# Patient Record
Sex: MALE | Race: WHITE
[De-identification: names, ages, dates, MRNs, and addresses within clinical notes are randomized per-mention and may not be internally consistent; named-entity substitution may affect disease eponyms.]

---

## 2020-01-27 ENCOUNTER — HOSPITAL ENCOUNTER (OUTPATIENT)
Dept: HOSPITAL 47 - RADUSWWP | Age: 55
Discharge: HOME | End: 2020-01-27
Attending: INTERNAL MEDICINE
Payer: MEDICARE

## 2020-01-27 DIAGNOSIS — K80.20: Primary | ICD-10-CM

## 2020-01-27 PROCEDURE — 76705 ECHO EXAM OF ABDOMEN: CPT

## 2020-01-27 NOTE — US
EXAMINATION TYPE: US abdomen limited

 

DATE OF EXAM: 1/27/2020

 

COMPARISON: US 5/21/18

 

CLINICAL HISTORY: R74.8 Elevated liver enzymes.

 

EXAM MEASUREMENTS:

 

Liver Length:  17.7 cm cm   

Gallbladder Wall:  0.4 cm   

CBD:  0.5 cm

Right Kidney:  12.5 x 5.6 x 5.6 cm

 

 

 

Pancreas:  Within normal limits

Liver:  There is increased echogenicity of the hepatic parenchyma with diminished visualization of th
e portal triads most commonly relating to hepatic steatosis and limiting evaluation for underlying he
patic masses. 

Gallbladder:  Filled with gallstones.

**Evidence for sonographic Villarreal's sign:  No

CBD:  wnl 

Right Kidney: No hydronephrosis or nephrolithiasis

 

IMPRESSION: 

1. Cholelithiasis without sonographic evidence of acute cholecystitis.

2. Sonographic findings most commonly related to hepatic steatosis. Correlate with liver function wesley
ts.

## 2023-01-06 ENCOUNTER — HOSPITAL ENCOUNTER (INPATIENT)
Dept: HOSPITAL 47 - EC | Age: 58
LOS: 17 days | Discharge: HOME HEALTH | DRG: 870 | End: 2023-01-23
Attending: HOSPITALIST | Admitting: HOSPITALIST
Payer: MEDICARE

## 2023-01-06 VITALS — BODY MASS INDEX: 26.4 KG/M2

## 2023-01-06 DIAGNOSIS — Z87.820: ICD-10-CM

## 2023-01-06 DIAGNOSIS — G92.8: ICD-10-CM

## 2023-01-06 DIAGNOSIS — K92.2: ICD-10-CM

## 2023-01-06 DIAGNOSIS — G97.1: ICD-10-CM

## 2023-01-06 DIAGNOSIS — J98.11: ICD-10-CM

## 2023-01-06 DIAGNOSIS — Z79.899: ICD-10-CM

## 2023-01-06 DIAGNOSIS — I10: ICD-10-CM

## 2023-01-06 DIAGNOSIS — E78.5: ICD-10-CM

## 2023-01-06 DIAGNOSIS — N17.0: ICD-10-CM

## 2023-01-06 DIAGNOSIS — Z74.1: ICD-10-CM

## 2023-01-06 DIAGNOSIS — I25.10: ICD-10-CM

## 2023-01-06 DIAGNOSIS — Z91.81: ICD-10-CM

## 2023-01-06 DIAGNOSIS — E43: ICD-10-CM

## 2023-01-06 DIAGNOSIS — A41.01: Primary | ICD-10-CM

## 2023-01-06 DIAGNOSIS — D69.6: ICD-10-CM

## 2023-01-06 DIAGNOSIS — F10.239: ICD-10-CM

## 2023-01-06 DIAGNOSIS — E87.6: ICD-10-CM

## 2023-01-06 DIAGNOSIS — I65.29: ICD-10-CM

## 2023-01-06 DIAGNOSIS — J96.02: ICD-10-CM

## 2023-01-06 DIAGNOSIS — G04.90: ICD-10-CM

## 2023-01-06 DIAGNOSIS — E72.20: ICD-10-CM

## 2023-01-06 DIAGNOSIS — E86.0: ICD-10-CM

## 2023-01-06 DIAGNOSIS — Z88.0: ICD-10-CM

## 2023-01-06 DIAGNOSIS — Z91.14: ICD-10-CM

## 2023-01-06 DIAGNOSIS — J15.9: ICD-10-CM

## 2023-01-06 DIAGNOSIS — R57.1: ICD-10-CM

## 2023-01-06 DIAGNOSIS — E87.3: ICD-10-CM

## 2023-01-06 DIAGNOSIS — G93.41: ICD-10-CM

## 2023-01-06 DIAGNOSIS — E83.42: ICD-10-CM

## 2023-01-06 DIAGNOSIS — J96.01: ICD-10-CM

## 2023-01-06 DIAGNOSIS — R77.8: ICD-10-CM

## 2023-01-06 DIAGNOSIS — E83.39: ICD-10-CM

## 2023-01-06 DIAGNOSIS — Z71.3: ICD-10-CM

## 2023-01-06 DIAGNOSIS — E87.0: ICD-10-CM

## 2023-01-06 DIAGNOSIS — I48.0: ICD-10-CM

## 2023-01-06 DIAGNOSIS — Z28.21: ICD-10-CM

## 2023-01-06 LAB
ALBUMIN SERPL-MCNC: 3.9 G/DL (ref 3.5–5)
ALP SERPL-CCNC: 85 U/L (ref 38–126)
ALT SERPL-CCNC: 84 U/L (ref 4–49)
ANION GAP SERPL CALC-SCNC: 27 MMOL/L
APTT BLD: 23.5 SEC (ref 22–30)
AST SERPL-CCNC: 191 U/L (ref 17–59)
BASOPHILS # BLD AUTO: 0.1 K/UL (ref 0–0.2)
BASOPHILS NFR BLD AUTO: 1 %
BILIRUB UR QL STRIP.AUTO: (no result)
BUN SERPL-SCNC: 52 MG/DL (ref 9–20)
CALCIUM SPEC-MCNC: 7.8 MG/DL (ref 8.4–10.2)
CHLORIDE SERPL-SCNC: 98 MMOL/L (ref 98–107)
CO2 BLDA-SCNC: 18 MMOL/L (ref 19–24)
CO2 SERPL-SCNC: 16 MMOL/L (ref 22–30)
EOSINOPHIL # BLD AUTO: 0.1 K/UL (ref 0–0.7)
EOSINOPHIL NFR BLD AUTO: 1 %
ERYTHROCYTE [DISTWIDTH] IN BLOOD BY AUTOMATED COUNT: 5.95 M/UL (ref 4.3–5.9)
ERYTHROCYTE [DISTWIDTH] IN BLOOD: 13.3 % (ref 11.5–15.5)
GLUCOSE BLD-MCNC: 128 MG/DL (ref 70–110)
GLUCOSE SERPL-MCNC: 155 MG/DL (ref 74–99)
HCO3 BLDA-SCNC: 17 MMOL/L (ref 21–25)
HCO3 BLDV-SCNC: 17 MMOL/L (ref 24–28)
HCT VFR BLD AUTO: 57.3 % (ref 39–53)
HGB BLD-MCNC: 18.7 GM/DL (ref 13–17.5)
HYALINE CASTS UR QL AUTO: 218 /LPF (ref 0–2)
INR PPP: 1.4 (ref ?–1.2)
LACTATE BLDV-SCNC: 12.4 MMOL/L (ref 0.7–2)
LYMPHOCYTES # SPEC AUTO: 2.1 K/UL (ref 1–4.8)
LYMPHOCYTES NFR SPEC AUTO: 12 %
MCH RBC QN AUTO: 31.4 PG (ref 25–35)
MCHC RBC AUTO-ENTMCNC: 32.6 G/DL (ref 31–37)
MCV RBC AUTO: 96.4 FL (ref 80–100)
MIXED CELL CASTS UR QL COMP ASSIST: 4 /LPF
MONOCYTES # BLD AUTO: 0.7 K/UL (ref 0–1)
MONOCYTES NFR BLD AUTO: 4 %
NEUTROPHILS # BLD AUTO: 13.9 K/UL (ref 1.3–7.7)
NEUTROPHILS NFR BLD AUTO: 81 %
PCO2 BLDA: 50 MMHG (ref 35–45)
PCO2 BLDV: 54 MMHG (ref 37–51)
PH BLDA: 7.13 [PH] (ref 7.35–7.45)
PH BLDV: 7.12 [PH] (ref 7.31–7.41)
PH UR: 5.5 [PH] (ref 5–8)
PLATELET # BLD AUTO: 150 K/UL (ref 150–450)
PO2 BLDA: 240 MMHG (ref 83–108)
POTASSIUM SERPL-SCNC: 2.7 MMOL/L (ref 3.5–5.1)
PROT SERPL-MCNC: 6.9 G/DL (ref 6.3–8.2)
PROT UR QL: (no result)
PT BLD: 13.8 SEC (ref 9–12)
RBC UR QL: 8 /HPF (ref 0–5)
SODIUM SERPL-SCNC: 141 MMOL/L (ref 137–145)
SP GR UR: 1.03 (ref 1–1.03)
SQUAMOUS UR QL AUTO: 2 /HPF (ref 0–4)
UROBILINOGEN UR QL STRIP: 3 MG/DL (ref ?–2)
WBC # BLD AUTO: 17.2 K/UL (ref 3.8–10.6)
WBC # UR AUTO: 10 /HPF (ref 0–5)

## 2023-01-06 PROCEDURE — 87529 HSV DNA AMP PROBE: CPT

## 2023-01-06 PROCEDURE — 83735 ASSAY OF MAGNESIUM: CPT

## 2023-01-06 PROCEDURE — 95822 EEG COMA OR SLEEP ONLY: CPT

## 2023-01-06 PROCEDURE — 71260 CT THORAX DX C+: CPT

## 2023-01-06 PROCEDURE — 84132 ASSAY OF SERUM POTASSIUM: CPT

## 2023-01-06 PROCEDURE — 87798 DETECT AGENT NOS DNA AMP: CPT

## 2023-01-06 PROCEDURE — 94002 VENT MGMT INPAT INIT DAY: CPT

## 2023-01-06 PROCEDURE — 71045 X-RAY EXAM CHEST 1 VIEW: CPT

## 2023-01-06 PROCEDURE — 93005 ELECTROCARDIOGRAM TRACING: CPT

## 2023-01-06 PROCEDURE — 87498 ENTEROVIRUS PROBE&REVRS TRNS: CPT

## 2023-01-06 PROCEDURE — 82565 ASSAY OF CREATININE: CPT

## 2023-01-06 PROCEDURE — 96365 THER/PROPH/DIAG IV INF INIT: CPT

## 2023-01-06 PROCEDURE — 84157 ASSAY OF PROTEIN OTHER: CPT

## 2023-01-06 PROCEDURE — 5A1955Z RESPIRATORY VENTILATION, GREATER THAN 96 CONSECUTIVE HOURS: ICD-10-PCS

## 2023-01-06 PROCEDURE — 87496 CYTOMEG DNA AMP PROBE: CPT

## 2023-01-06 PROCEDURE — 93306 TTE W/DOPPLER COMPLETE: CPT

## 2023-01-06 PROCEDURE — 80185 ASSAY OF PHENYTOIN TOTAL: CPT

## 2023-01-06 PROCEDURE — 82607 VITAMIN B-12: CPT

## 2023-01-06 PROCEDURE — 83690 ASSAY OF LIPASE: CPT

## 2023-01-06 PROCEDURE — 80306 DRUG TEST PRSMV INSTRMNT: CPT

## 2023-01-06 PROCEDURE — 87070 CULTURE OTHR SPECIMN AEROBIC: CPT

## 2023-01-06 PROCEDURE — 84484 ASSAY OF TROPONIN QUANT: CPT

## 2023-01-06 PROCEDURE — 85610 PROTHROMBIN TIME: CPT

## 2023-01-06 PROCEDURE — 82746 ASSAY OF FOLIC ACID SERUM: CPT

## 2023-01-06 PROCEDURE — 95713 VEEG 2-12 HR CONT MNTR: CPT

## 2023-01-06 PROCEDURE — 80076 HEPATIC FUNCTION PANEL: CPT

## 2023-01-06 PROCEDURE — 82150 ASSAY OF AMYLASE: CPT

## 2023-01-06 PROCEDURE — 74177 CT ABD & PELVIS W/CONTRAST: CPT

## 2023-01-06 PROCEDURE — 71250 CT THORAX DX C-: CPT

## 2023-01-06 PROCEDURE — 36415 COLL VENOUS BLD VENIPUNCTURE: CPT

## 2023-01-06 PROCEDURE — 89050 BODY FLUID CELL COUNT: CPT

## 2023-01-06 PROCEDURE — 80202 ASSAY OF VANCOMYCIN: CPT

## 2023-01-06 PROCEDURE — 86140 C-REACTIVE PROTEIN: CPT

## 2023-01-06 PROCEDURE — 70498 CT ANGIOGRAPHY NECK: CPT

## 2023-01-06 PROCEDURE — 85730 THROMBOPLASTIN TIME PARTIAL: CPT

## 2023-01-06 PROCEDURE — 80320 DRUG SCREEN QUANTALCOHOLS: CPT

## 2023-01-06 PROCEDURE — 84100 ASSAY OF PHOSPHORUS: CPT

## 2023-01-06 PROCEDURE — 84443 ASSAY THYROID STIM HORMONE: CPT

## 2023-01-06 PROCEDURE — 94003 VENT MGMT INPAT SUBQ DAY: CPT

## 2023-01-06 PROCEDURE — 96368 THER/DIAG CONCURRENT INF: CPT

## 2023-01-06 PROCEDURE — 70450 CT HEAD/BRAIN W/O DYE: CPT

## 2023-01-06 PROCEDURE — 93970 EXTREMITY STUDY: CPT

## 2023-01-06 PROCEDURE — 5A09357 ASSISTANCE WITH RESPIRATORY VENTILATION, LESS THAN 24 CONSECUTIVE HOURS, CONTINUOUS POSITIVE AIRWAY PRESSURE: ICD-10-PCS

## 2023-01-06 PROCEDURE — 82272 OCCULT BLD FECES 1-3 TESTS: CPT

## 2023-01-06 PROCEDURE — 87077 CULTURE AEROBIC IDENTIFY: CPT

## 2023-01-06 PROCEDURE — 87186 SC STD MICRODIL/AGAR DIL: CPT

## 2023-01-06 PROCEDURE — 76937 US GUIDE VASCULAR ACCESS: CPT

## 2023-01-06 PROCEDURE — 82805 BLOOD GASES W/O2 SATURATION: CPT

## 2023-01-06 PROCEDURE — 85027 COMPLETE CBC AUTOMATED: CPT

## 2023-01-06 PROCEDURE — 31500 INSERT EMERGENCY AIRWAY: CPT

## 2023-01-06 PROCEDURE — 81001 URINALYSIS AUTO W/SCOPE: CPT

## 2023-01-06 PROCEDURE — 70553 MRI BRAIN STEM W/O & W/DYE: CPT

## 2023-01-06 PROCEDURE — 72131 CT LUMBAR SPINE W/O DYE: CPT

## 2023-01-06 PROCEDURE — 80053 COMPREHEN METABOLIC PANEL: CPT

## 2023-01-06 PROCEDURE — 94760 N-INVAS EAR/PLS OXIMETRY 1: CPT

## 2023-01-06 PROCEDURE — 5A2204Z RESTORATION OF CARDIAC RHYTHM, SINGLE: ICD-10-PCS

## 2023-01-06 PROCEDURE — 82945 GLUCOSE OTHER FLUID: CPT

## 2023-01-06 PROCEDURE — 80048 BASIC METABOLIC PNL TOTAL CA: CPT

## 2023-01-06 PROCEDURE — 70496 CT ANGIOGRAPHY HEAD: CPT

## 2023-01-06 PROCEDURE — 83605 ASSAY OF LACTIC ACID: CPT

## 2023-01-06 PROCEDURE — 84145 PROCALCITONIN (PCT): CPT

## 2023-01-06 PROCEDURE — 87252 VIRUS INOCULATION TISSUE: CPT

## 2023-01-06 PROCEDURE — 82803 BLOOD GASES ANY COMBINATION: CPT

## 2023-01-06 PROCEDURE — 76770 US EXAM ABDO BACK WALL COMP: CPT

## 2023-01-06 PROCEDURE — 99291 CRITICAL CARE FIRST HOUR: CPT

## 2023-01-06 PROCEDURE — 87324 CLOSTRIDIUM AG IA: CPT

## 2023-01-06 PROCEDURE — 87205 SMEAR GRAM STAIN: CPT

## 2023-01-06 PROCEDURE — 36410 VNPNXR 3YR/> PHY/QHP DX/THER: CPT

## 2023-01-06 PROCEDURE — 84439 ASSAY OF FREE THYROXINE: CPT

## 2023-01-06 PROCEDURE — 0BH17EZ INSERTION OF ENDOTRACHEAL AIRWAY INTO TRACHEA, VIA NATURAL OR ARTIFICIAL OPENING: ICD-10-PCS

## 2023-01-06 PROCEDURE — 85025 COMPLETE CBC W/AUTO DIFF WBC: CPT

## 2023-01-06 PROCEDURE — 96361 HYDRATE IV INFUSION ADD-ON: CPT

## 2023-01-06 PROCEDURE — 92960 CARDIOVERSION ELECTRIC EXT: CPT

## 2023-01-06 PROCEDURE — 82140 ASSAY OF AMMONIA: CPT

## 2023-01-06 PROCEDURE — 87040 BLOOD CULTURE FOR BACTERIA: CPT

## 2023-01-06 PROCEDURE — 95816 EEG AWAKE AND DROWSY: CPT

## 2023-01-06 PROCEDURE — 36600 WITHDRAWAL OF ARTERIAL BLOOD: CPT

## 2023-01-06 RX ADMIN — SODIUM CHLORIDE SCH MLS/HR: 900 INJECTION, SOLUTION INTRAVENOUS at 23:30

## 2023-01-06 RX ADMIN — MIDAZOLAM SCH MLS/HR: 5 INJECTION INTRAMUSCULAR; INTRAVENOUS at 23:29

## 2023-01-06 RX ADMIN — CEFAZOLIN SCH MLS/HR: 330 INJECTION, POWDER, FOR SOLUTION INTRAMUSCULAR; INTRAVENOUS at 23:05

## 2023-01-06 RX ADMIN — POTASSIUM CHLORIDE SCH MLS/HR: 7.46 INJECTION, SOLUTION INTRAVENOUS at 23:42

## 2023-01-06 NOTE — CT
EXAMINATION TYPE: CT angio head neck

 

DATE OF EXAM: 1/6/2023

 

COMPARISON: None

 

HISTORY: AMS

 

CT DLP: 551.5 mGycm

Automated exposure control for dose reduction was used.

 

CONTRAST: 

Performed with IV Contrast, patient injected with 65 mL of Isovue 370.

 

 

Images obtained from the aortic arch to the vertex of the brain with IV contrast.

 

Three-D postprocessed images.

 

There is arterial flow in the thoracic aorta and the aorta shows normal branching pattern. There is a
rterial flow in the subclavian arteries bilaterally. There is arterial flow in the common internal an
d external carotid arteries bilaterally. There is some plaque formation at the carotid bifurcations a
nd approximately 25% stenosis at the origin of the left internal carotid artery. There is arterial fl
ow in both vertebral arteries. There is arterial flow in the vertebrobasilar artery system. No mass e
ffect. No evidence of carotid or vertebral artery aneurysm or dissection.

 

There is arterial flow in the anterior middle and posterior cerebral arteries bilaterally. No evidenc
e of intracranial aneurysm or neovascularity. No evidence of intracranial hemodynamic arterial stenos
is. No pathologic enhancement. There is normal enhancement of the venous sinuses.

 

IMPRESSION:

Negative CT angiogram of the brain.

 

Mild plaque at the carotid artery bifurcations. No evidence of hemodynamic arterial stenosis in the n
shree.

## 2023-01-06 NOTE — ED
Altered Mental Status HPI





- General


Chief Complaint: Altered Mental Status


Stated Complaint: Altered Mental


Time Seen by Provider: 01/06/23 20:15


Source: EMS


Mode of arrival: EMS





- History of Present Illness


Initial Comments: 





57-year-old male with past medical history of alcohol abuse presents to the 

emergency department as a possible code stroke.  EMS states that the patient was

at home, acting his normal self around 7:30.  It is then reported that his 

roommate checked out him at 8 PM and found face first down in bed unresponsive. 

They found the patient to be hypotensive.  They required bagging him into the 

emergency department because of his agonal respirations.  It was reported that 

the patient is an alcoholic and was on a binge recently.  Daughter and friend 

arrived to the emergency department.  They state that the patient has not had 

anything to drink since the first.  He has had significant nausea and vomiting. 

They state that today he was vomiting until he could no longer stand and slumped

down.  This is when they called EMS.  The remainder of the HPI is limited due to

the patient's obtunded status





- Related Data


                                Home Medications











 Medication  Instructions  Recorded  Confirmed


 


Metoclopramide [Reglan] 5 mg PO QID PRN 01/07/23 01/07/23


 


Metoprolol Succinate (ER) [Toprol 50 mg PO DAILY 01/07/23 01/07/23





XL]   


 


Omeprazole [PriLOSEC] 20 mg PO DAILY 01/07/23 01/07/23


 


Potassium Chloride ER [K-Dur 20] 20 meq PO BID 01/07/23 01/07/23


 


Pravastatin Sodium [Pravachol] 40 mg PO DAILY 01/07/23 01/07/23


 


methocarbamoL [Methocarbamol] 750 mg PO TID PRN 01/07/23 01/07/23








                                  Previous Rx's











 Medication  Instructions  Recorded


 


Hydrocodone/Acetaminophen [Norco 2 each PO Q6HR PRN #20 tab 06/17/17





5-325]  











                                    Allergies











Allergy/AdvReac Type Severity Reaction Status Date / Time


 


Penicillins Allergy  Unknown Verified 01/07/23 12:59





   Childhood  














Review of Systems


ROS Statement: 


Those systems with pertinent positive or pertinent negative responses have been 

documented in the HPI.





ROS Other: All systems not noted in ROS Statement are negative.





Past Medical History


Past Medical History: Hypertension


History of Any Multi-Drug Resistant Organisms: None Reported


Past Surgical History: Appendectomy, Orthopedic Surgery, Tonsillectomy


Past Psychological History: No Psychological Hx Reported


Past Alcohol Use History: None Reported


Past Drug Use History: None Reported





- Past Family History


  ** family


Family Medical History: Cancer


Additional Family Medical History / Comment(s): no CAD





General Exam


Limitations: altered mental status


General appearance: obtunded


Head exam: Present: atraumatic, normocephalic, normal inspection


Pupils: Present: mydriatic


ENT exam: Present: mucous membranes dry


Neck exam: Present: normal inspection


Respiratory exam: Present: other (agonal breathing)


Cardiovascular Exam: Present: tachycardia, irregular rhythm


Neurological exam: Present: altered, other (will not follow commands. 

nonsensical speech. no lateralizing deficits appreaciated)


Psychiatric exam: Present: agitated


Skin exam: Present: diaphoretic, mottled





Course


                                   Vital Signs











  01/06/23 01/06/23 01/06/23





  20:16 20:26 20:36


 


Temperature 97.0 F L  


 


Pulse Rate 170 H 168 H 184 H


 


Respiratory 18 16 1 L





Rate   


 


Blood Pressure 97/80 87/69 95/45


 


O2 Sat by Pulse 90 L 89 L 





Oximetry   


 


Fraction of   





Inspired Oxygen   





(FIO2)   














  01/06/23 01/06/23 01/06/23





  20:37 20:40 20:41


 


Temperature   


 


Pulse Rate 203 H  197 H


 


Respiratory 7 L  18





Rate   


 


Blood Pressure 57/42  63/26


 


O2 Sat by Pulse   100





Oximetry   


 


Fraction of  100 





Inspired Oxygen   





(FIO2)   














  01/06/23 01/06/23 01/06/23





  20:45 21:14 21:17


 


Temperature   


 


Pulse Rate 110 H 104 H 


 


Respiratory 18 16 





Rate   


 


Blood Pressure 104/65 131/79 


 


O2 Sat by Pulse 100 99 





Oximetry   


 


Fraction of   100





Inspired Oxygen   





(FIO2)   














  01/06/23 01/06/23 01/06/23





  22:00 23:00 23:11


 


Temperature   


 


Pulse Rate 102 H 101 H 93


 


Respiratory 18 18 18





Rate   


 


Blood Pressure 130/84 80/56 108/93


 


O2 Sat by Pulse 98 98 100





Oximetry   


 


Fraction of 70  





Inspired Oxygen   





(FIO2)   














  01/07/23





  00:00


 


Temperature 


 


Pulse Rate 91


 


Respiratory 31 H





Rate 


 


Blood Pressure 114/87


 


O2 Sat by Pulse 98





Oximetry 


 


Fraction of 





Inspired Oxygen 





(FIO2) 














- Reevaluation(s)


Reevaluation #1: 


Spoke with Dr. Turner - will await stabilization of the patient and CT/Ct 

angiography


01/06/23 2030








Reevaluation #2: 


Patient intubated, cardioverted and now off to CT


01/06/23 20:52








Reevaluation #3: 





01/06/23 21:29


Spoke with Dr. Turner - will not administer TPA. No CVA seen on CT. Patient 

likely has other diagnosis to explain AMS. Risks outweigh benefits of TPA. 








Reevaluation #4: 


Spoke with Dr. Chavez, agrees to admit patient. 


01/06/23 23:16











Procedures





- Procedures


Initial comment: 


Synchronized cardioversion performed at 150J. Patient already intubated and 

sedated prior to procedure. Performed due to hemodynamic instability. No consent

obtained due to emergent nature. 





- ABG Interpretation


Ph: 7.1


PCO2: 50


PO2: 240


Bicarbonate: 16.5


Interpretation: metabolic acidosis





- Intubation


Sedative: Etomidate


Mg Given: 20


Paralytic: Rocuronium


Mg Given: 50


Laryngoscope: fiber optic video scope


Size: 3


ET Tube Size: 7


ET Tube Uncuffed: No


Tube Secured Depth (cm): 24


Tube Secured Location: lips


Tube Placement Confirmation: visualized tube passing through cords, equal breath

sounds bilaterally, no breath sounds over epigastrium, confirmation by 

capnometry


Patient Tolerated Procedure: well, no complications





Medical Decision Making





- Medical Decision Making





Was pt. sent in by a medical professional or institution?


no


Did you speak to anyone other than the patient for history?  


girlfriend, daughter, ems


Did you review nursing and triage notes? 


yes and I agree


Were old charts reviewed? 


yes in an attempt to gain history on the patient


Differential Diagnosis? 


MDM Differential Altered Mental Status:


Hypoglycemia, DKA, hypercapnia, ETOH, overdose, CO poisoning, trauma, myxedema 

coma, HTN encephalopathy, infection, encephalitis, psychosis, intercranial 

hemorrhage, hepatic encephalopathy, meningitis, CVA this is not meant to be an 

all-inclusive list





EKG interpreted by me (3pts min.)?


yes


X-rays interpreted by me (1pt min.)?


yes


CT interpreted by me (1pt min.)?


yes


U/S interpreted by me (1pt. min.)?


no


What testing was considered but not performed? (CT, X-rays, U/S, labs)? Why?


none


What meds were considered but not given? Why?


none


Did you discuss the management of the patient with other professionals?


intensivist and admitting physician


Did you reconcile home meds?


yes


Was smoking cessation discussed for >3mins.?


no


Was critical care preformed (if so, how long)?


48 minutes


Were there social determinants of health that impacted care today? How? 

(Homelessness, low income, unemployed, alcoholism, drug addiction, 

transportation, low edu. Level, literacy, decrease access to med. care, long term, 

rehab)?


alcoholism put the patient into withdrawal subsequently leading to ER visit


Was there de-escalation of care discussed even if they declined? (Discuss DNR or

withdrawal of care, Hospice)?


no


What co-morbidities impacted this encounter? (DM, HTN, Smoking, COPD, CAD, 

Cancer, CVA, Hep., AIDS, mental health diagnosis, sleep apnea, morbid obesity)?


alcohol abuse


Was patient admitted / discharged?


@Upon arrival the patient was promptly placed into trauma 2.  He is placed on 

continuous pulse ox and cardiac monitoring.  Vitals are obtained.  Patient does 

have a heart rate of 220.  Blood pressure is 50/30.  Patient has agonal 

respirations.  He does not follow any commands.  Do attempt to assess an NIH 

which is 21.  Code stroke was activated as it is reported to me that the patient

had sudden onset of altered mental status.  Due to the patient's inability to 

protect his airway, he is intubated.  At 7-Yoruba tube is secured 24 cm at the 

lip.  The patient is then cardioverted due to his hypotension and elevated heart

rate.  He does have conversion to a normal sinus rhythm with normal blood 

pressure.  He is given 3 L bolus of normal saline.  Patient taken for CT of his 

brain, CT angiography is head and neck, CT angios chest abdomen pelvis due to 

his altered mental status, hypotension, mottled extremities.  CTs are read as 

negative.  Did discuss the case with Dr. Turner.  Risk of alteplase 

administration is too high in regards to the benefits of treatment and therefore

this is not administered to the patient.  Laboratory studies reveal a potassium 

of 2.7.  Lactic of 12.4.  Troponin of 0.304.  Patient was originally placed on a

propofol drip however he does have a markedly decrease in his blood pressure 

with this medication.  This is removed and the patient is started on a Versed 

and fentanyl drip.  I did discuss the patient's care with Dr. Villanueva who will 

accept admission of the patient.  I also spoke with Dr. Chavez who accepted 

the patient into ICU.  He will be placed on 150 mL of normal saline per hour.  

He is on a Versed drip for alcohol withdrawal.  Cardiology and nephrology 

consulted.  Patient taken to the floor in stable condition with guarded p

rognosis


Undiagnosed new problem with uncertain prognosis?


yes


Drug Therapy requiring intensive monitoring for toxicity (Heparin, Nitro, 

Insulin, Cardizem)?


yes - propofol gtt which is later switched to fentanyl and versed gtt


Were any procedures done?


Intubation, synchronized cardioversion


Diagnosis/symptom?


encephalopathy with vent dependance


Acute, or Chronic, or Acute on Chronic?


acute


Uncomplicated (without systemic symptoms) or Complicated (systemic symptoms)?


complicated


Side effects of treatment?


possible death with intubation


Exacerbation, Progression, or Severe Exacerbation]


no


Poses a threat to life or bodily function?


yes


Diagnosis/symptom?


new onset afib with rvr


Acute, or Chronic, or Acute on Chronic?


acute


Uncomplicated (without systemic symptoms) or Complicated (systemic symptoms)?


complicated


Side effects of treatment?


possible death with cardioversion


Exacerbation, Progression, or Severe Exacerbation]


no


Poses a threat to life or bodily function?


yes


Diagnosis/symptom?


acute hypotension


Acute, or Chronic, or Acute on Chronic?


acute


Uncomplicated (without systemic symptoms) or Complicated (systemic symptoms)?


complicated


Side effects of treatment?


possible death with cardioversion


Exacerbation, Progression, or Severe Exacerbation]


no


Poses a threat to life or bodily function?


yes





- Lab Data


Result diagrams: 


                                 01/16/23 05:00





                                 01/16/23 15:10


                                   Lab Results











  01/06/23 01/06/23 01/06/23 Range/Units





  20:35 21:19 21:19 


 


WBC     (3.8-10.6)  k/uL


 


RBC     (4.30-5.90)  m/uL


 


Hgb     (13.0-17.5)  gm/dL


 


Hct     (39.0-53.0)  %


 


MCV     (80.0-100.0)  fL


 


MCH     (25.0-35.0)  pg


 


MCHC     (31.0-37.0)  g/dL


 


RDW     (11.5-15.5)  %


 


Plt Count     (150-450)  k/uL


 


MPV     


 


Neutrophils %     %


 


Lymphocytes %     %


 


Monocytes %     %


 


Eosinophils %     %


 


Basophils %     %


 


Neutrophils #     (1.3-7.7)  k/uL


 


Lymphocytes #     (1.0-4.8)  k/uL


 


Monocytes #     (0-1.0)  k/uL


 


Eosinophils #     (0-0.7)  k/uL


 


Basophils #     (0-0.2)  k/uL


 


PT     (9.0-12.0)  sec


 


INR     (<1.2)  


 


APTT     (22.0-30.0)  sec


 


Sample Site     


 


ABG pH     (7.35-7.45)  


 


ABG pCO2     (35-45)  mmHg


 


ABG pO2     ()  mmHg


 


ABG HCO3     (21-25)  mmol/L


 


ABG Total CO2     (19-24)  mmol/L


 


ABG O2 Saturation     (94-97)  %


 


ABG Base Excess     mmol/L


 


Hai Test     


 


VBG pH     (7.31-7.41)  


 


VBG pCO2     (37-51)  mmHg


 


VBG HCO3     (24-28)  mmol/L


 


FiO2     %


 


Sodium     (137-145)  mmol/L


 


Potassium     (3.5-5.1)  mmol/L


 


Chloride     ()  mmol/L


 


Carbon Dioxide     (22-30)  mmol/L


 


Anion Gap     mmol/L


 


BUN     (9-20)  mg/dL


 


Creatinine     (0.66-1.25)  mg/dL


 


Est GFR (CKD-EPI)AfAm     (>60 ml/min/1.73 sqM)  


 


Est GFR (CKD-EPI)NonAf     (>60 ml/min/1.73 sqM)  


 


Glucose     (74-99)  mg/dL


 


POC Glucose (mg/dL)  128 H    ()  mg/dL


 


POC Glu Operater ID  Warren Buchanan    


 


Lactic Ac Sepsis Rflx     


 


Plasma Lactic Acid Jose R     (0.7-2.0)  mmol/L


 


Calcium     (8.4-10.2)  mg/dL


 


Total Bilirubin     (0.2-1.3)  mg/dL


 


AST     (17-59)  U/L


 


ALT     (4-49)  U/L


 


Alkaline Phosphatase     ()  U/L


 


Ammonia     (<30)  umol/L


 


Troponin I     (0.000-0.034)  ng/mL


 


Total Protein     (6.3-8.2)  g/dL


 


Albumin     (3.5-5.0)  g/dL


 


Urine Color    Dark Yellow  


 


Urine Appearance    Cloudy  (Clear)  


 


Urine pH    5.5  (5.0-8.0)  


 


Ur Specific Gravity    1.034  (1.001-1.035)  


 


Urine Protein    2+ H  (Negative)  


 


Urine Glucose (UA)    Trace H  (Negative)  


 


Urine Ketones    Negative  (Negative)  


 


Urine Blood    Moderate H  (Negative)  


 


Urine Nitrite    Negative  (Negative)  


 


Urine Bilirubin    1+ H  (Negative)  


 


Urine Urobilinogen    3.0  (<2.0)  mg/dL


 


Ur Leukocyte Esterase    Negative  (Negative)  


 


Urine RBC    8 H  (0-5)  /hpf


 


Urine WBC    10 H  (0-5)  /hpf


 


Ur Squamous Epith Cells    2  (0-4)  /hpf


 


Cellular Casts    4  (0)  /lpf


 


Hyaline Casts    218 H  (0-2)  /lpf


 


Urine Mucus    Many H  (None)  /hpf


 


Urine Opiates Screen   Not Detected   (NotDetected)  


 


Ur Oxycodone Screen   Not Detected   (NotDetected)  


 


Urine Methadone Screen   Not Detected   (NotDetected)  


 


Ur Propoxyphene Screen   Not Detected   (NotDetected)  


 


Ur Barbiturates Screen   Not Detected   (NotDetected)  


 


U Tricyclic Antidepress   Not Detected   (NotDetected)  


 


Ur Phencyclidine Scrn   Not Detected   (NotDetected)  


 


Ur Amphetamines Screen   Not Detected   (NotDetected)  


 


U Methamphetamines Scrn   Not Detected   (NotDetected)  


 


U Benzodiazepines Scrn   Detected H   (NotDetected)  


 


Urine Cocaine Screen   Not Detected   (NotDetected)  


 


U Marijuana (THC) Screen   Detected H   (NotDetected)  


 


Serum Alcohol     mg/dL














  01/06/23 01/06/23 01/06/23 Range/Units





  21:43 22:00 22:00 


 


WBC    17.2 H  (3.8-10.6)  k/uL


 


RBC    5.95 H  (4.30-5.90)  m/uL


 


Hgb    18.7 H  (13.0-17.5)  gm/dL


 


Hct    57.3 H*  (39.0-53.0)  %


 


MCV    96.4  (80.0-100.0)  fL


 


MCH    31.4  (25.0-35.0)  pg


 


MCHC    32.6  (31.0-37.0)  g/dL


 


RDW    13.3  (11.5-15.5)  %


 


Plt Count    150  (150-450)  k/uL


 


MPV    10.4  


 


Neutrophils %    81  %


 


Lymphocytes %    12  %


 


Monocytes %    4  %


 


Eosinophils %    1  %


 


Basophils %    1  %


 


Neutrophils #    13.9 H  (1.3-7.7)  k/uL


 


Lymphocytes #    2.1  (1.0-4.8)  k/uL


 


Monocytes #    0.7  (0-1.0)  k/uL


 


Eosinophils #    0.1  (0-0.7)  k/uL


 


Basophils #    0.1  (0-0.2)  k/uL


 


PT     (9.0-12.0)  sec


 


INR     (<1.2)  


 


APTT     (22.0-30.0)  sec


 


Sample Site  Right Radial    


 


ABG pH  7.13 L*    (7.35-7.45)  


 


ABG pCO2  50 H    (35-45)  mmHg


 


ABG pO2  240 H    ()  mmHg


 


ABG HCO3  17 L    (21-25)  mmol/L


 


ABG Total CO2  18 L    (19-24)  mmol/L


 


ABG O2 Saturation  98.8 H    (94-97)  %


 


ABG Base Excess  -12.7    mmol/L


 


Hai Test  Yes    


 


VBG pH   7.12 L*   (7.31-7.41)  


 


VBG pCO2   54 H   (37-51)  mmHg


 


VBG HCO3   17 L   (24-28)  mmol/L


 


FiO2  100    %


 


Sodium     (137-145)  mmol/L


 


Potassium     (3.5-5.1)  mmol/L


 


Chloride     ()  mmol/L


 


Carbon Dioxide     (22-30)  mmol/L


 


Anion Gap     mmol/L


 


BUN     (9-20)  mg/dL


 


Creatinine     (0.66-1.25)  mg/dL


 


Est GFR (CKD-EPI)AfAm     (>60 ml/min/1.73 sqM)  


 


Est GFR (CKD-EPI)NonAf     (>60 ml/min/1.73 sqM)  


 


Glucose     (74-99)  mg/dL


 


POC Glucose (mg/dL)     ()  mg/dL


 


POC Glu Operater ID     


 


Lactic Ac Sepsis Rflx     


 


Plasma Lactic Acid Jose R     (0.7-2.0)  mmol/L


 


Calcium     (8.4-10.2)  mg/dL


 


Total Bilirubin     (0.2-1.3)  mg/dL


 


AST     (17-59)  U/L


 


ALT     (4-49)  U/L


 


Alkaline Phosphatase     ()  U/L


 


Ammonia     (<30)  umol/L


 


Troponin I     (0.000-0.034)  ng/mL


 


Total Protein     (6.3-8.2)  g/dL


 


Albumin     (3.5-5.0)  g/dL


 


Urine Color     


 


Urine Appearance     (Clear)  


 


Urine pH     (5.0-8.0)  


 


Ur Specific Gravity     (1.001-1.035)  


 


Urine Protein     (Negative)  


 


Urine Glucose (UA)     (Negative)  


 


Urine Ketones     (Negative)  


 


Urine Blood     (Negative)  


 


Urine Nitrite     (Negative)  


 


Urine Bilirubin     (Negative)  


 


Urine Urobilinogen     (<2.0)  mg/dL


 


Ur Leukocyte Esterase     (Negative)  


 


Urine RBC     (0-5)  /hpf


 


Urine WBC     (0-5)  /hpf


 


Ur Squamous Epith Cells     (0-4)  /hpf


 


Cellular Casts     (0)  /lpf


 


Hyaline Casts     (0-2)  /lpf


 


Urine Mucus     (None)  /hpf


 


Urine Opiates Screen     (NotDetected)  


 


Ur Oxycodone Screen     (NotDetected)  


 


Urine Methadone Screen     (NotDetected)  


 


Ur Propoxyphene Screen     (NotDetected)  


 


Ur Barbiturates Screen     (NotDetected)  


 


U Tricyclic Antidepress     (NotDetected)  


 


Ur Phencyclidine Scrn     (NotDetected)  


 


Ur Amphetamines Screen     (NotDetected)  


 


U Methamphetamines Scrn     (NotDetected)  


 


U Benzodiazepines Scrn     (NotDetected)  


 


Urine Cocaine Screen     (NotDetected)  


 


U Marijuana (THC) Screen     (NotDetected)  


 


Serum Alcohol     mg/dL














  01/06/23 01/06/23 01/06/23 Range/Units





  22:00 22:00 22:00 


 


WBC     (3.8-10.6)  k/uL


 


RBC     (4.30-5.90)  m/uL


 


Hgb     (13.0-17.5)  gm/dL


 


Hct     (39.0-53.0)  %


 


MCV     (80.0-100.0)  fL


 


MCH     (25.0-35.0)  pg


 


MCHC     (31.0-37.0)  g/dL


 


RDW     (11.5-15.5)  %


 


Plt Count     (150-450)  k/uL


 


MPV     


 


Neutrophils %     %


 


Lymphocytes %     %


 


Monocytes %     %


 


Eosinophils %     %


 


Basophils %     %


 


Neutrophils #     (1.3-7.7)  k/uL


 


Lymphocytes #     (1.0-4.8)  k/uL


 


Monocytes #     (0-1.0)  k/uL


 


Eosinophils #     (0-0.7)  k/uL


 


Basophils #     (0-0.2)  k/uL


 


PT  13.8 H    (9.0-12.0)  sec


 


INR  1.4 H    (<1.2)  


 


APTT  23.5    (22.0-30.0)  sec


 


Sample Site     


 


ABG pH     (7.35-7.45)  


 


ABG pCO2     (35-45)  mmHg


 


ABG pO2     ()  mmHg


 


ABG HCO3     (21-25)  mmol/L


 


ABG Total CO2     (19-24)  mmol/L


 


ABG O2 Saturation     (94-97)  %


 


ABG Base Excess     mmol/L


 


Hai Test     


 


VBG pH     (7.31-7.41)  


 


VBG pCO2     (37-51)  mmHg


 


VBG HCO3     (24-28)  mmol/L


 


FiO2     %


 


Sodium    141  (137-145)  mmol/L


 


Potassium    2.7 L*  (3.5-5.1)  mmol/L


 


Chloride    98  ()  mmol/L


 


Carbon Dioxide    16 L  (22-30)  mmol/L


 


Anion Gap    27  mmol/L


 


BUN    52 H  (9-20)  mg/dL


 


Creatinine    3.66 H  (0.66-1.25)  mg/dL


 


Est GFR (CKD-EPI)AfAm    20  (>60 ml/min/1.73 sqM)  


 


Est GFR (CKD-EPI)NonAf    17  (>60 ml/min/1.73 sqM)  


 


Glucose    155 H  (74-99)  mg/dL


 


POC Glucose (mg/dL)     ()  mg/dL


 


POC Glu Operater ID     


 


Lactic Ac Sepsis Rflx     


 


Plasma Lactic Acid Jose R     (0.7-2.0)  mmol/L


 


Calcium    7.8 L  (8.4-10.2)  mg/dL


 


Total Bilirubin    3.7 H  (0.2-1.3)  mg/dL


 


AST    191 H  (17-59)  U/L


 


ALT    84 H  (4-49)  U/L


 


Alkaline Phosphatase    85  ()  U/L


 


Ammonia     (<30)  umol/L


 


Troponin I   0.304 H*   (0.000-0.034)  ng/mL


 


Total Protein    6.9  (6.3-8.2)  g/dL


 


Albumin    3.9  (3.5-5.0)  g/dL


 


Urine Color     


 


Urine Appearance     (Clear)  


 


Urine pH     (5.0-8.0)  


 


Ur Specific Gravity     (1.001-1.035)  


 


Urine Protein     (Negative)  


 


Urine Glucose (UA)     (Negative)  


 


Urine Ketones     (Negative)  


 


Urine Blood     (Negative)  


 


Urine Nitrite     (Negative)  


 


Urine Bilirubin     (Negative)  


 


Urine Urobilinogen     (<2.0)  mg/dL


 


Ur Leukocyte Esterase     (Negative)  


 


Urine RBC     (0-5)  /hpf


 


Urine WBC     (0-5)  /hpf


 


Ur Squamous Epith Cells     (0-4)  /hpf


 


Cellular Casts     (0)  /lpf


 


Hyaline Casts     (0-2)  /lpf


 


Urine Mucus     (None)  /hpf


 


Urine Opiates Screen     (NotDetected)  


 


Ur Oxycodone Screen     (NotDetected)  


 


Urine Methadone Screen     (NotDetected)  


 


Ur Propoxyphene Screen     (NotDetected)  


 


Ur Barbiturates Screen     (NotDetected)  


 


U Tricyclic Antidepress     (NotDetected)  


 


Ur Phencyclidine Scrn     (NotDetected)  


 


Ur Amphetamines Screen     (NotDetected)  


 


U Methamphetamines Scrn     (NotDetected)  


 


U Benzodiazepines Scrn     (NotDetected)  


 


Urine Cocaine Screen     (NotDetected)  


 


U Marijuana (THC) Screen     (NotDetected)  


 


Serum Alcohol    <10  mg/dL














  01/06/23 01/06/23 Range/Units





  22:00 22:49 


 


WBC    (3.8-10.6)  k/uL


 


RBC    (4.30-5.90)  m/uL


 


Hgb    (13.0-17.5)  gm/dL


 


Hct    (39.0-53.0)  %


 


MCV    (80.0-100.0)  fL


 


MCH    (25.0-35.0)  pg


 


MCHC    (31.0-37.0)  g/dL


 


RDW    (11.5-15.5)  %


 


Plt Count    (150-450)  k/uL


 


MPV    


 


Neutrophils %    %


 


Lymphocytes %    %


 


Monocytes %    %


 


Eosinophils %    %


 


Basophils %    %


 


Neutrophils #    (1.3-7.7)  k/uL


 


Lymphocytes #    (1.0-4.8)  k/uL


 


Monocytes #    (0-1.0)  k/uL


 


Eosinophils #    (0-0.7)  k/uL


 


Basophils #    (0-0.2)  k/uL


 


PT    (9.0-12.0)  sec


 


INR    (<1.2)  


 


APTT    (22.0-30.0)  sec


 


Sample Site    


 


ABG pH    (7.35-7.45)  


 


ABG pCO2    (35-45)  mmHg


 


ABG pO2    ()  mmHg


 


ABG HCO3    (21-25)  mmol/L


 


ABG Total CO2    (19-24)  mmol/L


 


ABG O2 Saturation    (94-97)  %


 


ABG Base Excess    mmol/L


 


Hai Test    


 


VBG pH    (7.31-7.41)  


 


VBG pCO2    (37-51)  mmHg


 


VBG HCO3    (24-28)  mmol/L


 


FiO2    %


 


Sodium    (137-145)  mmol/L


 


Potassium    (3.5-5.1)  mmol/L


 


Chloride    ()  mmol/L


 


Carbon Dioxide    (22-30)  mmol/L


 


Anion Gap    mmol/L


 


BUN    (9-20)  mg/dL


 


Creatinine    (0.66-1.25)  mg/dL


 


Est GFR (CKD-EPI)AfAm    (>60 ml/min/1.73 sqM)  


 


Est GFR (CKD-EPI)NonAf    (>60 ml/min/1.73 sqM)  


 


Glucose    (74-99)  mg/dL


 


POC Glucose (mg/dL)    ()  mg/dL


 


POC Glu Operater ID    


 


Lactic Ac Sepsis Rflx   Y  


 


Plasma Lactic Acid Jose R  12.4 H*   (0.7-2.0)  mmol/L


 


Calcium    (8.4-10.2)  mg/dL


 


Total Bilirubin    (0.2-1.3)  mg/dL


 


AST    (17-59)  U/L


 


ALT    (4-49)  U/L


 


Alkaline Phosphatase    ()  U/L


 


Ammonia  81 H   (<30)  umol/L


 


Troponin I    (0.000-0.034)  ng/mL


 


Total Protein    (6.3-8.2)  g/dL


 


Albumin    (3.5-5.0)  g/dL


 


Urine Color    


 


Urine Appearance    (Clear)  


 


Urine pH    (5.0-8.0)  


 


Ur Specific Gravity    (1.001-1.035)  


 


Urine Protein    (Negative)  


 


Urine Glucose (UA)    (Negative)  


 


Urine Ketones    (Negative)  


 


Urine Blood    (Negative)  


 


Urine Nitrite    (Negative)  


 


Urine Bilirubin    (Negative)  


 


Urine Urobilinogen    (<2.0)  mg/dL


 


Ur Leukocyte Esterase    (Negative)  


 


Urine RBC    (0-5)  /hpf


 


Urine WBC    (0-5)  /hpf


 


Ur Squamous Epith Cells    (0-4)  /hpf


 


Cellular Casts    (0)  /lpf


 


Hyaline Casts    (0-2)  /lpf


 


Urine Mucus    (None)  /hpf


 


Urine Opiates Screen    (NotDetected)  


 


Ur Oxycodone Screen    (NotDetected)  


 


Urine Methadone Screen    (NotDetected)  


 


Ur Propoxyphene Screen    (NotDetected)  


 


Ur Barbiturates Screen    (NotDetected)  


 


U Tricyclic Antidepress    (NotDetected)  


 


Ur Phencyclidine Scrn    (NotDetected)  


 


Ur Amphetamines Screen    (NotDetected)  


 


U Methamphetamines Scrn    (NotDetected)  


 


U Benzodiazepines Scrn    (NotDetected)  


 


Urine Cocaine Screen    (NotDetected)  


 


U Marijuana (THC) Screen    (NotDetected)  


 


Serum Alcohol    mg/dL














- EKG Data


EKG Comments: 


EKG done at 2036 demonstrates A. fib with a rapid rate.  Rate of 217.  . 

QTC of 331.  There is ST elevation aVR.  ST depression in 2, 3, aVF, V3 through 

V6





Repeat EKG done at 2043 demonstrates sinus tachycardia with a rate of 109.  HI 

interval 137.  .  .  ST depression 2, 3, aVF and V4 through V6.  M

ild aVR elevation








Critical Care Time


Critical Care Time: Yes


Critical Care Time: 





48 minutes





Disposition


Clinical Impression: 


 Ventilator dependent, Hypokalemia, Atrial fibrillation with RVR, Elevated 

troponin, Lactic acidosis, Kidney failure, Hypotension, Alcohol withdrawal





Disposition: ADMITTED AS IP TO THIS [facility]


Condition: Serious


Is patient prescribed a controlled substance at d/c from ED?: No


Time of Disposition: 23:16


Decision to Admit Reason: Admit from EC


Decision Date: 01/06/23


Decision Time: 23:16

## 2023-01-06 NOTE — CT
EXAMINATION TYPE: CT brain wo con

 

DATE OF EXAM: 1/6/2023

 

COMPARISON: 6/17/2017

 

HISTORY: AMS. CODE STROKE

 

CT DLP: 1126.4 mGycm

Automated exposure control for dose reduction was used.

 

Images obtained of the brain with no contrast.

 

There is cerebral cortical atrophy. There is no mass effect or midline shift. No sign of intracranial
 hemorrhage. There is previous left frontal craniotomy defect. There is left temporal parietal cranio
freya defect. There is normal aeration of the mastoid sinuses.

 

IMPRESSION:

Mild cerebral atrophy. No acute intracranial abnormality. No adverse change compared to old exam.

## 2023-01-06 NOTE — XR
EXAMINATION TYPE: XR chest 1V

 

DATE OF EXAM: 1/6/2023

 

COMPARISON: NONE

 

HISTORY: Altered mental status

 

TECHNIQUE: Single view

 

FINDINGS: Endotracheal tube is 7 cm from the jossy. There is nasogastric tube in the stomach. The nimo
ngs are clear of infiltrate. No heart failure. Heart size is normal. There are chest leads.

 

IMPRESSION: No active cardiopulmonary disease. Normal heart.

## 2023-01-06 NOTE — CT
EXAMINATION TYPE: CT ChestAbdPelvis w con

 

DATE OF EXAM: 1/6/2023

 

COMPARISON: None

 

HISTORY: AMS

 

CT DLP: 1260 mGycm

Automated exposure control for dose reduction was used.

 

CONTRAST: 

Performed with IV Contrast, patient injected with 75 mL of Isovue 370.

 

Images obtained from the thoracic inlet to the floor of the pelvis with the IV contrast.

 

There is endotracheal tube. There is no mediastinal adenopathy. Thoracic aorta is intact. There are n
o hilar masses. Heart size is normal. There is some mild subsegmental atelectasis right posterior rochelle
g base. No pleural effusion. No pericardial effusion. There is some coronary artery calcification.

 

There are clips from cholecystectomy. Liver and spleen are intact. No pancreatic mass. There is diste
nded gas and fluid-filled stomach.

 

There is no adrenal mass. Kidneys show satisfactory contrast opacification. No hydronephrosis. Delaye
d images show normal renal excretion. No retroperitoneal adenopathy. The bladder distends smoothly. N
o inguinal hernia. No free fluid in the pelvis.

 

There is no mesenteric edema. No ascites or free air. No sign of a bowel obstruction. Terminal ileum 
appears normal.  There is small short appendix.

 

The thoracic and lumbar vertebra. Intact. There is mild spondylotic changes in the mid and lower lumb
ar spine. No compression fracture. The sternum is intact. The bony pelvis is intact. The hip joints a
re intact.

 

IMPRESSION:

No significant abnormality of the chest abdomen pelvis.

## 2023-01-07 LAB
AMYLASE SERPL-CCNC: 416 U/L (ref 30–110)
ANION GAP SERPL CALC-SCNC: 18 MMOL/L
BASOPHILS # BLD AUTO: 0.2 K/UL (ref 0–0.2)
BASOPHILS NFR BLD AUTO: 1 %
BUN SERPL-SCNC: 61 MG/DL (ref 9–20)
CALCIUM SPEC-MCNC: 7.3 MG/DL (ref 8.4–10.2)
CHLORIDE SERPL-SCNC: 109 MMOL/L (ref 98–107)
CO2 BLDA-SCNC: 23 MMOL/L (ref 19–24)
CO2 SERPL-SCNC: 16 MMOL/L (ref 22–30)
EOSINOPHIL # BLD AUTO: 0 K/UL (ref 0–0.7)
EOSINOPHIL NFR BLD AUTO: 0 %
ERYTHROCYTE [DISTWIDTH] IN BLOOD BY AUTOMATED COUNT: 6.22 M/UL (ref 4.3–5.9)
ERYTHROCYTE [DISTWIDTH] IN BLOOD: 13.6 % (ref 11.5–15.5)
GLUCOSE BLD-MCNC: 130 MG/DL (ref 70–110)
GLUCOSE BLD-MCNC: 140 MG/DL (ref 70–110)
GLUCOSE BLD-MCNC: 144 MG/DL (ref 70–110)
GLUCOSE BLD-MCNC: 153 MG/DL (ref 70–110)
GLUCOSE SERPL-MCNC: 112 MG/DL (ref 74–99)
HCO3 BLDA-SCNC: 22 MMOL/L (ref 21–25)
HCT VFR BLD AUTO: 59.1 % (ref 39–53)
HGB BLD-MCNC: 19.5 GM/DL (ref 13–17.5)
INR PPP: 1.2 (ref ?–1.2)
LACTATE BLDV-SCNC: 5.3 MMOL/L (ref 0.7–2)
LIPASE SERPL-CCNC: 590 U/L (ref 23–300)
LYMPHOCYTES # SPEC AUTO: 2.9 K/UL (ref 1–4.8)
LYMPHOCYTES NFR SPEC AUTO: 13 %
MAGNESIUM SPEC-SCNC: 2.1 MG/DL (ref 1.6–2.3)
MAGNESIUM SPEC-SCNC: 2.3 MG/DL (ref 1.6–2.3)
MCH RBC QN AUTO: 31.4 PG (ref 25–35)
MCHC RBC AUTO-ENTMCNC: 33 G/DL (ref 31–37)
MCV RBC AUTO: 95 FL (ref 80–100)
MONOCYTES # BLD AUTO: 1.7 K/UL (ref 0–1)
MONOCYTES NFR BLD AUTO: 7 %
NEUTROPHILS # BLD AUTO: 17.3 K/UL (ref 1.3–7.7)
NEUTROPHILS NFR BLD AUTO: 76 %
PCO2 BLDA: 43 MMHG (ref 35–45)
PH BLDA: 7.31 [PH] (ref 7.35–7.45)
PLATELET # BLD AUTO: 134 K/UL (ref 150–450)
PO2 BLDA: 203 MMHG (ref 83–108)
POTASSIUM SERPL-SCNC: 4.2 MMOL/L (ref 3.5–5.1)
PT BLD: 12.8 SEC (ref 9–12)
SODIUM SERPL-SCNC: 143 MMOL/L (ref 137–145)
WBC # BLD AUTO: 22.9 K/UL (ref 3.8–10.6)

## 2023-01-07 PROCEDURE — 0DH67UZ INSERTION OF FEEDING DEVICE INTO STOMACH, VIA NATURAL OR ARTIFICIAL OPENING: ICD-10-PCS

## 2023-01-07 PROCEDURE — 3E0G76Z INTRODUCTION OF NUTRITIONAL SUBSTANCE INTO UPPER GI, VIA NATURAL OR ARTIFICIAL OPENING: ICD-10-PCS

## 2023-01-07 RX ADMIN — NOREPINEPHRINE BITARTRATE SCH: 1 INJECTION, SOLUTION, CONCENTRATE INTRAVENOUS at 02:42

## 2023-01-07 RX ADMIN — PANTOPRAZOLE SODIUM SCH MG: 40 INJECTION, POWDER, FOR SOLUTION INTRAVENOUS at 09:48

## 2023-01-07 RX ADMIN — SODIUM CHLORIDE SCH MLS/HR: 900 INJECTION, SOLUTION INTRAVENOUS at 09:46

## 2023-01-07 RX ADMIN — CEFAZOLIN SCH: 330 INJECTION, POWDER, FOR SOLUTION INTRAMUSCULAR; INTRAVENOUS at 16:11

## 2023-01-07 RX ADMIN — CHLORHEXIDINE GLUCONATE SCH ML: 1.2 RINSE ORAL at 21:47

## 2023-01-07 RX ADMIN — PANTOPRAZOLE SODIUM SCH MG: 40 INJECTION, POWDER, FOR SOLUTION INTRAVENOUS at 21:47

## 2023-01-07 RX ADMIN — CHLORHEXIDINE GLUCONATE SCH ML: 1.2 RINSE ORAL at 09:48

## 2023-01-07 RX ADMIN — POTASSIUM CHLORIDE SCH MLS/HR: 7.46 INJECTION, SOLUTION INTRAVENOUS at 02:40

## 2023-01-07 RX ADMIN — MIDAZOLAM SCH MLS/HR: 5 INJECTION INTRAMUSCULAR; INTRAVENOUS at 18:43

## 2023-01-07 RX ADMIN — POTASSIUM CHLORIDE SCH MLS/HR: 14.9 INJECTION, SOLUTION INTRAVENOUS at 09:08

## 2023-01-07 RX ADMIN — POTASSIUM CHLORIDE SCH MLS/HR: 7.46 INJECTION, SOLUTION INTRAVENOUS at 03:53

## 2023-01-07 RX ADMIN — SODIUM CHLORIDE SCH MLS/HR: 900 INJECTION, SOLUTION INTRAVENOUS at 10:59

## 2023-01-07 RX ADMIN — ENOXAPARIN SODIUM SCH: 40 INJECTION SUBCUTANEOUS at 09:21

## 2023-01-07 RX ADMIN — SODIUM CHLORIDE SCH MLS/HR: 900 INJECTION, SOLUTION INTRAVENOUS at 12:00

## 2023-01-07 RX ADMIN — CEFAZOLIN SCH MLS/HR: 330 INJECTION, POWDER, FOR SOLUTION INTRAMUSCULAR; INTRAVENOUS at 05:50

## 2023-01-07 RX ADMIN — MIDAZOLAM SCH MLS/HR: 5 INJECTION INTRAMUSCULAR; INTRAVENOUS at 14:19

## 2023-01-07 RX ADMIN — MIDAZOLAM SCH MLS/HR: 5 INJECTION INTRAMUSCULAR; INTRAVENOUS at 20:31

## 2023-01-07 RX ADMIN — MIDAZOLAM SCH MLS/HR: 5 INJECTION INTRAMUSCULAR; INTRAVENOUS at 10:12

## 2023-01-07 RX ADMIN — METOPROLOL TARTRATE SCH MG: 50 TABLET, FILM COATED ORAL at 21:47

## 2023-01-07 RX ADMIN — POTASSIUM CHLORIDE SCH MLS/HR: 7.46 INJECTION, SOLUTION INTRAVENOUS at 01:29

## 2023-01-07 RX ADMIN — METOPROLOL TARTRATE SCH MG: 50 TABLET, FILM COATED ORAL at 06:04

## 2023-01-07 RX ADMIN — MIDAZOLAM SCH MLS/HR: 5 INJECTION INTRAMUSCULAR; INTRAVENOUS at 01:55

## 2023-01-07 RX ADMIN — MIDAZOLAM SCH MLS/HR: 5 INJECTION INTRAMUSCULAR; INTRAVENOUS at 05:25

## 2023-01-07 RX ADMIN — POTASSIUM CHLORIDE SCH MLS/HR: 14.9 INJECTION, SOLUTION INTRAVENOUS at 17:24

## 2023-01-07 RX ADMIN — SODIUM CHLORIDE SCH MLS/HR: 900 INJECTION, SOLUTION INTRAVENOUS at 16:28

## 2023-01-07 RX ADMIN — SODIUM CHLORIDE SCH MLS/HR: 900 INJECTION, SOLUTION INTRAVENOUS at 04:56

## 2023-01-07 RX ADMIN — FENTANYL CITRATE SCH MLS/HR: 50 INJECTION INTRAVENOUS at 20:24

## 2023-01-07 NOTE — P.PN
Progress Note - Text


Progress Note Date: 01/07/23





Hospitalist Interval Note





Patient seen and examined at bedside. 








Vital signs reviewed


General: Intubated and sedated


Derm: warm, dry


Head: atraumatic, normocephalic, symmetric


Eyes: Pupils equal and reactive


Mouth: no lip lesion, mucus membranes moist


Cardiovascular: S1S2 reg, no murmur, positive posterior tibial pulse bilateral, 


Lungs: Rhonchorous breath sounds


Abdominal: soft, nondistended, no rigidity, no appreciable organomegaly, NG tube

in place with bleeding noted


Ext: no gross muscle atrophy, no edema, no contractures


Neuro: Sedated


Psych: Unable to assess





Assessment/Plan:


Shock, likely hypovolemic


A. fib with RVR status post cardioversion


Acute metabolic encephalopathy


Acute hypoxic and hypercapnic respiratory failure status post intubation


Acute kidney injury


Uremia


Severe hypokalemia


Severe hyperphosphatemia


Hyperammonemia


Elevated troponin


High anion gap metabolic acidosis secondary to Severe lactic acidosis


History of alcohol abuse


- Continue ICU care


- Wean pressors


-Monitor urine output and renal function


-Replete electrolytes


-IV PPI twice a day


-Gen. surgery consulted for possible upper GI bleed


-Hold anticoagulation





This is an update note for patient . There is no charge associated with this 

note.

## 2023-01-07 NOTE — P.NPCON
History of Present Illness





- Reason for Consult


acute renal failure





- History of Present Illness





Reason for consultation: Acute kidney injury





History of present illness:


Patient is a 57-year-old male seen in renal consultation for acute kidney 

injury.  Patient's creatinine in January 2019 was 1.0 and was elevated at 3.66-7

admission and is 2.75 today.  Patient was found unresponsive at home.  EMS was 

called and he was noted to be hypotensive.  Patient was noted to be actively 

breathing and was subsequently intubated.  Patient has history of fall: Abuse 

and it is noted that he was on a binge recently.  Patient is currently 

intubated.  He is not on vasopressors at this time.  He did receive 3 L of n

ormal saline on admission and is currently maintained on normal saline at 1 50 

mL an hour.  I do see Motrin and his home medication list but unable to 

determine how much she was actually taking.  Patient is noted to be acidotic.  

Blood pressure is in the systolic 80s.  He is on 40% FiO2.





Vital signs are stable.  Blood pressure on the lower side.


General: Resting in bed.


HEENT: Intubated.


LUNGS: Breath sounds decreased.


HEART: Rate and Rhythm are regular.


ABDOMEN: Soft, no distention.


EXTREMITITES: No edema.





Past Medical History


Past Medical History: Atrial Fibrillation, Hyperlipidemia, Hypertension


History of Any Multi-Drug Resistant Organisms: None Reported


Past Surgical History: Appendectomy, Orthopedic Surgery, Tonsillectomy


Past Psychological History: No Psychological Hx Reported


Past Alcohol Use History: None Reported


Past Drug Use History: None Reported





- Past Family History


  ** family


Additional Family Medical History / Comment(s): no CAD





Medications and Allergies


                                Home Medications











 Medication  Instructions  Recorded  Confirmed  Type


 


Metoprolol Succinate (ER) [Toprol 100 mg PO DAILY 05/31/17 05/31/17 History





Xl]    


 


Orphenadrine [Norflex] 100 mg PO Q12H #10 tablet.er 05/31/17  Rx


 


Pravastatin Sodium [Pravachol] 10 mg PO DAILY 05/31/17 05/31/17 History


 


predniSONE 50 mg PO DAILY #5 tab 05/31/17  Rx


 


Cyclobenzaprine [Flexeril] 10 mg PO TID PRN #20 tablet 06/17/17  Rx


 


Hydrocodone/Acetaminophen [Norco 2 each PO Q6HR PRN #20 tab 06/17/17  Rx





5-325]    


 


Ibuprofen [Motrin] 600 mg PO Q6HR PRN #20 tab 06/17/17  Rx








                                    Allergies











Allergy/AdvReac Type Severity Reaction Status Date / Time


 


Penicillins Allergy  Unknown Verified 01/06/23 20:19





   Childhood  














Physical Exam


Vitals: 


                                   Vital Signs











  Temp Pulse Resp BP Pulse Ox FiO2


 


 01/07/23 09:00   98  21  82/65  100 


 


 01/07/23 08:30   93  21  86/60  100 


 


 01/07/23 08:00  99.1 F  90  21  86/60  100  50


 


 01/07/23 07:30   90   90/64  


 


 01/07/23 07:01       40


 


 01/07/23 07:00   92  21  90/64  94 L  40


 


 01/07/23 06:30   107 H  21  97/67  96  40


 


 01/07/23 06:00   108 H  21  100/59  97  50


 


 01/07/23 05:30   107 H  21  93/80  96  50


 


 01/07/23 05:00   110 H  23  91/72  96  50


 


 01/07/23 04:30   111 H  24  105/73  94 L  50


 


 01/07/23 04:00  98.3 F  109 H  24  95/70  96  50


 


 01/07/23 03:45       50


 


 01/07/23 03:43       50


 


 01/07/23 03:40       60


 


 01/07/23 03:30   107 H  20  92/81  96  60


 


 01/07/23 03:00   124 H  22  103/57  96  60


 


 01/07/23 02:30   102 H  22  93/76  96  60


 


 01/07/23 02:00   101 H  22  93/76  96  60


 


 01/07/23 01:37       60


 


 01/07/23 01:30   98  28 H  88/64  96  60


 


 01/07/23 01:00   105 H  28 H  123/66  96  70


 


 01/07/23 00:51       70


 


 01/07/23 00:00   91  31 H  114/87  98 


 


 01/06/23 23:11   93  18  108/93  100 


 


 01/06/23 23:00   101 H  18  80/56  98 


 


 01/06/23 22:00   102 H  18  130/84  98  70


 


 01/06/23 21:17       100


 


 01/06/23 21:14   104 H  16  131/79  99 


 


 01/06/23 20:45   110 H  18  104/65  100 


 


 01/06/23 20:41   197 H  18  63/26  100 


 


 01/06/23 20:40       100


 


 01/06/23 20:37   203 H  7 L  57/42  


 


 01/06/23 20:36   184 H  1 L  95/45  


 


 01/06/23 20:26   168 H  16  87/69  89 L 


 


 01/06/23 20:16  97.0 F L  170 H  18  97/80  90 L 








                                Intake and Output











 01/06/23 01/07/23 01/07/23





 22:59 06:59 14:59


 


Intake Total  1442.680 450


 


Output Total  215 80


 


Balance  1227.680 370


 


Intake:   


 


  IV  1300 450


 


    Potassium Chloride 10 meq  400 





    In Water For Injection 1   





    100ml.bag @ 100 mls/hr   





    IVPB Q1HR [facility] Rx#:   





    [id number]   


 


    Sodium Chloride 0.9% 1,  900 450





    000 ml @ 150 mls/hr IV .   





    Q6H40M [facility] Rx#:[id number]   


 


  Intake, IV Titration  142.680 





  Amount   


 


    Midazolam HCl 50 mg In  51.999 





    Sodium Chloride 0.9% 40   





    ml @ 1 MG/HR 1 mls/hr IV   





    .Q24H [facility] Rx#:[id number]   


 


    Sodium Chloride 0.9% 80  88.721 





    ml @ 2 MCG/KG/HR 16.329   





    mls/hr IV .Q6H8M [facility] with   





    fentaNYL (PF) 1,000 mcg   





    Rx#:[id number]   


 


    propofoL 1,000 mg In  1.96 





    Empty Bag 1 bag @ 20 MCG/   





    KG/MIN 9.798 mls/hr IV .   





    S52Z34P [facility] Rx#:[id number]   


 


Output:   


 


  Urine  215 80


 


Other:   


 


  Voiding Method  Indwelling Catheter 


 


  Weight 81.647 kg  














Results





- Lab Results


                             Most recent lab results











ABG pH  7.31  (7.35-7.45)  L  01/07/23  05:49    


 


ABG pCO2  43 mmHg (35-45)   01/07/23  05:49    


 


ABG pO2  203 mmHg ()  H  01/07/23  05:49    


 


ABG HCO3  22 mmol/L (21-25)   01/07/23  05:49    


 


ABG O2 Saturation  99.5 % (94-97)  H  01/07/23  05:49    


 


Calcium  7.3 mg/dL (8.4-10.2)  L  01/07/23  07:26    


 


Phosphorus  8.4 mg/dL (2.5-4.5)  H  01/07/23  07:26    


 


Magnesium  2.1 mg/dL (1.6-2.3)   01/07/23  07:26    














                                 01/07/23 07:26





                                 01/07/23 07:26





Assessment and Plan


Plan: 





Assessment:


1.  Acute kidney injury secondary to hemodynamic ATN.  Creatinine 2.66 on 

admission and is 2.75 today.  Patient received IV contrast for CT angiogram and 

CT of the chest abdomen and pelvis on 01/06/2023 which were negative.  No 

hydronephrosis was noted.  Creatinine in January 2019 was 1.


2.  Alcohol withdrawal.


3.  Metabolic acidosis secondary to acute kidney injury and lactic acidosis.


4.  Hypokalemia from poor intake and intracellular shifting.  Replaced.  Better.

  Magnesium not low.  


5.  Altered mental status. ?Etiology.





Plan:


Change normal saline to bicarb drip.


Check blood cultures.


1 L bolus of normal saline now.


May need to initiate Levophed to maintain adequate map.


Avoid nephrotoxins.


Continue to monitor renal function and urine output.





Thank you for the consultation.  I will continue to follow this patient with you

 during his hospital stay.

## 2023-01-07 NOTE — P.GSCN
History of Present Illness


Consult date: 01/07/23


Reason for Consult: 





GI bleeding


History of present illness: 





57-year-old male in the ICU on the ventilator.  Admitted to the hospital with 

mental status changes.  Thought to possibly be related to alcohol withdrawal.  

Patient was having emesis.  Patient with significant lactic acidosis which has 

improved.  Underwent CT brain chest abdomen and pelvis.  No abdominal source of 

sepsis noted at this time.  Patient with an elevated hemoglobin suggesting 

dehydration.  We were consulted this morning after the nursing staff noted the 

gastric aspirate changing from a dark brown to possibly a maroon color.  No 

stools.  Currently nasogastric aspirate is brownish.  Patient taking Motrin and 

prednisone at home reportedly.  Patient with history of atrial fibrillation but 

I do not see any anticoagulation at home.





Review of Systems


ROS unobtainable: due to endotracheal tube





Past Medical History


Past Medical History: Atrial Fibrillation, Hyperlipidemia, Hypertension


History of Any Multi-Drug Resistant Organisms: None Reported


Past Surgical History: Appendectomy, Orthopedic Surgery, Tonsillectomy


Past Psychological History: No Psychological Hx Reported


Past Alcohol Use History: None Reported


Past Drug Use History: None Reported





- Past Family History


  ** family


Additional Family Medical History / Comment(s): no CAD





Medications and Allergies


                                Home Medications











 Medication  Instructions  Recorded  Confirmed  Type


 


Metoprolol Succinate (ER) [Toprol 100 mg PO DAILY 05/31/17 05/31/17 History





Xl]    


 


Orphenadrine [Norflex] 100 mg PO Q12H #10 tablet.er 05/31/17  Rx


 


Pravastatin Sodium [Pravachol] 10 mg PO DAILY 05/31/17 05/31/17 History


 


predniSONE 50 mg PO DAILY #5 tab 05/31/17  Rx


 


Cyclobenzaprine [Flexeril] 10 mg PO TID PRN #20 tablet 06/17/17  Rx


 


Hydrocodone/Acetaminophen [Norco 2 each PO Q6HR PRN #20 tab 06/17/17  Rx





5-325]    


 


Ibuprofen [Motrin] 600 mg PO Q6HR PRN #20 tab 06/17/17  Rx








                                    Allergies











Allergy/AdvReac Type Severity Reaction Status Date / Time


 


Penicillins Allergy  Unknown Verified 01/06/23 20:19





   Childhood  














Surgical - Exam


                                   Vital Signs











Temp Pulse Resp BP Pulse Ox


 


 97.0 F L  170 H  18   97/80   90 L


 


 01/06/23 20:16  01/06/23 20:16  01/06/23 20:16  01/06/23 20:16  01/06/23 20:16

















Physical exam:


General: Well-developed, well-nourished


HEENT: Normocephalic, sclerae nonicteric


Abdomen: Nontender, mildly distended


Extremities: No edema


Neuro: Intubated





Results





- Labs





                                 01/07/23 07:26





                                 01/07/23 07:26


                  Abnormal Lab Results - Last 24 Hours (Table)











  01/06/23 01/06/23 01/06/23 Range/Units





  20:35 21:19 21:19 


 


WBC     (3.8-10.6)  k/uL


 


RBC     (4.30-5.90)  m/uL


 


Hgb     (13.0-17.5)  gm/dL


 


Hct     (39.0-53.0)  %


 


Plt Count     (150-450)  k/uL


 


Neutrophils #     (1.3-7.7)  k/uL


 


Monocytes #     (0-1.0)  k/uL


 


PT     (9.0-12.0)  sec


 


INR     (<1.2)  


 


ABG pH     (7.35-7.45)  


 


ABG pCO2     (35-45)  mmHg


 


ABG pO2     ()  mmHg


 


ABG HCO3     (21-25)  mmol/L


 


ABG Total CO2     (19-24)  mmol/L


 


ABG O2 Saturation     (94-97)  %


 


VBG pH     (7.31-7.41)  


 


VBG pCO2     (37-51)  mmHg


 


VBG HCO3     (24-28)  mmol/L


 


Potassium     (3.5-5.1)  mmol/L


 


Chloride     ()  mmol/L


 


Carbon Dioxide     (22-30)  mmol/L


 


BUN     (9-20)  mg/dL


 


Creatinine     (0.66-1.25)  mg/dL


 


Glucose     (74-99)  mg/dL


 


POC Glucose (mg/dL)  128 H    ()  mg/dL


 


Plasma Lactic Acid Jose R     (0.7-2.0)  mmol/L


 


Calcium     (8.4-10.2)  mg/dL


 


Phosphorus     (2.5-4.5)  mg/dL


 


Total Bilirubin     (0.2-1.3)  mg/dL


 


AST     (17-59)  U/L


 


ALT     (4-49)  U/L


 


Ammonia     (<30)  umol/L


 


Troponin I     (0.000-0.034)  ng/mL


 


Amylase     ()  U/L


 


Lipase     ()  U/L


 


Urine Protein    2+ H  (Negative)  


 


Urine Glucose (UA)    Trace H  (Negative)  


 


Urine Blood    Moderate H  (Negative)  


 


Urine Bilirubin    1+ H  (Negative)  


 


Urine RBC    8 H  (0-5)  /hpf


 


Urine WBC    10 H  (0-5)  /hpf


 


Hyaline Casts    218 H  (0-2)  /lpf


 


Urine Mucus    Many H  (None)  /hpf


 


U Benzodiazepines Scrn   Detected H   (NotDetected)  


 


U Marijuana (THC) Screen   Detected H   (NotDetected)  














  01/06/23 01/06/23 01/06/23 Range/Units





  21:43 22:00 22:00 


 


WBC    17.2 H  (3.8-10.6)  k/uL


 


RBC    5.95 H  (4.30-5.90)  m/uL


 


Hgb    18.7 H  (13.0-17.5)  gm/dL


 


Hct    57.3 H*  (39.0-53.0)  %


 


Plt Count     (150-450)  k/uL


 


Neutrophils #    13.9 H  (1.3-7.7)  k/uL


 


Monocytes #     (0-1.0)  k/uL


 


PT     (9.0-12.0)  sec


 


INR     (<1.2)  


 


ABG pH  7.13 L*    (7.35-7.45)  


 


ABG pCO2  50 H    (35-45)  mmHg


 


ABG pO2  240 H    ()  mmHg


 


ABG HCO3  17 L    (21-25)  mmol/L


 


ABG Total CO2  18 L    (19-24)  mmol/L


 


ABG O2 Saturation  98.8 H    (94-97)  %


 


VBG pH   7.12 L*   (7.31-7.41)  


 


VBG pCO2   54 H   (37-51)  mmHg


 


VBG HCO3   17 L   (24-28)  mmol/L


 


Potassium     (3.5-5.1)  mmol/L


 


Chloride     ()  mmol/L


 


Carbon Dioxide     (22-30)  mmol/L


 


BUN     (9-20)  mg/dL


 


Creatinine     (0.66-1.25)  mg/dL


 


Glucose     (74-99)  mg/dL


 


POC Glucose (mg/dL)     ()  mg/dL


 


Plasma Lactic Acid Jose R     (0.7-2.0)  mmol/L


 


Calcium     (8.4-10.2)  mg/dL


 


Phosphorus     (2.5-4.5)  mg/dL


 


Total Bilirubin     (0.2-1.3)  mg/dL


 


AST     (17-59)  U/L


 


ALT     (4-49)  U/L


 


Ammonia     (<30)  umol/L


 


Troponin I     (0.000-0.034)  ng/mL


 


Amylase     ()  U/L


 


Lipase     ()  U/L


 


Urine Protein     (Negative)  


 


Urine Glucose (UA)     (Negative)  


 


Urine Blood     (Negative)  


 


Urine Bilirubin     (Negative)  


 


Urine RBC     (0-5)  /hpf


 


Urine WBC     (0-5)  /hpf


 


Hyaline Casts     (0-2)  /lpf


 


Urine Mucus     (None)  /hpf


 


U Benzodiazepines Scrn     (NotDetected)  


 


U Marijuana (THC) Screen     (NotDetected)  














  01/06/23 01/06/23 01/06/23 Range/Units





  22:00 22:00 22:00 


 


WBC     (3.8-10.6)  k/uL


 


RBC     (4.30-5.90)  m/uL


 


Hgb     (13.0-17.5)  gm/dL


 


Hct     (39.0-53.0)  %


 


Plt Count     (150-450)  k/uL


 


Neutrophils #     (1.3-7.7)  k/uL


 


Monocytes #     (0-1.0)  k/uL


 


PT  13.8 H    (9.0-12.0)  sec


 


INR  1.4 H    (<1.2)  


 


ABG pH     (7.35-7.45)  


 


ABG pCO2     (35-45)  mmHg


 


ABG pO2     ()  mmHg


 


ABG HCO3     (21-25)  mmol/L


 


ABG Total CO2     (19-24)  mmol/L


 


ABG O2 Saturation     (94-97)  %


 


VBG pH     (7.31-7.41)  


 


VBG pCO2     (37-51)  mmHg


 


VBG HCO3     (24-28)  mmol/L


 


Potassium    2.7 L*  (3.5-5.1)  mmol/L


 


Chloride     ()  mmol/L


 


Carbon Dioxide    16 L  (22-30)  mmol/L


 


BUN    52 H  (9-20)  mg/dL


 


Creatinine    3.66 H  (0.66-1.25)  mg/dL


 


Glucose    155 H  (74-99)  mg/dL


 


POC Glucose (mg/dL)     ()  mg/dL


 


Plasma Lactic Acid Jose R     (0.7-2.0)  mmol/L


 


Calcium    7.8 L  (8.4-10.2)  mg/dL


 


Phosphorus     (2.5-4.5)  mg/dL


 


Total Bilirubin    3.7 H  (0.2-1.3)  mg/dL


 


AST    191 H  (17-59)  U/L


 


ALT    84 H  (4-49)  U/L


 


Ammonia     (<30)  umol/L


 


Troponin I   0.304 H*   (0.000-0.034)  ng/mL


 


Amylase     ()  U/L


 


Lipase     ()  U/L


 


Urine Protein     (Negative)  


 


Urine Glucose (UA)     (Negative)  


 


Urine Blood     (Negative)  


 


Urine Bilirubin     (Negative)  


 


Urine RBC     (0-5)  /hpf


 


Urine WBC     (0-5)  /hpf


 


Hyaline Casts     (0-2)  /lpf


 


Urine Mucus     (None)  /hpf


 


U Benzodiazepines Scrn     (NotDetected)  


 


U Marijuana (THC) Screen     (NotDetected)  














  01/06/23 01/07/23 01/07/23 Range/Units





  22:00 00:52 01:05 


 


WBC     (3.8-10.6)  k/uL


 


RBC     (4.30-5.90)  m/uL


 


Hgb     (13.0-17.5)  gm/dL


 


Hct     (39.0-53.0)  %


 


Plt Count     (150-450)  k/uL


 


Neutrophils #     (1.3-7.7)  k/uL


 


Monocytes #     (0-1.0)  k/uL


 


PT     (9.0-12.0)  sec


 


INR     (<1.2)  


 


ABG pH     (7.35-7.45)  


 


ABG pCO2     (35-45)  mmHg


 


ABG pO2     ()  mmHg


 


ABG HCO3     (21-25)  mmol/L


 


ABG Total CO2     (19-24)  mmol/L


 


ABG O2 Saturation     (94-97)  %


 


VBG pH     (7.31-7.41)  


 


VBG pCO2     (37-51)  mmHg


 


VBG HCO3     (24-28)  mmol/L


 


Potassium     (3.5-5.1)  mmol/L


 


Chloride     ()  mmol/L


 


Carbon Dioxide     (22-30)  mmol/L


 


BUN     (9-20)  mg/dL


 


Creatinine     (0.66-1.25)  mg/dL


 


Glucose     (74-99)  mg/dL


 


POC Glucose (mg/dL)   140 H   ()  mg/dL


 


Plasma Lactic Acid Jose R  12.4 H*   5.3 H*  (0.7-2.0)  mmol/L


 


Calcium     (8.4-10.2)  mg/dL


 


Phosphorus     (2.5-4.5)  mg/dL


 


Total Bilirubin     (0.2-1.3)  mg/dL


 


AST     (17-59)  U/L


 


ALT     (4-49)  U/L


 


Ammonia  81 H   31 H  (<30)  umol/L


 


Troponin I     (0.000-0.034)  ng/mL


 


Amylase     ()  U/L


 


Lipase     ()  U/L


 


Urine Protein     (Negative)  


 


Urine Glucose (UA)     (Negative)  


 


Urine Blood     (Negative)  


 


Urine Bilirubin     (Negative)  


 


Urine RBC     (0-5)  /hpf


 


Urine WBC     (0-5)  /hpf


 


Hyaline Casts     (0-2)  /lpf


 


Urine Mucus     (None)  /hpf


 


U Benzodiazepines Scrn     (NotDetected)  


 


U Marijuana (THC) Screen     (NotDetected)  














  01/07/23 01/07/23 01/07/23 Range/Units





  01:05 01:15 03:58 


 


WBC     (3.8-10.6)  k/uL


 


RBC     (4.30-5.90)  m/uL


 


Hgb     (13.0-17.5)  gm/dL


 


Hct     (39.0-53.0)  %


 


Plt Count     (150-450)  k/uL


 


Neutrophils #     (1.3-7.7)  k/uL


 


Monocytes #     (0-1.0)  k/uL


 


PT    12.8 H  (9.0-12.0)  sec


 


INR    1.2 H  (<1.2)  


 


ABG pH     (7.35-7.45)  


 


ABG pCO2     (35-45)  mmHg


 


ABG pO2     ()  mmHg


 


ABG HCO3     (21-25)  mmol/L


 


ABG Total CO2     (19-24)  mmol/L


 


ABG O2 Saturation     (94-97)  %


 


VBG pH     (7.31-7.41)  


 


VBG pCO2     (37-51)  mmHg


 


VBG HCO3     (24-28)  mmol/L


 


Potassium     (3.5-5.1)  mmol/L


 


Chloride     ()  mmol/L


 


Carbon Dioxide     (22-30)  mmol/L


 


BUN     (9-20)  mg/dL


 


Creatinine     (0.66-1.25)  mg/dL


 


Glucose     (74-99)  mg/dL


 


POC Glucose (mg/dL)     ()  mg/dL


 


Plasma Lactic Acid Jose R     (0.7-2.0)  mmol/L


 


Calcium     (8.4-10.2)  mg/dL


 


Phosphorus  9.3 H*    (2.5-4.5)  mg/dL


 


Total Bilirubin     (0.2-1.3)  mg/dL


 


AST     (17-59)  U/L


 


ALT     (4-49)  U/L


 


Ammonia     (<30)  umol/L


 


Troponin I   0.248 H*   (0.000-0.034)  ng/mL


 


Amylase     ()  U/L


 


Lipase     ()  U/L


 


Urine Protein     (Negative)  


 


Urine Glucose (UA)     (Negative)  


 


Urine Blood     (Negative)  


 


Urine Bilirubin     (Negative)  


 


Urine RBC     (0-5)  /hpf


 


Urine WBC     (0-5)  /hpf


 


Hyaline Casts     (0-2)  /lpf


 


Urine Mucus     (None)  /hpf


 


U Benzodiazepines Scrn     (NotDetected)  


 


U Marijuana (THC) Screen     (NotDetected)  














  01/07/23 01/07/23 01/07/23 Range/Units





  05:49 06:07 07:26 


 


WBC    22.9 H  (3.8-10.6)  k/uL


 


RBC    6.22 H  (4.30-5.90)  m/uL


 


Hgb    19.5 H*  (13.0-17.5)  gm/dL


 


Hct    59.1 H*  (39.0-53.0)  %


 


Plt Count    134 L  (150-450)  k/uL


 


Neutrophils #    17.3 H  (1.3-7.7)  k/uL


 


Monocytes #    1.7 H  (0-1.0)  k/uL


 


PT     (9.0-12.0)  sec


 


INR     (<1.2)  


 


ABG pH  7.31 L    (7.35-7.45)  


 


ABG pCO2     (35-45)  mmHg


 


ABG pO2  203 H    ()  mmHg


 


ABG HCO3     (21-25)  mmol/L


 


ABG Total CO2     (19-24)  mmol/L


 


ABG O2 Saturation  99.5 H    (94-97)  %


 


VBG pH     (7.31-7.41)  


 


VBG pCO2     (37-51)  mmHg


 


VBG HCO3     (24-28)  mmol/L


 


Potassium     (3.5-5.1)  mmol/L


 


Chloride     ()  mmol/L


 


Carbon Dioxide     (22-30)  mmol/L


 


BUN     (9-20)  mg/dL


 


Creatinine     (0.66-1.25)  mg/dL


 


Glucose     (74-99)  mg/dL


 


POC Glucose (mg/dL)   130 H   ()  mg/dL


 


Plasma Lactic Acid Jose R     (0.7-2.0)  mmol/L


 


Calcium     (8.4-10.2)  mg/dL


 


Phosphorus     (2.5-4.5)  mg/dL


 


Total Bilirubin     (0.2-1.3)  mg/dL


 


AST     (17-59)  U/L


 


ALT     (4-49)  U/L


 


Ammonia     (<30)  umol/L


 


Troponin I     (0.000-0.034)  ng/mL


 


Amylase     ()  U/L


 


Lipase     ()  U/L


 


Urine Protein     (Negative)  


 


Urine Glucose (UA)     (Negative)  


 


Urine Blood     (Negative)  


 


Urine Bilirubin     (Negative)  


 


Urine RBC     (0-5)  /hpf


 


Urine WBC     (0-5)  /hpf


 


Hyaline Casts     (0-2)  /lpf


 


Urine Mucus     (None)  /hpf


 


U Benzodiazepines Scrn     (NotDetected)  


 


U Marijuana (THC) Screen     (NotDetected)  














  01/07/23 01/07/23 01/07/23 Range/Units





  07:26 07:26 11:58 


 


WBC     (3.8-10.6)  k/uL


 


RBC     (4.30-5.90)  m/uL


 


Hgb     (13.0-17.5)  gm/dL


 


Hct     (39.0-53.0)  %


 


Plt Count     (150-450)  k/uL


 


Neutrophils #     (1.3-7.7)  k/uL


 


Monocytes #     (0-1.0)  k/uL


 


PT     (9.0-12.0)  sec


 


INR     (<1.2)  


 


ABG pH     (7.35-7.45)  


 


ABG pCO2     (35-45)  mmHg


 


ABG pO2     ()  mmHg


 


ABG HCO3     (21-25)  mmol/L


 


ABG Total CO2     (19-24)  mmol/L


 


ABG O2 Saturation     (94-97)  %


 


VBG pH     (7.31-7.41)  


 


VBG pCO2     (37-51)  mmHg


 


VBG HCO3     (24-28)  mmol/L


 


Potassium     (3.5-5.1)  mmol/L


 


Chloride  109 H    ()  mmol/L


 


Carbon Dioxide  16 L    (22-30)  mmol/L


 


BUN  61 H    (9-20)  mg/dL


 


Creatinine  2.75 H    (0.66-1.25)  mg/dL


 


Glucose  112 H    (74-99)  mg/dL


 


POC Glucose (mg/dL)    144 H  ()  mg/dL


 


Plasma Lactic Acid Jose R   3.1 H*   (0.7-2.0)  mmol/L


 


Calcium  7.3 L    (8.4-10.2)  mg/dL


 


Phosphorus  8.4 H    (2.5-4.5)  mg/dL


 


Total Bilirubin     (0.2-1.3)  mg/dL


 


AST     (17-59)  U/L


 


ALT     (4-49)  U/L


 


Ammonia     (<30)  umol/L


 


Troponin I     (0.000-0.034)  ng/mL


 


Amylase  416 H*    ()  U/L


 


Lipase  590 H    ()  U/L


 


Urine Protein     (Negative)  


 


Urine Glucose (UA)     (Negative)  


 


Urine Blood     (Negative)  


 


Urine Bilirubin     (Negative)  


 


Urine RBC     (0-5)  /hpf


 


Urine WBC     (0-5)  /hpf


 


Hyaline Casts     (0-2)  /lpf


 


Urine Mucus     (None)  /hpf


 


U Benzodiazepines Scrn     (NotDetected)  


 


U Marijuana (THC) Screen     (NotDetected)  








                      Microbiology - Last 24 Hours (Table)











 01/06/23 20:37 Blood Culture - Final





 Blood 








                                 Diabetes panel











  01/06/23 01/07/23 Range/Units





  22:00 07:26 


 


Sodium  141  143  (137-145)  mmol/L


 


Potassium  2.7 L*  4.2  (3.5-5.1)  mmol/L


 


Chloride  98  109 H  ()  mmol/L


 


Carbon Dioxide  16 L  16 L  (22-30)  mmol/L


 


BUN  52 H  61 H  (9-20)  mg/dL


 


Creatinine  3.66 H  2.75 H  (0.66-1.25)  mg/dL


 


Glucose  155 H  112 H  (74-99)  mg/dL


 


Calcium  7.8 L  7.3 L  (8.4-10.2)  mg/dL


 


AST  191 H   (17-59)  U/L


 


ALT  84 H   (4-49)  U/L


 


Alkaline Phosphatase  85   ()  U/L


 


Total Protein  6.9   (6.3-8.2)  g/dL


 


Albumin  3.9   (3.5-5.0)  g/dL








                                  Calcium panel











  01/06/23 01/07/23 01/07/23 Range/Units





  22:00 01:05 07:26 


 


Calcium  7.8 L   7.3 L  (8.4-10.2)  mg/dL


 


Phosphorus   9.3 H*  8.4 H  (2.5-4.5)  mg/dL


 


Albumin  3.9    (3.5-5.0)  g/dL








                                 Pituitary panel











  01/06/23 01/07/23 Range/Units





  22:00 07:26 


 


Sodium  141  143  (137-145)  mmol/L


 


Potassium  2.7 L*  4.2  (3.5-5.1)  mmol/L


 


Chloride  98  109 H  ()  mmol/L


 


Carbon Dioxide  16 L  16 L  (22-30)  mmol/L


 


BUN  52 H  61 H  (9-20)  mg/dL


 


Creatinine  3.66 H  2.75 H  (0.66-1.25)  mg/dL


 


Glucose  155 H  112 H  (74-99)  mg/dL


 


Calcium  7.8 L  7.3 L  (8.4-10.2)  mg/dL








                                  Adrenal panel











  01/06/23 01/07/23 Range/Units





  22:00 07:26 


 


Sodium  141  143  (137-145)  mmol/L


 


Potassium  2.7 L*  4.2  (3.5-5.1)  mmol/L


 


Chloride  98  109 H  ()  mmol/L


 


Carbon Dioxide  16 L  16 L  (22-30)  mmol/L


 


BUN  52 H  61 H  (9-20)  mg/dL


 


Creatinine  3.66 H  2.75 H  (0.66-1.25)  mg/dL


 


Glucose  155 H  112 H  (74-99)  mg/dL


 


Calcium  7.8 L  7.3 L  (8.4-10.2)  mg/dL


 


Total Bilirubin  3.7 H   (0.2-1.3)  mg/dL


 


AST  191 H   (17-59)  U/L


 


ALT  84 H   (4-49)  U/L


 


Alkaline Phosphatase  85   ()  U/L


 


Total Protein  6.9   (6.3-8.2)  g/dL


 


Albumin  3.9   (3.5-5.0)  g/dL














Assessment and Plan


(1) GI bleed


Narrative/Plan: 


57-year-old male with suspected upper GI bleed.  Currently the gastric aspirate 

is nonbloody.  This may be related to gastric suction trauma.  No plans for 

endoscopy at this time.  We'll follow closely with you.


Current Visit: Yes   Status: Acute   Code(s): K92.2 - GASTROINTESTINAL 

HEMORRHAGE, UNSPECIFIED   SNOMED Code(s): 15107339

## 2023-01-07 NOTE — P.CRDCN
History of Present Illness


Consult date: 01/07/23


Reason for Consult (text): 





Tachycardia


History of present illness: 


This is Isaac Orona NP, I'm dictating on behalf of Dr. Stacy's H&P and A&P 


The patient was interviewed and examined.





HPI: Patient is a 57-year-old male who presented to the hospital with apparent 

respiratory failure and unresponsiveness.  The history and physicals obtained 

from records, as the patient is currently intubated and unresponsive.  According

to the records the patient was found by family after a few episodes of vomiting 

to be unresponsive facedown on his bed.  EMS was called and the patient was 

transported to the hospital.  During transportation, EMS had to bag the patient 

due to agonal respirations.  In the emergency department he was continued found 

to be unresponsive as well as unable to support his respiratory status, so he 

was intubated.  Multiple imaging studies were completed with no obvious acute 

cause.  It is reported the patient is a significant alcoholic and had not had 

anything to drink for approximately 6 days.  Patient was found at the time to be

severely tachycardic in the emergency department.  This appeared to be A. fib 

with RVR, the patient was cardioverted in the emergency department successfully.

 He is seen today in the ICU still ventilated and sedated.





ROS: 


Unable to be completed due to intubation and sedation.





EXAMINATION:


GENERAL: Well-nourished.


NECK: Supple without JVD or thyromegaly.


LUNGS: Breath sounds clear to auscultation bilaterally.  Respiration equal and 

unlabored.  No wheezes, rales or rhonchi.


HEART: Regular rate and rhythm without murmurs, rubs or gallops.  S1 and S2 

heard.


EXTREMITIES: No edema.  No clubbing or cyanosis.  Peripheral pulses intact and 

strong.








REVIEW OF LABS, ECG & MEDICAL DATA: 


LABS: White count 22.9, hemoglobin 19.5, platelets 134, arterial blood gas-pH 

7.31, CO2 43, O2 203, HCO3 22; sodium 143, potassium 4.2, B1 61, creatinine 

2.75, lactic acid 3.1, calcium 7.3, phosphorus 8.4, magnesium 2.1, amylase 416, 

lipase 590, total bilirubin 3.7, , ALTs 84, ammonia 81 troponin 2-0.304,

0.248


EKG: Initial EKG shows sinus tachycardia heart rate 109


IMAGING: CT of the brain without contrast dated 01/06/2023 demonstrates mild 

cerebral atrophy, no acute intracranial abnormality, no adverse change compared 

to old exam.  CT angiogram of the head and neck dated 01/06/2023 demonstrates a 

negative CT and agreement of the brain, mild plaque at the carotid artery bifurc

ations, no evidence of hemodynamic arterial stenosis in the neck.  CT of the 

chest, abdomen, and pelvis with contrast dated 01/06/2023 demonstrates no 

significant abnormality of the chest, abdomen, or pelvis.  Chest x-ray dated 

01/06/2023 demonstrates no active cardiopulmonary disease, normal heart.  

Ultrasound of the abdomen and bladder dated 01/07/2023 demonstrates no 

hydronephrosis bilaterally.


VITALS: Temp 98.5, pulse 93, respirations 16, blood pressure 92/71, O2 

saturation 97% on mechanical ventilation





IMPRESSION:


1.  Acute alcohol withdrawal


2.  Sinus tachycardia secondary to acute hypoxic respiratory failure, currently 

improved


3.  Elevated troponins, likely secondary to hypoxic respiratory failure





PLAN:


Patient's heart rate is currently under control.


Continue current medications as prescribed.


Further recommendations based on the patient's clinical course.





Thank you for the consult and allowing us to participate in the care of this 

patient.



































Past Medical History


Past Medical History: Atrial Fibrillation, Hyperlipidemia, Hypertension


History of Any Multi-Drug Resistant Organisms: None Reported


Past Surgical History: Appendectomy, Orthopedic Surgery, Tonsillectomy


Past Psychological History: No Psychological Hx Reported


Past Alcohol Use History: None Reported


Past Drug Use History: None Reported





- Past Family History


  ** family


Additional Family Medical History / Comment(s): no CAD





Medications and Allergies


                                Home Medications











 Medication  Instructions  Recorded  Confirmed  Type


 


Metoprolol Succinate (ER) [Toprol 100 mg PO DAILY 05/31/17 05/31/17 History





Xl]    


 


Orphenadrine [Norflex] 100 mg PO Q12H #10 tablet.er 05/31/17  Rx


 


Pravastatin Sodium [Pravachol] 10 mg PO DAILY 05/31/17 05/31/17 History


 


predniSONE 50 mg PO DAILY #5 tab 05/31/17  Rx


 


Cyclobenzaprine [Flexeril] 10 mg PO TID PRN #20 tablet 06/17/17  Rx


 


Hydrocodone/Acetaminophen [Norco 2 each PO Q6HR PRN #20 tab 06/17/17  Rx





5-325]    


 


Ibuprofen [Motrin] 600 mg PO Q6HR PRN #20 tab 06/17/17  Rx








                                    Allergies











Allergy/AdvReac Type Severity Reaction Status Date / Time


 


Penicillins Allergy  Unknown Verified 01/06/23 20:19





   Childhood  














Physical Exam


Vitals: 


                                   Vital Signs











  Temp Pulse Resp BP Pulse Ox FiO2


 


 01/07/23 12:00  98.5 F  93  16  92/71  97 


 


 01/07/23 11:30   91  23  90/65  98 


 


 01/07/23 11:00   90  20  81/65  99 


 


 01/07/23 10:37       40


 


 01/07/23 10:30   90  20  82/58  100 


 


 01/07/23 10:00   89  21  86/66  100  40


 


 01/07/23 09:30   92  20  83/65  100 


 


 01/07/23 09:00   98  21  82/65  100 


 


 01/07/23 08:30   93  21  86/60  100 


 


 01/07/23 08:00  99.1 F  90  21  86/60  100  50


 


 01/07/23 07:30   90   90/64  


 


 01/07/23 07:01       40


 


 01/07/23 07:00   92  21  90/64  94 L  40


 


 01/07/23 06:30   107 H  21  97/67  96  40


 


 01/07/23 06:00   108 H  21  100/59  97  50


 


 01/07/23 05:30   107 H  21  93/80  96  50


 


 01/07/23 05:00   110 H  23  91/72  96  50


 


 01/07/23 04:30   111 H  24  105/73  94 L  50


 


 01/07/23 04:00  98.3 F  109 H  24  95/70  96  50


 


 01/07/23 03:45       50


 


 01/07/23 03:43       50


 


 01/07/23 03:40       60


 


 01/07/23 03:30   107 H  20  92/81  96  60


 


 01/07/23 03:00   124 H  22  103/57  96  60


 


 01/07/23 02:30   102 H  22  93/76  96  60


 


 01/07/23 02:00   101 H  22  93/76  96  60


 


 01/07/23 01:37       60


 


 01/07/23 01:30   98  28 H  88/64  96  60


 


 01/07/23 01:00   105 H  28 H  123/66  96  70


 


 01/07/23 00:51       70


 


 01/07/23 00:00   91  31 H  114/87  98 


 


 01/06/23 23:11   93  18  108/93  100 


 


 01/06/23 23:00   101 H  18  80/56  98 


 


 01/06/23 22:00   102 H  18  130/84  98  70


 


 01/06/23 21:17       100


 


 01/06/23 21:14   104 H  16  131/79  99 


 


 01/06/23 20:45   110 H  18  104/65  100 


 


 01/06/23 20:41   197 H  18  63/26  100 


 


 01/06/23 20:40       100


 


 01/06/23 20:37   203 H  7 L  57/42  


 


 01/06/23 20:36   184 H  1 L  95/45  


 


 01/06/23 20:26   168 H  16  87/69  89 L 


 


 01/06/23 20:16  97.0 F L  170 H  18  97/80  90 L 








                                Intake and Output











 01/06/23 01/07/23 01/07/23





 22:59 06:59 14:59


 


Intake Total  9394.602 5701.79


 


Output Total  215 320


 


Balance  6718.757 9585.79


 


Intake:   


 


  IV  1300 1950


 


    Dextrose 5% in Water 1,   450





    000 ml @ 150 mls/hr IV .   





    Q7H40M [facility] with Sodium   





    Bicarb (1 Meq/ml) 150 ml   





    Rx#:[id number]   


 


    Potassium Chloride 10 meq  400 





    In Water For Injection 1   





    100ml.bag @ 100 mls/hr   





    IVPB Q1HR [facility] Rx#:   





    [id number]   


 


    Sodium Chloride 0.9% 1,  900 450





    000 ml @ 150 mls/hr IV .   





    Q6H40M [facility] Rx#:[id number]   


 


    Sodium Chloride 0.9% 1,   1000





    000 ml @ 999 mls/hr IV .   





    Q1H1M ONE Rx#:[id number]   


 


    Thiamine 100 mg In Sodium   50





    Chloride 0.9% 50 ml @   





    100 mls/hr IVPB Q24HR [facility]   





    Rx#:[id number]   


 


  Intake, IV Titration  142.680 148.79





  Amount   


 


    Midazolam HCl 50 mg In  51.999 50





    Sodium Chloride 0.9% 40   





    ml @ 1 MG/HR 1 mls/hr IV   





    .Q24H [facility] Rx#:[id number]   


 


    Sodium Chloride 0.9% 80  88.721 98.79





    ml @ 2 MCG/KG/HR 16.329   





    mls/hr IV .Q6H8M [facility] with   





    fentaNYL (PF) 1,000 mcg   





    Rx#:[id number]   


 


    propofoL 1,000 mg In  1.96 





    Empty Bag 1 bag @ 20 MCG/   





    KG/MIN 9.798 mls/hr IV .   





    [id number] [facility] Rx#:[id number]   


 


Output:   


 


  Urine  215 320


 


Other:   


 


  Voiding Method  Indwelling Catheter 


 


  Weight 81.647 kg  














Results





                                 01/07/23 07:26





                                 01/07/23 07:26


                                 Cardiac Enzymes











  01/06/23 01/06/23 01/07/23 Range/Units





  22:00 22:00 01:15 


 


AST   191 H   (17-59)  U/L


 


Troponin I  0.304 H*   0.248 H*  (0.000-0.034)  ng/mL








                                   Coagulation











  01/06/23 01/07/23 Range/Units





  22:00 03:58 


 


PT  13.8 H  12.8 H  (9.0-12.0)  sec


 


APTT  23.5   (22.0-30.0)  sec








                                       CBC











  01/06/23 01/07/23 Range/Units





  22:00 07:26 


 


WBC  17.2 H  22.9 H  (3.8-10.6)  k/uL


 


RBC  5.95 H  6.22 H  (4.30-5.90)  m/uL


 


Hgb  18.7 H  19.5 H*  (13.0-17.5)  gm/dL


 


Hct  57.3 H*  59.1 H*  (39.0-53.0)  %


 


Plt Count  150  134 L  (150-450)  k/uL








                          Comprehensive Metabolic Panel











  01/06/23 01/07/23 Range/Units





  22:00 07:26 


 


Sodium  141  143  (137-145)  mmol/L


 


Potassium  2.7 L*  4.2  (3.5-5.1)  mmol/L


 


Chloride  98  109 H  ()  mmol/L


 


Carbon Dioxide  16 L  16 L  (22-30)  mmol/L


 


BUN  52 H  61 H  (9-20)  mg/dL


 


Creatinine  3.66 H  2.75 H  (0.66-1.25)  mg/dL


 


Glucose  155 H  112 H  (74-99)  mg/dL


 


Calcium  7.8 L  7.3 L  (8.4-10.2)  mg/dL


 


AST  191 H   (17-59)  U/L


 


ALT  84 H   (4-49)  U/L


 


Alkaline Phosphatase  85   ()  U/L


 


Total Protein  6.9   (6.3-8.2)  g/dL


 


Albumin  3.9   (3.5-5.0)  g/dL








                               Current Medications











Generic Name Dose Route Start Last Admin





  Trade Name Freq  PRN Reason Stop Dose Admin


 


Chlorhexidine Gluconate  15 ml  01/07/23 09:00  01/07/23 09:48





  Chlorhexidine Gluconate 15 Ml Cup  MUCOUS MEM   15 ml





  BID [facility]   Administration


 


Enoxaparin Sodium  40 mg  01/07/23 09:00  01/07/23 09:21





  Enoxaparin 40 Mg/0.4 Ml Syringe  SQ   Not Given





  DAILY [facility]  


 


Sodium Chloride  1,000 mls @ 150 mls/hr  01/06/23 23:00  01/07/23 05:50





  Saline 0.9%  IV   150 mls/hr





  .Q6H40M [facility]   Administration


 


Fentanyl Citrate 1,000 mcg/  100 mls @ 16.329 mls/hr  01/06/23 23:30  01/07/23 

10:59





  Sodium Chloride  IV   2 mcg/kg/hr





  .Q6H8M [facility]   16.329 mls/hr





    Administration





  2 MCG/KG/HR  


 


Midazolam HCl 50 mg/ Sodium  50 mls @ 1 mls/hr  01/06/23 23:30  01/07/23 10:12





  Chloride  IV   11 mg/hr





  .Q24H [facility]   11 mls/hr





    Administration





  Protocol  





  1 MG/HR  


 


Norepinephrine Bitartrate 4 mg  254 mls @ 9.332 mls/hr  01/07/23 02:00  01/07/23

 02:42





  / Sodium Chloride  IV   Not Given





  .Q24H [facility]  





  Protocol  





  0.03 MCG/KG/MIN  


 


Thiamine HCl 100 mg/ Sodium  51 mls @ 100 mls/hr  01/07/23 09:00  01/07/23 12:00





  Chloride  IVPB   100 mls/hr





  Q24HR [facility]   Administration


 


Sodium Bicarbonate 150 ml/  1,150 mls @ 150 mls/hr  01/07/23 08:30  01/07/23 

09:08





  Dextrose/Water  IV   150 mls/hr





  .Q7H40M [facility]   Administration


 


Metoprolol Tartrate  50 mg  01/07/23 09:00  01/07/23 06:04





  Metoprolol Tartrate 50 Mg Tab  PO   50 mg





  BID [facility]   Administration


 


Naloxone HCl  0.2 mg  01/06/23 23:41 





  Naloxone 0.4 Mg/Ml 1 Ml Vial  IV  





  Q2M PRN  





  Opioid Reversal  


 


Pantoprazole Sodium  40 mg  01/07/23 09:30  01/07/23 09:48





  Pantoprazole 40 Mg/10 Ml Vial  IVP   40 mg





  BID [facility]   Administration








                                Intake and Output











 01/06/23 01/07/23 01/07/23





 22:59 06:59 14:59


 


Intake Total  9897.898 3757.79


 


Output Total  215 320


 


Balance  5980.534 6256.79


 


Intake:   


 


  IV  1300 1950


 


    Dextrose 5% in Water 1,   450





    000 ml @ 150 mls/hr IV .   





    Q7H40M [facility] with Sodium   





    Bicarb (1 Meq/ml) 150 ml   





    Rx#:[id number]   


 


    Potassium Chloride 10 meq  400 





    In Water For Injection 1   





    100ml.bag @ 100 mls/hr   





    IVPB Q1HR [facility] Rx#:   





    [id number]   


 


    Sodium Chloride 0.9% 1,  900 450





    000 ml @ 150 mls/hr IV .   





    Q6H40M [facility] Rx#:[id number]   


 


    Sodium Chloride 0.9% 1,   1000





    000 ml @ 999 mls/hr IV .   





    Q1H1M ONE Rx#:[id number]   


 


    Thiamine 100 mg In Sodium   50





    Chloride 0.9% 50 ml @   





    100 mls/hr IVPB Q24HR [facility]   





    Rx#:[id number]   


 


  Intake, IV Titration  142.680 148.79





  Amount   


 


    Midazolam HCl 50 mg In  51.999 50





    Sodium Chloride 0.9% 40   





    ml @ 1 MG/HR 1 mls/hr IV   





    .Q24H [facility] Rx#:[id number]   


 


    Sodium Chloride 0.9% 80  88.721 98.79





    ml @ 2 MCG/KG/HR 16.329   





    mls/hr IV .Q6H8M [facility] with   





    fentaNYL (PF) 1,000 mcg   





    Rx#:[id number]   


 


    propofoL 1,000 mg In  1.96 





    Empty Bag 1 bag @ 20 MCG/   





    KG/MIN 9.798 mls/hr IV .   





    [id number] [facility] Rx#:[id number]   


 


Output:   


 


  Urine  215 320


 


Other:   


 


  Voiding Method  Indwelling Catheter 


 


  Weight 81.647 kg  








                                        





                                 01/07/23 07:26 





                                 01/07/23 07:26

## 2023-01-07 NOTE — P.HPIM
History of Present Illness


H&P Date: 01/07/23


Chief Complaint: altered mental status 





57 year old male with alcohol dependance / abuse, afib on metoprolol 





patient arrived with EMS bagging the patient as a code stroke. 





patient is known for alcohol   abuse with binge drinking, however his last drink

was Jan 1st (6 days ago.) today he was seen tired , he vomited and slumped down 

on his face and became unresponsive , and family notified EMS. no report of 

bleeding, chest pain , or trouble breathing. 





upon arrival, code stroke was activated, however patient was unstable  

hypotensive with afib and RVR , patient was intubated and cardioverted. and 

converted to NSR.   CT imaging of the brain showed no acute pathology, 

interventional neurology on call did not recommend to give alteplase.  





no report of any recent acute illness, unknown if he has been taking his meds, 

he has history of afib , and supposed to be on metoprolol. 





blood work in the ED showed acute severe electrolytes imbalance. 








Review of Systems


ROS unobtainable: due to endotracheal tube





Past Medical History


Past Medical History: Atrial Fibrillation, Hypertension


History of Any Multi-Drug Resistant Organisms: None Reported


Past Surgical History: Appendectomy, Orthopedic Surgery, Tonsillectomy


Past Psychological History: No Psychological Hx Reported


Past Alcohol Use History: None Reported


Past Drug Use History: None Reported





- Past Family History


  ** family


Additional Family Medical History / Comment(s): no CAD





Medications and Allergies


                                Home Medications











 Medication  Instructions  Recorded  Confirmed  Type


 


Metoprolol Succinate (ER) [Toprol 100 mg PO DAILY 05/31/17 05/31/17 History





Xl]    


 


Orphenadrine [Norflex] 100 mg PO Q12H #10 tablet.er 05/31/17  Rx


 


Pravastatin Sodium [Pravachol] 10 mg PO DAILY 05/31/17 05/31/17 History


 


predniSONE 50 mg PO DAILY #5 tab 05/31/17  Rx


 


Cyclobenzaprine [Flexeril] 10 mg PO TID PRN #20 tablet 06/17/17  Rx


 


Hydrocodone/Acetaminophen [Norco 2 each PO Q6HR PRN #20 tab 06/17/17  Rx





5-325]    


 


Ibuprofen [Motrin] 600 mg PO Q6HR PRN #20 tab 06/17/17  Rx








                                    Allergies











Allergy/AdvReac Type Severity Reaction Status Date / Time


 


Penicillins Allergy  Unknown Verified 01/06/23 20:19





   Childhood  














Physical Exam


Vitals: 


                                   Vital Signs











  Temp Pulse Resp BP Pulse Ox FiO2


 


 01/07/23 02:00   101 H  22  93/76  96  60


 


 01/07/23 01:37       60


 


 01/07/23 01:30   98  28 H  88/64  96  60


 


 01/07/23 01:00   105 H  28 H  123/66  96  70


 


 01/07/23 00:51       70


 


 01/07/23 00:00   91  31 H  114/87  98 


 


 01/06/23 23:11   93  18  108/93  100 


 


 01/06/23 23:00   101 H  18  80/56  98 


 


 01/06/23 22:00   102 H  18  130/84  98  70


 


 01/06/23 21:17       100


 


 01/06/23 21:14   104 H  16  131/79  99 


 


 01/06/23 20:45   110 H  18  104/65  100 


 


 01/06/23 20:41   197 H  18  63/26  100 


 


 01/06/23 20:40       100


 


 01/06/23 20:37   203 H  7 L  57/42  


 


 01/06/23 20:36   184 H  1 L  95/45  


 


 01/06/23 20:26   168 H  16  87/69  89 L 


 


 01/06/23 20:16  97.0 F L  170 H  18  97/80  90 L 








                                Intake and Output











 01/06/23 01/06/23 01/07/23





 14:59 22:59 06:59


 


Intake Total   516.876


 


Output Total   50


 


Balance   466.876


 


Intake:   


 


  IV   500


 


    Potassium Chloride 10 meq   200





    In Water For Injection 1   





    100ml.bag @ 100 mls/hr   





    IVPB Q1HR [facility] Rx#:   





    [id number]   


 


    Sodium Chloride 0.9% 1,   300





    000 ml @ 150 mls/hr IV .   





    Q6H40M [facility] Rx#:[id number]   


 


  Intake, IV Titration   16.876





  Amount   


 


    Midazolam HCl 50 mg In   14.916





    Sodium Chloride 0.9% 40   





    ml @ 1 MG/HR 1 mls/hr IV   





    .Q24H [facility] Rx#:[id number]   


 


    propofoL 1,000 mg In   1.96





    Empty Bag 1 bag @ 20 MCG/   





    KG/MIN 9.798 mls/hr IV .   





    [id number] [facility] Rx#:[id number]   


 


Output:   


 


  Urine   50


 


Other:   


 


  Voiding Method   Indwelling Catheter


 


  Weight  81.647 kg 

















Constitutional:       intubated , makes good eye contact, follows simple 

commands


Eyes:      Anicteric sclerae, moist conjunctiva, 


         Pupils equal round reactive to light





ENMT:      NC/AT


          





Neck:      Supple,  no masses, or JVD


         No carotid bruits


         No thyromegaly





Lungs:      Clear to auscultation


         Clear to percussion


         Normal respiratory effort, no accessory muscle use 





Cardiovascular:   Heart regular in rate and rhythm, 


         No murmurs, gallops, or rubs


         No peripheral edema





Abdominal:       Soft


         Nontender, no guarding, rebound or rigidity


         Abdomen moving with respiration


         Normoactive bowel sounds


         No hepatomegaly, No splenomegaly


         No palpable mass 


          





Skin:      Normal temperature, tone, texture, turgor


          





Extremities:      No digital cyanosis 


         No clubbing


         Pedal pulses intact and symmetrical


         Radial pulses intact and symmetrical 


         No calf tenderness 





Psychiatric:      intubated and sedated , however, awake and follows simple 

commands


      


Neuro      unable to assess properly patient sedated , intubated, and in 4 point

 restraints


Lymphatics:       no palpable cervical or supraclavicular  lymph nodes  





Results


CBC & Chem 7: 


                                 01/06/23 22:00





                                 01/06/23 22:00


Labs: 


                  Abnormal Lab Results - Last 24 Hours (Table)











  01/06/23 01/06/23 01/06/23 Range/Units





  20:35 21:19 21:19 


 


WBC     (3.8-10.6)  k/uL


 


RBC     (4.30-5.90)  m/uL


 


Hgb     (13.0-17.5)  gm/dL


 


Hct     (39.0-53.0)  %


 


Neutrophils #     (1.3-7.7)  k/uL


 


PT     (9.0-12.0)  sec


 


INR     (<1.2)  


 


ABG pH     (7.35-7.45)  


 


ABG pCO2     (35-45)  mmHg


 


ABG pO2     ()  mmHg


 


ABG HCO3     (21-25)  mmol/L


 


ABG Total CO2     (19-24)  mmol/L


 


ABG O2 Saturation     (94-97)  %


 


VBG pH     (7.31-7.41)  


 


VBG pCO2     (37-51)  mmHg


 


VBG HCO3     (24-28)  mmol/L


 


Potassium     (3.5-5.1)  mmol/L


 


Carbon Dioxide     (22-30)  mmol/L


 


BUN     (9-20)  mg/dL


 


Creatinine     (0.66-1.25)  mg/dL


 


Glucose     (74-99)  mg/dL


 


POC Glucose (mg/dL)  128 H    ()  mg/dL


 


Plasma Lactic Acid Jose R     (0.7-2.0)  mmol/L


 


Calcium     (8.4-10.2)  mg/dL


 


Phosphorus     (2.5-4.5)  mg/dL


 


Total Bilirubin     (0.2-1.3)  mg/dL


 


AST     (17-59)  U/L


 


ALT     (4-49)  U/L


 


Ammonia     (<30)  umol/L


 


Troponin I     (0.000-0.034)  ng/mL


 


Urine Protein    2+ H  (Negative)  


 


Urine Glucose (UA)    Trace H  (Negative)  


 


Urine Blood    Moderate H  (Negative)  


 


Urine Bilirubin    1+ H  (Negative)  


 


Urine RBC    8 H  (0-5)  /hpf


 


Urine WBC    10 H  (0-5)  /hpf


 


Hyaline Casts    218 H  (0-2)  /lpf


 


Urine Mucus    Many H  (None)  /hpf


 


U Benzodiazepines Scrn   Detected H   (NotDetected)  


 


U Marijuana (THC) Screen   Detected H   (NotDetected)  














  01/06/23 01/06/23 01/06/23 Range/Units





  21:43 22:00 22:00 


 


WBC    17.2 H  (3.8-10.6)  k/uL


 


RBC    5.95 H  (4.30-5.90)  m/uL


 


Hgb    18.7 H  (13.0-17.5)  gm/dL


 


Hct    57.3 H*  (39.0-53.0)  %


 


Neutrophils #    13.9 H  (1.3-7.7)  k/uL


 


PT     (9.0-12.0)  sec


 


INR     (<1.2)  


 


ABG pH  7.13 L*    (7.35-7.45)  


 


ABG pCO2  50 H    (35-45)  mmHg


 


ABG pO2  240 H    ()  mmHg


 


ABG HCO3  17 L    (21-25)  mmol/L


 


ABG Total CO2  18 L    (19-24)  mmol/L


 


ABG O2 Saturation  98.8 H    (94-97)  %


 


VBG pH   7.12 L*   (7.31-7.41)  


 


VBG pCO2   54 H   (37-51)  mmHg


 


VBG HCO3   17 L   (24-28)  mmol/L


 


Potassium     (3.5-5.1)  mmol/L


 


Carbon Dioxide     (22-30)  mmol/L


 


BUN     (9-20)  mg/dL


 


Creatinine     (0.66-1.25)  mg/dL


 


Glucose     (74-99)  mg/dL


 


POC Glucose (mg/dL)     ()  mg/dL


 


Plasma Lactic Acid Jose R     (0.7-2.0)  mmol/L


 


Calcium     (8.4-10.2)  mg/dL


 


Phosphorus     (2.5-4.5)  mg/dL


 


Total Bilirubin     (0.2-1.3)  mg/dL


 


AST     (17-59)  U/L


 


ALT     (4-49)  U/L


 


Ammonia     (<30)  umol/L


 


Troponin I     (0.000-0.034)  ng/mL


 


Urine Protein     (Negative)  


 


Urine Glucose (UA)     (Negative)  


 


Urine Blood     (Negative)  


 


Urine Bilirubin     (Negative)  


 


Urine RBC     (0-5)  /hpf


 


Urine WBC     (0-5)  /hpf


 


Hyaline Casts     (0-2)  /lpf


 


Urine Mucus     (None)  /hpf


 


U Benzodiazepines Scrn     (NotDetected)  


 


U Marijuana (THC) Screen     (NotDetected)  














  01/06/23 01/06/23 01/06/23 Range/Units





  22:00 22:00 22:00 


 


WBC     (3.8-10.6)  k/uL


 


RBC     (4.30-5.90)  m/uL


 


Hgb     (13.0-17.5)  gm/dL


 


Hct     (39.0-53.0)  %


 


Neutrophils #     (1.3-7.7)  k/uL


 


PT  13.8 H    (9.0-12.0)  sec


 


INR  1.4 H    (<1.2)  


 


ABG pH     (7.35-7.45)  


 


ABG pCO2     (35-45)  mmHg


 


ABG pO2     ()  mmHg


 


ABG HCO3     (21-25)  mmol/L


 


ABG Total CO2     (19-24)  mmol/L


 


ABG O2 Saturation     (94-97)  %


 


VBG pH     (7.31-7.41)  


 


VBG pCO2     (37-51)  mmHg


 


VBG HCO3     (24-28)  mmol/L


 


Potassium    2.7 L*  (3.5-5.1)  mmol/L


 


Carbon Dioxide    16 L  (22-30)  mmol/L


 


BUN    52 H  (9-20)  mg/dL


 


Creatinine    3.66 H  (0.66-1.25)  mg/dL


 


Glucose    155 H  (74-99)  mg/dL


 


POC Glucose (mg/dL)     ()  mg/dL


 


Plasma Lactic Acid Jose R     (0.7-2.0)  mmol/L


 


Calcium    7.8 L  (8.4-10.2)  mg/dL


 


Phosphorus     (2.5-4.5)  mg/dL


 


Total Bilirubin    3.7 H  (0.2-1.3)  mg/dL


 


AST    191 H  (17-59)  U/L


 


ALT    84 H  (4-49)  U/L


 


Ammonia     (<30)  umol/L


 


Troponin I   0.304 H*   (0.000-0.034)  ng/mL


 


Urine Protein     (Negative)  


 


Urine Glucose (UA)     (Negative)  


 


Urine Blood     (Negative)  


 


Urine Bilirubin     (Negative)  


 


Urine RBC     (0-5)  /hpf


 


Urine WBC     (0-5)  /hpf


 


Hyaline Casts     (0-2)  /lpf


 


Urine Mucus     (None)  /hpf


 


U Benzodiazepines Scrn     (NotDetected)  


 


U Marijuana (THC) Screen     (NotDetected)  














  01/06/23 01/07/23 01/07/23 Range/Units





  22:00 00:52 01:05 


 


WBC     (3.8-10.6)  k/uL


 


RBC     (4.30-5.90)  m/uL


 


Hgb     (13.0-17.5)  gm/dL


 


Hct     (39.0-53.0)  %


 


Neutrophils #     (1.3-7.7)  k/uL


 


PT     (9.0-12.0)  sec


 


INR     (<1.2)  


 


ABG pH     (7.35-7.45)  


 


ABG pCO2     (35-45)  mmHg


 


ABG pO2     ()  mmHg


 


ABG HCO3     (21-25)  mmol/L


 


ABG Total CO2     (19-24)  mmol/L


 


ABG O2 Saturation     (94-97)  %


 


VBG pH     (7.31-7.41)  


 


VBG pCO2     (37-51)  mmHg


 


VBG HCO3     (24-28)  mmol/L


 


Potassium     (3.5-5.1)  mmol/L


 


Carbon Dioxide     (22-30)  mmol/L


 


BUN     (9-20)  mg/dL


 


Creatinine     (0.66-1.25)  mg/dL


 


Glucose     (74-99)  mg/dL


 


POC Glucose (mg/dL)   140 H   ()  mg/dL


 


Plasma Lactic Acid Jose R  12.4 H*   5.3 H*  (0.7-2.0)  mmol/L


 


Calcium     (8.4-10.2)  mg/dL


 


Phosphorus     (2.5-4.5)  mg/dL


 


Total Bilirubin     (0.2-1.3)  mg/dL


 


AST     (17-59)  U/L


 


ALT     (4-49)  U/L


 


Ammonia  81 H   31 H  (<30)  umol/L


 


Troponin I     (0.000-0.034)  ng/mL


 


Urine Protein     (Negative)  


 


Urine Glucose (UA)     (Negative)  


 


Urine Blood     (Negative)  


 


Urine Bilirubin     (Negative)  


 


Urine RBC     (0-5)  /hpf


 


Urine WBC     (0-5)  /hpf


 


Hyaline Casts     (0-2)  /lpf


 


Urine Mucus     (None)  /hpf


 


U Benzodiazepines Scrn     (NotDetected)  


 


U Marijuana (THC) Screen     (NotDetected)  














  01/07/23 01/07/23 Range/Units





  01:05 01:15 


 


WBC    (3.8-10.6)  k/uL


 


RBC    (4.30-5.90)  m/uL


 


Hgb    (13.0-17.5)  gm/dL


 


Hct    (39.0-53.0)  %


 


Neutrophils #    (1.3-7.7)  k/uL


 


PT    (9.0-12.0)  sec


 


INR    (<1.2)  


 


ABG pH    (7.35-7.45)  


 


ABG pCO2    (35-45)  mmHg


 


ABG pO2    ()  mmHg


 


ABG HCO3    (21-25)  mmol/L


 


ABG Total CO2    (19-24)  mmol/L


 


ABG O2 Saturation    (94-97)  %


 


VBG pH    (7.31-7.41)  


 


VBG pCO2    (37-51)  mmHg


 


VBG HCO3    (24-28)  mmol/L


 


Potassium    (3.5-5.1)  mmol/L


 


Carbon Dioxide    (22-30)  mmol/L


 


BUN    (9-20)  mg/dL


 


Creatinine    (0.66-1.25)  mg/dL


 


Glucose    (74-99)  mg/dL


 


POC Glucose (mg/dL)    ()  mg/dL


 


Plasma Lactic Acid Jose R    (0.7-2.0)  mmol/L


 


Calcium    (8.4-10.2)  mg/dL


 


Phosphorus  9.3 H*   (2.5-4.5)  mg/dL


 


Total Bilirubin    (0.2-1.3)  mg/dL


 


AST    (17-59)  U/L


 


ALT    (4-49)  U/L


 


Ammonia    (<30)  umol/L


 


Troponin I   0.248 H*  (0.000-0.034)  ng/mL


 


Urine Protein    (Negative)  


 


Urine Glucose (UA)    (Negative)  


 


Urine Blood    (Negative)  


 


Urine Bilirubin    (Negative)  


 


Urine RBC    (0-5)  /hpf


 


Urine WBC    (0-5)  /hpf


 


Hyaline Casts    (0-2)  /lpf


 


Urine Mucus    (None)  /hpf


 


U Benzodiazepines Scrn    (NotDetected)  


 


U Marijuana (THC) Screen    (NotDetected)  














Assessment and Plan


Assessment: 








57 year old male with afib , non compliant with meds, alcohol abuse, last drink 

6 days ago. presented due to altered mental status , code stroke activated. 

found to have afib with RVR and hypotension , required cardioversion, intubated 

due to agonal breathing, acute hypoxic and hypercapnic respiratory failure and 

severe acidosis resulting in acute metabolic encephalopathy .severe electrolytes

 derangement. patient is critical with guarded prognosis admitted to the ICU as 

inpatient with anticipated length of stay >2 midnight s








afib with RVR, unstable with hypotension


s/p cardioversion 


NSR 


start heparin drip 


cardiology consult 


check Echocardiogram 


initial troponins elevated , trending down 


resume metoprolol 





acute metabolic encephalopathy 


acute hypoxic   hypercapnic respiratory failure 


s/p ET intubation 


vent support 


admission to the ICU 


pulmonary/ICU consult 





acute kidney injury 


electrolytes imbalance severe hypokalemia, severe hyperphosphatemia 


severe lactic acidosis 


hyperammonemia 


monitor electrolytes 


replace K 


follow up electrolytes 


monitor urine output 


IVF hydration with normal saline 





alcohol abuse , last drink 6 days ago 


monitor for signs of alcohol withdrawal 


patient on versed and fentanyl 


patient did not tolerate propofol due to hypotension 





full code


DVT PPX on heparin drip for afib and elevated trops

## 2023-01-07 NOTE — P.CNPUL
History of Present Illness


Consult date: 01/07/23


Chief complaint: Altered mental status


History of present illness: 





57-year-old male patient with known history of alcoholism and known history of 

chronic into fibrillation.  The patient arrived to the emergency department 

yesterday for altered mentation suspecting a possible stroke.  EMS stated that 

the patient was at home, and he was in his normal self at around 7:30 PM.  

Subsequently at around 8 PM, he was found face down in bed unresponsive.  In the

emergency, the patient was found to be hypotensive.  EMS was supporting his 

breathing by bagging and the patient was having agonal breathing.  It was 

reported to us that the patient is an alcoholic and he was binge drinking.  

There is also history of emesis and apparently the patient was having 

significant amount of emesis and ultimately collapsed and he was unable to stand

up anymore.  In the emergency, the patient was found to have a heart rate of 

220.  Initial blood pressure was 50/30.  He was having agonal breathing.  He was

not following any commands.  Code stroke was activated.  Patient was unable to 

protect his airway.  At that point, he was intubated and he was given a 7 ET 

tube.  He was given a total of 3 L of fluid normal saline bolus.  CAT scan of 

the brain was done in addition to a CT angiogram of the head and the neck.  The 

CAT scans were negative.  Case was discussed with intervention neurology and no 

further treatment was done.  Initial blood work showed a lactic acid level of 

12.4.  The patient also will was found to have a potassium level of 2.7.  He was

in acute kidney injury with a BUN of 52 and a creatinine of 3.6.  Sodium level 

was at 141.  Troponins were positive at levels of 0.3 and 0.2 respectively.  AST

was 191.  He was 84 bilirubin was 3.7 calcium was 7.8.  His white cell count was

17.2 with a hemoglobin 18.7.  INR was 1.4 with a PT of 17.8 and a PTT of 23.  

Urine drug screen was positive for benzodiazepines and marijuana.  Alcohol level

was negative.  UA was positive for protein, 10 WBCs, 8 RBCs.  The chest x-ray 

was essentially clear.  Posterior intubation chest x-ray showed adequate 

positioning of 82.  Vision also has a G-tube in place.  No evidence of any 

pneumothorax.  Subsequent lactic acid level dropped down to 5.3.  At this point 

in time, the patient is intubated on a mechanical ventilator.  He is on a Versed

drip running at 11 mg/m and fentanyl drip running at 2 Karan respiratory 

kilogram per hour.  His assist-control mode of mechanical ventilation at the 

rate of 18 with a tidal volume of 400 FiO2 of 50% with a PEEP of 5.  Most recent

blood gas showed a pH of 7.31 with a pCO2 of 43 and pO2 of 203.  IV fluids are c

urrently in the form of normal saline at rate of 1 50 mL an hour.  Urine output 

is adequate and the patient is producing white celia urine.  Repeat electrodes 

from today are still pending.  He was placed on IV heparin.  He is on no 

pressors for now.  Note that the patient also had a CAT scan of the chest 

abdomen and pelvis in the emergency department that showed no significant 

abnormalities.





Review of Systems


ROS unobtainable: due to endotracheal tube





Past Medical History


Past Medical History: Atrial Fibrillation, Hyperlipidemia, Hypertension


History of Any Multi-Drug Resistant Organisms: None Reported


Past Surgical History: Appendectomy, Orthopedic Surgery, Tonsillectomy


Past Psychological History: No Psychological Hx Reported


Past Alcohol Use History: None Reported


Past Drug Use History: None Reported





- Past Family History


  ** family


Additional Family Medical History / Comment(s): no CAD





Medications and Allergies


                                Home Medications











 Medication  Instructions  Recorded  Confirmed  Type


 


Metoprolol Succinate (ER) [Toprol 100 mg PO DAILY 05/31/17 05/31/17 History





Xl]    


 


Orphenadrine [Norflex] 100 mg PO Q12H #10 tablet.er 05/31/17  Rx


 


Pravastatin Sodium [Pravachol] 10 mg PO DAILY 05/31/17 05/31/17 History


 


predniSONE 50 mg PO DAILY #5 tab 05/31/17  Rx


 


Cyclobenzaprine [Flexeril] 10 mg PO TID PRN #20 tablet 06/17/17  Rx


 


Hydrocodone/Acetaminophen [Norco 2 each PO Q6HR PRN #20 tab 06/17/17  Rx





5-325]    


 


Ibuprofen [Motrin] 600 mg PO Q6HR PRN #20 tab 06/17/17  Rx








                                    Allergies











Allergy/AdvReac Type Severity Reaction Status Date / Time


 


Penicillins Allergy  Unknown Verified 01/06/23 20:19





   Childhood  














Physical Exam


Vitals: 


                                   Vital Signs











  Temp Pulse Resp BP Pulse Ox FiO2


 


 01/07/23 06:00   108 H  21  100/59  97  50


 


 01/07/23 05:30   107 H  21  93/80  96  50


 


 01/07/23 05:00   110 H  23  91/72  96  50


 


 01/07/23 04:30   111 H  24  105/73  94 L  50


 


 01/07/23 04:00  98.3 F  109 H  24  95/70  96  50


 


 01/07/23 03:45       50


 


 01/07/23 03:43       50


 


 01/07/23 03:40       60


 


 01/07/23 03:30   107 H  20  92/81  96  60


 


 01/07/23 03:00   124 H  22  103/57  96  60


 


 01/07/23 02:30   102 H  22  93/76  96  60


 


 01/07/23 02:00   101 H  22  93/76  96  60


 


 01/07/23 01:37       60


 


 01/07/23 01:30   98  28 H  88/64  96  60


 


 01/07/23 01:00   105 H  28 H  123/66  96  70


 


 01/07/23 00:51       70


 


 01/07/23 00:00   91  31 H  114/87  98 


 


 01/06/23 23:11   93  18  108/93  100 


 


 01/06/23 23:00   101 H  18  80/56  98 


 


 01/06/23 22:00   102 H  18  130/84  98  70


 


 01/06/23 21:17       100


 


 01/06/23 21:14   104 H  16  131/79  99 


 


 01/06/23 20:45   110 H  18  104/65  100 


 


 01/06/23 20:41   197 H  18  63/26  100 


 


 01/06/23 20:40       100


 


 01/06/23 20:37   203 H  7 L  57/42  


 


 01/06/23 20:36   184 H  1 L  95/45  


 


 01/06/23 20:26   168 H  16  87/69  89 L 


 


 01/06/23 20:16  97.0 F L  170 H  18  97/80  90 L 








                                Intake and Output











 01/06/23 01/06/23 01/07/23





 14:59 22:59 06:59


 


Intake Total   1442.680


 


Output Total   215


 


Balance   1227.680


 


Intake:   


 


  IV   1300


 


    Potassium Chloride 10 meq   400





    In Water For Injection 1   





    100ml.bag @ 100 mls/hr   





    IVPB Q1HR [facility] Rx#:   





    [id number]   


 


    Sodium Chloride 0.9% 1,   900





    000 ml @ 150 mls/hr IV .   





    Q6H40M [facility] Rx#:[id number]   


 


  Intake, IV Titration   142.680





  Amount   


 


    Midazolam HCl 50 mg In   51.999





    Sodium Chloride 0.9% 40   





    ml @ 1 MG/HR 1 mls/hr IV   





    .Q24H [facility] Rx#:[id number]   


 


    Sodium Chloride 0.9% 80   88.721





    ml @ 2 MCG/KG/HR 16.329   





    mls/hr IV .Q6H8M [facility] with   





    fentaNYL (PF) 1,000 mcg   





    Rx#:[id number]   


 


    propofoL 1,000 mg In   1.96





    Empty Bag 1 bag @ 20 MCG/   





    KG/MIN 9.798 mls/hr IV .   





    L01F80M [facility] Rx#:[id number]   


 


Output:   


 


  Urine   215


 


Other:   


 


  Voiding Method   Indwelling Catheter


 


  Weight  81.647 kg 














  the patient is currently intubated on a mechanical ventilator, he is sedated. 

 He has an orogastric and orotracheal tube in place.  His calm and comfortable. 

 He grimaces to deep painful stimulation.  Once off sedation, he gets quite 

agitated.


Head exam was generally normal. There was no scleral icterus or corneal arcus. 

Mucous membranes were moist.


Neck was supple and without jugular venous distension, thyromegaly, or carotid 

bruits. Carotids were easily palpable bilaterally. There was no adenopathy.


Lungs were clear to auscultation and percussion, and with normal diaphragmatic 

excursion. No wheezes or rales were noted. 


Cardiac exam revealed the PMI to be normally situated and sized. The rhythm was 

regular and no extrasystoles were noted during several minutes of auscultation. 

The first and second heart sounds were normal and physiologic splitting of the 

second heart sound was noted. There were no murmurs, rubs, clicks, or gallops.


Abdominal exam revealed normal bowel sounds. The abdomen was soft, non-tender, 

and without masses, organomegaly, or appreciable enlargement of the abdominal 

aorta.


Examination of the extremities revealed easily palpable radial, femoral and 

pedal pulses. There was no cyanosis, clubbing or edema.


Examination of the skin revealed no evidence of significant rashes, suspicious 

appearing nevi or other concerning lesions.


Neurologically, the patient is awake and alert and the patient does not have any

 focal neurological deficit.  Cranial nerves are essentially intact.





Results





- Laboratory Findings


CBC and BMP: 


                                 01/06/23 22:00





                                 01/06/23 22:00


ABG











ABG pH  7.31  (7.35-7.45)  L  01/07/23  05:49    


 


ABG pCO2  43 mmHg (35-45)   01/07/23  05:49    


 


ABG pO2  203 mmHg ()  H  01/07/23  05:49    


 


ABG O2 Saturation  99.5 % (94-97)  H  01/07/23  05:49    





PT/INR, D-dimer











PT  12.8 sec (9.0-12.0)  H  01/07/23  03:58    


 


INR  1.2  (<1.2)  H  01/07/23  03:58    








Abnormal lab findings: 


                                  Abnormal Labs











  01/06/23 01/06/23 01/06/23





  20:35 21:19 21:19


 


WBC   


 


RBC   


 


Hgb   


 


Hct   


 


Neutrophils #   


 


PT   


 


INR   


 


ABG pH   


 


ABG pCO2   


 


ABG pO2   


 


ABG HCO3   


 


ABG Total CO2   


 


ABG O2 Saturation   


 


VBG pH   


 


VBG pCO2   


 


VBG HCO3   


 


Potassium   


 


Carbon Dioxide   


 


BUN   


 


Creatinine   


 


Glucose   


 


POC Glucose (mg/dL)  128 H  


 


Plasma Lactic Acid Jose R   


 


Calcium   


 


Phosphorus   


 


Total Bilirubin   


 


AST   


 


ALT   


 


Ammonia   


 


Troponin I   


 


Urine Protein    2+ H


 


Urine Glucose (UA)    Trace H


 


Urine Blood    Moderate H


 


Urine Bilirubin    1+ H


 


Urine RBC    8 H


 


Urine WBC    10 H


 


Hyaline Casts    218 H


 


Urine Mucus    Many H


 


U Benzodiazepines Scrn   Detected H 


 


U Marijuana (THC) Screen   Detected H 














  01/06/23 01/06/23 01/06/23





  21:43 22:00 22:00


 


WBC    17.2 H


 


RBC    5.95 H


 


Hgb    18.7 H


 


Hct    57.3 H*


 


Neutrophils #    13.9 H


 


PT   


 


INR   


 


ABG pH  7.13 L*  


 


ABG pCO2  50 H  


 


ABG pO2  240 H  


 


ABG HCO3  17 L  


 


ABG Total CO2  18 L  


 


ABG O2 Saturation  98.8 H  


 


VBG pH   7.12 L* 


 


VBG pCO2   54 H 


 


VBG HCO3   17 L 


 


Potassium   


 


Carbon Dioxide   


 


BUN   


 


Creatinine   


 


Glucose   


 


POC Glucose (mg/dL)   


 


Plasma Lactic Acid Jose R   


 


Calcium   


 


Phosphorus   


 


Total Bilirubin   


 


AST   


 


ALT   


 


Ammonia   


 


Troponin I   


 


Urine Protein   


 


Urine Glucose (UA)   


 


Urine Blood   


 


Urine Bilirubin   


 


Urine RBC   


 


Urine WBC   


 


Hyaline Casts   


 


Urine Mucus   


 


U Benzodiazepines Scrn   


 


U Marijuana (THC) Screen   














  01/06/23 01/06/23 01/06/23





  22:00 22:00 22:00


 


WBC   


 


RBC   


 


Hgb   


 


Hct   


 


Neutrophils #   


 


PT  13.8 H  


 


INR  1.4 H  


 


ABG pH   


 


ABG pCO2   


 


ABG pO2   


 


ABG HCO3   


 


ABG Total CO2   


 


ABG O2 Saturation   


 


VBG pH   


 


VBG pCO2   


 


VBG HCO3   


 


Potassium    2.7 L*


 


Carbon Dioxide    16 L


 


BUN    52 H


 


Creatinine    3.66 H


 


Glucose    155 H


 


POC Glucose (mg/dL)   


 


Plasma Lactic Acid Jose R   


 


Calcium    7.8 L


 


Phosphorus   


 


Total Bilirubin    3.7 H


 


AST    191 H


 


ALT    84 H


 


Ammonia   


 


Troponin I   0.304 H* 


 


Urine Protein   


 


Urine Glucose (UA)   


 


Urine Blood   


 


Urine Bilirubin   


 


Urine RBC   


 


Urine WBC   


 


Hyaline Casts   


 


Urine Mucus   


 


U Benzodiazepines Scrn   


 


U Marijuana (THC) Screen   














  01/06/23 01/07/23 01/07/23





  22:00 00:52 01:05


 


WBC   


 


RBC   


 


Hgb   


 


Hct   


 


Neutrophils #   


 


PT   


 


INR   


 


ABG pH   


 


ABG pCO2   


 


ABG pO2   


 


ABG HCO3   


 


ABG Total CO2   


 


ABG O2 Saturation   


 


VBG pH   


 


VBG pCO2   


 


VBG HCO3   


 


Potassium   


 


Carbon Dioxide   


 


BUN   


 


Creatinine   


 


Glucose   


 


POC Glucose (mg/dL)   140 H 


 


Plasma Lactic Acid Jose R  12.4 H*   5.3 H*


 


Calcium   


 


Phosphorus   


 


Total Bilirubin   


 


AST   


 


ALT   


 


Ammonia  81 H   31 H


 


Troponin I   


 


Urine Protein   


 


Urine Glucose (UA)   


 


Urine Blood   


 


Urine Bilirubin   


 


Urine RBC   


 


Urine WBC   


 


Hyaline Casts   


 


Urine Mucus   


 


U Benzodiazepines Scrn   


 


U Marijuana (THC) Screen   














  01/07/23 01/07/23 01/07/23





  01:05 01:15 03:58


 


WBC   


 


RBC   


 


Hgb   


 


Hct   


 


Neutrophils #   


 


PT    12.8 H


 


INR    1.2 H


 


ABG pH   


 


ABG pCO2   


 


ABG pO2   


 


ABG HCO3   


 


ABG Total CO2   


 


ABG O2 Saturation   


 


VBG pH   


 


VBG pCO2   


 


VBG HCO3   


 


Potassium   


 


Carbon Dioxide   


 


BUN   


 


Creatinine   


 


Glucose   


 


POC Glucose (mg/dL)   


 


Plasma Lactic Acid Jose R   


 


Calcium   


 


Phosphorus  9.3 H*  


 


Total Bilirubin   


 


AST   


 


ALT   


 


Ammonia   


 


Troponin I   0.248 H* 


 


Urine Protein   


 


Urine Glucose (UA)   


 


Urine Blood   


 


Urine Bilirubin   


 


Urine RBC   


 


Urine WBC   


 


Hyaline Casts   


 


Urine Mucus   


 


U Benzodiazepines Scrn   


 


U Marijuana (THC) Screen   














  01/07/23 01/07/23





  05:49 06:07


 


WBC  


 


RBC  


 


Hgb  


 


Hct  


 


Neutrophils #  


 


PT  


 


INR  


 


ABG pH  7.31 L 


 


ABG pCO2  


 


ABG pO2  203 H 


 


ABG HCO3  


 


ABG Total CO2  


 


ABG O2 Saturation  99.5 H 


 


VBG pH  


 


VBG pCO2  


 


VBG HCO3  


 


Potassium  


 


Carbon Dioxide  


 


BUN  


 


Creatinine  


 


Glucose  


 


POC Glucose (mg/dL)   130 H


 


Plasma Lactic Acid Jose R  


 


Calcium  


 


Phosphorus  


 


Total Bilirubin  


 


AST  


 


ALT  


 


Ammonia  


 


Troponin I  


 


Urine Protein  


 


Urine Glucose (UA)  


 


Urine Blood  


 


Urine Bilirubin  


 


Urine RBC  


 


Urine WBC  


 


Hyaline Casts  


 


Urine Mucus  


 


U Benzodiazepines Scrn  


 


U Marijuana (THC) Screen  














- Diagnostic Findings


Chest x-ray: image reviewed


CT scan - chest: image reviewed





Assessment and Plan


Plan: 














Altered mental status, currently under investigation.  No clinical evidence of 

stroke.  The neuro workup has been negative thus far.  The patient may have an 

underlying metabolic encephalopathy.  He was hypotensive and severely acidotic 

at a time of presentation to the emergency department.  Currently intubated on a

 mechanical ventilator





Acute respiratory failure, this is a non-hypoxic respiratory failure, probably 

hypercapnic.  The patient was unable to protect his airways in the emergency 

department as such the patient was intubated on a mechanical ventilator for now.

  He was quite acidotic at the time of admission and mildly hypercapnic.  Chest 

x-ray is clear.  Oxygenation is adequate for now





Severe lactic acidosis, improving and the lactic acid level is down to 5.3





Hypotension, recovered and the patient was given a total of 3 L bolus and 

currently on normal saline at rate of 150 mL an hour





Anion gap metabolic acidosis, being monitored





Troponin leak, probably in association with major hemodynamic alteration in 

acidosis, type II ischemia.








Alcoholism





Acute kidney injury and the creatinine is up to 3.6, could be related to 

underlying intravascular volume depletion, currently being resuscitated with IV 

fluids





Acute hypokalemia, awaiting follow-up labs





History of itchy fibrillation, current rhythm is sinus





Hypertension





Hyperlipidemia





Plan





Continue ventilator support and dropped FiO2 down to 40%


Awaiting follow-up electrodes from this morning of interest will be his level of

 acidosis and his potassium level and renal function.


Altered obtain an ultrasound the kidneys to rule out hydronephrosis


Keep the sedation for now with a combination of Versed and fentanyl


Continue IV fluids in the form of normal saline at rate of 150 mL an hour


Heparin subcu portably prophylaxis


IV Protonix


IV thiamine 100 mg every 24 hours


Discontinue IV heparin and put the patient on Lovenox 40 mg for DVT prophylaxis 

subcu


Check amylase and lipase


Condition is critical.  We'll continue to follow and make further 

recommendations based on his progress.


Time with Patient: Greater than 30

## 2023-01-07 NOTE — XR
EXAMINATION TYPE: XR chest 1V portable

 

DATE OF EXAM: 1/7/2023

 

CLINICAL HISTORY: Difficulty breathing progress study.  

 

TECHNIQUE: Single AP portable semiupright view of the chest is obtained.

 

COMPARISON: Chest x-ray and CT from one day earlier.

 

FINDINGS:  Stable endotracheal and orogastric tubes.

 

Lungs remain clear. Cardiac silhouette size stable and within normal limits. Partial visualization of
 surgical change in the cervical spine on current study.

 

IMPRESSION: No acute pulmonary process. No significant change from one day earlier.

## 2023-01-07 NOTE — US
EXAMINATION TYPE: US kidneys/renal and bladder

 

DATE OF EXAM: 1/7/2023

 

COMPARISON: CT from yesterday

 

CLINICAL HISTORY: [facility]. ICU patient. Bladder alston catheter.  

 

EXAM MEASUREMENTS:

 

Right Kidney:  11.2 x 4.5 x 5.8 cm

Left Kidney: 10.3 x 5.1 x 5.8 cm

 

 

 

Right Kidney: No hydronephrosis or masses seen  

Left Kidney: No hydronephrosis or masses seen  

Bladder: not well visualized due to alston

**Bilateral Jets not seen due to alston

 

There is no evidence for hydronephrosis at this point in time.  No nephrolithiasis is seen.  No yumiko
s are identified.  The urinary bladder is decompressed by Alston catheter.

 

 

 

IMPRESSION: No hydronephrosis seen bilaterally.

## 2023-01-08 LAB
ALBUMIN SERPL-MCNC: 3 G/DL (ref 3.5–5)
ALP SERPL-CCNC: 61 U/L (ref 38–126)
ALT SERPL-CCNC: 80 U/L (ref 4–49)
AMYLASE SERPL-CCNC: 48 U/L (ref 30–110)
ANION GAP SERPL CALC-SCNC: 8 MMOL/L
AST SERPL-CCNC: 83 U/L (ref 17–59)
BASOPHILS # BLD AUTO: 0.1 K/UL (ref 0–0.2)
BASOPHILS NFR BLD AUTO: 1 %
BUN SERPL-SCNC: 44 MG/DL (ref 9–20)
CALCIUM SPEC-MCNC: 7.1 MG/DL (ref 8.4–10.2)
CHLORIDE SERPL-SCNC: 103 MMOL/L (ref 98–107)
CO2 BLDA-SCNC: 41 MMOL/L (ref 19–24)
CO2 SERPL-SCNC: 32 MMOL/L (ref 22–30)
EOSINOPHIL # BLD AUTO: 0.1 K/UL (ref 0–0.7)
EOSINOPHIL NFR BLD AUTO: 0 %
ERYTHROCYTE [DISTWIDTH] IN BLOOD BY AUTOMATED COUNT: 5.31 M/UL (ref 4.3–5.9)
ERYTHROCYTE [DISTWIDTH] IN BLOOD: 13.8 % (ref 11.5–15.5)
GLUCOSE BLD-MCNC: 131 MG/DL (ref 70–110)
GLUCOSE BLD-MCNC: 144 MG/DL (ref 70–110)
GLUCOSE BLD-MCNC: 91 MG/DL (ref 70–110)
GLUCOSE BLD-MCNC: 97 MG/DL (ref 70–110)
GLUCOSE SERPL-MCNC: 148 MG/DL (ref 74–99)
HCO3 BLDA-SCNC: 39 MMOL/L (ref 21–25)
HCT VFR BLD AUTO: 49.3 % (ref 39–53)
HGB BLD-MCNC: 16.4 GM/DL (ref 13–17.5)
LIPASE SERPL-CCNC: 72 U/L (ref 23–300)
LYMPHOCYTES # SPEC AUTO: 1.7 K/UL (ref 1–4.8)
LYMPHOCYTES NFR SPEC AUTO: 12 %
MAGNESIUM SPEC-SCNC: 1.8 MG/DL (ref 1.6–2.3)
MCH RBC QN AUTO: 30.9 PG (ref 25–35)
MCHC RBC AUTO-ENTMCNC: 33.3 G/DL (ref 31–37)
MCV RBC AUTO: 92.8 FL (ref 80–100)
MONOCYTES # BLD AUTO: 1.2 K/UL (ref 0–1)
MONOCYTES NFR BLD AUTO: 8 %
NEUTROPHILS # BLD AUTO: 10.8 K/UL (ref 1.3–7.7)
NEUTROPHILS NFR BLD AUTO: 76 %
PCO2 BLDA: 54 MMHG (ref 35–45)
PH BLDA: 7.47 [PH] (ref 7.35–7.45)
PLATELET # BLD AUTO: 54 K/UL (ref 150–450)
PO2 BLDA: 90 MMHG (ref 83–108)
POTASSIUM SERPL-SCNC: 3.3 MMOL/L (ref 3.5–5.1)
PROT SERPL-MCNC: 5.7 G/DL (ref 6.3–8.2)
SODIUM SERPL-SCNC: 143 MMOL/L (ref 137–145)
WBC # BLD AUTO: 14.3 K/UL (ref 3.8–10.6)

## 2023-01-08 RX ADMIN — CEFAZOLIN SCH MLS/HR: 330 INJECTION, POWDER, FOR SOLUTION INTRAMUSCULAR; INTRAVENOUS at 18:11

## 2023-01-08 RX ADMIN — CHLORHEXIDINE GLUCONATE SCH ML: 1.2 RINSE ORAL at 08:41

## 2023-01-08 RX ADMIN — POTASSIUM BICARBONATE SCH MEQ: 782 TABLET, EFFERVESCENT ORAL at 11:31

## 2023-01-08 RX ADMIN — POTASSIUM CHLORIDE SCH MLS/HR: 14.9 INJECTION, SOLUTION INTRAVENOUS at 00:51

## 2023-01-08 RX ADMIN — SODIUM CHLORIDE SCH MLS/HR: 9 INJECTION, SOLUTION INTRAVENOUS at 08:37

## 2023-01-08 RX ADMIN — FENTANYL CITRATE SCH MLS/HR: 50 INJECTION INTRAVENOUS at 08:21

## 2023-01-08 RX ADMIN — METOPROLOL TARTRATE SCH MG: 25 TABLET, FILM COATED ORAL at 20:08

## 2023-01-08 RX ADMIN — CHLORHEXIDINE GLUCONATE SCH ML: 1.2 RINSE ORAL at 20:08

## 2023-01-08 RX ADMIN — PANTOPRAZOLE SODIUM SCH MG: 40 INJECTION, POWDER, FOR SOLUTION INTRAVENOUS at 20:08

## 2023-01-08 RX ADMIN — CEFAZOLIN SCH MLS/HR: 330 INJECTION, POWDER, FOR SOLUTION INTRAMUSCULAR; INTRAVENOUS at 07:30

## 2023-01-08 RX ADMIN — FENTANYL CITRATE SCH MLS/HR: 50 INJECTION INTRAVENOUS at 15:30

## 2023-01-08 RX ADMIN — NOREPINEPHRINE BITARTRATE SCH MLS/HR: 1 INJECTION, SOLUTION, CONCENTRATE INTRAVENOUS at 18:12

## 2023-01-08 RX ADMIN — MIDAZOLAM SCH MLS/HR: 5 INJECTION INTRAMUSCULAR; INTRAVENOUS at 09:00

## 2023-01-08 RX ADMIN — POTASSIUM BICARBONATE SCH MEQ: 782 TABLET, EFFERVESCENT ORAL at 08:41

## 2023-01-08 RX ADMIN — SODIUM CHLORIDE SCH: 900 INJECTION, SOLUTION INTRAVENOUS at 07:19

## 2023-01-08 RX ADMIN — SODIUM CHLORIDE SCH MLS/HR: 900 INJECTION, SOLUTION INTRAVENOUS at 11:30

## 2023-01-08 RX ADMIN — PANTOPRAZOLE SODIUM SCH MG: 40 INJECTION, POWDER, FOR SOLUTION INTRAVENOUS at 08:43

## 2023-01-08 RX ADMIN — METOPROLOL TARTRATE SCH MG: 25 TABLET, FILM COATED ORAL at 11:15

## 2023-01-08 RX ADMIN — FENTANYL CITRATE SCH MLS/HR: 50 INJECTION INTRAVENOUS at 02:40

## 2023-01-08 RX ADMIN — ENOXAPARIN SODIUM SCH MG: 40 INJECTION SUBCUTANEOUS at 08:42

## 2023-01-08 RX ADMIN — SODIUM CHLORIDE SCH MLS/HR: 9 INJECTION, SOLUTION INTRAVENOUS at 20:08

## 2023-01-08 NOTE — P.PN
Subjective


Progress Note Date: 01/08/23





Hospital Course: 


57-year-old male with history of alcohol dependence/abuse, atrial fibrillation 

presented with altered mentation and possible code stroke.  On arrival, patient 

was hemodynamically unstable, in A. fib with RVR, patient was intubated and 

cardioverted to normal sinus rhythm.  CT had showed no acute process, 

interventional neurology on call did not recommend to give alteplase.  

Reportedly, patient has been binge drinking, his last drink was on January 1.  

Per family patient was vomiting prior to becoming unresponsive.  His initial 

labs were consistent with severe dehydration, pH of 7.13, potassium 2.7, 

creatinine of 3.66, lactic acid of 12.4, mild transaminitis, mild elevated 

troponin, urine tox positive for benzos and marijuana.  Currently patient 

remains intubated, not requiring any pressors, currently on sedation, renal 

function improving with IV fluids.  Is also concern for possible GI bleed, 

patient on IV Protonix, surgery following.  Nephrology and ICU also following.








Subjective: 


Patient seen and examined at bedside.  No acute events overnight.  Currently he 

remains intubated and sedated.  Per nursing, his urine output is getting better.

 He has dark-colored stools, and minimal NG output with serosanguineous fluid.





Pertinent positives and negatives as discussed above, a complete review of 

systems was performed and all other systems are negative.








Vitals Signs Reviewed. 





Vital signs reviewed


General: Intubated and sedated


Derm: warm, dry


Head: atraumatic, normocephalic, symmetric


Eyes: Pupils pinpoint equal and reactive


Mouth: no lip lesion, mucus membranes moist


Cardiovascular: S1S2 reg, no murmur, positive posterior tibial pulse bilateral, 


Lungs: Clear to auscultation bilaterally, mechanical ventilation


Abdominal: soft, nondistended, no rigidity, no appreciable organomegaly, NG tube

in place 


Ext: no gross muscle atrophy, no edema, no contractures


Neuro: Sedated


Psych: Unable to assess





Assessment/Plan:


Shock, likely hypovolemic - resolved


A. fib with RVR status post cardioversion


Acute metabolic encephalopathy


Acute hypoxic and hypercapnic respiratory failure status post intubation


Acute kidney injury - resolved


Uremia


Severe hypokalemia - resolving


Severe hyperphosphatemia - resolved


Hyperammonemia


Elevated troponin


High anion gap metabolic acidosis secondary to Severe lactic acidosis - resolved


History of alcohol abuse


Gram-positive bacteremia


-Continue ICU care


-Continue IV fluids


-Monitor urine output and renal function, renal ultrasound showed no 

hydronephrosis


-Electrolytes improving, continue to monitor


-IV PPI twice a day


-Gen. surgery , and nephrology, and cardiology following


-First set of blood cultures positive for staph, likely contaminant


-Patient currently on vancomycin, repeat blood cultures pending





DVT ppx: Lovenox





Code status: Full code





Anticipated discharge place: Pending clinical course


Anticipated discharge time: Pending clinical course








Objective





- Vital Signs


Vital signs: 


                                   Vital Signs











Temp  100 F H  01/08/23 08:00


 


Pulse  99   01/08/23 10:00


 


Resp  18   01/08/23 10:00


 


BP  99/68   01/08/23 10:00


 


Pulse Ox  94 L  01/08/23 10:00


 


FiO2  40   01/08/23 10:00








                                 Intake & Output











 01/07/23 01/08/23 01/08/23





 18:59 06:59 18:59


 


Intake Total 3713.955 2101.070 513.662


 


Output Total 1200 970 170


 


Balance 2513.955 1131.070 343.662


 


Weight  79.8 kg 


 


Intake:   


 


  IV 3350 1800 150


 


    Dextrose 5% in Water 1, 1350 1800 150





    000 ml @ 150 mls/hr IV .   





    Q7H40M [facility] with Sodium   





    Bicarb (1 Meq/ml) 150 ml   





    Rx#:[id number]   


 


    Sodium Chloride 0.9% 1, 450  





    000 ml @ 150 mls/hr IV .   





    Q6H40M [facility] Rx#:[id number]   


 


    Sodium Chloride 0.9% 1, 1000  





    000 ml @ 999 mls/hr IV .   





    Q1H1M [facility] Rx#:[id number]   


 


    Thiamine 100 mg In Sodium 50  





    Chloride 0.9% 50 ml @   





    100 mls/hr IVPB Q24HR [facility]   





    Rx#:[id number]   


 


    Vancomycin 1,500 mg In 500  





    Sodium Chloride 0.9% 500   





    ml 500 ml @ 167 mls/hr   





    IVPB Q24H [facility] Rx#:   





    [id number]   


 


  Intake, IV Titration 363.955 271.070 363.662





  Amount   


 


    Midazolam HCl 200 mg In   78.05





    Sodium Chloride 0.9% 60   





    ml @ 14 MG/HR 7 mls/hr IV   





    .[id number] [facility] Rx#:   





    [id number]   


 


    Midazolam HCl 50 mg In 145.283  





    Sodium Chloride 0.9% 40   





    ml @ 1 MG/HR 1 mls/hr IV   





    .Q24H [facility] Rx#:[id number]   


 


    Sodium Chloride 0.9% 1,   100





    000 ml @ 100 mls/hr IV .   





    Q10H [facility] Rx#:[id number]   


 


    Sodium Chloride 0.9% 80 218.672 66.407 





    ml @ 2 MCG/KG/HR 16.329   





    mls/hr IV .Q6H8M [facility] with   





    fentaNYL (PF) 1,000 mcg   





    Rx#:[id number]   


 


    fentaNYL (PF) 2,500 mcg  204.663 185.612





    In Sodium Chloride 0.9%   





    200 ml @ 4 MCG/KG/HR 32.   





    659 mls/hr IV .Q7H40M [facility]   





    Rx#:[id number]   


 


  Other  30 


 


Output:   


 


  Gastric Drainage 300 290 


 


  Urine 700 680 70


 


  Stool 200  100


 


Other:   


 


  Voiding Method Indwelling Catheter Indwelling Catheter Indwelling Catheter














- Labs


CBC & Chem 7: 


                                 01/08/23 05:33





                                 01/08/23 05:33


Labs: 


                  Abnormal Lab Results - Last 24 Hours (Table)











  01/07/23 01/07/23 01/08/23 Range/Units





  11:58 18:00 00:39 


 


WBC     (3.8-10.6)  k/uL


 


Plt Count     (150-450)  k/uL


 


Neutrophils #     (1.3-7.7)  k/uL


 


Monocytes #     (0-1.0)  k/uL


 


ABG pH     (7.35-7.45)  


 


ABG pCO2     (35-45)  mmHg


 


ABG HCO3     (21-25)  mmol/L


 


ABG Total CO2     (19-24)  mmol/L


 


ABG O2 Saturation     (94-97)  %


 


Potassium     (3.5-5.1)  mmol/L


 


Carbon Dioxide     (22-30)  mmol/L


 


BUN     (9-20)  mg/dL


 


Glucose     (74-99)  mg/dL


 


POC Glucose (mg/dL)  144 H  153 H  131 H  ()  mg/dL


 


Calcium     (8.4-10.2)  mg/dL


 


Total Bilirubin     (0.2-1.3)  mg/dL


 


AST     (17-59)  U/L


 


ALT     (4-49)  U/L


 


Total Protein     (6.3-8.2)  g/dL


 


Albumin     (3.5-5.0)  g/dL














  01/08/23 01/08/23 01/08/23 Range/Units





  05:33 05:33 05:59 


 


WBC  14.3 H    (3.8-10.6)  k/uL


 


Plt Count  54 L D    (150-450)  k/uL


 


Neutrophils #  10.8 H    (1.3-7.7)  k/uL


 


Monocytes #  1.2 H    (0-1.0)  k/uL


 


ABG pH    7.47 H  (7.35-7.45)  


 


ABG pCO2    54 H  (35-45)  mmHg


 


ABG HCO3    39 H  (21-25)  mmol/L


 


ABG Total CO2    41 H  (19-24)  mmol/L


 


ABG O2 Saturation    98.2 H  (94-97)  %


 


Potassium   3.3 L   (3.5-5.1)  mmol/L


 


Carbon Dioxide   32 H   (22-30)  mmol/L


 


BUN   44 H   (9-20)  mg/dL


 


Glucose   148 H   (74-99)  mg/dL


 


POC Glucose (mg/dL)     ()  mg/dL


 


Calcium   7.1 L   (8.4-10.2)  mg/dL


 


Total Bilirubin   2.3 H   (0.2-1.3)  mg/dL


 


AST   83 H   (17-59)  U/L


 


ALT   80 H   (4-49)  U/L


 


Total Protein   5.7 L   (6.3-8.2)  g/dL


 


Albumin   3.0 L   (3.5-5.0)  g/dL














  01/08/23 Range/Units





  06:37 


 


WBC   (3.8-10.6)  k/uL


 


Plt Count   (150-450)  k/uL


 


Neutrophils #   (1.3-7.7)  k/uL


 


Monocytes #   (0-1.0)  k/uL


 


ABG pH   (7.35-7.45)  


 


ABG pCO2   (35-45)  mmHg


 


ABG HCO3   (21-25)  mmol/L


 


ABG Total CO2   (19-24)  mmol/L


 


ABG O2 Saturation   (94-97)  %


 


Potassium   (3.5-5.1)  mmol/L


 


Carbon Dioxide   (22-30)  mmol/L


 


BUN   (9-20)  mg/dL


 


Glucose   (74-99)  mg/dL


 


POC Glucose (mg/dL)  144 H  ()  mg/dL


 


Calcium   (8.4-10.2)  mg/dL


 


Total Bilirubin   (0.2-1.3)  mg/dL


 


AST   (17-59)  U/L


 


ALT   (4-49)  U/L


 


Total Protein   (6.3-8.2)  g/dL


 


Albumin   (3.5-5.0)  g/dL








                      Microbiology - Last 24 Hours (Table)











 01/07/23 00:38 Gram Stain - Preliminary





 Sputum Sputum Culture - Preliminary


 


 01/06/23 20:37 Blood Culture Gram Stain - Preliminary





 Blood Blood Culture - Preliminary





    Staphylococcus aureus





    Staphylococcus epidermidis


 


 01/06/23 20:37 Blood Culture - Final





 Blood

## 2023-01-08 NOTE — P.PN
Subjective





Patient is seen in follow-up for acute kidney injury.  Renal function improving.

 Nonoliguric.  Receiving IV fluids.  Now on normal saline.  Intubated.





Vital signs are stable.


General: Resting in bed.


HEENT: Intubated.  NG tube noted.


LUNGS: Breath sounds decreased.


HEART: Rate and Rhythm are regular. 


ABDOMEN: Soft, no distention.


EXTREMITITES: No edema.





Objective





- Vital Signs


Vital signs: 


                                   Vital Signs











Temp  100 F H  01/08/23 08:00


 


Pulse  101 H  01/08/23 08:30


 


Resp  17   01/08/23 08:30


 


BP  99/67   01/08/23 08:30


 


Pulse Ox  94 L  01/08/23 08:30


 


FiO2  40   01/08/23 08:00








                                 Intake & Output











 01/07/23 01/08/23 01/08/23





 18:59 06:59 18:59


 


Intake Total 3713.955 2101.070 513.662


 


Output Total 1200 970 170


 


Balance 2513.955 1131.070 343.662


 


Weight  79.8 kg 


 


Intake:   


 


  IV 3350 1800 150


 


    Dextrose 5% in Water 1, 1350 1800 150





    000 ml @ 150 mls/hr IV .   





    Q7H40M [facility] with Sodium   





    Bicarb (1 Meq/ml) 150 ml   





    Rx#:[id number]   


 


    Sodium Chloride 0.9% 1, 450  





    000 ml @ 150 mls/hr IV .   





    Q6H40M [facility] Rx#:[id number]   


 


    Sodium Chloride 0.9% 1, 1000  





    000 ml @ 999 mls/hr IV .   





    Q1H1M [facility] Rx#:[id number]   


 


    Thiamine 100 mg In Sodium 50  





    Chloride 0.9% 50 ml @   





    100 mls/hr IVPB Q24HR [facility]   





    Rx#:[id number]   


 


    Vancomycin 1,500 mg In 500  





    Sodium Chloride 0.9% 500   





    ml 500 ml @ 167 mls/hr   





    IVPB Q24H [facility] Rx#:   





    [id number]   


 


  Intake, IV Titration 363.955 271.070 363.662





  Amount   


 


    Midazolam HCl 200 mg In   78.05





    Sodium Chloride 0.9% 60   





    ml @ 14 MG/HR 7 mls/hr IV   





    .[id number] [facility] Rx#:   





    [id number]   


 


    Midazolam HCl 50 mg In 145.283  





    Sodium Chloride 0.9% 40   





    ml @ 1 MG/HR 1 mls/hr IV   





    .Q24H [facility] Rx#:[id number]   


 


    Sodium Chloride 0.9% 1,   100





    000 ml @ 100 mls/hr IV .   





    Q10H [facility] Rx#:[id number]   


 


    Sodium Chloride 0.9% 80 218.672 66.407 





    ml @ 2 MCG/KG/HR 16.329   





    mls/hr IV .Q6H8M [facility] with   





    fentaNYL (PF) 1,000 mcg   





    Rx#:[id number]   


 


    fentaNYL (PF) 2,500 mcg  204.663 185.612





    In Sodium Chloride 0.9%   





    200 ml @ 4 MCG/KG/HR 32.   





    659 mls/hr IV .Q7H40M [facility]   





    Rx#:[id number]   


 


  Other  30 


 


Output:   


 


  Gastric Drainage 300 290 


 


  Urine 700 680 70


 


  Stool 200  100


 


Other:   


 


  Voiding Method Indwelling Catheter Indwelling Catheter Indwelling Catheter














- Labs


CBC & Chem 7: 


                                 01/08/23 05:33





                                 01/08/23 05:33


Labs: 


                  Abnormal Lab Results - Last 24 Hours (Table)











  01/07/23 01/07/23 01/08/23 Range/Units





  11:58 18:00 00:39 


 


WBC     (3.8-10.6)  k/uL


 


Plt Count     (150-450)  k/uL


 


Neutrophils #     (1.3-7.7)  k/uL


 


Monocytes #     (0-1.0)  k/uL


 


ABG pH     (7.35-7.45)  


 


ABG pCO2     (35-45)  mmHg


 


ABG HCO3     (21-25)  mmol/L


 


ABG Total CO2     (19-24)  mmol/L


 


ABG O2 Saturation     (94-97)  %


 


Potassium     (3.5-5.1)  mmol/L


 


Carbon Dioxide     (22-30)  mmol/L


 


BUN     (9-20)  mg/dL


 


Glucose     (74-99)  mg/dL


 


POC Glucose (mg/dL)  144 H  153 H  131 H  ()  mg/dL


 


Calcium     (8.4-10.2)  mg/dL


 


Total Bilirubin     (0.2-1.3)  mg/dL


 


AST     (17-59)  U/L


 


ALT     (4-49)  U/L


 


Total Protein     (6.3-8.2)  g/dL


 


Albumin     (3.5-5.0)  g/dL














  01/08/23 01/08/23 01/08/23 Range/Units





  05:33 05:33 05:59 


 


WBC  14.3 H    (3.8-10.6)  k/uL


 


Plt Count  54 L D    (150-450)  k/uL


 


Neutrophils #  10.8 H    (1.3-7.7)  k/uL


 


Monocytes #  1.2 H    (0-1.0)  k/uL


 


ABG pH    7.47 H  (7.35-7.45)  


 


ABG pCO2    54 H  (35-45)  mmHg


 


ABG HCO3    39 H  (21-25)  mmol/L


 


ABG Total CO2    41 H  (19-24)  mmol/L


 


ABG O2 Saturation    98.2 H  (94-97)  %


 


Potassium   3.3 L   (3.5-5.1)  mmol/L


 


Carbon Dioxide   32 H   (22-30)  mmol/L


 


BUN   44 H   (9-20)  mg/dL


 


Glucose   148 H   (74-99)  mg/dL


 


POC Glucose (mg/dL)     ()  mg/dL


 


Calcium   7.1 L   (8.4-10.2)  mg/dL


 


Total Bilirubin   2.3 H   (0.2-1.3)  mg/dL


 


AST   83 H   (17-59)  U/L


 


ALT   80 H   (4-49)  U/L


 


Total Protein   5.7 L   (6.3-8.2)  g/dL


 


Albumin   3.0 L   (3.5-5.0)  g/dL














  01/08/23 Range/Units





  06:37 


 


WBC   (3.8-10.6)  k/uL


 


Plt Count   (150-450)  k/uL


 


Neutrophils #   (1.3-7.7)  k/uL


 


Monocytes #   (0-1.0)  k/uL


 


ABG pH   (7.35-7.45)  


 


ABG pCO2   (35-45)  mmHg


 


ABG HCO3   (21-25)  mmol/L


 


ABG Total CO2   (19-24)  mmol/L


 


ABG O2 Saturation   (94-97)  %


 


Potassium   (3.5-5.1)  mmol/L


 


Carbon Dioxide   (22-30)  mmol/L


 


BUN   (9-20)  mg/dL


 


Glucose   (74-99)  mg/dL


 


POC Glucose (mg/dL)  144 H  ()  mg/dL


 


Calcium   (8.4-10.2)  mg/dL


 


Total Bilirubin   (0.2-1.3)  mg/dL


 


AST   (17-59)  U/L


 


ALT   (4-49)  U/L


 


Total Protein   (6.3-8.2)  g/dL


 


Albumin   (3.5-5.0)  g/dL








                      Microbiology - Last 24 Hours (Table)











 01/07/23 00:38 Gram Stain - Preliminary





 Sputum Sputum Culture - Preliminary


 


 01/06/23 20:37 Blood Culture Gram Stain - Preliminary





 Blood Blood Culture - Preliminary





    Staphylococcus aureus





    Staphylococcus epidermidis


 


 01/06/23 20:37 Blood Culture - Final





 Blood 














Assessment and Plan


Plan: 





Assessment:


1.  Acute kidney injury secondary to hemodynamic ATN.  Creatinine 2.66 on 

admission and is 1.23 today.  Patient received IV contrast for CT angiogram and 

CT of the chest abdomen and pelvis on 01/06/2023 which were negative.  No 

hydronephrosis was noted.  Creatinine in January 2019 was 1.


2.  Alcohol withdrawal.


3.  Metabolic acidosis secondary to acute kidney injury and lactic acidosis.  

Status post bicarb drip.  Resolved.


4.  Hypokalemia from poor intake and intracellular shifting.  


5.  Altered mental status. ?Etiology.  Gram-positive bacteremia.  On 

antibiotics.





Plan:


Maintain normal saline.


Potassium replaced.


Avoid nephrotoxins.


Continue to monitor renal function and urine output.


Wean FiO2.

## 2023-01-08 NOTE — XR
EXAMINATION TYPE: XR chest 1V portable

 

DATE OF EXAM: 1/8/2023 5:23 AM

 

COMPARISON: Chest radiograph from one day prior. 

 

TECHNIQUE: XR chest 1V portable Frontal view of the chest.

 

CLINICAL INDICATION:Male, 57 years old with history of Tube placement; 

 

FINDINGS: 

Lungs/Pleura: There is no evidence of pleural effusion, focal consolidation, or pneumothorax.  

Pulmonary vascularity: Unremarkable.

Heart/mediastinum: Cardiomediastinal silhouette is unremarkable.

Musculoskeletal: No acute osseous pathology.

 

Other findings: None

 

Lines/Tubes:

Endotracheal tube with distal tip 6.9 cm above the jossy.

Nasogastric tube with its distal tip and side-port projecting under the diaphragm.

 

 

IMPRESSION: 

1.  No acute cardiopulmonary disease/process.

2.  Stable support tubes

## 2023-01-08 NOTE — P.PN
Subjective


Progress Note Date: 01/08/23








57-year-old male patient with known history of alcoholism and known history of 

chronic into fibrillation.  The patient arrived to the emergency department 

yesterday for altered mentation suspecting a possible stroke.  EMS stated that 

the patient was at home, and he was in his normal self at around 7:30 PM.  

Subsequently at around 8 PM, he was found face down in bed unresponsive.  In the

emergency, the patient was found to be hypotensive.  EMS was supporting his 

breathing by bagging and the patient was having agonal breathing.  It was 

reported to us that the patient is an alcoholic and he was binge drinking.  

There is also history of emesis and apparently the patient was having 

significant amount of emesis and ultimately collapsed and he was unable to stand

up anymore.  In the emergency, the patient was found to have a heart rate of 

220.  Initial blood pressure was 50/30.  He was having agonal breathing.  He was

not following any commands.  Code stroke was activated.  Patient was unable to p

rotect his airway.  At that point, he was intubated and he was given a 7 ET 

tube.  He was given a total of 3 L of fluid normal saline bolus.  CAT scan of 

the brain was done in addition to a CT angiogram of the head and the neck.  The 

CAT scans were negative.  Case was discussed with intervention neurology and no 

further treatment was done.  Initial blood work showed a lactic acid level of 

12.4.  The patient also will was found to have a potassium level of 2.7.  He was

in acute kidney injury with a BUN of 52 and a creatinine of 3.6.  Sodium level 

was at 141.  Troponins were positive at levels of 0.3 and 0.2 respectively.  AST

was 191.  He was 84 bilirubin was 3.7 calcium was 7.8.  His white cell count was

17.2 with a hemoglobin 18.7.  INR was 1.4 with a PT of 17.8 and a PTT of 23.  

Urine drug screen was positive for benzodiazepines and marijuana.  Alcohol level

was negative.  UA was positive for protein, 10 WBCs, 8 RBCs.  The chest x-ray 

was essentially clear.  Posterior intubation chest x-ray showed adequate 

positioning of 82.  Vision also has a G-tube in place.  No evidence of any p

neumothorax.  Subsequent lactic acid level dropped down to 5.3.  At this point 

in time, the patient is intubated on a mechanical ventilator.  He is on a Versed

drip running at 11 mg/m and fentanyl drip running at 2 Karan respiratory 

kilogram per hour.  His assist-control mode of mechanical ventilation at the 

rate of 18 with a tidal volume of 400 FiO2 of 50% with a PEEP of 5.  Most recent

blood gas showed a pH of 7.31 with a pCO2 of 43 and pO2 of 203.  IV fluids are 

currently in the form of normal saline at rate of 1 50 mL an hour.  Urine output

is adequate and the patient is producing white celia urine.  Repeat electrodes 

from today are still pending.  He was placed on IV heparin.  He is on no 

pressors for now.  Note that the patient also had a CAT scan of the chest 

abdomen and pelvis in the emergency department that showed no significant 

abnormalities.





On today's evaluation of 01/08/2023, the patient is still intubated on a 

mechanical ventilator.  He is very much dependent on sedation as the patient is 

quite agitated once off sedation.  This morning, he is on a combination of Aure

sed at 40 mg an hour and is also on fentanyl and at 4 mcg/kg/h.  I was told by 

the nursing staff that he gets quite agitated when the sedation runs of and this

has been noted by the nursing staff.  Note that no purposeful activity or 

following commands and his mentation is not appropriate at this point in time.  

There may be a component of delirium tremens of this patient.  He is a long-time

alcoholic.  In [facility], another sedation holiday will be given today and his 

mental status will be reassessed during the day.  Meanwhile, this morning, he is

on assist-control mode at a rate of 18 with a tidal volume of 400 and FiO2 was 

at 45% with a PEEP of 5.  His chest x-ray showed no acute abnormalities and EKG 

was in a good location.  No significant orotracheal secretions.  The blood gas 

is showing a component of alkalosis as the patient was being given bicarb 

infusion.  His patient's pH is at 7.47 with a pCO2 of 54 and pO2 of 90.  On his 

blood work, the renal function has improved.  The patient's creatinine is down 

to 1.23 with a BUN of 44.  Sodium is at 143 and potassium is at 3.70 severity 

replaced.  Serum bicarb is up to 32.  WBC was at 14.3 and hematocrit is 49 with 

a platelet count that is pending for now.  Note that her amylase and lipase was 

slightly elevated and there may be a component of pancreatitis along with 

alcoholic hepatitis.  Blood culture was positive for staph aureus and staph 

epidermidis.  I'm not sure if this is a true infection versus a contaminant.  

This was only in 1 out of 2 bottles.  Another set of blood cultures will be 

obtained.  Noted the patient was given a dose of vancomycin pending further 

cultures.  He is on Lovenox portably prophylaxis.  He remains nothing by mouth. 

No pressors for now.  Renal ultrasound was completed yesterday and it showed no 

evidence of any hydronephrosis.





Objective





- Vital Signs


Vital signs: 


                                   Vital Signs











Temp  100.7 F H  01/08/23 00:00


 


Pulse  101 H  01/08/23 06:00


 


Resp  18   01/08/23 06:00


 


BP  99/63   01/08/23 06:00


 


Pulse Ox  95   01/08/23 06:00


 


FiO2  45   01/08/23 05:15








                                 Intake & Output











 01/07/23 01/07/23 01/08/23





 06:59 18:59 06:59


 


Intake Total 1024.901 9937.955 2101.070


 


Output Total 215 1200 970


 


Balance 8981.272 8537.955 1131.070


 


Weight 81.647 kg  79.8 kg


 


Intake:   


 


  IV 1300 3350 1800


 


    Dextrose 5% in Water 1,  1350 1800





    000 ml @ 150 mls/hr IV .   





    Q7H40M [facility] with Sodium   





    Bicarb (1 Meq/ml) 150 ml   





    Rx#:[id number]   


 


    Potassium Chloride 10 meq 400  





    In Water For Injection 1   





    100ml.bag @ 100 mls/hr   





    IVPB Q1HR [facility] Rx#:   





    [id number]   


 


    Sodium Chloride 0.9% 1, 900 450 





    000 ml @ 150 mls/hr IV .   





    Q6H40M [facility] Rx#:[id number]   


 


    Sodium Chloride 0.9% 1,  1000 





    000 ml @ 999 mls/hr IV .   





    Q1H1M ONE Rx#:[id number]   


 


    Thiamine 100 mg In Sodium  50 





    Chloride 0.9% 50 ml @   





    100 mls/hr IVPB Q24HR [facility]   





    Rx#:[id number]   


 


    Vancomycin 1,500 mg In  500 





    Sodium Chloride 0.9% 500   





    ml 500 ml @ 167 mls/hr   





    IVPB Q24H [facility] Rx#:   





    [id number]   


 


  Intake, IV Titration 142.680 363.955 271.070





  Amount   


 


    Midazolam HCl 50 mg In 51.999 145.283 





    Sodium Chloride 0.9% 40   





    ml @ 1 MG/HR 1 mls/hr IV   





    .Q24H [facility] Rx#:[id number]   


 


    Sodium Chloride 0.9% 80 88.721 218.672 66.407





    ml @ 2 MCG/KG/HR 16.329   





    mls/hr IV .Q6H8M [facility] with   





    fentaNYL (PF) 1,000 mcg   





    Rx#:[id number]   


 


    fentaNYL (PF) 2,500 mcg   204.663





    In Sodium Chloride 0.9%   





    200 ml @ 4 MCG/KG/HR 32.   





    659 mls/hr IV .Q7H40M [facility]   





    Rx#:[id number]   


 


    propofoL 1,000 mg In 1.96  





    Empty Bag 1 bag @ 20 MCG/   





    KG/MIN 9.798 mls/hr IV .   





    H28F51T [facility] Rx#:[id number]   


 


  Other   30


 


Output:   


 


  Gastric Drainage  300 290


 


  Urine 215 700 680


 


  Stool  200 


 


Other:   


 


  Voiding Method Indwelling Catheter Indwelling Catheter Indwelling Catheter














- Exam














  the patient is currently intubated on a mechanical ventilator, he is sedated. 

He has an orogastric and orotracheal tube in place.  His calm and comfortable.  

He grimaces to deep painful stimulation.  Once off sedation, he gets quite 

agitated.


Head exam was generally normal. There was no scleral icterus or corneal arcus. 

Mucous membranes were moist.


Neck was supple and without jugular venous distension, thyromegaly, or carotid 

bruits. Carotids were easily palpable bilaterally. There was no adenopathy.


Lungs were clear to auscultation and percussion, and with normal diaphragmatic 

excursion. No wheezes or rales were noted. 


Cardiac exam revealed the PMI to be normally situated and sized. The rhythm was 

regular and no extrasystoles were noted during several minutes of auscultation. 

The first and second heart sounds were normal and physiologic splitting of the 

second heart sound was noted. There were no murmurs, rubs, clicks, or gallops.


Abdominal exam revealed normal bowel sounds. The abdomen was soft, non-tender, 

and without masses, organomegaly, or appreciable enlargement of the abdominal 

aorta.


Examination of the extremities revealed easily palpable radial, femoral and 

pedal pulses. There was no cyanosis, clubbing or edema.


Examination of the skin revealed no evidence of significant rashes, suspicious 

appearing nevi or other concerning lesions.


Neurologically, the patient is awake and alert and the patient does not have any

focal neurological deficit.  Cranial nerves are essentially intact.








- Labs


CBC & Chem 7: 


                                 01/08/23 05:33





                                 01/08/23 05:33


Labs: 


                  Abnormal Lab Results - Last 24 Hours (Table)











  01/07/23 01/07/23 01/07/23 Range/Units





  07:26 07:26 07:26 


 


WBC  22.9 H    (3.8-10.6)  k/uL


 


RBC  6.22 H    (4.30-5.90)  m/uL


 


Hgb  19.5 H*    (13.0-17.5)  gm/dL


 


Hct  59.1 H*    (39.0-53.0)  %


 


Plt Count  134 L    (150-450)  k/uL


 


Neutrophils #  17.3 H    (1.3-7.7)  k/uL


 


Monocytes #  1.7 H    (0-1.0)  k/uL


 


ABG pH     (7.35-7.45)  


 


ABG pCO2     (35-45)  mmHg


 


ABG HCO3     (21-25)  mmol/L


 


ABG Total CO2     (19-24)  mmol/L


 


ABG O2 Saturation     (94-97)  %


 


Potassium     (3.5-5.1)  mmol/L


 


Chloride   109 H   ()  mmol/L


 


Carbon Dioxide   16 L   (22-30)  mmol/L


 


BUN   61 H   (9-20)  mg/dL


 


Creatinine   2.75 H   (0.66-1.25)  mg/dL


 


Glucose   112 H   (74-99)  mg/dL


 


POC Glucose (mg/dL)     ()  mg/dL


 


Plasma Lactic Acid Jose R    3.1 H*  (0.7-2.0)  mmol/L


 


Calcium   7.3 L   (8.4-10.2)  mg/dL


 


Phosphorus   8.4 H   (2.5-4.5)  mg/dL


 


Total Bilirubin     (0.2-1.3)  mg/dL


 


AST     (17-59)  U/L


 


ALT     (4-49)  U/L


 


Total Protein     (6.3-8.2)  g/dL


 


Albumin     (3.5-5.0)  g/dL


 


Amylase   416 H*   ()  U/L


 


Lipase   590 H   ()  U/L














  01/07/23 01/07/23 01/08/23 Range/Units





  11:58 18:00 00:39 


 


WBC     (3.8-10.6)  k/uL


 


RBC     (4.30-5.90)  m/uL


 


Hgb     (13.0-17.5)  gm/dL


 


Hct     (39.0-53.0)  %


 


Plt Count     (150-450)  k/uL


 


Neutrophils #     (1.3-7.7)  k/uL


 


Monocytes #     (0-1.0)  k/uL


 


ABG pH     (7.35-7.45)  


 


ABG pCO2     (35-45)  mmHg


 


ABG HCO3     (21-25)  mmol/L


 


ABG Total CO2     (19-24)  mmol/L


 


ABG O2 Saturation     (94-97)  %


 


Potassium     (3.5-5.1)  mmol/L


 


Chloride     ()  mmol/L


 


Carbon Dioxide     (22-30)  mmol/L


 


BUN     (9-20)  mg/dL


 


Creatinine     (0.66-1.25)  mg/dL


 


Glucose     (74-99)  mg/dL


 


POC Glucose (mg/dL)  144 H  153 H  131 H  ()  mg/dL


 


Plasma Lactic Acid Jose R     (0.7-2.0)  mmol/L


 


Calcium     (8.4-10.2)  mg/dL


 


Phosphorus     (2.5-4.5)  mg/dL


 


Total Bilirubin     (0.2-1.3)  mg/dL


 


AST     (17-59)  U/L


 


ALT     (4-49)  U/L


 


Total Protein     (6.3-8.2)  g/dL


 


Albumin     (3.5-5.0)  g/dL


 


Amylase     ()  U/L


 


Lipase     ()  U/L














  01/08/23 01/08/23 01/08/23 Range/Units





  05:33 05:33 05:59 


 


WBC  14.3 H    (3.8-10.6)  k/uL


 


RBC     (4.30-5.90)  m/uL


 


Hgb     (13.0-17.5)  gm/dL


 


Hct     (39.0-53.0)  %


 


Plt Count     (150-450)  k/uL


 


Neutrophils #     (1.3-7.7)  k/uL


 


Monocytes #     (0-1.0)  k/uL


 


ABG pH    7.47 H  (7.35-7.45)  


 


ABG pCO2    54 H  (35-45)  mmHg


 


ABG HCO3    39 H  (21-25)  mmol/L


 


ABG Total CO2    41 H  (19-24)  mmol/L


 


ABG O2 Saturation    98.2 H  (94-97)  %


 


Potassium   3.3 L   (3.5-5.1)  mmol/L


 


Chloride     ()  mmol/L


 


Carbon Dioxide   32 H   (22-30)  mmol/L


 


BUN   44 H   (9-20)  mg/dL


 


Creatinine     (0.66-1.25)  mg/dL


 


Glucose   148 H   (74-99)  mg/dL


 


POC Glucose (mg/dL)     ()  mg/dL


 


Plasma Lactic Acid Jose R     (0.7-2.0)  mmol/L


 


Calcium   7.1 L   (8.4-10.2)  mg/dL


 


Phosphorus     (2.5-4.5)  mg/dL


 


Total Bilirubin   2.3 H   (0.2-1.3)  mg/dL


 


AST   83 H   (17-59)  U/L


 


ALT   80 H   (4-49)  U/L


 


Total Protein   5.7 L   (6.3-8.2)  g/dL


 


Albumin   3.0 L   (3.5-5.0)  g/dL


 


Amylase     ()  U/L


 


Lipase     ()  U/L














  01/08/23 Range/Units





  06:37 


 


WBC   (3.8-10.6)  k/uL


 


RBC   (4.30-5.90)  m/uL


 


Hgb   (13.0-17.5)  gm/dL


 


Hct   (39.0-53.0)  %


 


Plt Count   (150-450)  k/uL


 


Neutrophils #   (1.3-7.7)  k/uL


 


Monocytes #   (0-1.0)  k/uL


 


ABG pH   (7.35-7.45)  


 


ABG pCO2   (35-45)  mmHg


 


ABG HCO3   (21-25)  mmol/L


 


ABG Total CO2   (19-24)  mmol/L


 


ABG O2 Saturation   (94-97)  %


 


Potassium   (3.5-5.1)  mmol/L


 


Chloride   ()  mmol/L


 


Carbon Dioxide   (22-30)  mmol/L


 


BUN   (9-20)  mg/dL


 


Creatinine   (0.66-1.25)  mg/dL


 


Glucose   (74-99)  mg/dL


 


POC Glucose (mg/dL)  144 H  ()  mg/dL


 


Plasma Lactic Acid Jose R   (0.7-2.0)  mmol/L


 


Calcium   (8.4-10.2)  mg/dL


 


Phosphorus   (2.5-4.5)  mg/dL


 


Total Bilirubin   (0.2-1.3)  mg/dL


 


AST   (17-59)  U/L


 


ALT   (4-49)  U/L


 


Total Protein   (6.3-8.2)  g/dL


 


Albumin   (3.5-5.0)  g/dL


 


Amylase   ()  U/L


 


Lipase   ()  U/L








                      Microbiology - Last 24 Hours (Table)











 01/06/23 20:37 Blood Culture Gram Stain - Preliminary





 Blood Blood Culture - Preliminary





    Staphylococcus aureus





    Staphylococcus epidermidis


 


 01/07/23 00:38 Sputum Culture - Preliminary





 Sputum 


 


 01/06/23 20:37 Blood Culture - Final





 Blood 














Assessment and Plan


Plan: 














Altered mental status, currently under investigation.  No clinical evidence of 

stroke.  The neuro workup has been negative thus far.  The patient may have an 

underlying metabolic encephalopathy.  Cannot rule out the possibility of 

delirium tremens.  The patient is currently on a combination of fentanyl and 

Versed to control his agitation.





Acute respiratory failure, this is a non-hypoxic respiratory failure, probably 

hypercapnic.  The patient was unable to protect his airways in the emergency 

department as such the patient was intubated on a mechanical ventilator for now.

 He was quite acidotic at the time of admission and mildly hypercapnic.  Chest 

x-ray is clear.  Oxygenation is adequate for now.  Chest x-ray from today is not

showing any acute abnormalities., > 30 min





Positive culture with staph aureus and staph epidermidis, could be contaminant 

versus true infection.  The patient is currently on vancomycin





Severe lactic acidosis, improving and the lactic acid level is down to 3.0.





Hypotension, recovered and the patient is normotensive for now





Anion gap metabolic acidosis, being monitored, controlled with bicarb infusion 

the patient is currently alkalotic





Troponin leak, probably in association with major hemodynamic alteration in 

acidosis, type II ischemia.








Alcoholism





Acute kidney injury  improving and the creatinine is currently down to 1.23.





Acute hypokalemia, awaiting follow-up labs, being replaced





History of atrial fibrillation, current rhythm is sinus





Hypertension





Hyperlipidemia





Plan





Continue ventilator support and dropped FiO2 down to 40%


Give the patient has sedation holiday.  I would suggest dropping and put on 

first and keeping the patient Versed and assess his mentation and assess his 

ability to follow commands and will decide if further weaning it is possible 

today.


Discontinue bicarb infusion and switch this patient to normal saline at rate of 

75 mL an hour and replace the potassium.


Renal function is improving and the creatinine is normalizing.  No evidence of 

any hydronephrosis.


Repeat another 2 sets of blood cultures and continue the vancomycin for now


Continue IV Protonix and IV thiamine


Start the patient enteral feeding for nutritional support


Awaiting follow-up electrodes from this morning of interest will be his level of

acidosis and his potassium level and renal function.


Altered obtain an ultrasound the kidneys to rule out hydronephrosis


Keep the sedation for now with a combination of Versed and fentanyl


Continue IV fluids in the form of normal saline at rate of 75 mL an hour


Heparin subcu portably prophylaxis


IV Protonix


IV thiamine 100 mg every 24 hours


Lovenox 40 mg for DVT prophylaxis subcu


The patient enteral feeding for nutritional support.  Dietary consultation will 

be obtained


Check amylase and lipase levels were elevated yesterday in follow-up levels will

be obtained today


Condition is critical.  We'll continue to follow and make further 

recommendations based on his progress.


Critical care evaluation


Time with Patient: Greater than 30

## 2023-01-08 NOTE — P.PN
Subjective


Progress Note Date: 01/08/23


Principal diagnosis: 





Upper GI bleed





Patient remains on the ventilator sedated.  The gastric aspirate appears more 

serous in appearance.  There is a pink tinged to some of the fluid in the 

container.  The stool however is loose and brown.  A fecal management system was

placed and C. diff is pending.  Hemoglobin relatively stable.





Objective





- Vital Signs


Vital signs: 


                                   Vital Signs











Temp  100 F H  01/08/23 08:00


 


Pulse  101 H  01/08/23 08:30


 


Resp  17   01/08/23 08:30


 


BP  99/67   01/08/23 08:30


 


Pulse Ox  94 L  01/08/23 08:30


 


FiO2  40   01/08/23 08:00








                                 Intake & Output











 01/07/23 01/08/23 01/08/23





 18:59 06:59 18:59


 


Intake Total 3713.955 2101.070 513.662


 


Output Total 1200 970 170


 


Balance 2513.955 1131.070 343.662


 


Weight  79.8 kg 


 


Intake:   


 


  IV 3350 1800 150


 


    Dextrose 5% in Water 1, 1350 1800 150





    000 ml @ 150 mls/hr IV .   





    Q7H40M [facility] with Sodium   





    Bicarb (1 Meq/ml) 150 ml   





    Rx#:[id number]   


 


    Sodium Chloride 0.9% 1, 450  





    000 ml @ 150 mls/hr IV .   





    Q6H40M [facility] Rx#:[id number]   


 


    Sodium Chloride 0.9% 1, 1000  





    000 ml @ 999 mls/hr IV .   





    Q1H1M ONE Rx#:[id number]   


 


    Thiamine 100 mg In Sodium 50  





    Chloride 0.9% 50 ml @   





    100 mls/hr IVPB Q24HR [facility]   





    Rx#:[id number]   


 


    Vancomycin 1,500 mg In 500  





    Sodium Chloride 0.9% 500   





    ml 500 ml @ 167 mls/hr   





    IVPB Q24H [facility] Rx#:   





    [id number]   


 


  Intake, IV Titration 363.955 271.070 363.662





  Amount   


 


    Midazolam HCl 200 mg In   78.05





    Sodium Chloride 0.9% 60   





    ml @ 14 MG/HR 7 mls/hr IV   





    .K39F54S [facility] Rx#:   





    [id number]   


 


    Midazolam HCl 50 mg In 145.283  





    Sodium Chloride 0.9% 40   





    ml @ 1 MG/HR 1 mls/hr IV   





    .Q24H [facility] Rx#:[id number]   


 


    Sodium Chloride 0.9% 1,   100





    000 ml @ 100 mls/hr IV .   





    Q10H [facility] Rx#:[id number]   


 


    Sodium Chloride 0.9% 80 218.672 66.407 





    ml @ 2 MCG/KG/HR 16.329   





    mls/hr IV .Q6H8M [facility] with   





    fentaNYL (PF) 1,000 mcg   





    Rx#:[id number]   


 


    fentaNYL (PF) 2,500 mcg  204.663 185.612





    In Sodium Chloride 0.9%   





    200 ml @ 4 MCG/KG/HR 32.   





    659 mls/hr IV .Q7H40M [facility]   





    Rx#:[id number]   


 


  Other  30 


 


Output:   


 


  Gastric Drainage 300 290 


 


  Urine 700 680 70


 


  Stool 200  100


 


Other:   


 


  Voiding Method Indwelling Catheter Indwelling Catheter Indwelling Catheter














- Exam





Abdomen: Soft, nontender, nondistended





- Labs


CBC & Chem 7: 


                                 01/08/23 05:33





                                 01/08/23 05:33


Labs: 


                  Abnormal Lab Results - Last 24 Hours (Table)











  01/07/23 01/07/23 01/08/23 Range/Units





  11:58 18:00 00:39 


 


WBC     (3.8-10.6)  k/uL


 


Plt Count     (150-450)  k/uL


 


Neutrophils #     (1.3-7.7)  k/uL


 


Monocytes #     (0-1.0)  k/uL


 


ABG pH     (7.35-7.45)  


 


ABG pCO2     (35-45)  mmHg


 


ABG HCO3     (21-25)  mmol/L


 


ABG Total CO2     (19-24)  mmol/L


 


ABG O2 Saturation     (94-97)  %


 


Potassium     (3.5-5.1)  mmol/L


 


Carbon Dioxide     (22-30)  mmol/L


 


BUN     (9-20)  mg/dL


 


Glucose     (74-99)  mg/dL


 


POC Glucose (mg/dL)  144 H  153 H  131 H  ()  mg/dL


 


Calcium     (8.4-10.2)  mg/dL


 


Total Bilirubin     (0.2-1.3)  mg/dL


 


AST     (17-59)  U/L


 


ALT     (4-49)  U/L


 


Total Protein     (6.3-8.2)  g/dL


 


Albumin     (3.5-5.0)  g/dL














  01/08/23 01/08/23 01/08/23 Range/Units





  05:33 05:33 05:59 


 


WBC  14.3 H    (3.8-10.6)  k/uL


 


Plt Count  54 L D    (150-450)  k/uL


 


Neutrophils #  10.8 H    (1.3-7.7)  k/uL


 


Monocytes #  1.2 H    (0-1.0)  k/uL


 


ABG pH    7.47 H  (7.35-7.45)  


 


ABG pCO2    54 H  (35-45)  mmHg


 


ABG HCO3    39 H  (21-25)  mmol/L


 


ABG Total CO2    41 H  (19-24)  mmol/L


 


ABG O2 Saturation    98.2 H  (94-97)  %


 


Potassium   3.3 L   (3.5-5.1)  mmol/L


 


Carbon Dioxide   32 H   (22-30)  mmol/L


 


BUN   44 H   (9-20)  mg/dL


 


Glucose   148 H   (74-99)  mg/dL


 


POC Glucose (mg/dL)     ()  mg/dL


 


Calcium   7.1 L   (8.4-10.2)  mg/dL


 


Total Bilirubin   2.3 H   (0.2-1.3)  mg/dL


 


AST   83 H   (17-59)  U/L


 


ALT   80 H   (4-49)  U/L


 


Total Protein   5.7 L   (6.3-8.2)  g/dL


 


Albumin   3.0 L   (3.5-5.0)  g/dL














  01/08/23 Range/Units





  06:37 


 


WBC   (3.8-10.6)  k/uL


 


Plt Count   (150-450)  k/uL


 


Neutrophils #   (1.3-7.7)  k/uL


 


Monocytes #   (0-1.0)  k/uL


 


ABG pH   (7.35-7.45)  


 


ABG pCO2   (35-45)  mmHg


 


ABG HCO3   (21-25)  mmol/L


 


ABG Total CO2   (19-24)  mmol/L


 


ABG O2 Saturation   (94-97)  %


 


Potassium   (3.5-5.1)  mmol/L


 


Carbon Dioxide   (22-30)  mmol/L


 


BUN   (9-20)  mg/dL


 


Glucose   (74-99)  mg/dL


 


POC Glucose (mg/dL)  144 H  ()  mg/dL


 


Calcium   (8.4-10.2)  mg/dL


 


Total Bilirubin   (0.2-1.3)  mg/dL


 


AST   (17-59)  U/L


 


ALT   (4-49)  U/L


 


Total Protein   (6.3-8.2)  g/dL


 


Albumin   (3.5-5.0)  g/dL








                      Microbiology - Last 24 Hours (Table)











 01/07/23 00:38 Gram Stain - Preliminary





 Sputum Sputum Culture - Preliminary


 


 01/06/23 20:37 Blood Culture Gram Stain - Preliminary





 Blood Blood Culture - Preliminary





    Staphylococcus aureus





    Staphylococcus epidermidis


 


 01/06/23 20:37 Blood Culture - Final





 Blood 














Assessment and Plan


(1) GI bleed


Narrative/Plan: 


Patient without significant bleeding at this time.  Check C. diff as ordered.  

Follow hemoglobin.  Continue antiacid therapy.  Consider initiation of low 

volume tube feeds.  We'll follow.


Current Visit: Yes   Status: Acute   Code(s): K92.2 - GASTROINTESTINAL 

HEMORRHAGE, UNSPECIFIED   SNOMED Code(s): 73087789

## 2023-01-08 NOTE — P.PN
Subjective


Progress Note Date: 01/08/23


This is Isaac Orona NP, I'm dictating on behalf of Dr. Stacy's H&P and A&P.


Patient was interviewed and examined.





Patient is a 57-year-old male who presented to the hospital unresponsive with 

agonal breathing.  He was in afib with RVR, this has resolved and the patient is

now in sinus rhythm. He remains sedated and intubated. He is unresponsive this 

morning.  Nursing reported holding his metoprolol this am due to decreased blood

pressures, which appear to be trending down.








GENERAL: Intubated and sedated


NECK: Supple without JVD or thyromegaly.


LUNGS: Breath sounds clear to auscultation bilaterally.  Respiration equal and 

unlabored.  No wheezes, rales or rhonchi.


HEART: Regular rate and rhythm without murmurs, rubs or gallops.  S1 and S2 

heard.


EXTREMITIES: No edema.  No clubbing or cyanosis.  Peripheral pulses intact and 

strong.





VITALS:


Temp 100, pulse 99, respirations 18, blood pressure 99/68, O2 saturation 94% on 

mechanical ventilation





TELEMETRY:


Normal sinus rhythm





LABS:


White count 14.3, hemoglobin 16.4, platelets 54, ABG-pH 7.47, CO2 54, O2 90, 

HCO3 39; sodium 143, potassium 3.3, B1 44, creatinine 1.23, calcium 7.1, 

magnesium 1.8





IMPRESSION:


1.  Acute alcohol withdrawal


2.  Sinus tachycardia secondary to acute hypoxic failure, currently improved


3.  Elevated troponins, likely secondary to respiratory failure





PLAN:


Decrease metoprolol to 25mg BID


Continue other cardiac medications


Further recommendations based on the patients clinical course.








Objective





- Vital Signs


Vital signs: 


                                   Vital Signs











Temp  100 F H  01/08/23 08:00


 


Pulse  99   01/08/23 10:00


 


Resp  18   01/08/23 10:00


 


BP  99/68   01/08/23 10:00


 


Pulse Ox  94 L  01/08/23 10:00


 


FiO2  40   01/08/23 10:00








                                 Intake & Output











 01/07/23 01/08/23 01/08/23





 18:59 06:59 18:59


 


Intake Total 3713.955 2101.070 723.929


 


Output Total 1200 970 260


 


Balance 2513.955 1131.070 463.929


 


Weight  79.8 kg 


 


Intake:   


 


  IV 3350 1800 350


 


    Dextrose 5% in Water 1, 1350 1800 150





    000 ml @ 150 mls/hr IV .   





    Q7H40M [facility] with Sodium   





    Bicarb (1 Meq/ml) 150 ml   





    Rx#:[id number]   


 


    Sodium Chloride 0.9% 1,   200





    000 ml @ 100 mls/hr IV .   





    Q10H [facility] Rx#:[id number]   


 


    Sodium Chloride 0.9% 1, 450  





    000 ml @ 150 mls/hr IV .   





    Q6H40M [facility] Rx#:[id number]   


 


    Sodium Chloride 0.9% 1, 1000  





    000 ml @ 999 mls/hr IV .   





    Q1H1M [facility] Rx#:[id number]   


 


    Thiamine 100 mg In Sodium 50  





    Chloride 0.9% 50 ml @   





    100 mls/hr IVPB Q24HR [facility]   





    Rx#:[id number]   


 


    Vancomycin 1,500 mg In 500  





    Sodium Chloride 0.9% 500   





    ml 500 ml @ 167 mls/hr   





    IVPB Q24H [facility] Rx#:   





    [id number]   


 


  Intake, IV Titration 363.955 271.070 373.929





  Amount   


 


    Midazolam HCl 200 mg In   88.317





    Sodium Chloride 0.9% 60   





    ml @ 14 MG/HR 7 mls/hr IV   





    .[id number] [facility] Rx#:   





    [id number]   


 


    Midazolam HCl 50 mg In 145.283  





    Sodium Chloride 0.9% 40   





    ml @ 1 MG/HR 1 mls/hr IV   





    .Q24H [facility] Rx#:[id number]   


 


    Sodium Chloride 0.9% 1,   100





    000 ml @ 100 mls/hr IV .   





    Q10H [facility] Rx#:[id number]   


 


    Sodium Chloride 0.9% 80 218.672 66.407 





    ml @ 2 MCG/KG/HR 16.329   





    mls/hr IV .Q6H8M [facility] with   





    fentaNYL (PF) 1,000 mcg   





    Rx#:[id number]   


 


    fentaNYL (PF) 2,500 mcg  204.663 185.612





    In Sodium Chloride 0.9%   





    200 ml @ 4 MCG/KG/HR 32.   





    659 mls/hr IV .Q7H40M [facility]   





    Rx#:[id number]   


 


  Other  30 


 


Output:   


 


  Gastric Drainage 300 290 


 


  Urine 700 680 160


 


  Stool 200  100


 


Other:   


 


  Voiding Method Indwelling Catheter Indwelling Catheter Indwelling Catheter














- Labs


CBC & Chem 7: 


                                 01/08/23 05:33





                                 01/08/23 05:33


Labs: 


                  Abnormal Lab Results - Last 24 Hours (Table)











  01/07/23 01/07/23 01/08/23 Range/Units





  11:58 18:00 00:39 


 


WBC     (3.8-10.6)  k/uL


 


Plt Count     (150-450)  k/uL


 


Neutrophils #     (1.3-7.7)  k/uL


 


Monocytes #     (0-1.0)  k/uL


 


ABG pH     (7.35-7.45)  


 


ABG pCO2     (35-45)  mmHg


 


ABG HCO3     (21-25)  mmol/L


 


ABG Total CO2     (19-24)  mmol/L


 


ABG O2 Saturation     (94-97)  %


 


Potassium     (3.5-5.1)  mmol/L


 


Carbon Dioxide     (22-30)  mmol/L


 


BUN     (9-20)  mg/dL


 


Glucose     (74-99)  mg/dL


 


POC Glucose (mg/dL)  144 H  153 H  131 H  ()  mg/dL


 


Calcium     (8.4-10.2)  mg/dL


 


Total Bilirubin     (0.2-1.3)  mg/dL


 


AST     (17-59)  U/L


 


ALT     (4-49)  U/L


 


Total Protein     (6.3-8.2)  g/dL


 


Albumin     (3.5-5.0)  g/dL














  01/08/23 01/08/23 01/08/23 Range/Units





  05:33 05:33 05:59 


 


WBC  14.3 H    (3.8-10.6)  k/uL


 


Plt Count  54 L D    (150-450)  k/uL


 


Neutrophils #  10.8 H    (1.3-7.7)  k/uL


 


Monocytes #  1.2 H    (0-1.0)  k/uL


 


ABG pH    7.47 H  (7.35-7.45)  


 


ABG pCO2    54 H  (35-45)  mmHg


 


ABG HCO3    39 H  (21-25)  mmol/L


 


ABG Total CO2    41 H  (19-24)  mmol/L


 


ABG O2 Saturation    98.2 H  (94-97)  %


 


Potassium   3.3 L   (3.5-5.1)  mmol/L


 


Carbon Dioxide   32 H   (22-30)  mmol/L


 


BUN   44 H   (9-20)  mg/dL


 


Glucose   148 H   (74-99)  mg/dL


 


POC Glucose (mg/dL)     ()  mg/dL


 


Calcium   7.1 L   (8.4-10.2)  mg/dL


 


Total Bilirubin   2.3 H   (0.2-1.3)  mg/dL


 


AST   83 H   (17-59)  U/L


 


ALT   80 H   (4-49)  U/L


 


Total Protein   5.7 L   (6.3-8.2)  g/dL


 


Albumin   3.0 L   (3.5-5.0)  g/dL














  01/08/23 Range/Units





  06:37 


 


WBC   (3.8-10.6)  k/uL


 


Plt Count   (150-450)  k/uL


 


Neutrophils #   (1.3-7.7)  k/uL


 


Monocytes #   (0-1.0)  k/uL


 


ABG pH   (7.35-7.45)  


 


ABG pCO2   (35-45)  mmHg


 


ABG HCO3   (21-25)  mmol/L


 


ABG Total CO2   (19-24)  mmol/L


 


ABG O2 Saturation   (94-97)  %


 


Potassium   (3.5-5.1)  mmol/L


 


Carbon Dioxide   (22-30)  mmol/L


 


BUN   (9-20)  mg/dL


 


Glucose   (74-99)  mg/dL


 


POC Glucose (mg/dL)  144 H  ()  mg/dL


 


Calcium   (8.4-10.2)  mg/dL


 


Total Bilirubin   (0.2-1.3)  mg/dL


 


AST   (17-59)  U/L


 


ALT   (4-49)  U/L


 


Total Protein   (6.3-8.2)  g/dL


 


Albumin   (3.5-5.0)  g/dL








                      Microbiology - Last 24 Hours (Table)











 01/07/23 00:38 Gram Stain - Preliminary





 Sputum Sputum Culture - Preliminary


 


 01/06/23 20:37 Blood Culture Gram Stain - Preliminary





 Blood Blood Culture - Preliminary





    Staphylococcus aureus





    Staphylococcus epidermidis


 


 01/06/23 20:37 Blood Culture - Final





 Blood

## 2023-01-09 LAB
ANION GAP SERPL CALC-SCNC: 3 MMOL/L
BUN SERPL-SCNC: 26 MG/DL (ref 9–20)
CALCIUM SPEC-MCNC: 7.5 MG/DL (ref 8.4–10.2)
CHLORIDE SERPL-SCNC: 109 MMOL/L (ref 98–107)
CO2 BLDA-SCNC: 36 MMOL/L (ref 19–24)
CO2 SERPL-SCNC: 31 MMOL/L (ref 22–30)
GLUCOSE BLD-MCNC: 83 MG/DL (ref 70–110)
GLUCOSE BLD-MCNC: 87 MG/DL (ref 70–110)
GLUCOSE SERPL-MCNC: 83 MG/DL (ref 74–99)
HCO3 BLDA-SCNC: 34 MMOL/L (ref 21–25)
MAGNESIUM SPEC-SCNC: 1.8 MG/DL (ref 1.6–2.3)
PCO2 BLDA: 48 MMHG (ref 35–45)
PH BLDA: 7.47 [PH] (ref 7.35–7.45)
PO2 BLDA: 84 MMHG (ref 83–108)
POTASSIUM SERPL-SCNC: 4 MMOL/L (ref 3.5–5.1)
SODIUM SERPL-SCNC: 143 MMOL/L (ref 137–145)

## 2023-01-09 RX ADMIN — MIDAZOLAM SCH MLS/HR: 5 INJECTION INTRAMUSCULAR; INTRAVENOUS at 14:34

## 2023-01-09 RX ADMIN — FENTANYL CITRATE SCH MLS/HR: 50 INJECTION INTRAVENOUS at 14:34

## 2023-01-09 RX ADMIN — FENTANYL CITRATE SCH MLS/HR: 50 INJECTION INTRAVENOUS at 07:04

## 2023-01-09 RX ADMIN — SODIUM CHLORIDE SCH MLS/HR: 9 INJECTION, SOLUTION INTRAVENOUS at 09:08

## 2023-01-09 RX ADMIN — CHLORHEXIDINE GLUCONATE SCH ML: 1.2 RINSE ORAL at 20:41

## 2023-01-09 RX ADMIN — CEFAZOLIN SCH: 330 INJECTION, POWDER, FOR SOLUTION INTRAMUSCULAR; INTRAVENOUS at 03:25

## 2023-01-09 RX ADMIN — CEFAZOLIN SCH MLS/HR: 330 INJECTION, POWDER, FOR SOLUTION INTRAMUSCULAR; INTRAVENOUS at 07:10

## 2023-01-09 RX ADMIN — NOREPINEPHRINE BITARTRATE SCH MLS/HR: 1 INJECTION, SOLUTION, CONCENTRATE INTRAVENOUS at 03:19

## 2023-01-09 RX ADMIN — PANTOPRAZOLE SODIUM SCH MG: 40 INJECTION, POWDER, FOR SOLUTION INTRAVENOUS at 09:08

## 2023-01-09 RX ADMIN — SODIUM CHLORIDE SCH MLS/HR: 9 INJECTION, SOLUTION INTRAVENOUS at 20:41

## 2023-01-09 RX ADMIN — ENOXAPARIN SODIUM SCH MG: 40 INJECTION SUBCUTANEOUS at 09:08

## 2023-01-09 RX ADMIN — FENTANYL CITRATE SCH MLS/HR: 50 INJECTION INTRAVENOUS at 22:07

## 2023-01-09 RX ADMIN — MIDAZOLAM SCH MLS/HR: 5 INJECTION INTRAMUSCULAR; INTRAVENOUS at 03:24

## 2023-01-09 RX ADMIN — NOREPINEPHRINE BITARTRATE SCH MLS/HR: 1 INJECTION, SOLUTION, CONCENTRATE INTRAVENOUS at 15:30

## 2023-01-09 RX ADMIN — SODIUM CHLORIDE SCH MLS/HR: 900 INJECTION, SOLUTION INTRAVENOUS at 09:08

## 2023-01-09 RX ADMIN — PANTOPRAZOLE SODIUM SCH MG: 40 INJECTION, POWDER, FOR SOLUTION INTRAVENOUS at 20:40

## 2023-01-09 RX ADMIN — CHLORHEXIDINE GLUCONATE SCH ML: 1.2 RINSE ORAL at 09:08

## 2023-01-09 RX ADMIN — FENTANYL CITRATE SCH MLS/HR: 50 INJECTION INTRAVENOUS at 00:17

## 2023-01-09 RX ADMIN — METOPROLOL TARTRATE SCH: 25 TABLET, FILM COATED ORAL at 22:28

## 2023-01-09 RX ADMIN — CEFAZOLIN SCH MLS/HR: 330 INJECTION, POWDER, FOR SOLUTION INTRAMUSCULAR; INTRAVENOUS at 20:40

## 2023-01-09 RX ADMIN — METOPROLOL TARTRATE SCH MG: 25 TABLET, FILM COATED ORAL at 09:08

## 2023-01-09 NOTE — XR
EXAMINATION TYPE: XR chest 1V portable

 

DATE OF EXAM: 1/9/2023 3:49 PM

 

COMPARISON: Chest radiograph from one day prior.

 

TECHNIQUE: XR chest 1V portable Portable AP radiograph of the chest.

 

CLINICAL INDICATION:Male, 57 years old with history of Tube placement; 

 

FINDINGS: 

Lungs/Pleura: Minimal opacities project over the heart on the left without significant change from pr
ior. There is no evidence of pleural effusion, focal consolidation, or pneumothorax.  

Pulmonary vascularity: Unremarkable.

Heart/mediastinum: Cardiomediastinal silhouette is unremarkable.

Musculoskeletal: No acute osseous pathology.

 

Lines/Tubes:

Endotracheal tube with distal tip 5.8 cm above the jossy.

Nasogastric tube with its distal tip and side-port projecting under the diaphragm.

 

 

IMPRESSION: 

1.  Endotracheal tube in appropriate position, no significant change from one day prior.

2.  Minimal left lower lung airspace opacities.

3.

## 2023-01-09 NOTE — PN
PROGRESS NOTE



SUBJECTIVE:

A 57-year-old gentleman, who is admitted to hospital with unresponsiveness, was found

to be in atrial fibrillation with rapid ventricular rate.  The patient is sedated,

intubated, and has had issues with alcohol withdrawal.  His elevated troponin was

thought to be secondary to respiratory failure.



OBJECTIVE:

GENERAL:  On exam today, the patient is intubated, vented, and sedated.

VITAL SIGNS:  Heart rate is 66 beats per minute, blood pressure _____, respiratory rate

18, O2 saturation is 100.

NECK:  There is no jugular venous distention.

CHEST:  Reveals good air entry bilaterally.

HEART:  Reveals first and second heart sounds.  No gallop.  No murmur.

ABDOMEN:  Soft.

EXTREMITIES:  Did not reveal any edema.  Peripheral pulses are felt.



LABORATORY DATA:

Labs show that the potassium is 4, creatinine is 0.8; hemoglobin is 16.4, and his

platelet count is low at 54.



MEDICATIONS:

The patient is currently on Lopressor 25 b.i.d.



ASSESSMENT:

Paroxysmal atrial fibrillation.



PLAN:

The patient is currently in sinus rhythm.  He has thrombocytopenia of unclear etiology.

Please have Hematology review the patient.  Hold the Lovenox.





MMODL / IJN: [id number] / Job#: [id number]

## 2023-01-09 NOTE — P.PN
Subjective


Progress Note Date: 01/09/23





Hospital Course: 


57-year-old male with history of alcohol dependence/abuse, atrial fibrillation 

presented with altered mentation and possible code stroke.  On arrival, patient 

was hemodynamically unstable, in A. fib with RVR, patient was intubated and 

cardioverted to normal sinus rhythm.  CT had showed no acute process, 

interventional neurology on call did not recommend to give alteplase.  Re

portedly, patient has been binge drinking, his last drink was on January 1.  Per

family patient was vomiting prior to becoming unresponsive.  His initial labs 

were consistent with severe dehydration, pH of 7.13, potassium 2.7, creatinine 

of 3.66, lactic acid of 12.4, mild transaminitis, mild elevated troponin, urine 

tox positive for benzos and marijuana.  Currently patient remains intubated, not

requiring any pressors, currently on sedation, renal function improving with IV 

fluids.  There is also concern for possible GI bleed, patient on IV Protonix, 

surgery following.  Nephrology and ICU also following.








Subjective: 


Patient seen and examined at bedside.  No acute events overnight.  Currently he 

remains intubated and sedated.  Per nursing, his urine output is getting better.

 He has been having significant amount of diarrhea.





Pertinent positives and negatives as discussed above, a complete review of 

systems was performed and all other systems are negative.








Vitals Signs Reviewed. 





Vital signs reviewed


General: Intubated and sedated


Derm: warm, dry


Head: atraumatic, normocephalic, symmetric


Eyes: Pupils pinpoint equal and reactive


Mouth: no lip lesion, mucus membranes moist


Cardiovascular: S1S2 reg, no murmur, positive posterior tibial pulse bilateral, 


Lungs: Clear to auscultation bilaterally, mechanical ventilation


Abdominal: soft, nondistended, no rigidity, no appreciable organomegaly, NG tube

in place , rectal tube


Ext: no gross muscle atrophy, no edema, no contractures


Neuro: Sedated


Psych: Unable to assess





Assessment/Plan:


Shock, likely hypovolemic - resolved


A. fib with RVR status post cardioversion


Acute metabolic encephalopathy


Acute hypoxic and hypercapnic respiratory failure status post intubation


Acute kidney injury - resolved


Uremia - resolving


Severe hypokalemia -resolved


Severe hyperphosphatemia - resolved


Hyperammonemia


Elevated troponin


High anion gap metabolic acidosis secondary to Severe lactic acidosis - resolved


History of alcohol abuse


Gram-positive bacteremia


-Continue ICU care


-Continue IV fluids


-Monitor urine output and renal function, renal ultrasound showed no 

hydronephrosis


-Electrolytes improving, continue to monitor


-IV PPI twice a day


-Gen. surgery , and nephrology, and cardiology following


-First set of blood cultures positive for staph aureus and epidermidis, likely 

contaminant. however, sputum culture also showing staph


-Patient currently on vancomycin, repeat blood cultures pending


-Continue to wean sedation, possible extubation in 1-2 days





DVT ppx: Lovenox





Code status: Full code





Anticipated discharge place: Pending clinical course


Anticipated discharge time: Pending clinical course





Objective





- Vital Signs


Vital signs: 


                                   Vital Signs











Temp  99.7 F H  01/09/23 08:00


 


Pulse  75   01/09/23 10:00


 


Resp  20   01/09/23 10:00


 


BP  124/70   01/09/23 10:00


 


Pulse Ox  100   01/09/23 09:45


 


FiO2  40   01/09/23 08:00








                                 Intake & Output











 01/08/23 01/09/23 01/09/23





 18:59 06:59 18:59


 


Intake Total 2459.454 2247.096 371.354


 


Output Total 515 587 150


 


Balance 8587.494 0657.096 221.354


 


Weight  80 kg 


 


Intake:   


 


  IV 1750 1498 100


 


    Dextrose 5% in Water 1, 150  





    000 ml @ 150 mls/hr IV .   





    Q7H40M [facility] with Sodium   





    Bicarb (1 Meq/ml) 150 ml   





    Rx#:[id number]   


 


    Sodium Chloride 0.9% 1, 1000 1000 100





    000 ml @ 100 mls/hr IV .   





    Q10H [facility] Rx#:[id number]   


 


    Thiamine 100 mg In Sodium 100  





    Chloride 0.9% 50 ml @   





    100 mls/hr IVPB Q24HR [facility]   





    Rx#:[id number]   


 


    Vancomycin 1,500 mg In 500 498 





    Sodium Chloride 0.9% 500   





    ml 500 ml @ 167 mls/hr   





    IVPB Q24H [facility] Rx#:   





    [id number]   


 


  Intake, IV Titration 649.454 639.096 261.354





  Amount   


 


    Midazolam HCl 200 mg In 123.383 58.008 





    Sodium Chloride 0.9% 60   





    ml @ 14 MG/HR 7 mls/hr IV   





    .[id number] [facility] Rx#:   





    [id number]   


 


    Norepinephrine 4 mg In 6.947 331.088 39.817





    Sodium Chloride 0.9% 250   





    ml @ 0.03 MCG/KG/MIN 9.   





    332 mls/hr IV .Q24H [facility]   





    Rx#:[id number]   


 


    Sodium Chloride 0.9% 1, 100  





    000 ml @ 100 mls/hr IV .   





    [facility]H [facility] Rx#:[id number]   


 


    fentaNYL (PF) 2,500 mcg 419.124 250 221.537





    In Sodium Chloride 0.9%   





    200 ml @ 4 MCG/KG/HR 32.   





    659 mls/hr IV .Q7H40M [facility]   





    Rx#:[id number]   


 


  Tube Feeding  110 10


 


  Other 60  


 


Output:   


 


  Urine 415 587 50


 


  Stool 100  100


 


Other:   


 


  Voiding Method Indwelling Catheter Indwelling Catheter Indwelling Catheter














- Labs


CBC & Chem 7: 


                                 01/08/23 05:33





                                 01/09/23 07:39


Labs: 


                  Abnormal Lab Results - Last 24 Hours (Table)











  01/09/23 01/09/23 Range/Units





  05:01 07:39 


 


ABG pH  7.47 H   (7.35-7.45)  


 


ABG pCO2  48 H   (35-45)  mmHg


 


ABG HCO3  34 H   (21-25)  mmol/L


 


ABG Total CO2  36 H   (19-24)  mmol/L


 


ABG O2 Saturation  97.3 H   (94-97)  %


 


Chloride   109 H  ()  mmol/L


 


Carbon Dioxide   31 H  (22-30)  mmol/L


 


BUN   26 H  (9-20)  mg/dL


 


Calcium   7.5 L  (8.4-10.2)  mg/dL








                      Microbiology - Last 24 Hours (Table)











 01/08/23 08:29 Blood Culture - Preliminary





 Blood    No Growth after 24 hours


 


 01/07/23 00:38 Gram Stain - Preliminary





 Sputum Sputum Culture - Preliminary





    Presumptive Staph aureus

## 2023-01-09 NOTE — P.PN
Subjective


Progress Note Date: 01/09/23





CHIEF COMPLAINT: Upper GI bleed





HISTORY OF PRESENT ILLNESS: Patient remains in the ICU on mechanical vent

ilation.  Patient has been having diarrhea had FMS placed.  Stool is brown.  C. 

diff is negative.  NG tube currently has had no further blood noted.  Tube 

feedings had been started.  Afebrile.  WBC is 14 hemoglobin is 16 platelets are 

down to 54 sodium 143 potassium 4.0 creatinine 0.88 magnesium 1.8





PHYSICAL EXAM: 


VITAL SIGNS: Reviewed.


GENERAL: Well-developed in no acute distress. 


HEENT:  No sclera icterus. Extraocular movements grossly intact.  Moist buccal 

mucosa. Head is atraumatic, normocephalic. 


ABDOMEN:  Soft.  Nondistended. 


NEUROLOGIC: Intubated and sedated





ASSESSMENT: 


1.  Acute GI bleed.  No longer having bleeding through NG tube.  Stools are 

brown.








PLAN: 


-Continue ICU management


-Continue PPI


-Continue monitor hemoglobin








Physician Assistant note has been reviewed by physician. Signing provider agrees

with the documented findings, assessment, and plan of care. 





Objective





- Vital Signs


Vital signs: 


                                   Vital Signs











Temp  99.4 F   01/09/23 12:00


 


Pulse  61   01/09/23 12:00


 


Resp  22   01/09/23 12:00


 


BP  91/52   01/09/23 12:00


 


Pulse Ox  100   01/09/23 12:00


 


FiO2  40   01/09/23 12:00








                                 Intake & Output











 01/08/23 01/09/23 01/09/23





 18:59 06:59 18:59


 


Intake Total 2459.454 2247.096 1361.354


 


Output Total 515 587 305


 


Balance 8038.369 6030.096 1056.354


 


Weight  80 kg 80 kg


 


Intake:   


 


  IV 1750 1498 1050


 


    Dextrose 5% in Water 1, 150  





    000 ml @ 150 mls/hr IV .   





    Q7H40M [facility] with Sodium   





    Bicarb (1 Meq/ml) 150 ml   





    Rx#:[id number]   


 


    Sodium Chloride 0.9% 1, 1000 1000 500





    000 ml @ 100 mls/hr IV .   





    Q10H [facility] Rx#:[id number]   


 


    Thiamine 100 mg In Sodium 100  50





    Chloride 0.9% 50 ml @   





    100 mls/hr IVPB Q24HR [facility]   





    Rx#:[id number]   


 


    Vancomycin 1,500 mg In   500





    Sodium Chloride 0.9% 500   





    ml 500 ml @ 167 mls/hr   





    IVPB Q12H [facility] Rx#:   





    [id number]   


 


    Vancomycin 1,500 mg In 500 498 





    Sodium Chloride 0.9% 500   





    ml 500 ml @ 167 mls/hr   





    IVPB Q24H [facility] Rx#:   





    [id number]   


 


  Intake, IV Titration 649.454 639.096 261.354





  Amount   


 


    Midazolam HCl 200 mg In 123.383 58.008 





    Sodium Chloride 0.9% 60   





    ml @ 14 MG/HR 7 mls/hr IV   





    .[id number] [facility] Rx#:   





    [id number]   


 


    Norepinephrine 4 mg In 6.947 331.088 39.817





    Sodium Chloride 0.9% 250   





    ml @ 0.03 MCG/KG/MIN 9.   





    332 mls/hr IV .Q24H [facility]   





    Rx#:[id number]   


 


    Sodium Chloride 0.9% 1, 100  





    000 ml @ 100 mls/hr IV .   





    Q10H [facility] Rx#:[id number]   


 


    fentaNYL (PF) 2,500 mcg 419.124 250 221.537





    In Sodium Chloride 0.9%   





    200 ml @ 4 MCG/KG/HR 32.   





    659 mls/hr IV .Q7H40M [facility]   





    Rx#:[id number]   


 


  Tube Feeding  110 20


 


  Other 60  30


 


Output:   


 


  Urine 415 587 205


 


  Stool 100  100


 


Other:   


 


  Voiding Method Indwelling Catheter Indwelling Catheter Indwelling Catheter














- Labs


CBC & Chem 7: 


                                 01/08/23 05:33





                                 01/09/23 07:39


Labs: 


                  Abnormal Lab Results - Last 24 Hours (Table)











  01/09/23 01/09/23 Range/Units





  05:01 07:39 


 


ABG pH  7.47 H   (7.35-7.45)  


 


ABG pCO2  48 H   (35-45)  mmHg


 


ABG HCO3  34 H   (21-25)  mmol/L


 


ABG Total CO2  36 H   (19-24)  mmol/L


 


ABG O2 Saturation  97.3 H   (94-97)  %


 


Chloride   109 H  ()  mmol/L


 


Carbon Dioxide   31 H  (22-30)  mmol/L


 


BUN   26 H  (9-20)  mg/dL


 


Calcium   7.5 L  (8.4-10.2)  mg/dL








                      Microbiology - Last 24 Hours (Table)











 01/08/23 08:29 Blood Culture - Preliminary





 Blood    No Growth after 24 hours


 


 01/07/23 00:38 Gram Stain - Preliminary





 Sputum Sputum Culture - Preliminary





    Presumptive Staph aureus

## 2023-01-09 NOTE — P.PN
Subjective





Patient is seen in follow-up for acute kidney injury.  GFR back to baseline.  

Creatinine 0.88 today.  Nonoliguric.  Receiving IV fluids.  Also on tube feeds. 

Intubated.





Vital signs are stable.


General: Resting in bed.


HEENT: Intubated. 


LUNGS: Breath sounds decreased.


HEART: Rate and Rhythm are regular. 


ABDOMEN: Soft, no distention.


EXTREMITITES: No edema.





Objective





- Vital Signs


Vital signs: 


                                   Vital Signs











Temp  99.7 F H  01/09/23 08:00


 


Pulse  75   01/09/23 10:00


 


Resp  20   01/09/23 10:00


 


BP  124/70   01/09/23 10:00


 


Pulse Ox  100   01/09/23 09:45


 


FiO2  40   01/09/23 08:00








                                 Intake & Output











 01/08/23 01/09/23 01/09/23





 18:59 06:59 18:59


 


Intake Total 2459.454 2247.096 371.354


 


Output Total 515 587 150


 


Balance 0134.460 6758.096 221.354


 


Weight  80 kg 


 


Intake:   


 


  IV 1750 1498 100


 


    Dextrose 5% in Water 1, 150  





    000 ml @ 150 mls/hr IV .   





    Q7H40M [facility] with Sodium   





    Bicarb (1 Meq/ml) 150 ml   





    Rx#:[id number]   


 


    Sodium Chloride 0.9% 1, 1000 1000 100





    000 ml @ 100 mls/hr IV .   





    Q10H [facility] Rx#:[id number]   


 


    Thiamine 100 mg In Sodium 100  





    Chloride 0.9% 50 ml @   





    100 mls/hr IVPB Q24HR [facility]   





    Rx#:[id number]   


 


    Vancomycin 1,500 mg In 500 498 





    Sodium Chloride 0.9% 500   





    ml 500 ml @ 167 mls/hr   





    IVPB Q24H [facility] Rx#:   





    [id number]   


 


  Intake, IV Titration 649.454 639.096 261.354





  Amount   


 


    Midazolam HCl 200 mg In 123.383 58.008 





    Sodium Chloride 0.9% 60   





    ml @ 14 MG/HR 7 mls/hr IV   





    .[id number] [facility] Rx#:   





    [id number]   


 


    Norepinephrine 4 mg In 6.947 331.088 39.817





    Sodium Chloride 0.9% 250   





    ml @ 0.03 MCG/KG/MIN 9.   





    332 mls/hr IV .Q24H [facility]   





    Rx#:[id number]   


 


    Sodium Chloride 0.9% 1, 100  





    000 ml @ 100 mls/hr IV .   





    Q10H [facility] Rx#:[id number]   


 


    fentaNYL (PF) 2,500 mcg 419.124 250 221.537





    In Sodium Chloride 0.9%   





    200 ml @ 4 MCG/KG/HR 32.   





    659 mls/hr IV .Q7H40M [facility]   





    Rx#:[id number]   


 


  Tube Feeding  110 10


 


  Other 60  


 


Output:   


 


  Urine 415 587 50


 


  Stool 100  100


 


Other:   


 


  Voiding Method Indwelling Catheter Indwelling Catheter Indwelling Catheter














- Labs


CBC & Chem 7: 


                                 01/08/23 05:33





                                 01/09/23 07:39


Labs: 


                  Abnormal Lab Results - Last 24 Hours (Table)











  01/09/23 01/09/23 Range/Units





  05:01 07:39 


 


ABG pH  7.47 H   (7.35-7.45)  


 


ABG pCO2  48 H   (35-45)  mmHg


 


ABG HCO3  34 H   (21-25)  mmol/L


 


ABG Total CO2  36 H   (19-24)  mmol/L


 


ABG O2 Saturation  97.3 H   (94-97)  %


 


Chloride   109 H  ()  mmol/L


 


Carbon Dioxide   31 H  (22-30)  mmol/L


 


BUN   26 H  (9-20)  mg/dL


 


Calcium   7.5 L  (8.4-10.2)  mg/dL








                      Microbiology - Last 24 Hours (Table)











 01/07/23 00:38 Gram Stain - Preliminary





 Sputum Sputum Culture - Preliminary





    Presumptive Staph aureus














Assessment and Plan


Plan: 





Assessment:


1.  Acute kidney injury secondary to hemodynamic ATN.  Creatinine 2.66 on a

dmission and is 0.88 today.  Patient received IV contrast for CT angiogram and 

CT of the chest abdomen and pelvis on 01/06/2023 which were negative.  No 

hydronephrosis was noted.  Creatinine in January 2019 was 1.


2.  Alcohol withdrawal.


3.  Metabolic acidosis secondary to acute kidney injury and lactic acidosis.  

Status post bicarb drip.  Resolved.


4.  Hypokalemia from poor intake and intracellular shifting.  Replaced.  Better.


5.  Altered mental status. ?Etiology.  Staph aureus bacteremia.  On antibiotics.





Plan:


Maintain IV fluids.


Also receiving tube feeds.


Avoid nephrotoxins.


Continue to monitor renal function and urine output.


Wean FiO2.


I will sign off.  Please call with any questions or concerns.

## 2023-01-09 NOTE — P.PN
Subjective


Progress Note Date: 01/09/23


Principal diagnosis: 





Acute hypoxic and hypercapnic respiratory failure





57-year-old male patient with known history of alcoholism and known history of 

chronic into fibrillation.  The patient arrived to the emergency department 

yesterday for altered mentation suspecting a possible stroke.  EMS stated that 

the patient was at home, and he was in his normal self at around 7:30 PM.  

Subsequently at around 8 PM, he was found face down in bed unresponsive.  In the

emergency, the patient was found to be hypotensive.  EMS was supporting his 

breathing by bagging and the patient was having agonal breathing.  It was 

reported to us that the patient is an alcoholic and he was binge drinking.  

There is also history of emesis and apparently the patient was having 

significant amount of emesis and ultimately collapsed and he was unable to stand

up anymore.  In the emergency, the patient was found to have a heart rate of 

220.  Initial blood pressure was 50/30.  He was having agonal breathing.  He was

not following any commands.  Code stroke was activated.  Patient was unable to 

protect his airway.  At that point, he was intubated and he was given a 7 ET 

tube.  He was given a total of 3 L of fluid normal saline bolus.  CAT scan of 

the brain was done in addition to a CT angiogram of the head and the neck.  The 

CAT scans were negative.  Case was discussed with intervention neurology and no 

further treatment was done.  Initial blood work showed a lactic acid level of 

12.4.  The patient also will was found to have a potassium level of 2.7.  He was

in acute kidney injury with a BUN of 52 and a creatinine of 3.6.  Sodium level 

was at 141.  Troponins were positive at levels of 0.3 and 0.2 respectively.  AST

was 191.  He was 84 bilirubin was 3.7 calcium was 7.8.  His white cell count was

17.2 with a hemoglobin 18.7.  INR was 1.4 with a PT of 17.8 and a PTT of 23.  

Urine drug screen was positive for benzodiazepines and marijuana.  Alcohol level

was negative.  UA was positive for protein, 10 WBCs, 8 RBCs.  The chest x-ray 

was essentially clear.  Posterior intubation chest x-ray showed adequate 

positioning of 82.  Vision also has a G-tube in place.  No evidence of any pneu

mothorax.  Subsequent lactic acid level dropped down to 5.3.  At this point in 

time, the patient is intubated on a mechanical ventilator.  He is on a Versed 

drip running at 11 mg/m and fentanyl drip running at 2 Karan respiratory 

kilogram per hour.  His assist-control mode of mechanical ventilation at the 

rate of 18 with a tidal volume of 400 FiO2 of 50% with a PEEP of 5.  Most recent

blood gas showed a pH of 7.31 with a pCO2 of 43 and pO2 of 203.  IV fluids are 

currently in the form of normal saline at rate of 1 50 mL an hour.  Urine output

is adequate and the patient is producing white celia urine.  Repeat electrodes 

from today are still pending.  He was placed on IV heparin.  He is on no 

pressors for now.  Note that the patient also had a CAT scan of the chest 

abdomen and pelvis in the emergency department that showed no significant 

abnormalities.





On today's evaluation of 01/08/2023, the patient is still intubated on a 

mechanical ventilator.  He is very much dependent on sedation as the patient is 

quite agitated once off sedation.  This morning, he is on a combination of 

Versed at 40 mg an hour and is also on fentanyl and at 4 mcg/kg/h.  I was told 

by the nursing staff that he gets quite agitated when the sedation runs of and 

this has been noted by the nursing staff.  Note that no purposeful activity or 

following commands and his mentation is not appropriate at this point in time.  

There may be a component of delirium tremens of this patient.  He is a long-time

alcoholic.  In [facility], another sedation holiday will be given today and his 

mental status will be reassessed during the day.  Meanwhile, this morning, he is

on assist-control mode at a rate of 18 with a tidal volume of 400 and FiO2 was 

at 45% with a PEEP of 5.  His chest x-ray showed no acute abnormalities and EKG 

was in a good location.  No significant orotracheal secretions.  The blood gas 

is showing a component of alkalosis as the patient was being given bicarb 

infusion.  His patient's pH is at 7.47 with a pCO2 of 54 and pO2 of 90.  On his 

blood work, the renal function has improved.  The patient's creatinine is down 

to 1.23 with a BUN of 44.  Sodium is at 143 and potassium is at 3.70 severity 

replaced.  Serum bicarb is up to 32.  WBC was at 14.3 and hematocrit is 49 with 

a platelet count that is pending for now.  Note that her amylase and lipase was 

slightly elevated and there may be a component of pancreatitis along with 

alcoholic hepatitis.  Blood culture was positive for staph aureus and staph 

epidermidis.  I'm not sure if this is a true infection versus a contaminant.  

This was only in 1 out of 2 bottles.  Another set of blood cultures will be 

obtained.  Noted the patient was given a dose of vancomycin pending further 

cultures.  He is on Lovenox portably prophylaxis.  He remains nothing by mouth. 

No pressors for now.  Renal ultrasound was completed yesterday and it showed no 

evidence of any hydronephrosis.





Reevaluated today on 1/9/2023, patient remains in the ICU, intubated and 

mechanically ventilated.  Patient is on assist control rate of 18 tidal volume 

400 FiO2 40% and PEEP of 5 ABG showed a pO2 of 84 pCO2 48 pH of 7.47.  Patient 

remains on fentanyl at 4 mcg/kg/h, norepinephrine at 0.01 mcg/kg/m, Versed at 15

mg per hour and he is on IV fluid at the cc per hour in the form of 0.9 normal 

saline.  Patient is receiving vital HPI at 10 mL per hour.  Continues to have 

extreme agitations in spite of sedation, patient is arousable, and gets agitated

very easily.  Chest x-ray not done today, but chest x-ray from yesterday showed 

no evidence of infiltrate.  Seen by neurology today, felt to have toxic 

metabolic encephalopathy and alcohol withdrawal.  Considering the patient's 

agitation, he is obviously not a candidate for weaning and extubation at this 

point.  Basic metabolic profile is normal, renal profile is normal.  Brain CT on

admission showed no intracranial abnormality











Objective





- Vital Signs


Vital signs: 


                                   Vital Signs











Temp  99.7 F H  01/09/23 08:00


 


Pulse  66   01/09/23 11:00


 


Resp  20   01/09/23 11:00


 


BP  92/50   01/09/23 11:00


 


Pulse Ox  100   01/09/23 11:00


 


FiO2  40   01/09/23 11:26








                                 Intake & Output











 01/08/23 01/09/23 01/09/23





 18:59 06:59 18:59


 


Intake Total 2459.454 2247.096 1221.354


 


Output Total 515 587 275


 


Balance 2380.353 2305.096 946.354


 


Weight  80 kg 80 kg


 


Intake:   


 


  IV 1750 1498 950


 


    Dextrose 5% in Water 1, 150  





    000 ml @ 150 mls/hr IV .   





    Q7H40M PORTIA with Sodium   





    Bicarb (1 Meq/ml) 150 ml   





    Rx#:[id number]   


 


    Sodium Chloride 0.9% 1, 1000 1000 400





    000 ml @ 100 mls/hr IV .   





    Q10H [facility] Rx#:[id number]   


 


    Thiamine 100 mg In Sodium 100  50





    Chloride 0.9% 50 ml @   





    100 mls/hr IVPB Q24HR PORTIA   





    Rx#:[id number]   


 


    Vancomycin 1,500 mg In   500





    Sodium Chloride 0.9% 500   





    ml 500 ml @ 167 mls/hr   





    IVPB Q12H [facility] Rx#:   





    [id number]   


 


    Vancomycin 1,500 mg In 500 498 





    Sodium Chloride 0.9% 500   





    ml 500 ml @ 167 mls/hr   





    IVPB Q24H [facility] Rx#:   





    [id number]   


 


  Intake, IV Titration 649.454 639.096 261.354





  Amount   


 


    Midazolam HCl 200 mg In 123.383 58.008 





    Sodium Chloride 0.9% 60   





    ml @ 14 MG/HR 7 mls/hr IV   





    .[id number] [facility] Rx#:   





    [id number]   


 


    Norepinephrine 4 mg In 6.947 331.088 39.817





    Sodium Chloride 0.9% 250   





    ml @ 0.03 MCG/KG/MIN 9.   





    332 mls/hr IV .Q24H [facility]   





    Rx#:[id number]   


 


    Sodium Chloride 0.9% 1, 100  





    000 ml @ 100 mls/hr IV .   





    Q10H [facility] Rx#:[id number]   


 


    fentaNYL (PF) 2,500 mcg 419.124 250 221.537





    In Sodium Chloride 0.9%   





    200 ml @ 4 MCG/KG/HR 32.   





    659 mls/hr IV .Q7H40M [facility]   





    Rx#:[id number]   


 


  Tube Feeding  110 10


 


  Other 60  


 


Output:   


 


  Urine 415 587 175


 


  Stool 100  100


 


Other:   


 


  Voiding Method Indwelling Catheter Indwelling Catheter Indwelling Catheter














- Exam





Physical Exam: Revealed 57-year-old white male intubated, mechanically 

ventilated, on Versed and on fentanyl.


Head: Atraumatic, normocephalic.  Endotracheal tube and orogastric tube are 

intact.


HEENT:[Neck is supple.] [No neck masses.] [No thyromegaly.] [No JVD.]


Chest: [Clear throughout, no crackles, no rhonchi, no wheezes.]


Cardiac Exam: [Normal S1 and S2, no S3 gallop, no murmur.]


Abdomen: [Soft, nontender,  no megaly, no rebound, no guarding, normal bowel 

sounds.]


Extremities: [No clubbing, no edema, no cyanosis.]


Neurological Exam: Sedated, follows simple instructions however gets agitated 

very easily in spite of sedation on board.


Psychiatric : Could not assess.  Gets extremely agitated easily.


Musculoskeletal: No deformities.


Skin: No rashes





- Labs


CBC & Chem 7: 


                                 01/08/23 05:33





                                 01/09/23 07:39


Labs: 


                  Abnormal Lab Results - Last 24 Hours (Table)











  01/09/23 01/09/23 Range/Units





  05:01 07:39 


 


ABG pH  7.47 H   (7.35-7.45)  


 


ABG pCO2  48 H   (35-45)  mmHg


 


ABG HCO3  34 H   (21-25)  mmol/L


 


ABG Total CO2  36 H   (19-24)  mmol/L


 


ABG O2 Saturation  97.3 H   (94-97)  %


 


Chloride   109 H  ()  mmol/L


 


Carbon Dioxide   31 H  (22-30)  mmol/L


 


BUN   26 H  (9-20)  mg/dL


 


Calcium   7.5 L  (8.4-10.2)  mg/dL








                      Microbiology - Last 24 Hours (Table)











 01/08/23 08:29 Blood Culture - Preliminary





 Blood    No Growth after 24 hours


 


 01/07/23 00:38 Gram Stain - Preliminary





 Sputum Sputum Culture - Preliminary





    Presumptive Staph aureus














Assessment and Plan


Assessment: 





Impression:


Acute hypoxic and hypercapnic respiratory failure, mostly because the patient 

was unable to protect his airways upon his initial presentation, patient 

required intubation for airways protection.  And he was encephalopathic with 

mental status change upon his initial presentation.


Altered mental status, possibly metabolic encephalopathy, suspect alcohol 

withdrawal.


Severe lactic acidosis on admission, with hypovolemia, resolved.


Hypovolemic hypotension


Anion gap metabolic acidosis, resolved.


Positive blood cultures for staph epidermidis, felt to be a contamination.


History of alcoholism


Acute kidney injury with hypovolemia, resolved.


History of paroxysmal atrial fibrillation


Benign essential hypertension


Dyslipidemia





Recommendation:


Continue ventilatory support, considering his overall neurological status and 

agitation, not quite ready for weaning trials


Continue GI and DVT prophylaxis patient is on Lovenox and on Protonix.


Continue hemodynamic support if necessary, patient is not requiring much 

norepinephrine and that will be discontinued today.


Continue sedation


Continue nutritional support.


Continue IV thiamine.


Continue to monitor daily labs and address accordingly


Urology to see her on consultation.


Patient remains critical, prognosis is relatively guarded.


Critical care time is over 30 minutes.


We'll continue to follow





Time with Patient: Greater than 30

## 2023-01-09 NOTE — P.CNNES
History of Present Illness


Consult date: 01/09/23


Requesting physician: Carol Aldana


Reason for Consult: mental status changes


History of Present Illness: 





Patient is a 57-year-old male came to the [facility] ambulance on 01/06/2023 for

alcohol withdrawal with altered level of consciousness.  When EMS arrived, found

patient laying prone face down in the bed.  Patient was unresponsive with 

tachypnea at 24.  Patient's friend has mentioned the patient has been 

withdrawing from alcohol for the past 4 days.  Patient's friend mentioned that 

the patient was last seen normal alert and oriented 4 just prior to EMS 

arrival.  Estimated onset time was 7:30 PM.  Pupils were 6 mm and nonreactive.  

Pulse was irregular tachycardia with rate of 150.  Patient was hypotensive with 

initial BP 96/76 but then became hypotensive 73/49.  Patient's respiration 

declined to 8-10 and started becoming shallow and ineffective and is oxygen 

saturation dropped to 85%.  Patient's respiration when assisted with BVM at 15 

later per minute.  Patient's mouth appeared to have small amount of vomiting 8. 

Blood glucose was 144.  Patient became more alert and saturation improved to 95%

in the breathing improved.





Patient was intubated in the ER.  CT head reported mild cerebral atrophy, no ac

sally intracranial abnormality.  CTA reported as negative of the brain.  Mild 

plaque at the carotid artery bifurcation.  No evidence of hemodynamic arterial 

stenosis in the neck.  CT of the chest abdomen and pelvis were negative.  Chest 

x-ray was normal and EKG showed sinus tachycardia.  Patient's initial blood 

tests showed WBC 17.2 with hemoglobin 18.7 and platelets are normal.  INR 1.4.  

ABG was pH 7.13, pCO2 50 and saturation 98.8.  Sodium was normal potassium 2.7, 

BUN 52, creatinine 3.66.  , ALT 84 troponin was mildly elevated 0.304, UA

negative.  Urine drug screen positive for benzodiazepine and marijuana.  Blood 

alcohol level was <10.  Patient's most recent blood test shows normal 

electrolytes, BUN 26, creatinine 0.88.  Amylase and lipase are normal.  Ammonia 

borderline 31.  C. difficile negative.





Patient currently on fentanyl 4 mcg/kg/h, thiamine 100 mg, Versed drip 7.5 mL p

er hour.  Patient otherwise is very agitated, restless.





Patient is following some, arms, as per examination below.  He is sedated.





Review of Systems





Patient sedated.  Family members not available, and patient's nurse does not 

know any more details about review of systems.


ROS unobtainable: due to endotracheal tube, due to mental status





Past Medical History


Past Medical History: Atrial Fibrillation, Hyperlipidemia, Hypertension


History of Any Multi-Drug Resistant Organisms: None Reported


Past Surgical History: Appendectomy, Orthopedic Surgery, Tonsillectomy


Additional Past Surgical History / Comment(s): surgery for brain bleed 2019 or 

2020 (family unsure)


Past Psychological History: No Psychological Hx Reported


Past Alcohol Use History: None Reported


Past Drug Use History: None Reported





- Past Family History


  ** family


Family Medical History: Cancer


Additional Family Medical History / Comment(s): no CAD





Medications and Allergies


                                Home Medications











 Medication  Instructions  Recorded  Confirmed  Type


 


Hydrocodone/Acetaminophen [Norco 2 each PO Q6HR PRN #20 tab 06/17/17 01/07/23 Rx





5-325]    


 


Metoclopramide [Reglan] 5 mg PO QID PRN 01/07/23 01/07/23 History


 


Metoprolol Succinate (ER) [Toprol 50 mg PO DAILY 01/07/23 01/07/23 History





XL]    


 


Omeprazole [PriLOSEC] 20 mg PO DAILY 01/07/23 01/07/23 History


 


Potassium Chloride ER [K-Dur 20] 20 meq PO BID 01/07/23 01/07/23 History


 


Pravastatin Sodium [Pravachol] 40 mg PO DAILY 01/07/23 01/07/23 History


 


methocarbamoL [Methocarbamol] 750 mg PO TID PRN 01/07/23 01/07/23 History








                                    Allergies











Allergy/AdvReac Type Severity Reaction Status Date / Time


 


Penicillins Allergy  Unknown Verified 01/07/23 12:59





   Childhood  














Physical Examination





- Vital Signs


Vital Signs: 


                                   Vital Signs











  Temp Pulse Resp BP Pulse Ox FiO2


 


 01/09/23 08:00  99.7 F H  81  26 H  135/72  99  40


 


 01/09/23 07:45   84  22  131/87  


 


 01/09/23 07:30   80  21  118/62  100 


 


 01/09/23 07:23       40


 


 01/09/23 07:15   75  24  116/63  


 


 01/09/23 07:00   75  18  117/63  98 


 


 01/09/23 06:45   77  18  121/66  98 


 


 01/09/23 06:30   77  18  117/65  97 


 


 01/09/23 06:15   77  18  117/66  97 


 


 01/09/23 06:00   77  19  125/72  97  40


 


 01/09/23 05:45   83  18  128/66  99 


 


 01/09/23 05:30   80  18  111/66  99 


 


 01/09/23 05:15   70  19  109/67  99 


 


 01/09/23 05:00   71  18  110/63  98  40


 


 01/09/23 04:45   68  19  110/66  99 


 


 01/09/23 04:30   68  0 L  108/66  100 


 


 01/09/23 04:15   68  0 L  107/63  


 


 01/09/23 04:00  98.6 F  68  20  114/67  99  40


 


 01/09/23 03:45   72  20  107/60  


 


 01/09/23 03:30   68  19  106/62  


 


 01/09/23 03:15   68  19  107/64  98 


 


 01/09/23 03:00   70  18  107/62  98  40


 


 01/09/23 02:56       40


 


 01/09/23 02:45   68  18  101/53  78 L 


 


 01/09/23 02:30   68  18  102/58  97 


 


 01/09/23 02:15   69  18  103/60  96 


 


 01/09/23 02:00   69  17  102/60  96  40


 


 01/09/23 01:45   68  18  103/59  82 L 


 


 01/09/23 01:30   69  18  103/60  97 


 


 01/09/23 01:15   68  18  103/58  


 


 01/09/23 01:00   68  18  98/57  97  40


 


 01/09/23 00:45   69  18  99/58  97 


 


 01/09/23 00:30   71  18  94/58  


 


 01/09/23 00:15   73  23  100/61  96 


 


 01/09/23 00:00  98.7 F  73  25 H  95/58  98 


 


 01/08/23 23:45   66  24  97/58  99  40


 


 01/08/23 23:30   65  21  98/61  99  40


 


 01/08/23 23:16   66  18  98/61  99 


 


 01/08/23 23:15   65  18  93/58  99 


 


 01/08/23 23:09       40


 


 01/08/23 23:00   65  18  88/55  99  40


 


 01/08/23 22:45   66  18  90/56  99 


 


 01/08/23 22:30   66  18  92/56  98 


 


 01/08/23 22:15   65  18  89/53  99 


 


 01/08/23 22:00   66  18  96/56  100  40


 


 01/08/23 21:45   65  18  87/50  99 


 


 01/08/23 21:30   66  38 H  90/54  100 


 


 01/08/23 21:15   67  18  98/61  100 


 


 01/08/23 21:00   66  19  93/57  99  45


 


 01/08/23 20:45   70  23  99/65  99 


 


 01/08/23 20:30   71  22  101/62  99 


 


 01/08/23 20:15   72  21  98/65  99 


 


 01/08/23 20:00  98.5 F  71  18  99/62  98  45


 


 01/08/23 19:45   71  18  96/62  98 


 


 01/08/23 19:30   71  20  95/57  99 


 


 01/08/23 19:15   72  18  93/58  99 


 


 01/08/23 19:13       40


 


 01/08/23 19:00   71  21  94/56  99  40


 


 01/08/23 18:45   71  20  86/53  99 


 


 01/08/23 18:30   73  36 H  83/51  98 


 


 01/08/23 18:00   76  18  99/59  96  40


 


 01/08/23 17:30   77  27 H  84/52  98 


 


 01/08/23 17:00   73  18  80/53  99  40


 


 01/08/23 16:30   73  18  82/55  99 


 


 01/08/23 16:15       40


 


 01/08/23 16:00  98.5 F  74  19  82/49  97  40


 


 01/08/23 15:30   76  17  87/55  95 


 


 01/08/23 15:05       40


 


 01/08/23 15:00   80  16  86/59  97  40


 


 01/08/23 14:30   77  20  86/57  97 


 


 01/08/23 14:00   79  18  88/56  95  40


 


 01/08/23 13:30   80  21  87/56  95 


 


 01/08/23 13:00   81  18  84/55  96  40


 


 01/08/23 12:30   85  18  86/58  95 


 


 01/08/23 12:00  99.2 F  91  18  89/59  95  40


 


 01/08/23 11:30   93  18  90/63  95 


 


 01/08/23 11:10       40


 


 01/08/23 11:00   96  18  97/65  95  40


 


 01/08/23 10:30   97  18  99/65  95 


 


 01/08/23 10:00   99  18  99/68  94 L  40


 


 01/08/23 09:30   100  19  104/67  94 L 


 


 01/08/23 09:00   100  18  98/64  95  40








                                Intake and Output











 01/08/23 01/09/23 01/09/23





 22:59 06:59 14:59


 


Intake Total 9981.752 7395.727 368.243


 


Output Total 250 452 150


 


Balance 5328.134 0046.727 218.243


 


Intake:   


 


   900 100


 


    Sodium Chloride 0.9% 1, 500 900 100





    000 ml @ 100 mls/hr IV .   





    Q10H PORTIA Rx#:[id number]   


 


    Vancomycin 1,500 mg In 498  





    Sodium Chloride 0.9% 500   





    ml 500 ml @ 167 mls/hr   





    IVPB Q24H PORTIA Rx#:   





    [id number]   


 


  Intake, IV Titration 268.828 628.727 258.243





  Amount   


 


    Midazolam HCl 200 mg In 18 58.008 





    Sodium Chloride 0.9% 60   





    ml @ 14 MG/HR 7 mls/hr IV   





    .[id number] PORTIA Rx#:   





    [id number]   


 


    Norepinephrine 4 mg In 17.316 320.719 36.706





    Sodium Chloride 0.9% 250   





    ml @ 0.03 MCG/KG/MIN 9.   





    332 mls/hr IV .Q24H PORTIA   





    Rx#:[id number]   


 


    fentaNYL (PF) 2,500 mcg 233.512 250 221.537





    In Sodium Chloride 0.9%   





    200 ml @ 4 MCG/KG/HR 32.   





    659 mls/hr IV .Q7H40M PORTIA   





    Rx#:[id number]   


 


  Tube Feeding 30 80 10


 


Output:   


 


  Urine 250 452 50


 


  Stool   100


 


Other:   


 


  Voiding Method Indwelling Catheter Indwelling Catheter Indwelling Catheter


 


  Weight  80 kg 














Patient is a middle aged  male, who is intubated, sedated.


Patient is sedated, also appears encephalopathic.    Speech and language 

functions  cannot be assessed  Attention, concentration decreased due to 

sedation, and fund of knowledge  cannot be assessed due to intubation. 





On cranial nerve examination, pupils are small 2.5 mm, reacting to 2 mm 

bilaterally, they are equal, round and reacting to light.  Visual fields could 

not be tested.  While checking for oculocephalics, patient started moving her 

head side-to-side, as it was uncomfortable and wanted to stop.    Patient does 

have a gag and cough.   Other cranial nerves cannot be tested. 





On muscle strength testing, patient has normal  bilaterally.  Patient does 

move all 4 extremities to painful stimuli.





Deep tendon reflexes are symmetric 1 at the biceps, 1 brachioradialis, 2 at the 

knees, trace ankles and plantars are possibly upgoing bilaterally.





Sensory to touch  cannot be assessed, but patient does withdraw to painful 

stimuli in all 4 limbs





Cerebellar function cannot be tested.  Tone and bulk of muscles normal.





Gait deferred..





On general examination, there is no carotid bruit or murmur, S1-S2 audible.  

Chest is clear on consultation.  Abdomen is soft nontender.  No organomegaly, 

bowel sounds present.   Peripheral pulses are present.  No edema.





Results





- Laboratory Findings


CBC and BMP: 


                                 01/10/23 07:10





                                 01/10/23 07:10


Abnormal Lab Findings: 


                                  Abnormal Labs











  01/06/23 01/06/23 01/06/23





  20:35 21:19 21:19


 


WBC   


 


RBC   


 


Hgb   


 


Hct   


 


Plt Count   


 


Neutrophils #   


 


Monocytes #   


 


PT   


 


INR   


 


ABG pH   


 


ABG pCO2   


 


ABG pO2   


 


ABG HCO3   


 


ABG Total CO2   


 


ABG O2 Saturation   


 


VBG pH   


 


VBG pCO2   


 


VBG HCO3   


 


Potassium   


 


Chloride   


 


Carbon Dioxide   


 


BUN   


 


Creatinine   


 


Glucose   


 


POC Glucose (mg/dL)  128 H  


 


Plasma Lactic Acid Jose R   


 


Calcium   


 


Phosphorus   


 


Total Bilirubin   


 


AST   


 


ALT   


 


Ammonia   


 


Troponin I   


 


Total Protein   


 


Albumin   


 


Amylase   


 


Lipase   


 


Urine Protein    2+ H


 


Urine Glucose (UA)    Trace H


 


Urine Blood    Moderate H


 


Urine Bilirubin    1+ H


 


Urine RBC    8 H


 


Urine WBC    10 H


 


Hyaline Casts    218 H


 


Urine Mucus    Many H


 


U Benzodiazepines Scrn   Detected H 


 


U Marijuana (THC) Screen   Detected H 














  01/06/23 01/06/23 01/06/23





  21:43 22:00 22:00


 


WBC    17.2 H


 


RBC    5.95 H


 


Hgb    18.7 H


 


Hct    57.3 H*


 


Plt Count   


 


Neutrophils #    13.9 H


 


Monocytes #   


 


PT   


 


INR   


 


ABG pH  7.13 L*  


 


ABG pCO2  50 H  


 


ABG pO2  240 H  


 


ABG HCO3  17 L  


 


ABG Total CO2  18 L  


 


ABG O2 Saturation  98.8 H  


 


VBG pH   7.12 L* 


 


VBG pCO2   54 H 


 


VBG HCO3   17 L 


 


Potassium   


 


Chloride   


 


Carbon Dioxide   


 


BUN   


 


Creatinine   


 


Glucose   


 


POC Glucose (mg/dL)   


 


Plasma Lactic Acid Jose R   


 


Calcium   


 


Phosphorus   


 


Total Bilirubin   


 


AST   


 


ALT   


 


Ammonia   


 


Troponin I   


 


Total Protein   


 


Albumin   


 


Amylase   


 


Lipase   


 


Urine Protein   


 


Urine Glucose (UA)   


 


Urine Blood   


 


Urine Bilirubin   


 


Urine RBC   


 


Urine WBC   


 


Hyaline Casts   


 


Urine Mucus   


 


U Benzodiazepines Scrn   


 


U Marijuana (THC) Screen   














  01/06/23 01/06/23 01/06/23





  22:00 22:00 22:00


 


WBC   


 


RBC   


 


Hgb   


 


Hct   


 


Plt Count   


 


Neutrophils #   


 


Monocytes #   


 


PT  13.8 H  


 


INR  1.4 H  


 


ABG pH   


 


ABG pCO2   


 


ABG pO2   


 


ABG HCO3   


 


ABG Total CO2   


 


ABG O2 Saturation   


 


VBG pH   


 


VBG pCO2   


 


VBG HCO3   


 


Potassium    2.7 L*


 


Chloride   


 


Carbon Dioxide    16 L


 


BUN    52 H


 


Creatinine    3.66 H


 


Glucose    155 H


 


POC Glucose (mg/dL)   


 


Plasma Lactic Acid Jose R   


 


Calcium    7.8 L


 


Phosphorus   


 


Total Bilirubin    3.7 H


 


AST    191 H


 


ALT    84 H


 


Ammonia   


 


Troponin I   0.304 H* 


 


Total Protein   


 


Albumin   


 


Amylase   


 


Lipase   


 


Urine Protein   


 


Urine Glucose (UA)   


 


Urine Blood   


 


Urine Bilirubin   


 


Urine RBC   


 


Urine WBC   


 


Hyaline Casts   


 


Urine Mucus   


 


U Benzodiazepines Scrn   


 


U Marijuana (THC) Screen   














  01/06/23 01/07/23 01/07/23





  22:00 00:52 01:05


 


WBC   


 


RBC   


 


Hgb   


 


Hct   


 


Plt Count   


 


Neutrophils #   


 


Monocytes #   


 


PT   


 


INR   


 


ABG pH   


 


ABG pCO2   


 


ABG pO2   


 


ABG HCO3   


 


ABG Total CO2   


 


ABG O2 Saturation   


 


VBG pH   


 


VBG pCO2   


 


VBG HCO3   


 


Potassium   


 


Chloride   


 


Carbon Dioxide   


 


BUN   


 


Creatinine   


 


Glucose   


 


POC Glucose (mg/dL)   140 H 


 


Plasma Lactic Acid Jose R  12.4 H*   5.3 H*


 


Calcium   


 


Phosphorus   


 


Total Bilirubin   


 


AST   


 


ALT   


 


Ammonia  81 H   31 H


 


Troponin I   


 


Total Protein   


 


Albumin   


 


Amylase   


 


Lipase   


 


Urine Protein   


 


Urine Glucose (UA)   


 


Urine Blood   


 


Urine Bilirubin   


 


Urine RBC   


 


Urine WBC   


 


Hyaline Casts   


 


Urine Mucus   


 


U Benzodiazepines Scrn   


 


U Marijuana (THC) Screen   














  01/07/23 01/07/23 01/07/23





  01:05 01:15 03:58


 


WBC   


 


RBC   


 


Hgb   


 


Hct   


 


Plt Count   


 


Neutrophils #   


 


Monocytes #   


 


PT    12.8 H


 


INR    1.2 H


 


ABG pH   


 


ABG pCO2   


 


ABG pO2   


 


ABG HCO3   


 


ABG Total CO2   


 


ABG O2 Saturation   


 


VBG pH   


 


VBG pCO2   


 


VBG HCO3   


 


Potassium   


 


Chloride   


 


Carbon Dioxide   


 


BUN   


 


Creatinine   


 


Glucose   


 


POC Glucose (mg/dL)   


 


Plasma Lactic Acid Jose R   


 


Calcium   


 


Phosphorus  9.3 H*  


 


Total Bilirubin   


 


AST   


 


ALT   


 


Ammonia   


 


Troponin I   0.248 H* 


 


Total Protein   


 


Albumin   


 


Amylase   


 


Lipase   


 


Urine Protein   


 


Urine Glucose (UA)   


 


Urine Blood   


 


Urine Bilirubin   


 


Urine RBC   


 


Urine WBC   


 


Hyaline Casts   


 


Urine Mucus   


 


U Benzodiazepines Scrn   


 


U Marijuana (THC) Screen   














  01/07/23 01/07/23 01/07/23





  05:49 06:07 07:26


 


WBC    22.9 H


 


RBC    6.22 H


 


Hgb    19.5 H*


 


Hct    59.1 H*


 


Plt Count    134 L


 


Neutrophils #    17.3 H


 


Monocytes #    1.7 H


 


PT   


 


INR   


 


ABG pH  7.31 L  


 


ABG pCO2   


 


ABG pO2  203 H  


 


ABG HCO3   


 


ABG Total CO2   


 


ABG O2 Saturation  99.5 H  


 


VBG pH   


 


VBG pCO2   


 


VBG HCO3   


 


Potassium   


 


Chloride   


 


Carbon Dioxide   


 


BUN   


 


Creatinine   


 


Glucose   


 


POC Glucose (mg/dL)   130 H 


 


Plasma Lactic Acid Jose R   


 


Calcium   


 


Phosphorus   


 


Total Bilirubin   


 


AST   


 


ALT   


 


Ammonia   


 


Troponin I   


 


Total Protein   


 


Albumin   


 


Amylase   


 


Lipase   


 


Urine Protein   


 


Urine Glucose (UA)   


 


Urine Blood   


 


Urine Bilirubin   


 


Urine RBC   


 


Urine WBC   


 


Hyaline Casts   


 


Urine Mucus   


 


U Benzodiazepines Scrn   


 


U Marijuana (THC) Screen   














  01/07/23 01/07/23 01/07/23





  07:26 07:26 11:58


 


WBC   


 


RBC   


 


Hgb   


 


Hct   


 


Plt Count   


 


Neutrophils #   


 


Monocytes #   


 


PT   


 


INR   


 


ABG pH   


 


ABG pCO2   


 


ABG pO2   


 


ABG HCO3   


 


ABG Total CO2   


 


ABG O2 Saturation   


 


VBG pH   


 


VBG pCO2   


 


VBG HCO3   


 


Potassium   


 


Chloride  109 H  


 


Carbon Dioxide  16 L  


 


BUN  61 H  


 


Creatinine  2.75 H  


 


Glucose  112 H  


 


POC Glucose (mg/dL)    144 H


 


Plasma Lactic Acid Jose R   3.1 H* 


 


Calcium  7.3 L  


 


Phosphorus  8.4 H  


 


Total Bilirubin   


 


AST   


 


ALT   


 


Ammonia   


 


Troponin I   


 


Total Protein   


 


Albumin   


 


Amylase  416 H*  


 


Lipase  590 H  


 


Urine Protein   


 


Urine Glucose (UA)   


 


Urine Blood   


 


Urine Bilirubin   


 


Urine RBC   


 


Urine WBC   


 


Hyaline Casts   


 


Urine Mucus   


 


U Benzodiazepines Scrn   


 


U Marijuana (THC) Screen   














  01/07/23 01/08/23 01/08/23





  18:00 00:39 05:33


 


WBC    14.3 H


 


RBC   


 


Hgb   


 


Hct   


 


Plt Count    54 L D


 


Neutrophils #    10.8 H


 


Monocytes #    1.2 H


 


PT   


 


INR   


 


ABG pH   


 


ABG pCO2   


 


ABG pO2   


 


ABG HCO3   


 


ABG Total CO2   


 


ABG O2 Saturation   


 


VBG pH   


 


VBG pCO2   


 


VBG HCO3   


 


Potassium   


 


Chloride   


 


Carbon Dioxide   


 


BUN   


 


Creatinine   


 


Glucose   


 


POC Glucose (mg/dL)  153 H  131 H 


 


Plasma Lactic Acid Jose R   


 


Calcium   


 


Phosphorus   


 


Total Bilirubin   


 


AST   


 


ALT   


 


Ammonia   


 


Troponin I   


 


Total Protein   


 


Albumin   


 


Amylase   


 


Lipase   


 


Urine Protein   


 


Urine Glucose (UA)   


 


Urine Blood   


 


Urine Bilirubin   


 


Urine RBC   


 


Urine WBC   


 


Hyaline Casts   


 


Urine Mucus   


 


U Benzodiazepines Scrn   


 


U Marijuana (THC) Screen   














  01/08/23 01/08/23 01/08/23





  05:33 05:59 06:37


 


WBC   


 


RBC   


 


Hgb   


 


Hct   


 


Plt Count   


 


Neutrophils #   


 


Monocytes #   


 


PT   


 


INR   


 


ABG pH   7.47 H 


 


ABG pCO2   54 H 


 


ABG pO2   


 


ABG HCO3   39 H 


 


ABG Total CO2   41 H 


 


ABG O2 Saturation   98.2 H 


 


VBG pH   


 


VBG pCO2   


 


VBG HCO3   


 


Potassium  3.3 L  


 


Chloride   


 


Carbon Dioxide  32 H  


 


BUN  44 H  


 


Creatinine   


 


Glucose  148 H  


 


POC Glucose (mg/dL)    144 H


 


Plasma Lactic Acid Jose R   


 


Calcium  7.1 L  


 


Phosphorus   


 


Total Bilirubin  2.3 H  


 


AST  83 H  


 


ALT  80 H  


 


Ammonia   


 


Troponin I   


 


Total Protein  5.7 L  


 


Albumin  3.0 L  


 


Amylase   


 


Lipase   


 


Urine Protein   


 


Urine Glucose (UA)   


 


Urine Blood   


 


Urine Bilirubin   


 


Urine RBC   


 


Urine WBC   


 


Hyaline Casts   


 


Urine Mucus   


 


U Benzodiazepines Scrn   


 


U Marijuana (THC) Screen   














  01/09/23 01/09/23





  05:01 07:39


 


WBC  


 


RBC  


 


Hgb  


 


Hct  


 


Plt Count  


 


Neutrophils #  


 


Monocytes #  


 


PT  


 


INR  


 


ABG pH  7.47 H 


 


ABG pCO2  48 H 


 


ABG pO2  


 


ABG HCO3  34 H 


 


ABG Total CO2  36 H 


 


ABG O2 Saturation  97.3 H 


 


VBG pH  


 


VBG pCO2  


 


VBG HCO3  


 


Potassium  


 


Chloride   109 H


 


Carbon Dioxide   31 H


 


BUN   26 H


 


Creatinine  


 


Glucose  


 


POC Glucose (mg/dL)  


 


Plasma Lactic Acid Jose R  


 


Calcium   7.5 L


 


Phosphorus  


 


Total Bilirubin  


 


AST  


 


ALT  


 


Ammonia  


 


Troponin I  


 


Total Protein  


 


Albumin  


 


Amylase  


 


Lipase  


 


Urine Protein  


 


Urine Glucose (UA)  


 


Urine Blood  


 


Urine Bilirubin  


 


Urine RBC  


 


Urine WBC  


 


Hyaline Casts  


 


Urine Mucus  


 


U Benzodiazepines Scrn  


 


U Marijuana (THC) Screen  














Assessment and Plan


Assessment: 





* Altered mental status, likely due to toxic metabolic encephalopathy


* Patient presented with unresponsiveness, likely due to above.


* Alcohol withdrawal


* Acute kidney injury, now resolved


* Status post shock, likely hypovolemic


* Atrial fibrillation with RVR, status post cardioversion


* Acute respiratory failure, on mechanical ventilation


* Elevated troponin


* Gram-positive bacteremia, possibly contaminant.





Plan: 





* Patient is showing meaningful response at this time.  Patient is still on 

  sedation with Versed and fentanyl.


* CTA reported as negative of the brain.  Mild plaque at the carotid artery 

  bifurcation.  No evidence of hemodynamic arterial stenosis in the neck.


* Check EEG to evaluate for any epileptiform activity


* Check B12, folate, TSH.


* Neurology will follow.  Thank you for the consult.

## 2023-01-10 LAB
ANION GAP SERPL CALC-SCNC: 2 MMOL/L
BASOPHILS # BLD AUTO: 0 K/UL (ref 0–0.2)
BASOPHILS NFR BLD AUTO: 0 %
BUN SERPL-SCNC: 15 MG/DL (ref 9–20)
CALCIUM SPEC-MCNC: 7.5 MG/DL (ref 8.4–10.2)
CHLORIDE SERPL-SCNC: 111 MMOL/L (ref 98–107)
CO2 BLDA-SCNC: 34 MMOL/L (ref 19–24)
CO2 SERPL-SCNC: 32 MMOL/L (ref 22–30)
EOSINOPHIL # BLD AUTO: 0.4 K/UL (ref 0–0.7)
EOSINOPHIL NFR BLD AUTO: 7 %
ERYTHROCYTE [DISTWIDTH] IN BLOOD BY AUTOMATED COUNT: 3.72 M/UL (ref 4.3–5.9)
ERYTHROCYTE [DISTWIDTH] IN BLOOD: 13.3 % (ref 11.5–15.5)
GLUCOSE BLD-MCNC: 102 MG/DL (ref 70–110)
GLUCOSE BLD-MCNC: 78 MG/DL (ref 70–110)
GLUCOSE BLD-MCNC: 93 MG/DL (ref 70–110)
GLUCOSE SERPL-MCNC: 88 MG/DL (ref 74–99)
HCO3 BLDA-SCNC: 32 MMOL/L (ref 21–25)
HCT VFR BLD AUTO: 36 % (ref 39–53)
HGB BLD-MCNC: 11.8 GM/DL (ref 13–17.5)
LYMPHOCYTES # SPEC AUTO: 1.1 K/UL (ref 1–4.8)
LYMPHOCYTES NFR SPEC AUTO: 18 %
MAGNESIUM SPEC-SCNC: 1.8 MG/DL (ref 1.6–2.3)
MCH RBC QN AUTO: 31.8 PG (ref 25–35)
MCHC RBC AUTO-ENTMCNC: 32.9 G/DL (ref 31–37)
MCV RBC AUTO: 96.7 FL (ref 80–100)
MONOCYTES # BLD AUTO: 0.6 K/UL (ref 0–1)
MONOCYTES NFR BLD AUTO: 10 %
NEUTROPHILS # BLD AUTO: 3.6 K/UL (ref 1.3–7.7)
NEUTROPHILS NFR BLD AUTO: 61 %
PCO2 BLDA: 51 MMHG (ref 35–45)
PH BLDA: 7.41 [PH] (ref 7.35–7.45)
PLATELET # BLD AUTO: 61 K/UL (ref 150–450)
PO2 BLDA: 100 MMHG (ref 83–108)
POTASSIUM SERPL-SCNC: 2.9 MMOL/L (ref 3.5–5.1)
SODIUM SERPL-SCNC: 145 MMOL/L (ref 137–145)
T4 FREE SERPL-MCNC: 1.37 NG/DL (ref 0.78–2.19)
WBC # BLD AUTO: 5.8 K/UL (ref 3.8–10.6)

## 2023-01-10 RX ADMIN — SODIUM CHLORIDE SCH MLS/HR: 9 INJECTION, SOLUTION INTRAVENOUS at 07:57

## 2023-01-10 RX ADMIN — PANTOPRAZOLE SODIUM SCH MG: 40 INJECTION, POWDER, FOR SOLUTION INTRAVENOUS at 20:13

## 2023-01-10 RX ADMIN — POTASSIUM CHLORIDE SCH MLS/HR: 14.9 INJECTION, SOLUTION INTRAVENOUS at 20:14

## 2023-01-10 RX ADMIN — PANTOPRAZOLE SODIUM SCH MG: 40 INJECTION, POWDER, FOR SOLUTION INTRAVENOUS at 08:00

## 2023-01-10 RX ADMIN — CHLORHEXIDINE GLUCONATE SCH ML: 1.2 RINSE ORAL at 08:00

## 2023-01-10 RX ADMIN — POTASSIUM CHLORIDE SCH MLS/HR: 14.9 INJECTION, SOLUTION INTRAVENOUS at 09:31

## 2023-01-10 RX ADMIN — FENTANYL CITRATE SCH MLS/HR: 50 INJECTION INTRAVENOUS at 17:11

## 2023-01-10 RX ADMIN — FENTANYL CITRATE SCH: 50 INJECTION INTRAVENOUS at 05:31

## 2023-01-10 RX ADMIN — POTASSIUM BICARBONATE SCH MEQ: 782 TABLET, EFFERVESCENT ORAL at 10:37

## 2023-01-10 RX ADMIN — FENTANYL CITRATE SCH MLS/HR: 50 INJECTION INTRAVENOUS at 06:21

## 2023-01-10 RX ADMIN — SODIUM CHLORIDE SCH MLS/HR: 900 INJECTION, SOLUTION INTRAVENOUS at 08:00

## 2023-01-10 RX ADMIN — POTASSIUM BICARBONATE SCH MEQ: 782 TABLET, EFFERVESCENT ORAL at 09:30

## 2023-01-10 RX ADMIN — MAGNESIUM SULFATE IN DEXTROSE SCH MLS/HR: 10 INJECTION, SOLUTION INTRAVENOUS at 09:30

## 2023-01-10 RX ADMIN — DEXTRAN 70 AND HYPROMELLOSE 2910 SCH: 1; 3 SOLUTION/ DROPS OPHTHALMIC at 20:41

## 2023-01-10 RX ADMIN — MIDAZOLAM SCH MLS/HR: 5 INJECTION INTRAMUSCULAR; INTRAVENOUS at 21:50

## 2023-01-10 RX ADMIN — ENOXAPARIN SODIUM SCH MG: 40 INJECTION SUBCUTANEOUS at 08:00

## 2023-01-10 RX ADMIN — CISATRACURIUM BESYLATE SCH: 10 INJECTION INTRAVENOUS at 19:58

## 2023-01-10 RX ADMIN — MAGNESIUM SULFATE IN DEXTROSE SCH MLS/HR: 10 INJECTION, SOLUTION INTRAVENOUS at 11:02

## 2023-01-10 RX ADMIN — CEFAZOLIN SCH MLS/HR: 330 INJECTION, POWDER, FOR SOLUTION INTRAMUSCULAR; INTRAVENOUS at 05:30

## 2023-01-10 RX ADMIN — POTASSIUM BICARBONATE SCH MEQ: 782 TABLET, EFFERVESCENT ORAL at 11:03

## 2023-01-10 RX ADMIN — CHLORHEXIDINE GLUCONATE SCH ML: 1.2 RINSE ORAL at 20:13

## 2023-01-10 RX ADMIN — FENTANYL CITRATE SCH MLS/HR: 50 INJECTION INTRAVENOUS at 23:17

## 2023-01-10 RX ADMIN — MIDAZOLAM SCH MLS/HR: 5 INJECTION INTRAMUSCULAR; INTRAVENOUS at 02:26

## 2023-01-10 RX ADMIN — METOPROLOL TARTRATE SCH MG: 25 TABLET, FILM COATED ORAL at 20:13

## 2023-01-10 RX ADMIN — MIDAZOLAM SCH MLS/HR: 5 INJECTION INTRAMUSCULAR; INTRAVENOUS at 11:44

## 2023-01-10 RX ADMIN — METOPROLOL TARTRATE SCH: 25 TABLET, FILM COATED ORAL at 08:00

## 2023-01-10 RX ADMIN — SODIUM CHLORIDE SCH MLS/HR: 9 INJECTION, SOLUTION INTRAVENOUS at 20:12

## 2023-01-10 NOTE — P.PN
Subjective


Progress Note Date: 01/10/23


Principal diagnosis: 





Acute hypoxic and hypercapnic respiratory failure





57-year-old male patient with known history of alcoholism and known history of 

chronic into fibrillation.  The patient arrived to the emergency department 

yesterday for altered mentation suspecting a possible stroke.  EMS stated that 

the patient was at home, and he was in his normal self at around 7:30 PM.  

Subsequently at around 8 PM, he was found face down in bed unresponsive.  In the

emergency, the patient was found to be hypotensive.  EMS was supporting his 

breathing by bagging and the patient was having agonal breathing.  It was 

reported to us that the patient is an alcoholic and he was binge drinking.  

There is also history of emesis and apparently the patient was having 

significant amount of emesis and ultimately collapsed and he was unable to stand

up anymore.  In the emergency, the patient was found to have a heart rate of 

220.  Initial blood pressure was 50/30.  He was having agonal breathing.  He was

not following any commands.  Code stroke was activated.  Patient was unable to 

protect his airway.  At that point, he was intubated and he was given a 7 ET 

tube.  He was given a total of 3 L of fluid normal saline bolus.  CAT scan of 

the brain was done in addition to a CT angiogram of the head and the neck.  The 

CAT scans were negative.  Case was discussed with intervention neurology and no 

further treatment was done.  Initial blood work showed a lactic acid level of 

12.4.  The patient also will was found to have a potassium level of 2.7.  He was

in acute kidney injury with a BUN of 52 and a creatinine of 3.6.  Sodium level 

was at 141.  Troponins were positive at levels of 0.3 and 0.2 respectively.  AST

was 191.  He was 84 bilirubin was 3.7 calcium was 7.8.  His white cell count was

17.2 with a hemoglobin 18.7.  INR was 1.4 with a PT of 17.8 and a PTT of 23.  

Urine drug screen was positive for benzodiazepines and marijuana.  Alcohol level

was negative.  UA was positive for protein, 10 WBCs, 8 RBCs.  The chest x-ray 

was essentially clear.  Posterior intubation chest x-ray showed adequate 

positioning of 82.  Vision also has a G-tube in place.  No evidence of any pneu

mothorax.  Subsequent lactic acid level dropped down to 5.3.  At this point in 

time, the patient is intubated on a mechanical ventilator.  He is on a Versed 

drip running at 11 mg/m and fentanyl drip running at 2 Karan respiratory 

kilogram per hour.  His assist-control mode of mechanical ventilation at the 

rate of 18 with a tidal volume of 400 FiO2 of 50% with a PEEP of 5.  Most recent

blood gas showed a pH of 7.31 with a pCO2 of 43 and pO2 of 203.  IV fluids are 

currently in the form of normal saline at rate of 1 50 mL an hour.  Urine output

is adequate and the patient is producing white celia urine.  Repeat electrodes 

from today are still pending.  He was placed on IV heparin.  He is on no 

pressors for now.  Note that the patient also had a CAT scan of the chest 

abdomen and pelvis in the emergency department that showed no significant 

abnormalities.





On today's evaluation of 01/08/2023, the patient is still intubated on a 

mechanical ventilator.  He is very much dependent on sedation as the patient is 

quite agitated once off sedation.  This morning, he is on a combination of 

Versed at 40 mg an hour and is also on fentanyl and at 4 mcg/kg/h.  I was told 

by the nursing staff that he gets quite agitated when the sedation runs of and 

this has been noted by the nursing staff.  Note that no purposeful activity or 

following commands and his mentation is not appropriate at this point in time.  

There may be a component of delirium tremens of this patient.  He is a long-time

alcoholic.  In [facility], another sedation holiday will be given today and his 

mental status will be reassessed during the day.  Meanwhile, this morning, he is

on assist-control mode at a rate of 18 with a tidal volume of 400 and FiO2 was 

at 45% with a PEEP of 5.  His chest x-ray showed no acute abnormalities and EKG 

was in a good location.  No significant orotracheal secretions.  The blood gas 

is showing a component of alkalosis as the patient was being given bicarb 

infusion.  His patient's pH is at 7.47 with a pCO2 of 54 and pO2 of 90.  On his 

blood work, the renal function has improved.  The patient's creatinine is down 

to 1.23 with a BUN of 44.  Sodium is at 143 and potassium is at 3.70 severity 

replaced.  Serum bicarb is up to 32.  WBC was at 14.3 and hematocrit is 49 with 

a platelet count that is pending for now.  Note that her amylase and lipase was 

slightly elevated and there may be a component of pancreatitis along with 

alcoholic hepatitis.  Blood culture was positive for staph aureus and staph 

epidermidis.  I'm not sure if this is a true infection versus a contaminant.  

This was only in 1 out of 2 bottles.  Another set of blood cultures will be 

obtained.  Noted the patient was given a dose of vancomycin pending further 

cultures.  He is on Lovenox portably prophylaxis.  He remains nothing by mouth. 

No pressors for now.  Renal ultrasound was completed yesterday and it showed no 

evidence of any hydronephrosis.





Reevaluated today on 1/9/2023, patient remains in the ICU, intubated and 

mechanically ventilated.  Patient is on assist control rate of 18 tidal volume 

400 FiO2 40% and PEEP of 5 ABG showed a pO2 of 84 pCO2 48 pH of 7.47.  Patient 

remains on fentanyl at 4 mcg/kg/h, norepinephrine at 0.01 mcg/kg/m, Versed at 15

mg per hour and he is on IV fluid at the cc per hour in the form of 0.9 normal 

saline.  Patient is receiving vital HPI at 10 mL per hour.  Continues to have 

extreme agitations in spite of sedation, patient is arousable, and gets agitated

very easily.  Chest x-ray not done today, but chest x-ray from yesterday showed 

no evidence of infiltrate.  Seen by neurology today, felt to have toxic 

metabolic encephalopathy and alcohol withdrawal.  Considering the patient's 

agitation, he is obviously not a candidate for weaning and extubation at this 

point.  Basic metabolic profile is normal, renal profile is normal.  Brain CT on

admission showed no intracranial abnormality





Reevaluated today on 1/10/23, patient remains in the ICU, intubated and 

mechanically ventilated.  Patient is on assist control rate of 18, tidal volume 

400 FiO2 40% and PEEP of 5.  ABG showed a pO2 of 100 pCO2 51 pH of 7.41.  EEG 

showed mostly metabolic encephalopathy, no seizure activity.  Patient remains on

significant amount of sedation including fentanyl and 4 mcg/kg/h, and I cut it 

down to 2 mcg/kg per hour.  Remains on Versed at 20 mg per hour.  Normal saline 

which I have changed to D5 45 at 100 mL per hour.  He is on tube feeding at 40 

mL per hour.  Patient remains on GI and DVT prophylaxis, he is not requiring any

pressors.  Today I plan to hold or at least cut down on the sedation, assess 

mental status, and possibly give the patient a trial of weaning if possible.  

However considering the patient gets extremely agitated, that may not happen 

today.  His potassium is low, and is being corrected as per protocol.  WBC count

is 5.8 hemoglobin is 11.8.  Renal profile is normal potassium is being corrected

it is 2.90.  Patient's Gram stain of the sputum and culture came back positive 

for staph aureus.  The staph aureus is MSSA.  Chest x-ray remains relatively cl

ear, minimal perihilar atelectasis, no clear-cut evidence of pneumonia.











Objective





- Vital Signs


Vital signs: 


                                   Vital Signs











Temp  97.8 F   01/10/23 08:00


 


Pulse  57 L  01/10/23 09:00


 


Resp  18   01/10/23 09:00


 


BP  94/53   01/10/23 09:00


 


Pulse Ox  100   01/10/23 09:00


 


FiO2  40   01/10/23 08:10








                                 Intake & Output











 01/09/23 01/10/23 01/10/23





 18:59 06:59 18:59


 


Intake Total 2445.613 2842.050 862.876


 


Output Total 1340 510 53


 


Balance 4631.200 4249.050 809.876


 


Weight 80 kg 86.1 kg 


 


Intake:   


 


  IV 1650 1700 650


 


    Sodium Chloride 0.9% 1, 1100 1200 100





    000 ml @ 100 mls/hr IV .   





    Q10H [facility] Rx#:[id number]   


 


    Thiamine 100 mg In Sodium 50  50





    Chloride 0.9% 50 ml @   





    100 mls/hr IVPB Q24HR [facility]   





    Rx#:[id number]   


 


    Vancomycin 1,500 mg In 500 500 500





    Sodium Chloride 0.9% 500   





    ml 500 ml @ 167 mls/hr   





    IVPB Q12H [facility] Rx#:   





    [id number]   


 


  Intake, IV Titration 625.613 772.050 102.876





  Amount   


 


    Midazolam HCl 200 mg In 67.625 96.292 





    Sodium Chloride 0.9% 60   





    ml @ 14 MG/HR 7 mls/hr IV   





    .[id number] [facility] Rx#:   





    [id number]   


 


    Norepinephrine 4 mg In 91.508 179.183 





    Sodium Chloride 0.9% 250   





    ml @ 0.03 MCG/KG/MIN 9.   





    332 mls/hr IV .Q24H [facility]   





    Rx#:[id number]   


 


    fentaNYL (PF) 2,500 mcg 466.480 496.575 102.876





    In Sodium Chloride 0.9%   





    200 ml @ 4 MCG/KG/HR 32.   





    659 mls/hr IV .Q7H40M [facility]   





    Rx#:[id number]   


 


  Tube Feeding 110 280 80


 


  Other 60 90 30


 


Output:   


 


  Urine 440 510 53


 


  Stool 900  


 


Other:   


 


  Voiding Method Indwelling Catheter Indwelling Catheter Indwelling Catheter














- Exam





Physical Exam: Revealed 57-year-old white male intubated, mechanically 

ventilated, remains on relatively high dose of fentanyl and Versed


Head: Atraumatic, normocephalic.  Endotracheal tube and orogastric tube are 

intact.


HEENT:[Neck is supple.] [No neck masses.] [No thyromegaly.] [No JVD.]


Chest: [Clear throughout, no crackles, no rhonchi, no wheezes.]


Cardiac Exam: [Normal S1 and S2, no S3 gallop, no murmur.]


Abdomen: [Soft, nontender,  no megaly, no rebound, no guarding, normal bowel 

sounds.]


Extremities: [No clubbing, no edema, no cyanosis.]


Neurological Exam: Arousable in spite of being on fentanyl and Versed, patient f

ollows very simple instructions


Psychiatric : Could not assess.  .


Musculoskeletal: No deformities.


Skin: No rashes





- Labs


CBC & Chem 7: 


                                 01/10/23 07:10





                                 01/10/23 07:10


Labs: 


                  Abnormal Lab Results - Last 24 Hours (Table)











  01/10/23 01/10/23 01/10/23 Range/Units





  05:08 07:10 07:10 


 


RBC   3.72 L   (4.30-5.90)  m/uL


 


Hgb   11.8 L D   (13.0-17.5)  gm/dL


 


Hct   36.0 L   (39.0-53.0)  %


 


Plt Count   61 L   (150-450)  k/uL


 


ABG pCO2  51 H    (35-45)  mmHg


 


ABG HCO3  32 H    (21-25)  mmol/L


 


ABG Total CO2  34 H    (19-24)  mmol/L


 


ABG O2 Saturation  98.4 H    (94-97)  %


 


Potassium    2.9 L  (3.5-5.1)  mmol/L


 


Chloride    111 H  ()  mmol/L


 


Carbon Dioxide    32 H  (22-30)  mmol/L


 


Calcium    7.5 L  (8.4-10.2)  mg/dL








                      Microbiology - Last 24 Hours (Table)











 01/08/23 08:29 Blood Culture - Preliminary





 Blood    No Growth after 48 hours


 


 01/06/23 20:37 Blood Culture Gram Stain - Preliminary





 Blood Blood Culture - Preliminary





    Staphylococcus aureus





    Staphylococcus epidermidis





    Coagulase Negative Staph


 


 01/07/23 00:38 Gram Stain - Final





 Sputum Sputum Culture - Final





    Staphylococcus aureus














Assessment and Plan


Assessment: 





Impression:


Acute hypoxic and hypercapnic respiratory failure, mostly because the patient 

was unable to protect his airways upon his initial presentation, patient 

required intubation for airways protection.  And he was encephalopathic with 

mental status change upon his initial presentation.


MSSA tracheobronchitis, no clear-cut evidence of pneumonia noted on the chest x-

ray.


Altered mental status, possibly metabolic encephalopathy, suspect alcohol 

withdrawal.


Severe lactic acidosis on admission, with hypovolemia, resolved.


Hypovolemic with hypotension


Anion gap metabolic acidosis, resolved.


Positive blood cultures for staph epidermidis, felt to be a contamination.


History of alcoholism


Acute kidney injury with hypovolemia, resolved.


History of paroxysmal atrial fibrillation


Benign essential hypertension


Acute toxic metabolic encephalopathy


Dyslipidemia





Recommendation:


Continue ventilatory support, may consider a trial of weaning give the patient 

couldn't tolerate coming off Versed and fentanyl today.


Continue GI and DVT prophylaxis patient is on Lovenox and on Protonix.


Continue sedation


Continue nutritional support.


Continue IV thiamine.


Continue to monitor daily labs and address accordingly


Neurology is following on consultation, and I have reviewed the results of the 

EEG.  Basically nondiagnostic.  Showed metabolic encephalopathy


Patient remains critical, prognosis is relatively guarded.


Critical care time is over 30 minutes.


We'll continue to follow





Time with Patient: Greater than 30

## 2023-01-10 NOTE — P.PN
Subjective





Patient is seen in follow-up for acute kidney injury.  GFR back to baseline.  

Potassium is low and is being replaced.  Nonoliguric.  Receiving IV fluids.  

Also on tube feeds.  Intubated.





Vital signs are stable.


General: Resting in bed.


HEENT: Intubated. 


LUNGS: Breath sounds decreased.


HEART: Rate and Rhythm are regular. 


ABDOMEN: Soft, no distention.


EXTREMITITES: No edema.





Objective





- Vital Signs


Vital signs: 


                                   Vital Signs











Temp  97.8 F   01/10/23 08:00


 


Pulse  57 L  01/10/23 09:00


 


Resp  18   01/10/23 09:00


 


BP  94/53   01/10/23 09:00


 


Pulse Ox  100   01/10/23 09:00


 


FiO2  40   01/10/23 08:10








                                 Intake & Output











 01/09/23 01/10/23 01/10/23





 18:59 06:59 18:59


 


Intake Total 2445.613 2842.050 862.876


 


Output Total 1340 510 53


 


Balance 9267.865 5773.050 809.876


 


Weight 80 kg 86.1 kg 


 


Intake:   


 


  IV 1650 1700 650


 


    Sodium Chloride 0.9% 1, 1100 1200 100





    000 ml @ 100 mls/hr IV .   





    Q10H [facility] Rx#:[id number]   


 


    Thiamine 100 mg In Sodium 50  50





    Chloride 0.9% 50 ml @   





    100 mls/hr IVPB Q24HR [facility]   





    Rx#:[id number]   


 


    Vancomycin 1,500 mg In 500 500 500





    Sodium Chloride 0.9% 500   





    ml 500 ml @ 167 mls/hr   





    IVPB Q12H [facility] Rx#:   





    [id number]   


 


  Intake, IV Titration 625.613 772.050 102.876





  Amount   


 


    Midazolam HCl 200 mg In 67.625 96.292 





    Sodium Chloride 0.9% 60   





    ml @ 14 MG/HR 7 mls/hr IV   





    .[id number] [facility] Rx#:   





    [id number]   


 


    Norepinephrine 4 mg In 91.508 179.183 





    Sodium Chloride 0.9% 250   





    ml @ 0.03 MCG/KG/MIN 9.   





    332 mls/hr IV .Q24H [facility]   





    Rx#:[id number]   


 


    fentaNYL (PF) 2,500 mcg 466.480 496.575 102.876





    In Sodium Chloride 0.9%   





    200 ml @ 4 MCG/KG/HR 32.   





    659 mls/hr IV .Q7H40M [facility]   





    Rx#:[id number]   


 


  Tube Feeding 110 280 80


 


  Other 60 90 30


 


Output:   


 


  Urine 440 510 53


 


  Stool 900  


 


Other:   


 


  Voiding Method Indwelling Catheter Indwelling Catheter Indwelling Catheter














- Labs


CBC & Chem 7: 


                                 01/10/23 07:10





                                 01/10/23 07:10


Labs: 


                  Abnormal Lab Results - Last 24 Hours (Table)











  01/10/23 01/10/23 01/10/23 Range/Units





  05:08 07:10 07:10 


 


RBC   3.72 L   (4.30-5.90)  m/uL


 


Hgb   11.8 L D   (13.0-17.5)  gm/dL


 


Hct   36.0 L   (39.0-53.0)  %


 


Plt Count   61 L   (150-450)  k/uL


 


ABG pCO2  51 H    (35-45)  mmHg


 


ABG HCO3  32 H    (21-25)  mmol/L


 


ABG Total CO2  34 H    (19-24)  mmol/L


 


ABG O2 Saturation  98.4 H    (94-97)  %


 


Potassium    2.9 L  (3.5-5.1)  mmol/L


 


Chloride    111 H  ()  mmol/L


 


Carbon Dioxide    32 H  (22-30)  mmol/L


 


Calcium    7.5 L  (8.4-10.2)  mg/dL








                      Microbiology - Last 24 Hours (Table)











 01/06/23 20:37 Blood Culture Gram Stain - Preliminary





 Blood Blood Culture - Preliminary





    Staphylococcus aureus





    Staphylococcus epidermidis





    Coagulase Negative Staph


 


 01/07/23 00:38 Gram Stain - Final





 Sputum Sputum Culture - Final





    Staphylococcus aureus


 


 01/08/23 08:29 Blood Culture - Preliminary





 Blood    No Growth after 24 hours














Assessment and Plan


Plan: 





Assessment:


1.  Acute kidney injury secondary to hemodynamic ATN.  Creatinine 2.66 on 

admission and is 0.68 today.  Patient received IV contrast for CT angiogram and 

CT of the chest abdomen and pelvis on 01/06/2023 which were negative.  No 

hydronephrosis was noted.  Creatinine in January 2019 was 1.


2.  Alcohol withdrawal.


3.  Metabolic acidosis secondary to acute kidney injury and lactic acidosis.  

Status post bicarb drip.  Resolved.


4.  Hypokalemia from poor intake and saline diuresis.  Being replaced.


5.  Altered mental status. ?Etiology.  Staph aureus bacteremia.  On antibiotics.


6.  Mild hypernatremia from lack of water intake.





Plan:


Agree with half-normal saline.  


Also receiving tube feeds.


Continue with potassium replacement.  


Avoid nephrotoxins.


Continue to monitor renal function and urine output.


Wean FiO2.

## 2023-01-10 NOTE — EEG
ELECTROENCEPHALOGRAM REPORT



PREAMBLE:

This is a 57-year-old male with episode of unresponsiveness and altered mental status.

This study is performed to evaluate for any epileptiform activity.



EEG FINDINGS:

This is a 21-channel digital EEG recorded with video component, utilizing 10/20

international system with referential and bipolar montages.  Background consists of

slightly asymmetric background, with relatively higher amplitude activity in the left

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

defect.  Otherwise, the background consists of diffusely moderate voltage activity in

mixed theta and delta slowing in bihemispheric region.  Different stages of sleep were

not clearly seen.  No definitive focal or generalized epileptiform activity was seen.



IMPRESSION:

This is an abnormal EEG due to:

1. Amplitude asymmetry with relatively higher amplitude activity in the left

    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

2. Background slowing of moderate-to-severe degree, consistent with generalized

    cerebral dysfunction as can be seen with toxic metabolic encephalopathy or due to

    diffuse structural brain abnormality.  Clinical correlation is recommended.  No

    definitive epileptiform activity was seen.





MMODL / IJN: [id number] / Job#: [id number]

## 2023-01-10 NOTE — CDI
Documentation Clarification Form



Date: 1/10/2023 10:53:28 AM

From: Emperatriz Laurent RN CCDS

Phone: [phone]

MRN: [id number]

Admit Date: 1/6/2023 11:52:00 PM

Patient Name: Denton Delacruz

Visit Number: [id number]

Discharge Date:  





ATTENTION: The Clinical Documentation Specialists (CDI) and [facility] [employer] [facility] [employer] Coding Staff 
appreciate your assistance in clarifying documentation. Please respond to the 
clarification below the line at the bottom and electronically sign. The CDI & 
[facility] [employer] Coding staff will review the response and follow-up if needed. Please note: 
Queries are made part of the Legal Health Record. If you have any questions, 
please contact the author of this message via ITS.



Dr. Glen Bishop MD



The documentation of   His elevated troponin was thought to be secondary to 
respiratory failure Cardiology Note, 1/9.  Please clarify if there is an 
additional diagnosis and/or clinical significance.



Patient history/risk factors: 51-year-old male presented to the ED via EMS after
being found face down in bed unresponsive. The patient was hypotensive and 
required bagging him into the ED with agonal respirations.  Medical History: 
Alcoholic; Atrial Fibrillation and HTN. 1/7, H&P.



Clinical indicators:

1/6, VS: B/P 97/80; ; Temp 97.0F Oral; RR 18; SpO2 90% Non-rebreather 15L 

1/6, VS: B/P 104/65; ; SpO2 100% Mechanical Ventilator FiO2 100.

1/6, Blood Gas: Right Radial  PH 7.13; PCO2 50; pO2 240; HCO3 17; Total CO2 18; 
O2 Saturation 98.8 BASE EXCESS -12.7; Hai test Yes; PH 7.12 PCO2 54  HCO3 17 
FiO2 100% 

1/6, Troponin: 0.304; 1/7:  0.248

1/6, EKG: Sinus Tachycardia, Possible right atrial enlargement, Moderate ST 
Depression. 



Treatment: 1/6 Non-Rebreather mask;  1/6 BiPAP; 1/6  Intubation on Mechanical 
Ventilator. 



Is there an additional diagnosis and/or clinical significance related to the 
above lab result/information?



[  ]  Type 2 MI due to Respiratory Failure 

[  ]  Other, please specify ________

[  ]  Unable to determine

 pt did not have MI



(Template Last Reviewed: October 2021)

MTDD

## 2023-01-10 NOTE — PN
PROGRESS NOTE



SUBJECTIVE:

Denton is a 57-year-old gentleman with respiratory failure, who is currently intubated

and on vent, has been having problems with hypotension.  He remains in sinus rhythm.



OBJECTIVE:

VITAL SIGNS:  Heart rate is 57 beats per minute, blood pressure is 94/50, and

respiratory rate is 18.

CHEST:  Reveals diminished air entry with occasional rhonchi.

HEART:  Reveals first and second heart sounds.  No gallop.

ABDOMEN:  Soft.

EXTREMITIES:  Did not reveal any edema.



LABORATORY DATA:

Show a hemoglobin of 11.8, platelet count is 61, potassium is 2.9, creatinine is 0.68.



ASSESSMENT:

1. Respiratory failure.

2. Paroxysmal atrial fibrillation.

3. Hypotension.



PLAN:

Continue current supportive measures.





MMODL / IJN: [id number] / Job#: [id number]

## 2023-01-10 NOTE — P.PN
Subjective


Progress Note Date: 01/10/23





CHIEF COMPLAINT: Upper GI bleed





HISTORY OF PRESENT ILLNESS: Patient remains in the ICU on mechanical vent

ilation.  Patient has been having diarrhea had FMS placed.  Stool is brown.  C. 

diff is negative.  NG tube currently has had no further blood noted.  Patient is

currently on tube feeds.  Afebrile.  WBC 5.8 Hgb 11.8 platelets 61 sodium 145 

potassium 2.9 creatinine 0.68





PHYSICAL EXAM: 


VITAL SIGNS: Reviewed.


GENERAL: Well-developed in no acute distress. 


HEENT:  No sclera icterus. Extraocular movements grossly intact.  Moist buccal 

mucosa. Head is atraumatic, normocephalic. 


ABDOMEN:  Soft.  Nondistended. 


NEUROLOGIC: Intubated and sedated





ASSESSMENT: 


1.  Acute GI bleed.  No longer having bleeding through NG tube.  Stools are 

brown.








PLAN: 


-Continue ICU management


-Continue PPI


-Continue monitor hemoglobin


-Patient has had no further bleeding.  Surgical service will sign off.  Please 

call with any questions or concerns.





Physician Assistant note has been reviewed by physician. Signing provider agrees

with the documented findings, assessment, and plan of care. 





I have personally seen and examined the patient, reviewed the NP /PAs history, 

exam and MDM and agree with the assessment and plan as written. Based on total 

visit time, I have performed more than 50% of the visit.





As above:  Patient stable on the ventilator.  No further bleeding.  We'll sign 

off.  Please call if needed.





Objective





- Vital Signs


Vital signs: 


                                   Vital Signs











Temp  97.5 F L  01/10/23 12:00


 


Pulse  85   01/10/23 15:15


 


Resp  20   01/10/23 15:15


 


BP  135/81   01/10/23 15:15


 


Pulse Ox  92 L  01/10/23 15:15


 


FiO2  40   01/10/23 15:19








                                 Intake & Output











 01/09/23 01/10/23 01/10/23





 18:59 06:59 18:59


 


Intake Total 2445.613 2842.050 2071.376


 


Output Total 1340 510 303


 


Balance 7040.157 6169.050 1768.376


 


Weight 80 kg 86.1 kg 


 


Intake:   


 


  IV 1650 1700 1450


 


    Dextrose 5%-0.45% NaCl 1,   600





    000 ml @ 100 mls/hr IV .   





    Q10H [facility] Rx#:[id number]   


 


    Magnesium Sulfate-D5w Pmx   200





    1 gm In Dextrose/Water 1   





    100ml.bag @ 100 mls/hr   





    IVPB Q1H [facility] Rx#:   





    [id number]   


 


    Sodium Chloride 0.9% 1, 1100 1200 100





    000 ml @ 100 mls/hr IV .   





    Q10H [facility] Rx#:[id number]   


 


    Thiamine 100 mg In Sodium 50  50





    Chloride 0.9% 50 ml @   





    100 mls/hr IVPB Q24HR [facility]   





    Rx#:[id number]   


 


    Vancomycin 1,500 mg In 500 500 500





    Sodium Chloride 0.9% 500   





    ml 500 ml @ 167 mls/hr   





    IVPB Q12H [facility] Rx#:   





    [id number]   


 


  Intake, IV Titration 625.613 772.050 201.376





  Amount   


 


    Midazolam HCl 200 mg In 67.625 96.292 98.50





    Sodium Chloride 0.9% 60   





    ml @ 14 MG/HR 7 mls/hr IV   





    .[id number] [facility] Rx#:   





    [id number]   


 


    Norepinephrine 4 mg In 91.508 179.183 





    Sodium Chloride 0.9% 250   





    ml @ 0.03 MCG/KG/MIN 9.   





    332 mls/hr IV .Q24H [facility]   





    Rx#:[id number]   


 


    fentaNYL (PF) 2,500 mcg 466.480 496.575 102.876





    In Sodium Chloride 0.9%   





    200 ml @ 4 MCG/KG/HR 32.   





    659 mls/hr IV .Q7H40M [facility]   





    Rx#:[id number]   


 


  Tube Feeding 110 280 330


 


  Other 60 90 90


 


Output:   


 


  Urine 440 510 303


 


  Stool 900  


 


Other:   


 


  Voiding Method Indwelling Catheter Indwelling Catheter Indwelling Catheter








                       ABP, PAP, CO, CI - Last Documented











Arterial Blood Pressure        74/34

















- Labs


CBC & Chem 7: 


                                 01/10/23 07:10





                                 01/10/23 16:11


Labs: 


                  Abnormal Lab Results - Last 24 Hours (Table)











  01/10/23 01/10/23 01/10/23 Range/Units





  05:08 07:10 07:10 


 


RBC   3.72 L   (4.30-5.90)  m/uL


 


Hgb   11.8 L D   (13.0-17.5)  gm/dL


 


Hct   36.0 L   (39.0-53.0)  %


 


Plt Count   61 L   (150-450)  k/uL


 


ABG pCO2  51 H    (35-45)  mmHg


 


ABG HCO3  32 H    (21-25)  mmol/L


 


ABG Total CO2  34 H    (19-24)  mmol/L


 


ABG O2 Saturation  98.4 H    (94-97)  %


 


Potassium    2.9 L  (3.5-5.1)  mmol/L


 


Chloride    111 H  ()  mmol/L


 


Carbon Dioxide    32 H  (22-30)  mmol/L


 


Calcium    7.5 L  (8.4-10.2)  mg/dL


 


TSH     (0.465-4.680)  mIU/L














  01/10/23 Range/Units





  07:10 


 


RBC   (4.30-5.90)  m/uL


 


Hgb   (13.0-17.5)  gm/dL


 


Hct   (39.0-53.0)  %


 


Plt Count   (150-450)  k/uL


 


ABG pCO2   (35-45)  mmHg


 


ABG HCO3   (21-25)  mmol/L


 


ABG Total CO2   (19-24)  mmol/L


 


ABG O2 Saturation   (94-97)  %


 


Potassium   (3.5-5.1)  mmol/L


 


Chloride   ()  mmol/L


 


Carbon Dioxide   (22-30)  mmol/L


 


Calcium   (8.4-10.2)  mg/dL


 


TSH  0.220 L  (0.465-4.680)  mIU/L








                      Microbiology - Last 24 Hours (Table)











 01/06/23 20:37 Blood Culture Gram Stain - Preliminary





 Blood Blood Culture - Preliminary





    Staphylococcus aureus





    Staphylococcus epidermidis





    Coagulase Negative Staph


 


 01/08/23 08:29 Blood Culture - Preliminary





 Blood    No Growth after 48 hours


 


 01/07/23 00:38 Gram Stain - Final





 Sputum Sputum Culture - Final





    Staphylococcus aureus

## 2023-01-10 NOTE — PN
PROGRESS NOTE



SUBJECTIVE:

Denton is a 57-year-old gentleman with respiratory failure, paroxysmal atrial

fibrillation that we are following for paroxysmal atrial fibrillation.  Remains in

sinus rhythm.  Has had issues with hypotension and is currently requiring Levophed from

time to time.



OBJECTIVE:

GENERAL:  Intubated, vented, sedated, stable hemodynamically.

CHEST:  Reveals good air entry bilaterally.

HEART:  Reveals first and second heart sounds.  No gallop.

EXTREMITIES:  Did not reveal any edema.  Peripheral pulses are felt.



ASSESSMENT:

1. Vent requiring respiratory failure.

2. Paroxysmal atrial fibrillation.



PLAN:

The patient will continue current supportive measures.  Prognosis guarded.





MMODL / IJN: [id number] / Job#: [id number]

## 2023-01-10 NOTE — P.PN
Subjective


Progress Note Date: 01/10/23





Hospital Course: 


57-year-old male with history of alcohol dependence/abuse, atrial fibrillation 

presented with altered mentation and possible code stroke.  On arrival, patient 

was hemodynamically unstable, in A. fib with RVR, patient was intubated and 

cardioverted to normal sinus rhythm.  CT had showed no acute process, 

interventional neurology on call did not recommend to give alteplase.  Re

portedly, patient has been binge drinking, his last drink was on January 1.  Per

family patient was vomiting prior to becoming unresponsive.  His initial labs 

were consistent with severe dehydration, pH of 7.13, potassium 2.7, creatinine 

of 3.66, lactic acid of 12.4, mild transaminitis, mild elevated troponin, urine 

tox positive for benzos and marijuana.  Currently patient remains intubated, not

requiring any pressors, currently on sedation, renal function improving with IV 

fluids.  There is also concern for possible GI bleed, patient on IV Protonix, 

surgery following a hemoglobin stable.  Nephrology and ICU also following.








Subjective: 


Patient seen and examined at bedside.  No acute events overnight.  Currently he 

remains intubated and sedated.  Per nursing, his urine output slowly down 

trending.  The amount of diarrhea has decreased.





Pertinent positives and negatives as discussed above, a complete review of 

systems was performed and all other systems are negative.








Vitals Signs Reviewed. 





Vital signs reviewed


General: Intubated and sedated


Derm: warm, dry


Head: atraumatic, normocephalic, symmetric


Eyes: Pupils pinpoint equal and reactive


Mouth: no lip lesion, mucus membranes moist


Cardiovascular: S1S2 reg, no murmur, positive posterior tibial pulse bilateral, 


Lungs: Clear to auscultation bilaterally, mechanical ventilation


Abdominal: soft, nondistended, no rigidity, no appreciable organomegaly, NG tube

in place , rectal tube


Ext: no gross muscle atrophy, no edema, no contractures


Neuro: Sedated


Psych: Unable to assess





Assessment/Plan:





Acute hypoxic and hypercapnic respiratory failure status post intubation


-Continue to wean sedation


-Remains on minimal vent settings


-Can consider spontaneous breathing trial, extubation





Acute metabolic encephalopathy


Hyperammonemia


History of alcohol abuse


-Continue to wean sedation


-Neurology following


-EEG report: Amplitude asymmetry likely from previous craniotomy defect, 

moderate to severe slowing concerning for toxic metabolic encephalopathy, no 

definitive epileptiform activity seen


-IV thiamine





Shock, likely hypovolemic - resolved


High anion gap metabolic acidosis secondary to Severe lactic acidosis - resolved


Acute kidney injury - resolved


Severe hyperphosphatemia - resolved


Severe hypokalemia - resolving


Hypomagnesemia


-Continue IV fluids


-Monitor urine output and renal function, renal ultrasound showed no 

hydronephrosis


-Replete electrolytes per protocol, continue to monitor


-Renal following





A. fib with RVR status post cardioversion


Elevated troponin


-Cardiology following





Possible upper GI bleed


Thrombocytopenia


-IV PPI twice a day


-Low platelets likely in the setting of chronic alcohol abuse


-Gen. surgery following





Staph bacteremia


-First set of blood cultures positive for staph aureus and epidermidis, likely 

contaminant. however, sputum culture also showing staph


-Patient currently on vancomycin, repeat blood cultures no growth to date





DVT ppx: Lovenox





Code status: Full code





Anticipated discharge place: Pending clinical course


Anticipated discharge time: Pending clinical course





Objective





- Vital Signs


Vital signs: 


                                   Vital Signs











Temp  97.8 F   01/10/23 08:00


 


Pulse  57 L  01/10/23 09:00


 


Resp  18   01/10/23 09:00


 


BP  94/53   01/10/23 09:00


 


Pulse Ox  100   01/10/23 09:00


 


FiO2  40   01/10/23 08:10








                                 Intake & Output











 01/09/23 01/10/23 01/10/23





 18:59 06:59 18:59


 


Intake Total 2445.613 2842.050 862.876


 


Output Total 1340 510 53


 


Balance 9257.719 7070.050 809.876


 


Weight 80 kg 86.1 kg 


 


Intake:   


 


  IV 1650 1700 650


 


    Sodium Chloride 0.9% 1, 1100 1200 100





    000 ml @ 100 mls/hr IV .   





    Q10H [facility] Rx#:[id number]   


 


    Thiamine 100 mg In Sodium 50  50





    Chloride 0.9% 50 ml @   





    100 mls/hr IVPB Q24HR [facility]   





    Rx#:[id number]   


 


    Vancomycin 1,500 mg In 500 500 500





    Sodium Chloride 0.9% 500   





    ml 500 ml @ 167 mls/hr   





    IVPB Q12H [facility] Rx#:   





    [id number]   


 


  Intake, IV Titration 625.613 772.050 102.876





  Amount   


 


    Midazolam HCl 200 mg In 67.625 96.292 





    Sodium Chloride 0.9% 60   





    ml @ 14 MG/HR 7 mls/hr IV   





    .[id number] [facility] Rx#:   





    [id number]   


 


    Norepinephrine 4 mg In 91.508 179.183 





    Sodium Chloride 0.9% 250   





    ml @ 0.03 MCG/KG/MIN 9.   





    332 mls/hr IV .Q24H [facility]   





    Rx#:[id number]   


 


    fentaNYL (PF) 2,500 mcg 466.480 496.575 102.876





    In Sodium Chloride 0.9%   





    200 ml @ 4 MCG/KG/HR 32.   





    659 mls/hr IV .Q7H40M [facility]   





    Rx#:[id number]   


 


  Tube Feeding 110 280 80


 


  Other 60 90 30


 


Output:   


 


  Urine 440 510 53


 


  Stool 900  


 


Other:   


 


  Voiding Method Indwelling Catheter Indwelling Catheter Indwelling Catheter














- Labs


CBC & Chem 7: 


                                 01/10/23 07:10





                                 01/10/23 07:10


Labs: 


                  Abnormal Lab Results - Last 24 Hours (Table)











  01/10/23 01/10/23 01/10/23 Range/Units





  05:08 07:10 07:10 


 


RBC   3.72 L   (4.30-5.90)  m/uL


 


Hgb   11.8 L D   (13.0-17.5)  gm/dL


 


Hct   36.0 L   (39.0-53.0)  %


 


Plt Count   61 L   (150-450)  k/uL


 


ABG pCO2  51 H    (35-45)  mmHg


 


ABG HCO3  32 H    (21-25)  mmol/L


 


ABG Total CO2  34 H    (19-24)  mmol/L


 


ABG O2 Saturation  98.4 H    (94-97)  %


 


Potassium    2.9 L  (3.5-5.1)  mmol/L


 


Chloride    111 H  ()  mmol/L


 


Carbon Dioxide    32 H  (22-30)  mmol/L


 


Calcium    7.5 L  (8.4-10.2)  mg/dL








                      Microbiology - Last 24 Hours (Table)











 01/08/23 08:29 Blood Culture - Preliminary





 Blood    No Growth after 48 hours


 


 01/06/23 20:37 Blood Culture Gram Stain - Preliminary





 Blood Blood Culture - Preliminary





    Staphylococcus aureus





    Staphylococcus epidermidis





    Coagulase Negative Staph


 


 01/07/23 00:38 Gram Stain - Final





 Sputum Sputum Culture - Final





    Staphylococcus aureus

## 2023-01-11 LAB
ANION GAP SERPL CALC-SCNC: 1 MMOL/L
BUN SERPL-SCNC: 7 MG/DL (ref 9–20)
CALCIUM SPEC-MCNC: 7.3 MG/DL (ref 8.4–10.2)
CELLS COUNTED: 100
CHLORIDE SERPL-SCNC: 111 MMOL/L (ref 98–107)
CO2 BLDA-SCNC: 34 MMOL/L (ref 19–24)
CO2 SERPL-SCNC: 30 MMOL/L (ref 22–30)
EOSINOPHIL # BLD MANUAL: 0.35 K/UL (ref 0–0.7)
ERYTHROCYTE [DISTWIDTH] IN BLOOD BY AUTOMATED COUNT: 3.51 M/UL (ref 4.3–5.9)
ERYTHROCYTE [DISTWIDTH] IN BLOOD: 13.8 % (ref 11.5–15.5)
GLUCOSE BLD-MCNC: 108 MG/DL (ref 70–110)
GLUCOSE BLD-MCNC: 110 MG/DL (ref 70–110)
GLUCOSE BLD-MCNC: 93 MG/DL (ref 70–110)
GLUCOSE BLD-MCNC: 95 MG/DL (ref 70–110)
GLUCOSE SERPL-MCNC: 196 MG/DL (ref 74–99)
HCO3 BLDA-SCNC: 33 MMOL/L (ref 21–25)
HCT VFR BLD AUTO: 33.8 % (ref 39–53)
HGB BLD-MCNC: 10.9 GM/DL (ref 13–17.5)
LYMPHOCYTES # BLD MANUAL: 1.06 K/UL (ref 1–4.8)
MAGNESIUM SPEC-SCNC: 1.9 MG/DL (ref 1.6–2.3)
MCH RBC QN AUTO: 31 PG (ref 25–35)
MCHC RBC AUTO-ENTMCNC: 32.1 G/DL (ref 31–37)
MCV RBC AUTO: 96.4 FL (ref 80–100)
MONOCYTES # BLD MANUAL: 0.53 K/UL (ref 0–1)
NEUTROPHILS NFR BLD MANUAL: 67 %
NEUTS SEG # BLD MANUAL: 3.95 K/UL (ref 1.3–7.7)
PCO2 BLDA: 48 MMHG (ref 35–45)
PH BLDA: 7.44 [PH] (ref 7.35–7.45)
PLATELET # BLD AUTO: 74 K/UL (ref 150–450)
PO2 BLDA: 106 MMHG (ref 83–108)
POTASSIUM SERPL-SCNC: 3 MMOL/L (ref 3.5–5.1)
SODIUM SERPL-SCNC: 142 MMOL/L (ref 137–145)
WBC # BLD AUTO: 5.9 K/UL (ref 3.8–10.6)

## 2023-01-11 PROCEDURE — 02HV33Z INSERTION OF INFUSION DEVICE INTO SUPERIOR VENA CAVA, PERCUTANEOUS APPROACH: ICD-10-PCS

## 2023-01-11 PROCEDURE — 4A133B1 MONITORING OF ARTERIAL PRESSURE, PERIPHERAL, PERCUTANEOUS APPROACH: ICD-10-PCS

## 2023-01-11 PROCEDURE — 4A133J1 MONITORING OF ARTERIAL PULSE, PERIPHERAL, PERCUTANEOUS APPROACH: ICD-10-PCS

## 2023-01-11 PROCEDURE — 03HY32Z INSERTION OF MONITORING DEVICE INTO UPPER ARTERY, PERCUTANEOUS APPROACH: ICD-10-PCS

## 2023-01-11 RX ADMIN — POTASSIUM CHLORIDE SCH MLS/HR: 14.9 INJECTION, SOLUTION INTRAVENOUS at 04:22

## 2023-01-11 RX ADMIN — SODIUM CHLORIDE SCH MLS/HR: 9 INJECTION, SOLUTION INTRAVENOUS at 08:11

## 2023-01-11 RX ADMIN — ENOXAPARIN SODIUM SCH MG: 40 INJECTION SUBCUTANEOUS at 08:11

## 2023-01-11 RX ADMIN — DEXTRAN 70 AND HYPROMELLOSE 2910 SCH: 1; 3 SOLUTION/ DROPS OPHTHALMIC at 07:42

## 2023-01-11 RX ADMIN — DEXTRAN 70 AND HYPROMELLOSE 2910 SCH: 1; 3 SOLUTION/ DROPS OPHTHALMIC at 00:38

## 2023-01-11 RX ADMIN — PANTOPRAZOLE SODIUM SCH MG: 40 INJECTION, POWDER, FOR SOLUTION INTRAVENOUS at 09:21

## 2023-01-11 RX ADMIN — FENTANYL CITRATE SCH MLS/HR: 50 INJECTION INTRAVENOUS at 07:41

## 2023-01-11 RX ADMIN — DEXTRAN 70 AND HYPROMELLOSE 2910 SCH DROPS: 1; 3 SOLUTION/ DROPS OPHTHALMIC at 16:22

## 2023-01-11 RX ADMIN — MIDAZOLAM SCH MLS/HR: 5 INJECTION INTRAMUSCULAR; INTRAVENOUS at 21:20

## 2023-01-11 RX ADMIN — DEXTRAN 70 AND HYPROMELLOSE 2910 SCH DROPS: 1; 3 SOLUTION/ DROPS OPHTHALMIC at 21:03

## 2023-01-11 RX ADMIN — METOPROLOL TARTRATE SCH: 25 TABLET, FILM COATED ORAL at 09:20

## 2023-01-11 RX ADMIN — POTASSIUM CHLORIDE SCH MLS/HR: 7.46 INJECTION, SOLUTION INTRAVENOUS at 08:58

## 2023-01-11 RX ADMIN — MAGNESIUM SULFATE IN DEXTROSE SCH MLS/HR: 10 INJECTION, SOLUTION INTRAVENOUS at 11:22

## 2023-01-11 RX ADMIN — CISATRACURIUM BESYLATE SCH MLS/HR: 10 INJECTION INTRAVENOUS at 11:00

## 2023-01-11 RX ADMIN — DEXTRAN 70 AND HYPROMELLOSE 2910 SCH: 1; 3 SOLUTION/ DROPS OPHTHALMIC at 04:14

## 2023-01-11 RX ADMIN — POTASSIUM BICARBONATE SCH MEQ: 782 TABLET, EFFERVESCENT ORAL at 23:16

## 2023-01-11 RX ADMIN — POTASSIUM CHLORIDE SCH MLS/HR: 14.9 INJECTION, SOLUTION INTRAVENOUS at 16:23

## 2023-01-11 RX ADMIN — SODIUM CHLORIDE SCH MLS/HR: 9 INJECTION, SOLUTION INTRAVENOUS at 20:42

## 2023-01-11 RX ADMIN — PANTOPRAZOLE SODIUM SCH MG: 40 INJECTION, POWDER, FOR SOLUTION INTRAVENOUS at 21:03

## 2023-01-11 RX ADMIN — DEXTRAN 70 AND HYPROMELLOSE 2910 SCH DROPS: 1; 3 SOLUTION/ DROPS OPHTHALMIC at 13:10

## 2023-01-11 RX ADMIN — POTASSIUM CHLORIDE SCH MLS/HR: 7.46 INJECTION, SOLUTION INTRAVENOUS at 11:21

## 2023-01-11 RX ADMIN — SODIUM CHLORIDE SCH MLS/HR: 900 INJECTION, SOLUTION INTRAVENOUS at 08:11

## 2023-01-11 RX ADMIN — POTASSIUM CHLORIDE SCH MLS/HR: 7.46 INJECTION, SOLUTION INTRAVENOUS at 13:10

## 2023-01-11 RX ADMIN — POTASSIUM CHLORIDE SCH MLS/HR: 7.46 INJECTION, SOLUTION INTRAVENOUS at 16:22

## 2023-01-11 RX ADMIN — MAGNESIUM SULFATE IN DEXTROSE SCH MLS/HR: 10 INJECTION, SOLUTION INTRAVENOUS at 08:58

## 2023-01-11 RX ADMIN — CHLORHEXIDINE GLUCONATE SCH ML: 1.2 RINSE ORAL at 08:11

## 2023-01-11 RX ADMIN — NOREPINEPHRINE BITARTRATE SCH: 1 INJECTION, SOLUTION, CONCENTRATE INTRAVENOUS at 02:07

## 2023-01-11 RX ADMIN — MIDAZOLAM SCH MLS/HR: 5 INJECTION INTRAMUSCULAR; INTRAVENOUS at 07:42

## 2023-01-11 RX ADMIN — CHLORHEXIDINE GLUCONATE SCH ML: 1.2 RINSE ORAL at 21:04

## 2023-01-11 NOTE — P.PN
Subjective


Progress Note Date: 01/11/23





Patient was seen for a follow-up.  Patient currently on fentanyl 1 mcg/h and 

Versed 5 mg per hour drip.  Patient also on Nimbex 1 mcg/kg/min.  Per nursing 

report, when the sedation is decreased, patient started showing rigors and 

tremors.  No obvious seizures have been noticed.





Patient's daughters has mentioned yesterday that patient has been a very heavy 

drinker all their lives that very know their father.  They believe patient's 

last drink was on 01/01/2023, whereas he was hospitalized on 01/06/2023.  

Patient's daughters states that he has history of traumatic brain injury in 2016

or 2017, when was drunk and he fell down stairs suffering from a brain bleed.  

He was admitted to [facility] in the ICU for a month.  He had undergone 

craniotomy 2, on the left side.  





Per nurse report, when the sedation is decreased, he shakes his head yes/no.  He

moves all 4 extremities.  He follows commands.  Because of agitation, he was 

again sedated.  He is not able to be extubated.








Objective





- Vital Signs


Vital signs: 


                                   Vital Signs











Temp  99.4 F   01/11/23 12:00


 


Pulse  75   01/11/23 14:45


 


Resp  37 H  01/11/23 14:45


 


BP  115/60   01/11/23 14:00


 


Pulse Ox  98   01/11/23 14:45


 


FiO2  35   01/11/23 14:42








                                 Intake & Output











 01/10/23 01/11/23 01/11/23





 18:59 06:59 18:59


 


Intake Total 2556.837 2319.887 1908.066


 


Output Total 443 460 970


 


Balance 2113.837 1859.887 938.066


 


Weight  90 kg 90 kg


 


Intake:   


 


  IV 1750 1700 1650


 


    Dextrose 5%-0.45% NaCl 1, 900 1200 700





    000 ml @ 100 mls/hr IV .   





    Q10H [facility] Rx#:[id number]   


 


    Magnesium Sulfate-D5w Pmx 200  200





    1 gm In Dextrose/Water 1   





    100ml.bag @ 100 mls/hr   





    IVPB Q1H [facility] Rx#:   





    [id number]   


 


    Potassium Chloride 10 meq   200





    In Water For Injection 1   





    100ml.bag @ 100 mls/hr   





    IVPB Q1HR [facility] Rx#:   





    [id number]   


 


    Sodium Chloride 0.9% 1, 100  





    000 ml @ 100 mls/hr IV .   





    Q10H [facility] Rx#:[id number]   


 


    Thiamine 100 mg In Sodium 50  50





    Chloride 0.9% 50 ml @   





    100 mls/hr IVPB Q24HR [facility]   





    Rx#:[id number]   


 


    Vancomycin 1,500 mg In 500 500 500





    Sodium Chloride 0.9% 500   





    ml 500 ml @ 167 mls/hr   





    IVPB Q12H [facility] Rx#:   





    [id number]   


 


  Intake, IV Titration 326.837 539.887 238.066





  Amount   


 


    Midazolam HCl 200 mg In 98.50 90.667 135.167





    Sodium Chloride 0.9% 60   





    ml @ 14 MG/HR 7 mls/hr IV   





    .[id number] [facility] Rx#:   





    [id number]   


 


    Norepinephrine 4 mg In  0 55.272





    Sodium Chloride 0.9% 250   





    ml @ 0.03 MCG/KG/MIN 9.   





    332 mls/hr IV .Q24H [facility]   





    Rx#:[id number]   


 


    fentaNYL (PF) 2,500 mcg 228.337 449.22 47.627





    In Sodium Chloride 0.9%   





    200 ml @ 1 MCG/KG/HR 8.   





    165 mls/hr IV .Q24H [facility]   





    Rx#:[id number]   


 


  Tube Feeding 360 50 20


 


  Other 120 30 


 


Output:   


 


  Gastric Drainage   210


 


  Urine 443 460 760


 


Other:   


 


  Voiding Method Indwelling Catheter Indwelling Catheter Indwelling Catheter








                       ABP, PAP, CO, CI - Last Documented











Arterial Blood Pressure        129/64

















- Exam





Exam limited because patient sedated heavily, also on Nimbex.





- Labs


CBC & Chem 7: 


                                 01/11/23 07:10





                                 01/11/23 20:01


Labs: 


                  Abnormal Lab Results - Last 24 Hours (Table)











  01/10/23 01/11/23 01/11/23 Range/Units





  07:10 05:30 07:10 


 


RBC     (4.30-5.90)  m/uL


 


Hgb     (13.0-17.5)  gm/dL


 


Hct     (39.0-53.0)  %


 


Plt Count     (150-450)  k/uL


 


ABG pCO2   48 H   (35-45)  mmHg


 


ABG HCO3   33 H   (21-25)  mmol/L


 


ABG Total CO2   34 H   (19-24)  mmol/L


 


ABG O2 Saturation   98.9 H   (94-97)  %


 


Potassium    3.0 L  (3.5-5.1)  mmol/L


 


Chloride    111 H  ()  mmol/L


 


BUN    7 L  (9-20)  mg/dL


 


Creatinine    0.63 L  (0.66-1.25)  mg/dL


 


Glucose    196 H  (74-99)  mg/dL


 


Calcium    7.3 L  (8.4-10.2)  mg/dL


 


Vitamin B12  2102.0 H    (200.0-944.0)  pg/mL














  01/11/23 Range/Units





  07:10 


 


RBC  3.51 L  (4.30-5.90)  m/uL


 


Hgb  10.9 L  (13.0-17.5)  gm/dL


 


Hct  33.8 L  (39.0-53.0)  %


 


Plt Count  74 L  (150-450)  k/uL


 


ABG pCO2   (35-45)  mmHg


 


ABG HCO3   (21-25)  mmol/L


 


ABG Total CO2   (19-24)  mmol/L


 


ABG O2 Saturation   (94-97)  %


 


Potassium   (3.5-5.1)  mmol/L


 


Chloride   ()  mmol/L


 


BUN   (9-20)  mg/dL


 


Creatinine   (0.66-1.25)  mg/dL


 


Glucose   (74-99)  mg/dL


 


Calcium   (8.4-10.2)  mg/dL


 


Vitamin B12   (200.0-944.0)  pg/mL








                      Microbiology - Last 24 Hours (Table)











 01/06/23 20:37 Blood Culture Gram Stain - Final





 Blood Blood Culture - Final





    Staphylococcus aureus





    Staphylococcus epidermidis





    Coagulase Negative Staph


 


 01/08/23 08:29 Blood Culture - Preliminary





 Blood    No Growth after 72 hours














Assessment and Plan


Assessment: 





* Altered mental status, likely due to toxic metabolic encephalopathy


* Patient presented with unresponsiveness, likely due to above.


* Possible Alcohol withdrawal


* History of alcoholism.


* Acute kidney injury, now resolved


* Status post shock, likely hypovolemic


* Atrial fibrillation with RVR, status post cardioversion


* Acute respiratory failure, on mechanical ventilation


* Elevated troponin


* Gram-positive bacteremia, possibly contaminant.





Plan: 





* Patient continues to be encephalopathic, also sedated because he started 

  having rigors and tremors when sedation is decreased.  


* Check CT head and EEG in the morning.  


* Patient is still on sedation with Versed and fentanyl.


* CTA reported as negative of the brain.  Mild plaque at the carotid artery 

  bifurcation.  No evidence of hemodynamic arterial stenosis in the neck.


* EEG was abnormal due to amplitude asymmetry with relatively higher amplitude 

  activity in the left frontoparietal region due to breach rhythm from previous 

  craniotomy defect.  Background slowing of moderate to severe degree, 

  consistent with generalized cerebral dysfunction, as can be seen with toxic 

  metabolic encephalopathy or due to diffuse structural brain abnormality.  

  Clinical correlation is recommended.  No epileptiform activity was seen.


* B12 2102, folate 12.90, TSH slightly decreased 0.220, with normal free T4 

  1.37.  Possible due to sick euthyroid.


* Continue present management.  Neurology will follow.

## 2023-01-11 NOTE — PN
PROGRESS NOTE



HISTORY OF PRESENT ILLNESS:

Denton is a 57-year-old gentleman who is in the ICU with respiratory failure.  He is

intubated on the ventilator and unresponsive.  We are following him for paroxysmal

atrial fibrillation.  He remains in sinus rhythm.



PHYSICAL EXAMINATION:

VITAL SIGNS:  Heart rate is 69 beats per minute.  Blood pressure is 94/60, respiratory

rate is 18.

CHEST:  Reveals diminished air entry with occasional rhonchi.

HEART:  Reveals first and second heart sounds.  No gallop.  No murmur.

MUSCULOSKELETAL:  Exam of extremities did not reveal any edema.  Peripheral pulses are

felt.



LABORATORY DATA:

Labs show a hemoglobin of 10.9, platelet count is 74, creatinine is 0.6.



ASSESSMENT:

1. Respiratory failure.

2. Paroxysmal atrial fibrillation.



PLAN:

The patient will continue current medications.





MMODL / TRUN: [id number] / Job#: [id number]

## 2023-01-11 NOTE — XR
EXAMINATION TYPE: XR chest 1V portable

 

DATE OF EXAM: 1/11/2023 5:43 AM

 

COMPARISON: Chest radiograph from one day prior.

 

TECHNIQUE: XR chest 1V portable Portable AP radiograph of the chest.

 

CLINICAL INDICATION:Male, 57 years old with history of Tube placement; 

 

FINDINGS: 

Lungs/Pleura: There is no evidence of pleural effusion, focal consolidation, or pneumothorax. Eventra
tion of the left diaphragm. 

Pulmonary vascularity: Unremarkable.

Heart/mediastinum: Cardiomediastinal silhouette is unremarkable.

Musculoskeletal: No acute osseous pathology. There is fixation hardware in the lower cervical spine.

 

Lines/Tubes:

Endotracheal tube with distal tip 8.0 cm above the jossy.

Nasogastric tube with its distal tip and side-port projecting under the diaphragm.

 

 

IMPRESSION: 

1.  Stable support tubes,

2.  No change from one day prior, No acute cardiopulmonary disease/process.

## 2023-01-11 NOTE — P.PN
Subjective


Progress Note Date: 01/10/23





Patient was seen for a follow-up.  Patient's 2 daughters were present today.  

Patient currently on fentanyl 4 mcg/h and Versed 20 mg per hour drip.





Patient's daughters mention that patient has been a very heavy drinker all their

lives that very know their father.  They believe patient's last drink was on 

01/01/2023, whereas he was hospitalized on 01/06/2023.  Patient's daughters 

states that he was drunk and he fell down stairs suffering from a brain bleed in

2016 oh 2017.  He was admitted to [facility] that he underwent craniotomy 

2, on the left side.  He was hospitalized in the ICU for a month.





Per nurse report, when the sedation is decreased, he shakes his head yes/no.  He

moves all 4 extremities.  He follows, wants.  Because of agitation, he was again

sedated.  He is not able to be extubated.








Objective





- Vital Signs


Vital signs: 


                                   Vital Signs











Temp  97.5 F L  01/10/23 16:00


 


Pulse  102 H  01/10/23 17:00


 


Resp  15   01/10/23 17:00


 


BP  144/87   01/10/23 17:00


 


Pulse Ox  97   01/10/23 17:00


 


FiO2  40   01/10/23 16:00








                                 Intake & Output











 01/09/23 01/10/23 01/10/23





 18:59 06:59 18:59


 


Intake Total 2445.613 2842.050 2196.837


 


Output Total 1340 510 303


 


Balance 2858.429 3652.050 1893.837


 


Weight 80 kg 86.1 kg 


 


Intake:   


 


  IV 1650 1700 1450


 


    Dextrose 5%-0.45% NaCl 1,   600





    000 ml @ 100 mls/hr IV .   





    Q10H [facility] Rx#:[id number]   


 


    Magnesium Sulfate-D5w Pmx   200





    1 gm In Dextrose/Water 1   





    100ml.bag @ 100 mls/hr   





    IVPB Q1H [facility] Rx#:   





    [id number]   


 


    Sodium Chloride 0.9% 1, 1100 1200 100





    000 ml @ 100 mls/hr IV .   





    Q10H [facility] Rx#:[id number]   


 


    Thiamine 100 mg In Sodium 50  50





    Chloride 0.9% 50 ml @   





    100 mls/hr IVPB Q24HR [facility]   





    Rx#:[id number]   


 


    Vancomycin 1,500 mg In 500 500 500





    Sodium Chloride 0.9% 500   





    ml 500 ml @ 167 mls/hr   





    IVPB Q12H [facility] Rx#:   





    [id number]   


 


  Intake, IV Titration 625.613 772.050 326.837





  Amount   


 


    Midazolam HCl 200 mg In 67.625 96.292 98.50





    Sodium Chloride 0.9% 60   





    ml @ 14 MG/HR 7 mls/hr IV   





    .[id number] [facility] Rx#:   





    [id number]   


 


    Norepinephrine 4 mg In 91.508 179.183 





    Sodium Chloride 0.9% 250   





    ml @ 0.03 MCG/KG/MIN 9.   





    332 mls/hr IV .Q24H [facility]   





    Rx#:[id number]   


 


    fentaNYL (PF) 2,500 mcg 466.480 496.575 228.337





    In Sodium Chloride 0.9%   





    200 ml @ 4 MCG/KG/HR 32.   





    659 mls/hr IV .Q7H40M [facility]   





    Rx#:[id number]   


 


  Tube Feeding 110 280 330


 


  Other 60 90 90


 


Output:   


 


  Urine 440 510 303


 


  Stool 900  


 


Other:   


 


  Voiding Method Indwelling Catheter Indwelling Catheter Indwelling Catheter








                       ABP, PAP, CO, CI - Last Documented











Arterial Blood Pressure        74/34

















- Exam





Exam limited because patient sedated heavily.





- Labs


CBC & Chem 7: 


                                 01/11/23 07:10





                                 01/11/23 07:10


Labs: 


                  Abnormal Lab Results - Last 24 Hours (Table)











  01/10/23 01/10/23 01/10/23 Range/Units





  05:08 07:10 07:10 


 


RBC   3.72 L   (4.30-5.90)  m/uL


 


Hgb   11.8 L D   (13.0-17.5)  gm/dL


 


Hct   36.0 L   (39.0-53.0)  %


 


Plt Count   61 L   (150-450)  k/uL


 


ABG pCO2  51 H    (35-45)  mmHg


 


ABG HCO3  32 H    (21-25)  mmol/L


 


ABG Total CO2  34 H    (19-24)  mmol/L


 


ABG O2 Saturation  98.4 H    (94-97)  %


 


Potassium    2.9 L  (3.5-5.1)  mmol/L


 


Chloride    111 H  ()  mmol/L


 


Carbon Dioxide    32 H  (22-30)  mmol/L


 


Calcium    7.5 L  (8.4-10.2)  mg/dL


 


TSH     (0.465-4.680)  mIU/L














  01/10/23 Range/Units





  07:10 


 


RBC   (4.30-5.90)  m/uL


 


Hgb   (13.0-17.5)  gm/dL


 


Hct   (39.0-53.0)  %


 


Plt Count   (150-450)  k/uL


 


ABG pCO2   (35-45)  mmHg


 


ABG HCO3   (21-25)  mmol/L


 


ABG Total CO2   (19-24)  mmol/L


 


ABG O2 Saturation   (94-97)  %


 


Potassium   (3.5-5.1)  mmol/L


 


Chloride   ()  mmol/L


 


Carbon Dioxide   (22-30)  mmol/L


 


Calcium   (8.4-10.2)  mg/dL


 


TSH  0.220 L  (0.465-4.680)  mIU/L








                      Microbiology - Last 24 Hours (Table)











 01/06/23 20:37 Blood Culture Gram Stain - Preliminary





 Blood Blood Culture - Preliminary





    Staphylococcus aureus





    Staphylococcus epidermidis





    Coagulase Negative Staph


 


 01/08/23 08:29 Blood Culture - Preliminary





 Blood    No Growth after 48 hours


 


 01/07/23 00:38 Gram Stain - Final





 Sputum Sputum Culture - Final





    Staphylococcus aureus














Assessment and Plan


Assessment: 





* Altered mental status, likely due to toxic metabolic encephalopathy


* Patient presented with unresponsiveness, likely due to above.


* Alcohol withdrawal


* Acute kidney injury, now resolved


* Status post shock, likely hypovolemic


* Atrial fibrillation with RVR, status post cardioversion


* Acute respiratory failure, on mechanical ventilation


* Elevated troponin


* Gram-positive bacteremia, possibly contaminant.





Plan: 





* Patient is showing meaningful response at this time.  Patient is still on 

  sedation with Versed and fentanyl.


* CTA reported as negative of the brain.  Mild plaque at the carotid artery 

  bifurcation.  No evidence of hemodynamic arterial stenosis in the neck.


* EEG was abnormal due to amplitude asymmetry with relatively higher amplitude 

  activity in the left frontoparietal region due to breach rhythm from previous 

  craniotomy defect.  Background slowing of moderate to severe degree, 

  consistent with generalized cerebral dysfunction, as can be seen with toxic 

  metabolic encephalopathy or due to diffuse structural brain abnormality.  

  Clinical correlation is recommended.  No epileptiform activity was seen.


* B12 2102, folate 12.90, TSH slightly decreased 0.220, with normal free T4 

  1.37.


* Continue present management.  Per nurse report, when the sedation is 

  decreased, patient moves all extremities and follows commands.  Therefore we 

  will hold off on any brain imaging at this time.  We will continue to follow.

## 2023-01-11 NOTE — XR
EXAMINATION TYPE: XR chest 1V portable

 

DATE OF EXAM: 1/11/2023 12:15 PM

 

COMPARISON: Chest radiograph from one day prior.

 

TECHNIQUE: XR chest 1V portable Portable AP radiograph of the chest.

 

CLINICAL INDICATION:Male, 57 years old with history of line placement; 

 

FINDINGS: 

Lungs/Pleura: There is no evidence of pleural effusion, focal consolidation, or pneumothorax. Eventra
tion of the left diaphragm. 

Pulmonary vascularity: Unremarkable.

Heart/mediastinum: Cardiomediastinal silhouette is unremarkable.

Musculoskeletal: No acute osseous pathology. There is fixation hardware in the lower cervical spine.

 

Lines/Tubes:

Endotracheal tube with distal tip 6.3 cm above the jossy.

Nasogastric tube with its distal tip and side-port projecting under the diaphragm.

Right internal jugular central venous catheter with distal tip at the right atrium.

 

IMPRESSION: 

1.  Right central venous catheter with tip projecting over the aortic right atrium

2.  Stable support tubes,

## 2023-01-11 NOTE — OP
OPERATIVE REPORT



PROCEDURE PERFORMED:

Placement of the right internal jugular triple-lumen catheter.



PREOPERATIVE DIAGNOSIS:

Acute respiratory failure.



POSTOPERATIVE DIAGNOSIS:

Acute respiratory failure.



ANESTHESIA USED:

2 mL of 1% lidocaine.



DESCRIPTION OF PROCEDURE:

The patient was placed in the supine position.  The area of the cervical region was

prepped in a sterile fashion, and drapes were applied.  The area behind the posterior

belly of the sternocleidomastoid was locally anesthetized.  Then, the right internal

jugular vein was easily accessed through the posterior approach behind the posterior

belly of the sternocleidomastoid, and as the vein was cannulated, a guidewire was

placed and advanced into the vein.  Then, the area around the guidewire was dilated,

and a triple-lumen catheter was inserted over the guidewire, and the guidewire was

removed.  Good blood flow in the 3 different ports of the triple-lumen catheter noted.

No immediate complications.  Chest x-ray was ordered.  Line was secured using 3-0 silk

sutures.





MMODL / IJN: [id number] / Job#: [id number]

## 2023-01-11 NOTE — OP
OPERATIVE REPORT



PROCEDURE PERFORMED:

Placement of the right radial arterial line.



PREOPERATIVE DIAGNOSIS:

Acute respiratory failure.



POSTOPERATIVE DIAGNOSIS:

Acute respiratory failure.



ANESTHESIA USED:

None deployed.



DESCRIPTION OF PROCEDURE:

The right wrist was prepared in a sterile fashion.  Drapes were applied.  The right

radial artery was palpated, cannulated, and a guidewire was placed.  A Cook catheter

was inserted over the guidewire, and the guidewire was removed.  Good blood flow and

good waveform noted.  No complications.  Line was secured using 3-0 silk sutures.





MMODL / IJN: [id number] / Job#: [id number]

## 2023-01-11 NOTE — XR
EXAMINATION TYPE: XR chest 1V portable

 

DATE OF EXAM: 1/11/2023 6:46 PM

 

COMPARISON: Chest radiograph from same day

 

TECHNIQUE: XR chest 1V portable Portable AP radiograph of the chest.

 

CLINICAL INDICATION:Male, 57 years old with history of JAY; 

 

FINDINGS: 

Lungs/Pleura: There is no evidence of focal consolidation, or pneumothorax. Left pleural effusion now
 more apparent.

Pulmonary vascularity: Unremarkable.

Heart/mediastinum: Cardiomediastinal silhouette is unremarkable.

Musculoskeletal: No acute osseous pathology. There is fixation hardware in the lower cervical spine.

 

Lines/Tubes:

Endotracheal tube with distal tip 7.9 cm above the jossy.

Nasogastric tube with side-port projecting over the distal esophagus.

Right internal jugular central venous catheter with distal tip at the right atrium.

 

IMPRESSION: 

1.  Left pleural effusion is more conspicuous on this exam. 

2.  Right central venous catheter with tip projecting over the aortic right atrium

3.  Nasogastric tube needs to be advanced 14 cm for optimal placement.

## 2023-01-11 NOTE — P.PN
Subjective


Progress Note Date: 01/11/23





Patient was quite tremulous with shakes today upon evaluation.  Had several 

episodes of desaturation while on PEEP of 5, FiO2 of 35%.  Patient's fentanyl 

drip and Versed are ongoing, for no drip at 1 ki per kilogram, Versed at 20.  

Patient is moving all extremities but not following commands, and opens eyes 

spontaneously, but does not close his eyes upon request.  Case discussed with 

neurology, felt to be ongoing alcohol withdrawal seizures.  Pulmonology will be 

starting Nimbex in order tolerated vent.





General: intubated, sedated


HEENT: normocephalic, atraumatic, no tracheal deviation


Respiratory: symmetric chest rise, no cyanosis, ventilator dependent


CVS: perfusing all extremities, no distal gangrene, no pitting edema


GI: soft, ND


: no SPT, no CVAT, alston is present


Neuro: sedated, rigors, moving all extremities, not following commands





Assessment/plan:





Acute hypoxic and hypercapnic respiratory failure status post intubation


-Started nimbex today, case discussed with neurology, pulmonology


-CXR reviewed and shows breathing tube in high tracheal area, can be pushed 

further


-Labs reviewed: hgb 10.9, stable; PLTs 74, improving





Acute metabolic encephalopathy


Hyperammonemia


History of alcohol abuse


-Continue to wean sedation as able


-Neurology following, pulmonology following, case discussed with both





Shock, likely hypovolemic - resolved


High anion gap metabolic acidosis secondary to Severe lactic acidosis - resolved


Acute kidney injury - resolved


Severe hyperphosphatemia - resolved


Severe hypokalemia - resolving


Hypomagnesemia


-Continue IV fluids


-Monitor urine output and renal function, renal ultrasound showed no 

hydronephrosis


-Replete electrolytes per protocol, continue to monitor


-Renal following





A. fib with RVR status post cardioversion


Elevated troponin


-Cardiology following





Possible upper GI bleed


Thrombocytopenia


-IV PPI twice a day


-Low platelets likely in the setting of chronic alcohol abuse


-Gen. surgery following





Staph bacteremia


-First set of blood cultures positive for staph aureus and epidermidis, likely 

contaminant. however, sputum culture also showing staph


-Patient currently on vancomycin, repeat blood cultures no growth to date





DVT ppx: Lovenox





Code status: Full code





Anticipated discharge place: Pending clinical course


Anticipated discharge time: Pending clinical course








Objective





- Vital Signs


Vital signs: 


                                   Vital Signs











Temp  99.4 F   01/11/23 12:00


 


Pulse  75   01/11/23 13:00


 


Resp  18   01/11/23 13:00


 


BP  156/83   01/11/23 13:00


 


Pulse Ox  96   01/11/23 13:00


 


FiO2  35   01/11/23 12:00








                                 Intake & Output











 01/10/23 01/11/23 01/11/23





 18:59 06:59 18:59


 


Intake Total 2556.837 2319.887 1588.066


 


Output Total 443 460 645


 


Balance 2113.837 1859.887 943.066


 


Weight  90 kg 90 kg


 


Intake:   


 


  IV 1750 1700 1350


 


    Dextrose 5%-0.45% NaCl 1, 900 1200 400





    000 ml @ 100 mls/hr IV .   





    Q10H [facility] Rx#:[id number]   


 


    Magnesium Sulfate-D5w Pmx 200  200





    1 gm In Dextrose/Water 1   





    100ml.bag @ 100 mls/hr   





    IVPB Q1H [facility] Rx#:   





    [id number]   


 


    Potassium Chloride 10 meq   200





    In Water For Injection 1   





    100ml.bag @ 100 mls/hr   





    IVPB Q1HR [facility] Rx#:   





    [id number]   


 


    Sodium Chloride 0.9% 1, 100  





    000 ml @ 100 mls/hr IV .   





    Q10H [facility] Rx#:[id number]   


 


    Thiamine 100 mg In Sodium 50  50





    Chloride 0.9% 50 ml @   





    100 mls/hr IVPB Q24HR [facility]   





    Rx#:[id number]   


 


    Vancomycin 1,500 mg In 500 500 500





    Sodium Chloride 0.9% 500   





    ml 500 ml @ 167 mls/hr   





    IVPB Q12H [facility] Rx#:   





    [id number]   


 


  Intake, IV Titration 326.837 539.887 238.066





  Amount   


 


    Midazolam HCl 200 mg In 98.50 90.667 135.167





    Sodium Chloride 0.9% 60   





    ml @ 14 MG/HR 7 mls/hr IV   





    .[id number] [facility] Rx#:   





    [id number]   


 


    Norepinephrine 4 mg In  0 55.272





    Sodium Chloride 0.9% 250   





    ml @ 0.03 MCG/KG/MIN 9.   





    332 mls/hr IV .Q24H [facility]   





    Rx#:[id number]   


 


    fentaNYL (PF) 2,500 mcg 228.337 449.22 47.627





    In Sodium Chloride 0.9%   





    200 ml @ 1 MCG/KG/HR 8.   





    165 mls/hr IV .Q24H [facility]   





    Rx#:[id number]   


 


  Tube Feeding 360 50 


 


  Other 120 30 


 


Output:   


 


  Gastric Drainage   210


 


  Urine 443 460 435


 


Other:   


 


  Voiding Method Indwelling Catheter Indwelling Catheter Indwelling Catheter








                       ABP, PAP, CO, CI - Last Documented











Arterial Blood Pressure        136/59

















- Labs


CBC & Chem 7: 


                                 01/11/23 07:10





                                 01/11/23 07:10


Labs: 


                  Abnormal Lab Results - Last 24 Hours (Table)











  01/10/23 01/11/23 01/11/23 Range/Units





  07:10 05:30 07:10 


 


RBC     (4.30-5.90)  m/uL


 


Hgb     (13.0-17.5)  gm/dL


 


Hct     (39.0-53.0)  %


 


Plt Count     (150-450)  k/uL


 


ABG pCO2   48 H   (35-45)  mmHg


 


ABG HCO3   33 H   (21-25)  mmol/L


 


ABG Total CO2   34 H   (19-24)  mmol/L


 


ABG O2 Saturation   98.9 H   (94-97)  %


 


Potassium    3.0 L  (3.5-5.1)  mmol/L


 


Chloride    111 H  ()  mmol/L


 


BUN    7 L  (9-20)  mg/dL


 


Creatinine    0.63 L  (0.66-1.25)  mg/dL


 


Glucose    196 H  (74-99)  mg/dL


 


Calcium    7.3 L  (8.4-10.2)  mg/dL


 


Vitamin B12  2102.0 H    (200.0-944.0)  pg/mL














  01/11/23 Range/Units





  07:10 


 


RBC  3.51 L  (4.30-5.90)  m/uL


 


Hgb  10.9 L  (13.0-17.5)  gm/dL


 


Hct  33.8 L  (39.0-53.0)  %


 


Plt Count  74 L  (150-450)  k/uL


 


ABG pCO2   (35-45)  mmHg


 


ABG HCO3   (21-25)  mmol/L


 


ABG Total CO2   (19-24)  mmol/L


 


ABG O2 Saturation   (94-97)  %


 


Potassium   (3.5-5.1)  mmol/L


 


Chloride   ()  mmol/L


 


BUN   (9-20)  mg/dL


 


Creatinine   (0.66-1.25)  mg/dL


 


Glucose   (74-99)  mg/dL


 


Calcium   (8.4-10.2)  mg/dL


 


Vitamin B12   (200.0-944.0)  pg/mL








                      Microbiology - Last 24 Hours (Table)











 01/08/23 08:29 Blood Culture - Preliminary





 Blood    No Growth after 72 hours


 


 01/06/23 20:37 Blood Culture Gram Stain - Preliminary





 Blood Blood Culture - Preliminary





    Staphylococcus aureus





    Staphylococcus epidermidis





    Coagulase Negative Staph

## 2023-01-11 NOTE — CT
EXAMINATION TYPE: CT brain wo con

 

DATE OF EXAM: 1/11/2023

 

COMPARISON: 1/6/2023

 

HISTORY: persistent altered mental status

 

CT DLP: 1133.4 mGycm

Automated exposure control for dose reduction was used.

 

There is mild cerebral atrophy. There is no mass effect or midline shift. No sign of intracranial hem
orrhage. There is left temporal parietal craniotomy defect

 

There is some mucosal thickening left maxillary sinus.

 

IMPRESSION:

Previous surgery. Mild atrophy. No acute intracranial abnormality. No change compared to recent exam.

## 2023-01-11 NOTE — P.PN
Subjective


Progress Note Date: 01/11/23


Principal diagnosis: 





Acute hypoxic and hypercapnic respiratory failure





57-year-old male patient with known history of alcoholism and known history of 

chronic into fibrillation.  The patient arrived to the emergency department 

yesterday for altered mentation suspecting a possible stroke.  EMS stated that 

the patient was at home, and he was in his normal self at around 7:30 PM.  

Subsequently at around 8 PM, he was found face down in bed unresponsive.  In the

emergency, the patient was found to be hypotensive.  EMS was supporting his 

breathing by bagging and the patient was having agonal breathing.  It was 

reported to us that the patient is an alcoholic and he was binge drinking.  

There is also history of emesis and apparently the patient was having 

significant amount of emesis and ultimately collapsed and he was unable to stand

up anymore.  In the emergency, the patient was found to have a heart rate of 

220.  Initial blood pressure was 50/30.  He was having agonal breathing.  He was

not following any commands.  Code stroke was activated.  Patient was unable to 

protect his airway.  At that point, he was intubated and he was given a 7 ET 

tube.  He was given a total of 3 L of fluid normal saline bolus.  CAT scan of 

the brain was done in addition to a CT angiogram of the head and the neck.  The 

CAT scans were negative.  Case was discussed with intervention neurology and no 

further treatment was done.  Initial blood work showed a lactic acid level of 

12.4.  The patient also will was found to have a potassium level of 2.7.  He was

in acute kidney injury with a BUN of 52 and a creatinine of 3.6.  Sodium level 

was at 141.  Troponins were positive at levels of 0.3 and 0.2 respectively.  AST

was 191.  He was 84 bilirubin was 3.7 calcium was 7.8.  His white cell count was

17.2 with a hemoglobin 18.7.  INR was 1.4 with a PT of 17.8 and a PTT of 23.  

Urine drug screen was positive for benzodiazepines and marijuana.  Alcohol level

was negative.  UA was positive for protein, 10 WBCs, 8 RBCs.  The chest x-ray 

was essentially clear.  Posterior intubation chest x-ray showed adequate 

positioning of 82.  Vision also has a G-tube in place.  No evidence of any pneu

mothorax.  Subsequent lactic acid level dropped down to 5.3.  At this point in 

time, the patient is intubated on a mechanical ventilator.  He is on a Versed 

drip running at 11 mg/m and fentanyl drip running at 2 Karan respiratory 

kilogram per hour.  His assist-control mode of mechanical ventilation at the 

rate of 18 with a tidal volume of 400 FiO2 of 50% with a PEEP of 5.  Most recent

blood gas showed a pH of 7.31 with a pCO2 of 43 and pO2 of 203.  IV fluids are 

currently in the form of normal saline at rate of 1 50 mL an hour.  Urine output

is adequate and the patient is producing white celia urine.  Repeat electrodes 

from today are still pending.  He was placed on IV heparin.  He is on no 

pressors for now.  Note that the patient also had a CAT scan of the chest 

abdomen and pelvis in the emergency department that showed no significant 

abnormalities.





On today's evaluation of 01/08/2023, the patient is still intubated on a 

mechanical ventilator.  He is very much dependent on sedation as the patient is 

quite agitated once off sedation.  This morning, he is on a combination of 

Versed at 40 mg an hour and is also on fentanyl and at 4 mcg/kg/h.  I was told 

by the nursing staff that he gets quite agitated when the sedation runs of and 

this has been noted by the nursing staff.  Note that no purposeful activity or 

following commands and his mentation is not appropriate at this point in time.  

There may be a component of delirium tremens of this patient.  He is a long-time

alcoholic.  In [facility], another sedation holiday will be given today and his 

mental status will be reassessed during the day.  Meanwhile, this morning, he is

on assist-control mode at a rate of 18 with a tidal volume of 400 and FiO2 was 

at 45% with a PEEP of 5.  His chest x-ray showed no acute abnormalities and EKG 

was in a good location.  No significant orotracheal secretions.  The blood gas 

is showing a component of alkalosis as the patient was being given bicarb 

infusion.  His patient's pH is at 7.47 with a pCO2 of 54 and pO2 of 90.  On his 

blood work, the renal function has improved.  The patient's creatinine is down 

to 1.23 with a BUN of 44.  Sodium is at 143 and potassium is at 3.70 severity 

replaced.  Serum bicarb is up to 32.  WBC was at 14.3 and hematocrit is 49 with 

a platelet count that is pending for now.  Note that her amylase and lipase was 

slightly elevated and there may be a component of pancreatitis along with 

alcoholic hepatitis.  Blood culture was positive for staph aureus and staph 

epidermidis.  I'm not sure if this is a true infection versus a contaminant.  

This was only in 1 out of 2 bottles.  Another set of blood cultures will be 

obtained.  Noted the patient was given a dose of vancomycin pending further 

cultures.  He is on Lovenox portably prophylaxis.  He remains nothing by mouth. 

No pressors for now.  Renal ultrasound was completed yesterday and it showed no 

evidence of any hydronephrosis.





Reevaluated today on 1/9/2023, patient remains in the ICU, intubated and 

mechanically ventilated.  Patient is on assist control rate of 18 tidal volume 

400 FiO2 40% and PEEP of 5 ABG showed a pO2 of 84 pCO2 48 pH of 7.47.  Patient 

remains on fentanyl at 4 mcg/kg/h, norepinephrine at 0.01 mcg/kg/m, Versed at 15

mg per hour and he is on IV fluid at the cc per hour in the form of 0.9 normal 

saline.  Patient is receiving vital HPI at 10 mL per hour.  Continues to have 

extreme agitations in spite of sedation, patient is arousable, and gets agitated

very easily.  Chest x-ray not done today, but chest x-ray from yesterday showed 

no evidence of infiltrate.  Seen by neurology today, felt to have toxic 

metabolic encephalopathy and alcohol withdrawal.  Considering the patient's 

agitation, he is obviously not a candidate for weaning and extubation at this 

point.  Basic metabolic profile is normal, renal profile is normal.  Brain CT on

admission showed no intracranial abnormality





Reevaluated today on 1/10/23, patient remains in the ICU, intubated and 

mechanically ventilated.  Patient is on assist control rate of 18, tidal volume 

400 FiO2 40% and PEEP of 5.  ABG showed a pO2 of 100 pCO2 51 pH of 7.41.  EEG 

showed mostly metabolic encephalopathy, no seizure activity.  Patient remains on

significant amount of sedation including fentanyl and 4 mcg/kg/h, and I cut it 

down to 2 mcg/kg per hour.  Remains on Versed at 20 mg per hour.  Normal saline 

which I have changed to D5 45 at 100 mL per hour.  He is on tube feeding at 40 

mL per hour.  Patient remains on GI and DVT prophylaxis, he is not requiring any

pressors.  Today I plan to hold or at least cut down on the sedation, assess 

mental status, and possibly give the patient a trial of weaning if possible.  

However considering the patient gets extremely agitated, that may not happen 

today.  His potassium is low, and is being corrected as per protocol.  WBC count

is 5.8 hemoglobin is 11.8.  Renal profile is normal potassium is being corrected

it is 2.90.  Patient's Gram stain of the sputum and culture came back positive 

for staph aureus.  The staph aureus is MSSA.  Chest x-ray remains relatively cl

ear, minimal perihilar atelectasis, no clear-cut evidence of pneumonia.





Reevaluated today on 1/11/23, remains in the ICU, intubated, mechanically 

ventilated, patient remains on assist control rate of 18 tidal volume 400 FiO2 

40% and PEEP of 5.  Cut down his FiO2 down to 35%, his ABG showed a pO2 of 106 

pCO2 48 pH of 7.44.  Apparently the patient was placed back on relatively high-

dose of fentanyl yesterday, although my recommendation was to keep fentanyl at 

no more than 2 mcg/kg/h.  Found out this morning that the patient was placed on 

4 mcg/kg per hour overnight, he is relatively sedated, he is also on Versed at 

20 mg per hour.  Norepinephrine at 0.02 mcg/kg/m, D5 4 5 at 100 mL per hour.  

Patient is on vancomycin which I have discontinued and place the patient on 

cefazolin for his MSSA tracheobronchitis.  Enteral feeding is presently on hold,

patient developed significant amount of residual overnight.  Hence we'll start 

trickle feedings.  At any rate patient was laced on a lower dose of fentanyl, 

but shortly after the patient became extremely agitated, restless, and could not

be ventilated properly with his agitation in spite of fentanyl 12 mcg/kg/h and 

in spite of Versed at 20 mg per hour hence I recommended that the patient goes 

on Nimbex, and will be titrated accordingly.  In the meantime we'll cut down the

fentanyl to 1 mcg/kg/h and he will need lower dose of Versed.  To me it seems, 

patient is having some sort of withdrawal.  Most likely alcohol withdrawal 

considering his clinical history.  After paralyzing the patient with Nimbex, and

cutting down on the dose of fentanyl, cutting down on the dose of Versed, I 

recommended the patient goes on Seroquel, I also recommended that he remains 

intubated and mechanically ventilated.  IBC he is not ready to be weaned and e

xtubated at this point.











Objective





- Vital Signs


Vital signs: 


                                   Vital Signs











Temp  99.4 F   01/11/23 12:00


 


Pulse  94   01/11/23 12:00


 


Resp  18   01/11/23 12:00


 


BP  167/89   01/11/23 12:00


 


Pulse Ox  94 L  01/11/23 12:00


 


FiO2  35   01/11/23 12:00








                                 Intake & Output











 01/10/23 01/11/23 01/11/23





 18:59 06:59 18:59


 


Intake Total 2556.837 2319.887 1588.066


 


Output Total 443 460 645


 


Balance 2113.837 1859.887 943.066


 


Weight  90 kg 90 kg


 


Intake:   


 


  IV 1750 1700 1350


 


    Dextrose 5%-0.45% NaCl 1, 900 1200 400





    000 ml @ 100 mls/hr IV .   





    Q10H [facility] Rx#:[id number]   


 


    Magnesium Sulfate-D5w Pmx 200  200





    1 gm In Dextrose/Water 1   





    100ml.bag @ 100 mls/hr   





    IVPB Q1H [facility] Rx#:   





    [id number]   


 


    Potassium Chloride 10 meq   200





    In Water For Injection 1   





    100ml.bag @ 100 mls/hr   





    IVPB Q1HR [facility] Rx#:   





    [id number]   


 


    Sodium Chloride 0.9% 1, 100  





    000 ml @ 100 mls/hr IV .   





    Q10H [facility] Rx#:[id number]   


 


    Thiamine 100 mg In Sodium 50  50





    Chloride 0.9% 50 ml @   





    100 mls/hr IVPB Q24HR [facility]   





    Rx#:[id number]   


 


    Vancomycin 1,500 mg In 500 500 500





    Sodium Chloride 0.9% 500   





    ml 500 ml @ 167 mls/hr   





    IVPB Q12H [facility] Rx#:   





    [id number]   


 


  Intake, IV Titration 326.837 539.887 238.066





  Amount   


 


    Midazolam HCl 200 mg In 98.50 90.667 135.167





    Sodium Chloride 0.9% 60   





    ml @ 14 MG/HR 7 mls/hr IV   





    .[id number] [facility] Rx#:   





    [id number]   


 


    Norepinephrine 4 mg In  0 55.272





    Sodium Chloride 0.9% 250   





    ml @ 0.03 MCG/KG/MIN 9.   





    332 mls/hr IV .Q24H [facility]   





    Rx#:[id number]   


 


    fentaNYL (PF) 2,500 mcg 228.337 449.22 47.627





    In Sodium Chloride 0.9%   





    200 ml @ 1 MCG/KG/HR 8.   





    165 mls/hr IV .Q24H [facility]   





    Rx#:[id number]   


 


  Tube Feeding 360 50 


 


  Other 120 30 


 


Output:   


 


  Gastric Drainage   210


 


  Urine 443 460 435


 


Other:   


 


  Voiding Method Indwelling Catheter Indwelling Catheter Indwelling Catheter








                       ABP, PAP, CO, CI - Last Documented











Arterial Blood Pressure        153/78

















- Exam





Physical Exam: Revealed 57-year-old white male intubated, mechanically 

ventilated, remains on relatively high dose of fentanyl and Versed


Head: Atraumatic, normocephalic.  Endotracheal tube and orogastric tube are 

intact.


HEENT:[Neck is supple.] [No neck masses.] [No thyromegaly.] [No JVD.]


Chest: [Clear throughout, no crackles, no rhonchi, no wheezes.]


Cardiac Exam: [Normal S1 and S2, no S3 gallop, no murmur.]


Abdomen: [Soft, nontender,  no megaly, no rebound, no guarding, normal bowel 

sounds.]


Extremities: [No clubbing, no edema, no cyanosis.]


Neurological Exam: Arousable in spite of being on fentanyl and Versed, high do

se, but on lower dose of fentanyl patient was extremely agitated and he was 

desaturating down, could not be ventilated properly on lower dose of fentanyl


Psychiatric : Could not assess.  .


Musculoskeletal: No deformities.


Skin: No rashes





- Labs


CBC & Chem 7: 


                                 01/11/23 07:10





                                 01/11/23 07:10


Labs: 


                  Abnormal Lab Results - Last 24 Hours (Table)











  01/10/23 01/11/23 01/11/23 Range/Units





  07:10 05:30 07:10 


 


RBC     (4.30-5.90)  m/uL


 


Hgb     (13.0-17.5)  gm/dL


 


Hct     (39.0-53.0)  %


 


Plt Count     (150-450)  k/uL


 


ABG pCO2   48 H   (35-45)  mmHg


 


ABG HCO3   33 H   (21-25)  mmol/L


 


ABG Total CO2   34 H   (19-24)  mmol/L


 


ABG O2 Saturation   98.9 H   (94-97)  %


 


Potassium    3.0 L  (3.5-5.1)  mmol/L


 


Chloride    111 H  ()  mmol/L


 


BUN    7 L  (9-20)  mg/dL


 


Creatinine    0.63 L  (0.66-1.25)  mg/dL


 


Glucose    196 H  (74-99)  mg/dL


 


Calcium    7.3 L  (8.4-10.2)  mg/dL


 


Vitamin B12  2102.0 H    (200.0-944.0)  pg/mL


 


TSH  0.220 L    (0.465-4.680)  mIU/L














  01/11/23 Range/Units





  07:10 


 


RBC  3.51 L  (4.30-5.90)  m/uL


 


Hgb  10.9 L  (13.0-17.5)  gm/dL


 


Hct  33.8 L  (39.0-53.0)  %


 


Plt Count  74 L  (150-450)  k/uL


 


ABG pCO2   (35-45)  mmHg


 


ABG HCO3   (21-25)  mmol/L


 


ABG Total CO2   (19-24)  mmol/L


 


ABG O2 Saturation   (94-97)  %


 


Potassium   (3.5-5.1)  mmol/L


 


Chloride   ()  mmol/L


 


BUN   (9-20)  mg/dL


 


Creatinine   (0.66-1.25)  mg/dL


 


Glucose   (74-99)  mg/dL


 


Calcium   (8.4-10.2)  mg/dL


 


Vitamin B12   (200.0-944.0)  pg/mL


 


TSH   (0.465-4.680)  mIU/L








                      Microbiology - Last 24 Hours (Table)











 01/08/23 08:29 Blood Culture - Preliminary





 Blood    No Growth after 72 hours


 


 01/06/23 20:37 Blood Culture Gram Stain - Preliminary





 Blood Blood Culture - Preliminary





    Staphylococcus aureus





    Staphylococcus epidermidis





    Coagulase Negative Staph














Assessment and Plan


Assessment: 





Impression:


Acute hypoxic and hypercapnic respiratory failure, mostly because the patient 

was unable to protect his airways upon his initial presentation, patient 

required intubation for airways protection.  And he was encephalopathic with 

mental status change upon his initial presentation.


MSSA tracheobronchitis, no clear-cut evidence of pneumonia noted on the chest x-

ray.


Altered mental status, possibly metabolic encephalopathy, suspect alcohol 

withdrawal.


Severe lactic acidosis on admission, with hypovolemia, resolved.


Hypovolemic with hypotension


Anion gap metabolic acidosis, resolved.


Positive blood cultures for staph epidermidis, felt to be a contamination.


History of alcoholism


Acute kidney injury with hypovolemia, resolved.


History of paroxysmal atrial fibrillation


Benign essential hypertension


Acute toxic metabolic encephalopathy


Dyslipidemia





Recommendation:


Continue ventilatory support, however will try the patient on a couple of days 

of Nimbex with lower dose of fentanyl and lower dose of Versed.


Continue GI and DVT prophylaxis patient is on Lovenox and on Protonix.


Continue sedation, will add Seroquel


Continue nutritional support.  Patient was started on thickened feedings.


Continue IV thiamine.


Continue to monitor daily labs and address accordingly


Patient remains critical, prognosis is relatively guarded.


Critical care time is over 30 minutes.  Not including the time spent on 

procedures


We'll continue to follow





Time with Patient: Greater than 30

## 2023-01-12 LAB
ANION GAP SERPL CALC-SCNC: 0 MMOL/L
BUN SERPL-SCNC: 5 MG/DL (ref 9–20)
CALCIUM SPEC-MCNC: 7.3 MG/DL (ref 8.4–10.2)
CHLORIDE SERPL-SCNC: 110 MMOL/L (ref 98–107)
CO2 BLDA-SCNC: 34 MMOL/L (ref 19–24)
CO2 BLDA-SCNC: 34 MMOL/L (ref 19–24)
CO2 SERPL-SCNC: 33 MMOL/L (ref 22–30)
ERYTHROCYTE [DISTWIDTH] IN BLOOD BY AUTOMATED COUNT: 3.62 M/UL (ref 4.3–5.9)
ERYTHROCYTE [DISTWIDTH] IN BLOOD: 13.4 % (ref 11.5–15.5)
GLUCOSE BLD-MCNC: 102 MG/DL (ref 70–110)
GLUCOSE BLD-MCNC: 103 MG/DL (ref 70–110)
GLUCOSE BLD-MCNC: 110 MG/DL (ref 70–110)
GLUCOSE BLD-MCNC: 115 MG/DL (ref 70–110)
GLUCOSE BLD-MCNC: 88 MG/DL (ref 70–110)
GLUCOSE BLD-MCNC: 99 MG/DL (ref 70–110)
GLUCOSE SERPL-MCNC: 112 MG/DL (ref 74–99)
HCO3 BLDA-SCNC: 32 MMOL/L (ref 21–25)
HCO3 BLDA-SCNC: 33 MMOL/L (ref 21–25)
HCT VFR BLD AUTO: 34.2 % (ref 39–53)
HGB BLD-MCNC: 11.2 GM/DL (ref 13–17.5)
MCH RBC QN AUTO: 31 PG (ref 25–35)
MCHC RBC AUTO-ENTMCNC: 32.8 G/DL (ref 31–37)
MCV RBC AUTO: 94.5 FL (ref 80–100)
PCO2 BLDA: 49 MMHG (ref 35–45)
PCO2 BLDA: 54 MMHG (ref 35–45)
PH BLDA: 7.38 [PH] (ref 7.35–7.45)
PH BLDA: 7.44 [PH] (ref 7.35–7.45)
PLATELET # BLD AUTO: 106 K/UL (ref 150–450)
PO2 BLDA: 64 MMHG (ref 83–108)
PO2 BLDA: 89 MMHG (ref 83–108)
POTASSIUM SERPL-SCNC: 3.2 MMOL/L (ref 3.5–5.1)
SODIUM SERPL-SCNC: 143 MMOL/L (ref 137–145)
WBC # BLD AUTO: 8 K/UL (ref 3.8–10.6)

## 2023-01-12 RX ADMIN — DEXTRAN 70 AND HYPROMELLOSE 2910 SCH DROPS: 1; 3 SOLUTION/ DROPS OPHTHALMIC at 20:01

## 2023-01-12 RX ADMIN — DEXTRAN 70 AND HYPROMELLOSE 2910 SCH DROPS: 1; 3 SOLUTION/ DROPS OPHTHALMIC at 04:00

## 2023-01-12 RX ADMIN — DEXTRAN 70 AND HYPROMELLOSE 2910 SCH DROPS: 1; 3 SOLUTION/ DROPS OPHTHALMIC at 09:25

## 2023-01-12 RX ADMIN — METOPROLOL TARTRATE SCH MG: 25 TABLET, FILM COATED ORAL at 20:00

## 2023-01-12 RX ADMIN — DEXTRAN 70 AND HYPROMELLOSE 2910 SCH DROPS: 1; 3 SOLUTION/ DROPS OPHTHALMIC at 11:17

## 2023-01-12 RX ADMIN — POTASSIUM CHLORIDE SCH MLS/HR: 14.9 INJECTION, SOLUTION INTRAVENOUS at 00:52

## 2023-01-12 RX ADMIN — FENTANYL CITRATE SCH: 50 INJECTION INTRAVENOUS at 19:39

## 2023-01-12 RX ADMIN — DEXTRAN 70 AND HYPROMELLOSE 2910 SCH DROPS: 1; 3 SOLUTION/ DROPS OPHTHALMIC at 00:29

## 2023-01-12 RX ADMIN — SODIUM CHLORIDE SCH MLS/HR: 9 INJECTION, SOLUTION INTRAVENOUS at 09:24

## 2023-01-12 RX ADMIN — MIDAZOLAM SCH MLS/HR: 5 INJECTION INTRAMUSCULAR; INTRAVENOUS at 08:22

## 2023-01-12 RX ADMIN — SODIUM CHLORIDE SCH MLS/HR: 9 INJECTION, SOLUTION INTRAVENOUS at 20:01

## 2023-01-12 RX ADMIN — METOPROLOL TARTRATE SCH: 25 TABLET, FILM COATED ORAL at 09:26

## 2023-01-12 RX ADMIN — POTASSIUM BICARBONATE SCH: 782 TABLET, EFFERVESCENT ORAL at 09:25

## 2023-01-12 RX ADMIN — SODIUM CHLORIDE SCH MLS/HR: 900 INJECTION, SOLUTION INTRAVENOUS at 09:23

## 2023-01-12 RX ADMIN — PANTOPRAZOLE SODIUM SCH MG: 40 INJECTION, POWDER, FOR SOLUTION INTRAVENOUS at 09:24

## 2023-01-12 RX ADMIN — DEXTRAN 70 AND HYPROMELLOSE 2910 SCH DROPS: 1; 3 SOLUTION/ DROPS OPHTHALMIC at 16:54

## 2023-01-12 RX ADMIN — POTASSIUM CHLORIDE SCH MLS/HR: 14.9 INJECTION, SOLUTION INTRAVENOUS at 21:44

## 2023-01-12 RX ADMIN — CHLORHEXIDINE GLUCONATE SCH ML: 1.2 RINSE ORAL at 20:00

## 2023-01-12 RX ADMIN — POTASSIUM BICARBONATE SCH MEQ: 782 TABLET, EFFERVESCENT ORAL at 01:30

## 2023-01-12 RX ADMIN — FENTANYL CITRATE SCH MLS/HR: 50 INJECTION INTRAVENOUS at 08:20

## 2023-01-12 RX ADMIN — PANTOPRAZOLE SODIUM SCH MG: 40 INJECTION, POWDER, FOR SOLUTION INTRAVENOUS at 20:00

## 2023-01-12 RX ADMIN — POTASSIUM CHLORIDE SCH MLS/HR: 14.9 INJECTION, SOLUTION INTRAVENOUS at 11:17

## 2023-01-12 RX ADMIN — CHLORHEXIDINE GLUCONATE SCH ML: 1.2 RINSE ORAL at 09:24

## 2023-01-12 RX ADMIN — DEXTRAN 70 AND HYPROMELLOSE 2910 SCH DROPS: 1; 3 SOLUTION/ DROPS OPHTHALMIC at 23:52

## 2023-01-12 RX ADMIN — POTASSIUM BICARBONATE SCH MEQ: 782 TABLET, EFFERVESCENT ORAL at 11:17

## 2023-01-12 RX ADMIN — ENOXAPARIN SODIUM SCH MG: 40 INJECTION SUBCUTANEOUS at 09:24

## 2023-01-12 RX ADMIN — METOPROLOL TARTRATE SCH: 25 TABLET, FILM COATED ORAL at 03:03

## 2023-01-12 RX ADMIN — MIDAZOLAM SCH MLS/HR: 5 INJECTION INTRAMUSCULAR; INTRAVENOUS at 23:52

## 2023-01-12 RX ADMIN — LEVETIRACETAM SCH MLS/HR: 10 INJECTION INTRAVENOUS at 20:01

## 2023-01-12 RX ADMIN — NOREPINEPHRINE BITARTRATE SCH MLS/HR: 1 INJECTION, SOLUTION, CONCENTRATE INTRAVENOUS at 05:14

## 2023-01-12 RX ADMIN — POTASSIUM BICARBONATE SCH MEQ: 782 TABLET, EFFERVESCENT ORAL at 09:24

## 2023-01-12 NOTE — P.PN
Subjective


Progress Note Date: 01/12/23


Principal diagnosis: 





Acute hypoxic and hypercapnic respiratory failure





57-year-old male patient with known history of alcoholism and known history of 

chronic into fibrillation.  The patient arrived to the emergency department 

yesterday for altered mentation suspecting a possible stroke.  EMS stated that 

the patient was at home, and he was in his normal self at around 7:30 PM.  

Subsequently at around 8 PM, he was found face down in bed unresponsive.  In the

emergency, the patient was found to be hypotensive.  EMS was supporting his 

breathing by bagging and the patient was having agonal breathing.  It was 

reported to us that the patient is an alcoholic and he was binge drinking.  

There is also history of emesis and apparently the patient was having 

significant amount of emesis and ultimately collapsed and he was unable to stand

up anymore.  In the emergency, the patient was found to have a heart rate of 

220.  Initial blood pressure was 50/30.  He was having agonal breathing.  He was

not following any commands.  Code stroke was activated.  Patient was unable to 

protect his airway.  At that point, he was intubated and he was given a 7 ET 

tube.  He was given a total of 3 L of fluid normal saline bolus.  CAT scan of 

the brain was done in addition to a CT angiogram of the head and the neck.  The 

CAT scans were negative.  Case was discussed with intervention neurology and no 

further treatment was done.  Initial blood work showed a lactic acid level of 

12.4.  The patient also will was found to have a potassium level of 2.7.  He was

in acute kidney injury with a BUN of 52 and a creatinine of 3.6.  Sodium level 

was at 141.  Troponins were positive at levels of 0.3 and 0.2 respectively.  AST

was 191.  He was 84 bilirubin was 3.7 calcium was 7.8.  His white cell count was

17.2 with a hemoglobin 18.7.  INR was 1.4 with a PT of 17.8 and a PTT of 23.  

Urine drug screen was positive for benzodiazepines and marijuana.  Alcohol level

was negative.  UA was positive for protein, 10 WBCs, 8 RBCs.  The chest x-ray 

was essentially clear.  Posterior intubation chest x-ray showed adequate 

positioning of 82.  Vision also has a G-tube in place.  No evidence of any pneu

mothorax.  Subsequent lactic acid level dropped down to 5.3.  At this point in 

time, the patient is intubated on a mechanical ventilator.  He is on a Versed 

drip running at 11 mg/m and fentanyl drip running at 2 Karan respiratory 

kilogram per hour.  His assist-control mode of mechanical ventilation at the 

rate of 18 with a tidal volume of 400 FiO2 of 50% with a PEEP of 5.  Most recent

blood gas showed a pH of 7.31 with a pCO2 of 43 and pO2 of 203.  IV fluids are 

currently in the form of normal saline at rate of 1 50 mL an hour.  Urine output

is adequate and the patient is producing white celia urine.  Repeat electrodes 

from today are still pending.  He was placed on IV heparin.  He is on no 

pressors for now.  Note that the patient also had a CAT scan of the chest 

abdomen and pelvis in the emergency department that showed no significant 

abnormalities.





On today's evaluation of 01/08/2023, the patient is still intubated on a 

mechanical ventilator.  He is very much dependent on sedation as the patient is 

quite agitated once off sedation.  This morning, he is on a combination of 

Versed at 40 mg an hour and is also on fentanyl and at 4 mcg/kg/h.  I was told 

by the nursing staff that he gets quite agitated when the sedation runs of and 

this has been noted by the nursing staff.  Note that no purposeful activity or 

following commands and his mentation is not appropriate at this point in time.  

There may be a component of delirium tremens of this patient.  He is a long-time

alcoholic.  In [facility], another sedation holiday will be given today and his 

mental status will be reassessed during the day.  Meanwhile, this morning, he is

on assist-control mode at a rate of 18 with a tidal volume of 400 and FiO2 was 

at 45% with a PEEP of 5.  His chest x-ray showed no acute abnormalities and EKG 

was in a good location.  No significant orotracheal secretions.  The blood gas 

is showing a component of alkalosis as the patient was being given bicarb 

infusion.  His patient's pH is at 7.47 with a pCO2 of 54 and pO2 of 90.  On his 

blood work, the renal function has improved.  The patient's creatinine is down 

to 1.23 with a BUN of 44.  Sodium is at 143 and potassium is at 3.70 severity 

replaced.  Serum bicarb is up to 32.  WBC was at 14.3 and hematocrit is 49 with 

a platelet count that is pending for now.  Note that her amylase and lipase was 

slightly elevated and there may be a component of pancreatitis along with 

alcoholic hepatitis.  Blood culture was positive for staph aureus and staph 

epidermidis.  I'm not sure if this is a true infection versus a contaminant.  

This was only in 1 out of 2 bottles.  Another set of blood cultures will be 

obtained.  Noted the patient was given a dose of vancomycin pending further 

cultures.  He is on Lovenox portably prophylaxis.  He remains nothing by mouth. 

No pressors for now.  Renal ultrasound was completed yesterday and it showed no 

evidence of any hydronephrosis.





Reevaluated today on 1/9/2023, patient remains in the ICU, intubated and 

mechanically ventilated.  Patient is on assist control rate of 18 tidal volume 

400 FiO2 40% and PEEP of 5 ABG showed a pO2 of 84 pCO2 48 pH of 7.47.  Patient 

remains on fentanyl at 4 mcg/kg/h, norepinephrine at 0.01 mcg/kg/m, Versed at 15

mg per hour and he is on IV fluid at the cc per hour in the form of 0.9 normal 

saline.  Patient is receiving vital HPI at 10 mL per hour.  Continues to have 

extreme agitations in spite of sedation, patient is arousable, and gets agitated

very easily.  Chest x-ray not done today, but chest x-ray from yesterday showed 

no evidence of infiltrate.  Seen by neurology today, felt to have toxic 

metabolic encephalopathy and alcohol withdrawal.  Considering the patient's 

agitation, he is obviously not a candidate for weaning and extubation at this 

point.  Basic metabolic profile is normal, renal profile is normal.  Brain CT on

admission showed no intracranial abnormality





Reevaluated today on 1/10/23, patient remains in the ICU, intubated and 

mechanically ventilated.  Patient is on assist control rate of 18, tidal volume 

400 FiO2 40% and PEEP of 5.  ABG showed a pO2 of 100 pCO2 51 pH of 7.41.  EEG 

showed mostly metabolic encephalopathy, no seizure activity.  Patient remains on

significant amount of sedation including fentanyl and 4 mcg/kg/h, and I cut it 

down to 2 mcg/kg per hour.  Remains on Versed at 20 mg per hour.  Normal saline 

which I have changed to D5 45 at 100 mL per hour.  He is on tube feeding at 40 

mL per hour.  Patient remains on GI and DVT prophylaxis, he is not requiring any

pressors.  Today I plan to hold or at least cut down on the sedation, assess 

mental status, and possibly give the patient a trial of weaning if possible.  

However considering the patient gets extremely agitated, that may not happen 

today.  His potassium is low, and is being corrected as per protocol.  WBC count

is 5.8 hemoglobin is 11.8.  Renal profile is normal potassium is being corrected

it is 2.90.  Patient's Gram stain of the sputum and culture came back positive 

for staph aureus.  The staph aureus is MSSA.  Chest x-ray remains relatively cl

ear, minimal perihilar atelectasis, no clear-cut evidence of pneumonia.





Reevaluated today on 1/11/23, remains in the ICU, intubated, mechanically 

ventilated, patient remains on assist control rate of 18 tidal volume 400 FiO2 

40% and PEEP of 5.  Cut down his FiO2 down to 35%, his ABG showed a pO2 of 106 

pCO2 48 pH of 7.44.  Apparently the patient was placed back on relatively high-

dose of fentanyl yesterday, although my recommendation was to keep fentanyl at 

no more than 2 mcg/kg/h.  Found out this morning that the patient was placed on 

4 mcg/kg per hour overnight, he is relatively sedated, he is also on Versed at 

20 mg per hour.  Norepinephrine at 0.02 mcg/kg/m, D5 4 5 at 100 mL per hour.  

Patient is on vancomycin which I have discontinued and place the patient on 

cefazolin for his MSSA tracheobronchitis.  Enteral feeding is presently on hold,

patient developed significant amount of residual overnight.  Hence we'll start 

trickle feedings.  At any rate patient was laced on a lower dose of fentanyl, 

but shortly after the patient became extremely agitated, restless, and could not

be ventilated properly with his agitation in spite of fentanyl 12 mcg/kg/h and 

in spite of Versed at 20 mg per hour hence I recommended that the patient goes 

on Nimbex, and will be titrated accordingly.  In the meantime we'll cut down the

fentanyl to 1 mcg/kg/h and he will need lower dose of Versed.  To me it seems, 

patient is having some sort of withdrawal.  Most likely alcohol withdrawal 

considering his clinical history.  After paralyzing the patient with Nimbex, and

cutting down on the dose of fentanyl, cutting down on the dose of Versed, I 

recommended the patient goes on Seroquel, I also recommended that he remains 

intubated and mechanically ventilated.  IB he is not ready to be weaned and e

xtubated at this point.





Reevaluated today on 1/12/23, patient remains in the ICU, remains intubated and 

mechanically ventilated.  He is on assist control rate of 18 tidal volume 400 

FiO2 40% and PEEP of 5.  ABG showed a pO2 of 89 pCO2 49 pH of 7.44.  Yesterday 

the patient had to be placed on Nimbex for extreme agitation, and he was 

throughout the afternoon on fentanyl at 2 mcg/kg/m he was also on Versed at 20 

mg per hour, and Nimbex was added yesterday, however in the early evening the 

patient developed significant hypertension, tachycardia, and he was not doing 

very well.  Clinically I felt this is most likely that the patient was having a 

seizure that could not be clinically seen while on Nimbex, hence I decided to 

discontinue his Nimbex completely, and I recommended overnight that the patient 

goes on fentanyl, Versed, and propofol.  After this was done, the patient did 

great, his blood pressure settled down nicely, his oxygenation improved, and I 

recommended no more Nimbex for this patient.  I clinically believe that the 

patient is having seizures, neurology will see him today, may have to have 

repeat EEG.  Patient is now on Versed at 15 mg per hour, is also on propofol at 

20 mcg/kg/m.  Around 4 AM, his fentanyl was discontinued.  I plan to start 

tapering his Versed and propofol down, and if he develops any symptoms to 

suggest underlying seizure, I will go ahead and start the patient on Keppra 

until seen by neurology today.  Patient is on norepinephrine at 0.02 mcg/kg/m, 

however this is not necessarily needed if the patient requires less sedation and

I will try to taper down his Versed and propofol further.  Patient remains on IV

fluid D5 4 5 at 100 mL per hour.  Obviously considering the events yesterday and

last night, the patient is not quite ready for weaning, but I am willing to 

start tapering the propofol and Versed down, hopefully maintain him on either 

propofol or Versed alone.  Again no more Nimbex for this patient, I believe the 

patient must have had a seizure while he was on Nimbex and that could not be 

seen.CBC today is relatively normal basic metabolic profile is normal potassium 

is a bit low at 3.2, renal profile is normal.patient remains on trickle 

feeding,/enteral feeding/nutritional support.











Objective





- Vital Signs


Vital signs: 


                                   Vital Signs











Temp  98.8 F   01/12/23 08:00


 


Pulse  54 L  01/12/23 11:00


 


Resp  18   01/12/23 11:00


 


BP  122/74   01/12/23 11:00


 


Pulse Ox  100   01/12/23 11:00


 


FiO2  40   01/12/23 11:37








                                 Intake & Output











 01/11/23 01/12/23 01/12/23





 18:59 06:59 18:59


 


Intake Total 2268.066 2060.409 1456.574


 


Output Total 1385 1575 745


 


Balance 883.066 485.409 711.574


 


Weight 90 kg 92.6 kg 


 


Intake:   


 


  IV 1950 1700 1000


 


    Dextrose 5%-0.45% NaCl 1, 1000 1200 500





    000 ml @ 100 mls/hr IV .   





    Q10H [facility] Rx#:[id number]   


 


    Magnesium Sulfate-D5w Pmx 200  





    1 gm In Dextrose/Water 1   





    100ml.bag @ 100 mls/hr   





    IVPB Q1H [facility] Rx#:   





    [id number]   


 


    Potassium Chloride 10 meq 200  





    In Water For Injection 1   





    100ml.bag @ 100 mls/hr   





    IVPB Q1HR [facility] Rx#:   





    [id number]   


 


    Thiamine 100 mg In Sodium 50  





    Chloride 0.9% 50 ml @   





    100 mls/hr IVPB Q24HR [facility]   





    Rx#:[id number]   


 


    Vancomycin 1,500 mg In 500 500 





    Sodium Chloride 0.9% 500   





    ml 500 ml @ 167 mls/hr   





    IVPB Q12H [facility] Rx#:   





    [id number]   


 


    Vancomycin 1,500 mg In   500





    Sodium Chloride 0.9% 500   





    ml 500 ml @ 167 mls/hr   





    IVPB Q12H [facility] Rx#:   





    [id number]   


 


  Intake, IV Titration 238.066 270.409 251.574





  Amount   


 


    Midazolam HCl 200 mg In 135.167 59.083 94.292





    Sodium Chloride 0.9% 60   





    ml @ 14 MG/HR 7 mls/hr IV   





    .[id number] [facility] Rx#:   





    [id number]   


 


    Norepinephrine 4 mg In 55.272 0 40.339





    Sodium Chloride 0.9% 250   





    ml @ 0.03 MCG/KG/MIN 9.   





    332 mls/hr IV .Q24H [facility]   





    Rx#:[id number]   


 


    fentaNYL (PF) 2,500 mcg 47.627 142.206 30.003





    In Sodium Chloride 0.9%   





    200 ml @ 2 MCG/KG/HR 16.   





    329 mls/hr IV .[id number]   





    [facility] Rx#:[id number]   


 


    propofoL 1,000 mg In  69.12 86.94





    Empty Bag 1 bag @ 25 MCG/   





    KG/MIN 13.5 mls/hr IV .   





    Q7H25M [facility] Rx#:[id number]   


 


  Tube Feeding 50 60 50


 


  Other 30 30 155


 


Output:   


 


  Gastric Drainage 210  


 


  Urine 1175 1575 745


 


Other:   


 


  Voiding Method Indwelling Catheter Indwelling Catheter Indwelling Catheter








                       ABP, PAP, CO, CI - Last Documented











Arterial Blood Pressure        101/52

















- Exam





Physical Exam: Revealed 57-year-old white male intubated, mechanically 

ventilated, maintained on propofol and on Versed.


Head: Atraumatic, normocephalic.  Endotracheal tube and orogastric tube are 

intact.


HEENT:[Neck is supple.] [No neck masses.] [No thyromegaly.] [No JVD.]


Chest: [Clear throughout, no crackles, no rhonchi, no wheezes.]


Cardiac Exam: [Normal S1 and S2, no S3 gallop, no murmur.]


Abdomen: [Soft, nontender,  no megaly, no rebound, no guarding, normal bowel 

sounds.]


Extremities: [No clubbing, no edema, no cyanosis.]


Neurological Exam: could not assess, fully sedated.


Psychiatric : Could not assess.  .


Musculoskeletal: No deformities.


Skin: No rashes





- Labs


CBC & Chem 7: 


                                 01/12/23 09:32





                                 01/12/23 05:25


Labs: 


                  Abnormal Lab Results - Last 24 Hours (Table)











  01/11/23 01/11/23 01/11/23 Range/Units





  07:10 19:15 20:01 


 


RBC     (4.30-5.90)  m/uL


 


Hgb     (13.0-17.5)  gm/dL


 


Hct     (39.0-53.0)  %


 


Plt Count     (150-450)  k/uL


 


ABG pCO2   54 H   (35-45)  mmHg


 


ABG pO2   64 L   ()  mmHg


 


ABG HCO3   32 H   (21-25)  mmol/L


 


ABG Total CO2   34 H   (19-24)  mmol/L


 


ABG O2 Saturation   93.0 L   (94-97)  %


 


Potassium    3.2 L  (3.5-5.1)  mmol/L


 


Chloride     ()  mmol/L


 


Carbon Dioxide     (22-30)  mmol/L


 


BUN     (9-20)  mg/dL


 


Creatinine     (0.66-1.25)  mg/dL


 


Glucose     (74-99)  mg/dL


 


POC Glucose (mg/dL)     ()  mg/dL


 


Calcium     (8.4-10.2)  mg/dL


 


Procalcitonin  0.61 H    (0.02-0.09)  ng/mL














  01/12/23 01/12/23 01/12/23 Range/Units





  05:25 05:38 07:46 


 


RBC     (4.30-5.90)  m/uL


 


Hgb     (13.0-17.5)  gm/dL


 


Hct     (39.0-53.0)  %


 


Plt Count     (150-450)  k/uL


 


ABG pCO2   49 H   (35-45)  mmHg


 


ABG pO2     ()  mmHg


 


ABG HCO3   33 H   (21-25)  mmol/L


 


ABG Total CO2   34 H   (19-24)  mmol/L


 


ABG O2 Saturation   97.9 H   (94-97)  %


 


Potassium  3.2 L    (3.5-5.1)  mmol/L


 


Chloride  110 H    ()  mmol/L


 


Carbon Dioxide  33 H    (22-30)  mmol/L


 


BUN  5 L    (9-20)  mg/dL


 


Creatinine  0.65 L    (0.66-1.25)  mg/dL


 


Glucose  112 H    (74-99)  mg/dL


 


POC Glucose (mg/dL)    115 H  ()  mg/dL


 


Calcium  7.3 L    (8.4-10.2)  mg/dL


 


Procalcitonin     (0.02-0.09)  ng/mL














  01/12/23 Range/Units





  09:32 


 


RBC  3.62 L  (4.30-5.90)  m/uL


 


Hgb  11.2 L  (13.0-17.5)  gm/dL


 


Hct  34.2 L  (39.0-53.0)  %


 


Plt Count  106 L  (150-450)  k/uL


 


ABG pCO2   (35-45)  mmHg


 


ABG pO2   ()  mmHg


 


ABG HCO3   (21-25)  mmol/L


 


ABG Total CO2   (19-24)  mmol/L


 


ABG O2 Saturation   (94-97)  %


 


Potassium   (3.5-5.1)  mmol/L


 


Chloride   ()  mmol/L


 


Carbon Dioxide   (22-30)  mmol/L


 


BUN   (9-20)  mg/dL


 


Creatinine   (0.66-1.25)  mg/dL


 


Glucose   (74-99)  mg/dL


 


POC Glucose (mg/dL)   ()  mg/dL


 


Calcium   (8.4-10.2)  mg/dL


 


Procalcitonin   (0.02-0.09)  ng/mL








                      Microbiology - Last 24 Hours (Table)











 01/08/23 08:29 Blood Culture - Preliminary





 Blood    No Growth after 96 hours


 


 01/06/23 20:37 Blood Culture Gram Stain - Final





 Blood Blood Culture - Final





    Staphylococcus aureus





    Staphylococcus epidermidis





    Coagulase Negative Staph














Assessment and Plan


Assessment: 





Impression:


Acute hypoxic and hypercapnic respiratory failure, mostly because the patient 

was unable to protect his airways upon his initial presentation, patient 

required intubation for airways protection.  And he was encephalopathic with 

mental status change upon his initial presentation.


possible recurrent seizures, that to be addressed by neurology on the case, may 

need to be maintained on Keppra for now.


MSSA tracheobronchitis, no clear-cut evidence of pneumonia noted on the chest x-

ray.patient was placed on Kefzol yesterday, however considering the issues he 

had last night, I recommended that he goes back on vancomycin instead of Kefzol 

and we will eventually switch him back to kefzol


Altered mental status, possibly metabolic encephalopathy, suspect alcohol 

withdrawal.


Severe lactic acidosis on admission, with hypovolemia, resolved.impression cancino

spect, his severe lactic acidosis may have been related to seizure activity.


Hypovolemic with hypotension


Anion gap metabolic acidosis, resolved.


Positive blood cultures for staph epidermidis, felt to be a contamination.


History of alcoholism


Acute kidney injury with hypovolemia, resolved.


History of paroxysmal atrial fibrillation


Benign essential hypertension


Acute toxic metabolic encephalopathy, possible seizures.


Dyslipidemia





Recommendation:


Continue ventilatory support,continue propofol and Versed, and titrate both down

hopefully try to maintain him on one of them only.


Continue GI and DVT prophylaxis patient is on Lovenox and on Protonix.


Continue sedation, discontinued Seroquel for now.  Although clinically I doubt 

serotonin syndrome.


Continue nutritional support.


Continue IV thiamine.


Continue to monitor daily labs and address accordingly


Patient remains critical, prognosis is relatively guarded.


critical care time is over 30 minutes.


Patient to be seen by neurology today, and assess need for antiseizure 

medication


We'll continue to follow





Time with Patient: Greater than 30

## 2023-01-12 NOTE — XR
EXAMINATION TYPE: XR chest 1V portable

 

DATE OF EXAM: 1/12/2023

 

COMPARISON: Yesterday

 

HISTORY: Respiratory failure

 

TECHNIQUE: Single view

 

FINDINGS: There is some infiltrate and atelectasis left lower lobe. There is nasogastric tube with th
e tip over the distal esophagus. The endotracheal tube is 2.5 cm from the jossy. There is right jugu
lar catheter with tip in the right atrium. There are chest leads.

 

IMPRESSION: There is infiltrate and atelectasis left lower lobe with pleural fluid and slightly worse
 than exam yesterday. Nasogastric tube is in the distal esophagus and not in the stomach.

 

Mild increased pulmonary interstitial infiltrates compared to yesterday.

## 2023-01-12 NOTE — EEG
ELECTROENCEPHALOGRAM REPORT



PREAMBLE:

This is a 57-year-old male with altered mental status.  The patient is having 
some

seizure-like tremoring.  The patient is on a ventilator.  The patient currently 
on

Versed drip, fentanyl, and propofol.



EEG FINDINGS:

This is a 21-channel digital EEG recorded with video component, utilizing 10/20

international system with referential and bipolar montages.  The background 
consists of

significantly suppressed brain waves during most of the study intermixed with 
periodic

slight burst of relatively higher amplitude theta activity seen in bihemispheric

region.  There is amplitude asymmetries noted with higher amplitude in the left

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 
defect.

Some left parietal sharp appearing waves were seen, which could be part of the 
breach

rhythm, although could potentially be epileptiform as well. Photic stimulation 
was not

performed.  Stage II sleep was seen.



IMPRESSION:

This is an abnormal EEG due to:

1. The presence of amplitude asymmetry with relatively higher amplitude in the 
left

    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

2. Generalized suppressed background consistent with encephalopathy or 
medication

    effect.

3. Some sporadic sharp appearing waves seen in the left parietal region, which 
could

    be related to underlying breach rhythm, although may suggest underlying 
cortical

    irritability.  No electrographic seizure was seen in the entire study.  
Clinical

    correlation and followup EEG recommended as clinically indicated.





CHARITY / TRUN: [id number] / Job#: [id number]

MTDD

## 2023-01-12 NOTE — P.PN
Subjective


Progress Note Date: 01/12/23





Patient was stopped on fentanyl, started on propofol and versed.  Titrating down

as able.  UOP is good.  Nimbex d/c'd.  Seizures overnight.





General: intubated, sedated


HEENT: normocephalic, atraumatic, no tracheal deviation


Respiratory: symmetric chest rise, no cyanosis, ventilator dependent


CVS: perfusing all extremities, no distal gangrene, no pitting edema


GI: soft, ND


: no SPT, no CVAT, alston is present


Neuro: sedated, rigors, moving all extremities, not following commands





Assessment/plan:





Acute hypoxic and hypercapnic respiratory failure status post intubation


-Stopped nimbex today, started propofol, continued versed, downtitrating as 

able, started keppra, EEG today case discussed with neurology, pulmonology


-CXR reviewed and shows breathing tube in high tracheal area, can be pushed fu

rther again


-Documentation from pulmonology reviewed and discussed at bedside, patient on 

propofol, versed


-CTH negative for bleed





Acute metabolic encephalopathy


Hyperammonemia


History of alcohol abuse


-Continue to wean sedation as able


-Neurology following, pulmonology following, case discussed with both





Shock, likely hypovolemic - resolved


High anion gap metabolic acidosis secondary to Severe lactic acidosis - resolved


Acute kidney injury - resolved


Severe hyperphosphatemia - resolved


Severe hypokalemia - resolving


Hypomagnesemia


-Continue IV fluids


-Monitor urine output and renal function, renal ultrasound showed no 

hydronephrosis


-Replete electrolytes per protocol, continue to monitor


-Renal following





A. fib with RVR status post cardioversion


Elevated troponin


-Cardiology following





Possible upper GI bleed


Thrombocytopenia


-IV PPI twice a day


-Low platelets likely in the setting of chronic alcohol abuse


-Gen. surgery following





Staph bacteremia


-First set of blood cultures positive for staph aureus and epidermidis, likely 

contaminant. however, sputum culture also showing staph


-Patient currently on vancomycin, repeat blood cultures no growth to date





DVT ppx: Lovenox





Code status: Full code





Anticipated discharge place: Pending clinical course


Anticipated discharge time: Pending clinical course








Objective





- Vital Signs


Vital signs: 


                                   Vital Signs











Temp  98.1 F   01/12/23 12:00


 


Pulse  60   01/12/23 13:15


 


Resp  18   01/12/23 13:15


 


BP  124/69   01/12/23 13:00


 


Pulse Ox  98   01/12/23 13:15


 


FiO2  40   01/12/23 12:00








                                 Intake & Output











 01/11/23 01/12/23 01/12/23





 18:59 06:59 18:59


 


Intake Total 2268.066 2060.409 1816.574


 


Output Total 1385 1575 1070


 


Balance 883.066 485.409 746.574


 


Weight 90 kg 92.6 kg 


 


Intake:   


 


  IV 1950 1700 1300


 


    Dextrose 5%-0.45% NaCl 1, 1000 1200 800





    000 ml @ 100 mls/hr IV .   





    Q10H [facility] Rx#:[id number]   


 


    Magnesium Sulfate-D5w Pmx 200  





    1 gm In Dextrose/Water 1   





    100ml.bag @ 100 mls/hr   





    IVPB Q1H [facility] Rx#:   





    [id number]   


 


    Potassium Chloride 10 meq 200  





    In Water For Injection 1   





    100ml.bag @ 100 mls/hr   





    IVPB Q1HR [facility] Rx#:   





    [id number]   


 


    Thiamine 100 mg In Sodium 50  





    Chloride 0.9% 50 ml @   





    100 mls/hr IVPB Q24HR [facility]   





    Rx#:[id number]   


 


    Vancomycin 1,500 mg In 500 500 





    Sodium Chloride 0.9% 500   





    ml 500 ml @ 167 mls/hr   





    IVPB Q12H [facility] Rx#:   





    [id number]   


 


    Vancomycin 1,500 mg In   500





    Sodium Chloride 0.9% 500   





    ml 500 ml @ 167 mls/hr   





    IVPB Q12H [facility] Rx#:   





    [id number]   


 


  Intake, IV Titration 238.066 270.409 251.574





  Amount   


 


    Midazolam HCl 200 mg In 135.167 59.083 94.292





    Sodium Chloride 0.9% 60   





    ml @ 14 MG/HR 7 mls/hr IV   





    .[id number] [facility] Rx#:   





    [id number]   


 


    Norepinephrine 4 mg In 55.272 0 40.339





    Sodium Chloride 0.9% 250   





    ml @ 0.03 MCG/KG/MIN 9.   





    332 mls/hr IV .Q24H [facility]   





    Rx#:[id number]   


 


    fentaNYL (PF) 2,500 mcg 47.627 142.206 30.003





    In Sodium Chloride 0.9%   





    200 ml @ 2 MCG/KG/HR 16.   





    329 mls/hr IV .[id number]   





    [facility] Rx#:[id number]   


 


    propofoL 1,000 mg In  69.12 86.94





    Empty Bag 1 bag @ 25 MCG/   





    KG/MIN 13.5 mls/hr IV .   





    Q7H25M [facility] Rx#:[id number]   


 


  Tube Feeding 50 60 80


 


  Other 30 30 185


 


Output:   


 


  Gastric Drainage 210  


 


  Urine 1175 1575 1070


 


Other:   


 


  Voiding Method Indwelling Catheter Indwelling Catheter Indwelling Catheter








                       ABP, PAP, CO, CI - Last Documented











Arterial Blood Pressure        90/47

















- Labs


CBC & Chem 7: 


                                 01/12/23 09:32





                                 01/12/23 05:25


Labs: 


                  Abnormal Lab Results - Last 24 Hours (Table)











  01/11/23 01/11/23 01/11/23 Range/Units





  07:10 19:15 20:01 


 


RBC     (4.30-5.90)  m/uL


 


Hgb     (13.0-17.5)  gm/dL


 


Hct     (39.0-53.0)  %


 


Plt Count     (150-450)  k/uL


 


ABG pCO2   54 H   (35-45)  mmHg


 


ABG pO2   64 L   ()  mmHg


 


ABG HCO3   32 H   (21-25)  mmol/L


 


ABG Total CO2   34 H   (19-24)  mmol/L


 


ABG O2 Saturation   93.0 L   (94-97)  %


 


Potassium    3.2 L  (3.5-5.1)  mmol/L


 


Chloride     ()  mmol/L


 


Carbon Dioxide     (22-30)  mmol/L


 


BUN     (9-20)  mg/dL


 


Creatinine     (0.66-1.25)  mg/dL


 


Glucose     (74-99)  mg/dL


 


POC Glucose (mg/dL)     ()  mg/dL


 


Calcium     (8.4-10.2)  mg/dL


 


Procalcitonin  0.61 H    (0.02-0.09)  ng/mL














  01/12/23 01/12/23 01/12/23 Range/Units





  05:25 05:38 07:46 


 


RBC     (4.30-5.90)  m/uL


 


Hgb     (13.0-17.5)  gm/dL


 


Hct     (39.0-53.0)  %


 


Plt Count     (150-450)  k/uL


 


ABG pCO2   49 H   (35-45)  mmHg


 


ABG pO2     ()  mmHg


 


ABG HCO3   33 H   (21-25)  mmol/L


 


ABG Total CO2   34 H   (19-24)  mmol/L


 


ABG O2 Saturation   97.9 H   (94-97)  %


 


Potassium  3.2 L    (3.5-5.1)  mmol/L


 


Chloride  110 H    ()  mmol/L


 


Carbon Dioxide  33 H    (22-30)  mmol/L


 


BUN  5 L    (9-20)  mg/dL


 


Creatinine  0.65 L    (0.66-1.25)  mg/dL


 


Glucose  112 H    (74-99)  mg/dL


 


POC Glucose (mg/dL)    115 H  ()  mg/dL


 


Calcium  7.3 L    (8.4-10.2)  mg/dL


 


Procalcitonin     (0.02-0.09)  ng/mL














  01/12/23 Range/Units





  09:32 


 


RBC  3.62 L  (4.30-5.90)  m/uL


 


Hgb  11.2 L  (13.0-17.5)  gm/dL


 


Hct  34.2 L  (39.0-53.0)  %


 


Plt Count  106 L  (150-450)  k/uL


 


ABG pCO2   (35-45)  mmHg


 


ABG pO2   ()  mmHg


 


ABG HCO3   (21-25)  mmol/L


 


ABG Total CO2   (19-24)  mmol/L


 


ABG O2 Saturation   (94-97)  %


 


Potassium   (3.5-5.1)  mmol/L


 


Chloride   ()  mmol/L


 


Carbon Dioxide   (22-30)  mmol/L


 


BUN   (9-20)  mg/dL


 


Creatinine   (0.66-1.25)  mg/dL


 


Glucose   (74-99)  mg/dL


 


POC Glucose (mg/dL)   ()  mg/dL


 


Calcium   (8.4-10.2)  mg/dL


 


Procalcitonin   (0.02-0.09)  ng/mL








                      Microbiology - Last 24 Hours (Table)











 01/08/23 08:29 Blood Culture - Preliminary





 Blood    No Growth after 96 hours


 


 01/06/23 20:37 Blood Culture Gram Stain - Final





 Blood Blood Culture - Final





    Staphylococcus aureus





    Staphylococcus epidermidis





    Coagulase Negative Staph

## 2023-01-13 LAB
ALBUMIN SERPL-MCNC: 2.1 G/DL (ref 3.5–5)
ALP SERPL-CCNC: 72 U/L (ref 38–126)
ALT SERPL-CCNC: 22 U/L (ref 4–49)
ANION GAP SERPL CALC-SCNC: 1 MMOL/L
ANION GAP SERPL CALC-SCNC: 1 MMOL/L
AST SERPL-CCNC: 21 U/L (ref 17–59)
BASOPHILS # BLD AUTO: 0.1 K/UL (ref 0–0.2)
BASOPHILS NFR BLD AUTO: 1 %
BILIRUB INDIRECT SERPL-MCNC: 0 MG/DL (ref 0–1.1)
BILIRUBIN DIRECT+TOT PNL SERPL-MCNC: 0.3 MG/DL (ref 0–0.2)
BUN SERPL-SCNC: 4 MG/DL (ref 9–20)
BUN SERPL-SCNC: 4 MG/DL (ref 9–20)
CALCIUM SPEC-MCNC: 7.6 MG/DL (ref 8.4–10.2)
CALCIUM SPEC-MCNC: 7.6 MG/DL (ref 8.4–10.2)
CHLORIDE SERPL-SCNC: 113 MMOL/L (ref 98–107)
CHLORIDE SERPL-SCNC: 113 MMOL/L (ref 98–107)
CO2 BLDA-SCNC: 32 MMOL/L (ref 19–24)
CO2 SERPL-SCNC: 30 MMOL/L (ref 22–30)
CO2 SERPL-SCNC: 30 MMOL/L (ref 22–30)
EOSINOPHIL # BLD AUTO: 0.3 K/UL (ref 0–0.7)
EOSINOPHIL NFR BLD AUTO: 3 %
ERYTHROCYTE [DISTWIDTH] IN BLOOD BY AUTOMATED COUNT: 3.96 M/UL (ref 4.3–5.9)
ERYTHROCYTE [DISTWIDTH] IN BLOOD: 13.3 % (ref 11.5–15.5)
GLUCOSE BLD-MCNC: 113 MG/DL (ref 70–110)
GLUCOSE BLD-MCNC: 127 MG/DL (ref 70–110)
GLUCOSE BLD-MCNC: 141 MG/DL (ref 70–110)
GLUCOSE SERPL-MCNC: 113 MG/DL (ref 74–99)
GLUCOSE SERPL-MCNC: 167 MG/DL (ref 74–99)
HCO3 BLDA-SCNC: 31 MMOL/L (ref 21–25)
HCT VFR BLD AUTO: 37.4 % (ref 39–53)
HGB BLD-MCNC: 12.1 GM/DL (ref 13–17.5)
LYMPHOCYTES # SPEC AUTO: 1.2 K/UL (ref 1–4.8)
LYMPHOCYTES NFR SPEC AUTO: 11 %
MCH RBC QN AUTO: 30.5 PG (ref 25–35)
MCHC RBC AUTO-ENTMCNC: 32.3 G/DL (ref 31–37)
MCV RBC AUTO: 94.4 FL (ref 80–100)
MONOCYTES # BLD AUTO: 0.7 K/UL (ref 0–1)
MONOCYTES NFR BLD AUTO: 6 %
NEUTROPHILS # BLD AUTO: 9.1 K/UL (ref 1.3–7.7)
NEUTROPHILS NFR BLD AUTO: 79 %
PCO2 BLDA: 44 MMHG (ref 35–45)
PH BLDA: 7.46 [PH] (ref 7.35–7.45)
PLATELET # BLD AUTO: 156 K/UL (ref 150–450)
PO2 BLDA: 100 MMHG (ref 83–108)
POTASSIUM SERPL-SCNC: 3.6 MMOL/L (ref 3.5–5.1)
POTASSIUM SERPL-SCNC: 3.8 MMOL/L (ref 3.5–5.1)
PROT SERPL-MCNC: 4.4 G/DL (ref 6.3–8.2)
SODIUM SERPL-SCNC: 144 MMOL/L (ref 137–145)
SODIUM SERPL-SCNC: 144 MMOL/L (ref 137–145)
WBC # BLD AUTO: 11.5 K/UL (ref 3.8–10.6)

## 2023-01-13 RX ADMIN — POTASSIUM CHLORIDE SCH MLS/HR: 14.9 INJECTION, SOLUTION INTRAVENOUS at 09:14

## 2023-01-13 RX ADMIN — FENTANYL CITRATE SCH: 50 INJECTION INTRAVENOUS at 11:58

## 2023-01-13 RX ADMIN — CHLORHEXIDINE GLUCONATE SCH ML: 1.2 RINSE ORAL at 20:36

## 2023-01-13 RX ADMIN — METOPROLOL TARTRATE SCH MG: 25 TABLET, FILM COATED ORAL at 20:36

## 2023-01-13 RX ADMIN — SODIUM CHLORIDE SCH MLS/HR: 9 INJECTION, SOLUTION INTRAVENOUS at 20:24

## 2023-01-13 RX ADMIN — DEXTRAN 70 AND HYPROMELLOSE 2910 SCH DROPS: 1; 3 SOLUTION/ DROPS OPHTHALMIC at 03:35

## 2023-01-13 RX ADMIN — POTASSIUM CHLORIDE SCH MLS/HR: 14.9 INJECTION, SOLUTION INTRAVENOUS at 20:24

## 2023-01-13 RX ADMIN — PANTOPRAZOLE SODIUM SCH MG: 40 INJECTION, POWDER, FOR SOLUTION INTRAVENOUS at 09:14

## 2023-01-13 RX ADMIN — NOREPINEPHRINE BITARTRATE SCH MLS/HR: 1 INJECTION, SOLUTION, CONCENTRATE INTRAVENOUS at 19:13

## 2023-01-13 RX ADMIN — METOPROLOL TARTRATE SCH MG: 25 TABLET, FILM COATED ORAL at 09:14

## 2023-01-13 RX ADMIN — CHLORHEXIDINE GLUCONATE SCH ML: 1.2 RINSE ORAL at 09:15

## 2023-01-13 RX ADMIN — NOREPINEPHRINE BITARTRATE SCH MLS/HR: 1 INJECTION, SOLUTION, CONCENTRATE INTRAVENOUS at 02:53

## 2023-01-13 RX ADMIN — ENOXAPARIN SODIUM SCH MG: 40 INJECTION SUBCUTANEOUS at 09:15

## 2023-01-13 RX ADMIN — PANTOPRAZOLE SODIUM SCH MG: 40 INJECTION, POWDER, FOR SOLUTION INTRAVENOUS at 20:36

## 2023-01-13 RX ADMIN — LEVETIRACETAM SCH MLS/HR: 10 INJECTION INTRAVENOUS at 09:15

## 2023-01-13 RX ADMIN — LEVETIRACETAM SCH MLS/HR: 10 INJECTION INTRAVENOUS at 20:36

## 2023-01-13 RX ADMIN — SODIUM CHLORIDE SCH MLS/HR: 900 INJECTION, SOLUTION INTRAVENOUS at 09:16

## 2023-01-13 RX ADMIN — SODIUM CHLORIDE SCH MLS/HR: 9 INJECTION, SOLUTION INTRAVENOUS at 09:16

## 2023-01-13 RX ADMIN — DEXTRAN 70 AND HYPROMELLOSE 2910 SCH: 1; 3 SOLUTION/ DROPS OPHTHALMIC at 11:36

## 2023-01-13 NOTE — XR
EXAMINATION TYPE: XR chest 1V portable

 

DATE OF EXAM: 1/13/2023 6:08 AM

 

COMPARISON: Chest radiographs from 1/12/2023

 

TECHNIQUE: XR chest 1V portable Portable AP radiograph of the chest.

 

CLINICAL INDICATION:Male, 57 years old with history of Vent; 

 

FINDINGS: 

Lungs/Pleura: Blunting of the left costophrenic angle. Patchy airspace disease within both bases. No 
pneumothorax. 

Pulmonary vascularity: Unremarkable.

Heart/mediastinum: Cardiomediastinal silhouette is unremarkable.

Musculoskeletal: No acute osseous pathology. Cervical fusion hardware.

 

Other findings: None

 

Lines/Tubes:

Endotracheal tube with distal tip 2.3 cm above the jossy

Nasogastric tube with its distal tip and side-port projecting under the diaphragm. This has been adva
nced.

Right internal jugular central venous catheter with distal tip in the right atrium.

 

IMPRESSION: 

1.  Small left pleural effusion with patchy airspace disease within both bases which may represent at
electasis or infiltrate.

2.  NG tube has been advanced into appropriate position. Remaining lines and tubes are stable.

## 2023-01-13 NOTE — P.PN
Subjective


Progress Note Date: 01/13/23


Principal diagnosis: 





Acute hypoxic and hypercapnic respiratory failure





57-year-old male patient with known history of alcoholism and known history of 

chronic into fibrillation.  The patient arrived to the emergency department 

yesterday for altered mentation suspecting a possible stroke.  EMS stated that 

the patient was at home, and he was in his normal self at around 7:30 PM.  

Subsequently at around 8 PM, he was found face down in bed unresponsive.  In the

emergency, the patient was found to be hypotensive.  EMS was supporting his 

breathing by bagging and the patient was having agonal breathing.  It was 

reported to us that the patient is an alcoholic and he was binge drinking.  

There is also history of emesis and apparently the patient was having 

significant amount of emesis and ultimately collapsed and he was unable to stand

up anymore.  In the emergency, the patient was found to have a heart rate of 

220.  Initial blood pressure was 50/30.  He was having agonal breathing.  He was

not following any commands.  Code stroke was activated.  Patient was unable to 

protect his airway.  At that point, he was intubated and he was given a 7 ET 

tube.  He was given a total of 3 L of fluid normal saline bolus.  CAT scan of 

the brain was done in addition to a CT angiogram of the head and the neck.  The 

CAT scans were negative.  Case was discussed with intervention neurology and no 

further treatment was done.  Initial blood work showed a lactic acid level of 

12.4.  The patient also will was found to have a potassium level of 2.7.  He was

in acute kidney injury with a BUN of 52 and a creatinine of 3.6.  Sodium level 

was at 141.  Troponins were positive at levels of 0.3 and 0.2 respectively.  AST

was 191.  He was 84 bilirubin was 3.7 calcium was 7.8.  His white cell count was

17.2 with a hemoglobin 18.7.  INR was 1.4 with a PT of 17.8 and a PTT of 23.  

Urine drug screen was positive for benzodiazepines and marijuana.  Alcohol level

was negative.  UA was positive for protein, 10 WBCs, 8 RBCs.  The chest x-ray 

was essentially clear.  Posterior intubation chest x-ray showed adequate 

positioning of 82.  Vision also has a G-tube in place.  No evidence of any pneu

mothorax.  Subsequent lactic acid level dropped down to 5.3.  At this point in 

time, the patient is intubated on a mechanical ventilator.  He is on a Versed 

drip running at 11 mg/m and fentanyl drip running at 2 Karan respiratory 

kilogram per hour.  His assist-control mode of mechanical ventilation at the 

rate of 18 with a tidal volume of 400 FiO2 of 50% with a PEEP of 5.  Most recent

blood gas showed a pH of 7.31 with a pCO2 of 43 and pO2 of 203.  IV fluids are 

currently in the form of normal saline at rate of 1 50 mL an hour.  Urine output

is adequate and the patient is producing white celia urine.  Repeat electrodes 

from today are still pending.  He was placed on IV heparin.  He is on no 

pressors for now.  Note that the patient also had a CAT scan of the chest 

abdomen and pelvis in the emergency department that showed no significant 

abnormalities.





On today's evaluation of 01/08/2023, the patient is still intubated on a 

mechanical ventilator.  He is very much dependent on sedation as the patient is 

quite agitated once off sedation.  This morning, he is on a combination of 

Versed at 40 mg an hour and is also on fentanyl and at 4 mcg/kg/h.  I was told 

by the nursing staff that he gets quite agitated when the sedation runs of and 

this has been noted by the nursing staff.  Note that no purposeful activity or 

following commands and his mentation is not appropriate at this point in time.  

There may be a component of delirium tremens of this patient.  He is a long-time

alcoholic.  In [facility], another sedation holiday will be given today and his 

mental status will be reassessed during the day.  Meanwhile, this morning, he is

on assist-control mode at a rate of 18 with a tidal volume of 400 and FiO2 was 

at 45% with a PEEP of 5.  His chest x-ray showed no acute abnormalities and EKG 

was in a good location.  No significant orotracheal secretions.  The blood gas 

is showing a component of alkalosis as the patient was being given bicarb 

infusion.  His patient's pH is at 7.47 with a pCO2 of 54 and pO2 of 90.  On his 

blood work, the renal function has improved.  The patient's creatinine is down 

to 1.23 with a BUN of 44.  Sodium is at 143 and potassium is at 3.70 severity 

replaced.  Serum bicarb is up to 32.  WBC was at 14.3 and hematocrit is 49 with 

a platelet count that is pending for now.  Note that her amylase and lipase was 

slightly elevated and there may be a component of pancreatitis along with 

alcoholic hepatitis.  Blood culture was positive for staph aureus and staph 

epidermidis.  I'm not sure if this is a true infection versus a contaminant.  

This was only in 1 out of 2 bottles.  Another set of blood cultures will be 

obtained.  Noted the patient was given a dose of vancomycin pending further 

cultures.  He is on Lovenox portably prophylaxis.  He remains nothing by mouth. 

No pressors for now.  Renal ultrasound was completed yesterday and it showed no 

evidence of any hydronephrosis.





Reevaluated today on 1/9/2023, patient remains in the ICU, intubated and 

mechanically ventilated.  Patient is on assist control rate of 18 tidal volume 

400 FiO2 40% and PEEP of 5 ABG showed a pO2 of 84 pCO2 48 pH of 7.47.  Patient 

remains on fentanyl at 4 mcg/kg/h, norepinephrine at 0.01 mcg/kg/m, Versed at 15

mg per hour and he is on IV fluid at the cc per hour in the form of 0.9 normal 

saline.  Patient is receiving vital HPI at 10 mL per hour.  Continues to have 

extreme agitations in spite of sedation, patient is arousable, and gets agitated

very easily.  Chest x-ray not done today, but chest x-ray from yesterday showed 

no evidence of infiltrate.  Seen by neurology today, felt to have toxic 

metabolic encephalopathy and alcohol withdrawal.  Considering the patient's 

agitation, he is obviously not a candidate for weaning and extubation at this 

point.  Basic metabolic profile is normal, renal profile is normal.  Brain CT on

admission showed no intracranial abnormality





Reevaluated today on 1/10/23, patient remains in the ICU, intubated and 

mechanically ventilated.  Patient is on assist control rate of 18, tidal volume 

400 FiO2 40% and PEEP of 5.  ABG showed a pO2 of 100 pCO2 51 pH of 7.41.  EEG 

showed mostly metabolic encephalopathy, no seizure activity.  Patient remains on

significant amount of sedation including fentanyl and 4 mcg/kg/h, and I cut it 

down to 2 mcg/kg per hour.  Remains on Versed at 20 mg per hour.  Normal saline 

which I have changed to D5 45 at 100 mL per hour.  He is on tube feeding at 40 

mL per hour.  Patient remains on GI and DVT prophylaxis, he is not requiring any

pressors.  Today I plan to hold or at least cut down on the sedation, assess 

mental status, and possibly give the patient a trial of weaning if possible.  

However considering the patient gets extremely agitated, that may not happen 

today.  His potassium is low, and is being corrected as per protocol.  WBC count

is 5.8 hemoglobin is 11.8.  Renal profile is normal potassium is being corrected

it is 2.90.  Patient's Gram stain of the sputum and culture came back positive 

for staph aureus.  The staph aureus is MSSA.  Chest x-ray remains relatively cl

ear, minimal perihilar atelectasis, no clear-cut evidence of pneumonia.





Reevaluated today on 1/11/23, remains in the ICU, intubated, mechanically 

ventilated, patient remains on assist control rate of 18 tidal volume 400 FiO2 

40% and PEEP of 5.  Cut down his FiO2 down to 35%, his ABG showed a pO2 of 106 

pCO2 48 pH of 7.44.  Apparently the patient was placed back on relatively high-

dose of fentanyl yesterday, although my recommendation was to keep fentanyl at 

no more than 2 mcg/kg/h.  Found out this morning that the patient was placed on 

4 mcg/kg per hour overnight, he is relatively sedated, he is also on Versed at 

20 mg per hour.  Norepinephrine at 0.02 mcg/kg/m, D5 4 5 at 100 mL per hour.  

Patient is on vancomycin which I have discontinued and place the patient on 

cefazolin for his MSSA tracheobronchitis.  Enteral feeding is presently on hold,

patient developed significant amount of residual overnight.  Hence we'll start 

trickle feedings.  At any rate patient was laced on a lower dose of fentanyl, 

but shortly after the patient became extremely agitated, restless, and could not

be ventilated properly with his agitation in spite of fentanyl 12 mcg/kg/h and 

in spite of Versed at 20 mg per hour hence I recommended that the patient goes 

on Nimbex, and will be titrated accordingly.  In the meantime we'll cut down the

fentanyl to 1 mcg/kg/h and he will need lower dose of Versed.  To me it seems, 

patient is having some sort of withdrawal.  Most likely alcohol withdrawal 

considering his clinical history.  After paralyzing the patient with Nimbex, and

cutting down on the dose of fentanyl, cutting down on the dose of Versed, I 

recommended the patient goes on Seroquel, I also recommended that he remains 

intubated and mechanically ventilated.  IB he is not ready to be weaned and e

xtubated at this point.





Reevaluated today on 1/12/23, patient remains in the ICU, remains intubated and 

mechanically ventilated.  He is on assist control rate of 18 tidal volume 400 

FiO2 40% and PEEP of 5.  ABG showed a pO2 of 89 pCO2 49 pH of 7.44.  Yesterday 

the patient had to be placed on Nimbex for extreme agitation, and he was 

throughout the afternoon on fentanyl at 2 mcg/kg/m he was also on Versed at 20 

mg per hour, and Nimbex was added yesterday, however in the early evening the 

patient developed significant hypertension, tachycardia, and he was not doing 

very well.  Clinically I felt this is most likely that the patient was having a 

seizure that could not be clinically seen while on Nimbex, hence I decided to 

discontinue his Nimbex completely, and I recommended overnight that the patient 

goes on fentanyl, Versed, and propofol.  After this was done, the patient did 

great, his blood pressure settled down nicely, his oxygenation improved, and I 

recommended no more Nimbex for this patient.  I clinically believe that the 

patient is having seizures, neurology will see him today, may have to have 

repeat EEG.  Patient is now on Versed at 15 mg per hour, is also on propofol at 

20 mcg/kg/m.  Around 4 AM, his fentanyl was discontinued.  I plan to start 

tapering his Versed and propofol down, and if he develops any symptoms to 

suggest underlying seizure, I will go ahead and start the patient on Keppra 

until seen by neurology today.  Patient is on norepinephrine at 0.02 mcg/kg/m, 

however this is not necessarily needed if the patient requires less sedation and

I will try to taper down his Versed and propofol further.  Patient remains on IV

fluid D5 4 5 at 100 mL per hour.  Obviously considering the events yesterday and

last night, the patient is not quite ready for weaning, but I am willing to 

start tapering the propofol and Versed down, hopefully maintain him on either 

propofol or Versed alone.  Again no more Nimbex for this patient, I believe the 

patient must have had a seizure while he was on Nimbex and that could not be 

seen.CBC today is relatively normal basic metabolic profile is normal potassium 

is a bit low at 3.2, renal profile is normal.patient remains on trickle 

feeding,/enteral feeding/nutritional support.





Reevaluated today on 1/1:30/23, patient remains in the ICU, intubated 

mechanically ventilated.  He is on assist control rate of 18 tidal volume 450 

FiO2 35% PEEP of 5 ABG showed a pO2 of 100 pCO2 44 pH of 7.46.  His FiO2 was cut

down to 35%.  Patient continued to have intermittent episodes of seizure-like 

activities or possibly myoclonic jerks affecting his upper body and upper 

extremities, with deviation of his eyes with upward gaze.  Neurology on the case

loaded up with Dilantin yesterday, and he is on Keppra today.  Although his EEG 

according to the neurologist did not show any evidence of seizure activity.  

Patient to be seen again by neurology today, and decide whether to continue with

seizure medications he did receive Dilantin 1 dose yesterday, and he is now on 

Keppra 1 g twice a day.  Early this morning patient had an EEG which is yet to 

be interpreted by the neurologist.  In the meantime the patient is on propofol 

at 20 mcg/kg/m, Versed 3 mg/h, D5 4 5 at 50 mL per hour and is on vital AF at 30

mL per hour.  Unable to cut down on his sedation at this point mostly because of

these episodic seizure-like activities or possibly myoclonic jerks.  Chest x-ray

today showed small left pleural effusion not large enough to consider 

thoracentesis.  His WBC count is 11.5 hemoglobin is 12.1.  Basic metabolic 

profile and renal profile are normal











Objective





- Vital Signs


Vital signs: 


                                   Vital Signs











Temp  98.4 F   01/13/23 08:00


 


Pulse  65   01/13/23 10:00


 


Resp  18   01/13/23 10:00


 


BP  94/57   01/13/23 10:00


 


Pulse Ox  99   01/13/23 10:00


 


FiO2  35   01/13/23 11:12








                                 Intake & Output











 01/12/23 01/13/23 01/13/23





 18:59 06:59 18:59


 


Intake Total 2345.119 2653.421 597.633


 


Output Total 1870 1690 320


 


Balance 475.119 963.421 277.633


 


Weight  94.4 kg 94.4 kg


 


Intake:   


 


  IV 1700 1900 400


 


    Dextrose 5%-0.45% NaCl 1, 1200 1200 400





    000 ml @ 100 mls/hr IV .   





    Q10H [facility] Rx#:[id number]   


 


    Phenytoin Sodium Inj 1,  100 





    850 mg In Sodium Chloride   





    0.9% 100 ml @ 200 mls/hr   





    IVPB ONCE STA Rx#:   





    [id number]   


 


    Vancomycin 1,500 mg In 500 500 





    Sodium Chloride 0.9% 500   





    ml 500 ml @ 167 mls/hr   





    IVPB Q12H [facility] Rx#:   





    [id number]   


 


    levETIRAcetam IV 1,000 mg  100 





    In Saline 1 100ml.bag @   





    400 mls/hr IVPB Q12HR [facility]   





    Rx#:[id number]   


 


  Intake, IV Titration 310.119 343.421 47.633





  Amount   


 


    Midazolam HCl 200 mg In 94.292 9.801 0





    Sodium Chloride 0.9% 60   





    ml @ 14 MG/HR 7 mls/hr IV   





    .[id number] [facility] Rx#:   





    [id number]   


 


    Norepinephrine 4 mg In 40.339 215.315 31.523





    Sodium Chloride 0.9% 250   





    ml @ 0.03 MCG/KG/MIN 9.   





    332 mls/hr IV .Q24H [facility]   





    Rx#:[id number]   


 


    fentaNYL (PF) 2,500 mcg 30.003  





    In Sodium Chloride 0.9%   





    200 ml @ 2 MCG/KG/HR 16.   





    329 mls/hr IV .[id number]   





    [facility] Rx#:[id number]   


 


    propofoL 1,000 mg In 145.485 118.305 16.11





    Empty Bag 1 bag @ 25 MCG/   





    KG/MIN 13.5 mls/hr IV .   





    Q7H25M [facility] Rx#:[id number]   


 


  Tube Feeding 120 260 120


 


  Other 215 150 30


 


Output:   


 


  Urine 1870 1690 320


 


Other:   


 


  Voiding Method Indwelling Catheter Indwelling Catheter Indwelling Catheter








                       ABP, PAP, CO, CI - Last Documented











Arterial Blood Pressure        94/46

















- Exam





Physical Exam: Revealed 57-year-old white male intubated, mechanically 

ventilated, maintained on propofol and on Versed.


Head: Atraumatic, normocephalic.  Endotracheal tube and orogastric tube are 

intact.


HEENT:[Neck is supple.] [No neck masses.] [No thyromegaly.] [No JVD.]


Chest: [Clear throughout, no crackles, no rhonchi, no wheezes.]


Cardiac Exam: [Normal S1 and S2, no S3 gallop, no murmur.]


Abdomen: [Soft, nontender,  no megaly, no rebound, no guarding, normal bowel 

sounds.]


Extremities: [No clubbing, no edema, no cyanosis.]


Neurological Exam: Unable to assess on Versed and propofol, however on lower 

doses of Versed the patient was noted to have myoclonic-like jerks involving the

upper body.


Psychiatric : Could not assess.  .


Musculoskeletal: No deformities.


Skin: No rashes





- Labs


CBC & Chem 7: 


                                 01/13/23 04:45





                                 01/13/23 04:45


Labs: 


                  Abnormal Lab Results - Last 24 Hours (Table)











  01/12/23 01/13/23 01/13/23 Range/Units





  23:44 04:45 04:45 


 


WBC    11.5 H  (3.8-10.6)  k/uL


 


RBC    3.96 L  (4.30-5.90)  m/uL


 


Hgb    12.1 L  (13.0-17.5)  gm/dL


 


Hct    37.4 L  (39.0-53.0)  %


 


Neutrophils #    9.1 H  (1.3-7.7)  k/uL


 


ABG pH     (7.35-7.45)  


 


ABG HCO3     (21-25)  mmol/L


 


ABG Total CO2     (19-24)  mmol/L


 


ABG O2 Saturation     (94-97)  %


 


Chloride  113 H  113 H   ()  mmol/L


 


BUN  4 L  4 L   (9-20)  mg/dL


 


Glucose  113 H  167 H   (74-99)  mg/dL


 


POC Glucose (mg/dL)     ()  mg/dL


 


Calcium  7.6 L  7.6 L   (8.4-10.2)  mg/dL














  01/13/23 01/13/23 Range/Units





  05:48 05:51 


 


WBC    (3.8-10.6)  k/uL


 


RBC    (4.30-5.90)  m/uL


 


Hgb    (13.0-17.5)  gm/dL


 


Hct    (39.0-53.0)  %


 


Neutrophils #    (1.3-7.7)  k/uL


 


ABG pH  7.46 H   (7.35-7.45)  


 


ABG HCO3  31 H   (21-25)  mmol/L


 


ABG Total CO2  32 H   (19-24)  mmol/L


 


ABG O2 Saturation  98.4 H   (94-97)  %


 


Chloride    ()  mmol/L


 


BUN    (9-20)  mg/dL


 


Glucose    (74-99)  mg/dL


 


POC Glucose (mg/dL)   141 H  ()  mg/dL


 


Calcium    (8.4-10.2)  mg/dL








                      Microbiology - Last 24 Hours (Table)











 01/08/23 08:29 Blood Culture - Preliminary





 Blood    No Growth after 120 hours














Assessment and Plan


Assessment: 





Impression:


Acute hypoxic and hypercapnic respiratory failure, mostly because the patient 

was unable to protect his airways upon his initial presentation, patient 

required intubation for airways protection.  And he was encephalopathic with 

mental status change upon his initial presentation.


possible recurrent seizures, that to be addressed by neurology on the case, 

patient was loaded with Dilantin last night and now he is on Keppra maintenance.

 As ordered by neurology on the case.


MSSA tracheobronchitis, we will discontinue vancomycin again and start the 

patient on Kefzol.


Altered mental status, possibly metabolic encephalopathy, suspect alcohol 

withdrawal.


Severe lactic acidosis on admission, with hypovolemia, resolved.impression 

suspect, his severe lactic acidosis may have been related to seizure activity.


Hypovolemic with hypotension


Anion gap metabolic acidosis, resolved.


Positive blood cultures for staph epidermidis, felt to be a contamination.


History of alcoholism


Acute kidney injury with hypovolemia, resolved.


History of paroxysmal atrial fibrillation


Benign essential hypertension


Acute toxic metabolic encephalopathy, possible seizures.


Dyslipidemia





Recommendation:


Continue ventilatory support, continue relatively low dose of Versed and low 

dose of propofol for now not quite ready for weaning was to because of his 

neurologic status


Continue GI and DVT prophylaxis patient is on Lovenox and on Protonix.


Continue nutritional support.


Continue IV thiamine.


Continue to monitor daily labs and address accordingly


Remains critically ill.


Patient to be evaluated by neurology again today and interpreted his EGD which 

was done earlier this morning.


Not quite ready for weaning.


Critical care time is over 30





Time with Patient: Greater than 30

## 2023-01-13 NOTE — P.PN
Subjective


Progress Note Date: 01/12/23





Patient was seen for a follow-up.  Patient currently on propofol.  Apparently 

patient has developed seizure-like activity, with rhythmic twitching of 

shoulders and upper extremities, but no involvement of the facial region, or 

lower extremities.  





Patient's daughters has mentioned yesterday that patient has been a very heavy 

drinker all their lives that very know their father.  They believe patient's l

ast drink was on 01/01/2023, whereas he was hospitalized on 01/06/2023.  

Patient's daughters states that he has history of traumatic brain injury in 2016

or 2017, when was drunk and he fell down stairs suffering from a brain bleed.  

He was admitted to [facility] in the ICU for a month.  He had undergone 

craniotomy 2, on the left side.  





Per nurse report, when the sedation is decreased, he shakes his head yes/no.  He

moves all 4 extremities.  He follows commands.  Because of agitation, he was 

again sedated.  He is not able to be extubated.








Objective





- Vital Signs


Vital signs: 


                                   Vital Signs











Temp  98.3 F   01/12/23 16:00


 


Pulse  64   01/12/23 16:30


 


Resp  22   01/12/23 16:30


 


BP  96/59   01/12/23 16:00


 


Pulse Ox  98   01/12/23 16:30


 


FiO2  40   01/12/23 16:00








                                 Intake & Output











 01/11/23 01/12/23 01/12/23





 18:59 06:59 18:59


 


Intake Total 2268.066 2060.409 1953.169


 


Output Total 1385 1575 1270


 


Balance 883.066 485.409 683.169


 


Weight 90 kg 92.6 kg 


 


Intake:   


 


  IV 1950 1700 1400


 


    Dextrose 5%-0.45% NaCl 1, 1000 1200 900





    000 ml @ 100 mls/hr IV .   





    Q10H [facility] Rx#:[id number]   


 


    Magnesium Sulfate-D5w Pmx 200  





    1 gm In Dextrose/Water 1   





    100ml.bag @ 100 mls/hr   





    IVPB Q1H [facility] Rx#:   





    [id number]   


 


    Potassium Chloride 10 meq 200  





    In Water For Injection 1   





    100ml.bag @ 100 mls/hr   





    IVPB Q1HR [facility] Rx#:   





    [id number]   


 


    Thiamine 100 mg In Sodium 50  





    Chloride 0.9% 50 ml @   





    100 mls/hr IVPB Q24HR [facility]   





    Rx#:[id number]   


 


    Vancomycin 1,500 mg In 500 500 





    Sodium Chloride 0.9% 500   





    ml 500 ml @ 167 mls/hr   





    IVPB Q12H [facility] Rx#:   





    [id number]   


 


    Vancomycin 1,500 mg In   500





    Sodium Chloride 0.9% 500   





    ml 500 ml @ 167 mls/hr   





    IVPB Q12H [facility] Rx#:   





    [id number]   


 


  Intake, IV Titration 238.066 270.409 278.169





  Amount   


 


    Midazolam HCl 200 mg In 135.167 59.083 94.292





    Sodium Chloride 0.9% 60   





    ml @ 14 MG/HR 7 mls/hr IV   





    .[id number] [facility] Rx#:   





    [id number]   


 


    Norepinephrine 4 mg In 55.272 0 40.339





    Sodium Chloride 0.9% 250   





    ml @ 0.03 MCG/KG/MIN 9.   





    332 mls/hr IV .Q24H [facility]   





    Rx#:[id number]   


 


    fentaNYL (PF) 2,500 mcg 47.627 142.206 30.003





    In Sodium Chloride 0.9%   





    200 ml @ 2 MCG/KG/HR 16.   





    329 mls/hr IV .[id number]   





    [facility] Rx#:[id number]   


 


    propofoL 1,000 mg In  69.12 113.535





    Empty Bag 1 bag @ 25 MCG/   





    KG/MIN 13.5 mls/hr IV .   





    Q7H25M [facility] Rx#:[id number]   


 


  Tube Feeding 50 60 90


 


  Other 30 30 185


 


Output:   


 


  Gastric Drainage 210  


 


  Urine 1175 1575 1270


 


Other:   


 


  Voiding Method Indwelling Catheter Indwelling Catheter Indwelling Catheter








                       ABP, PAP, CO, CI - Last Documented











Arterial Blood Pressure        103/49

















- Exam





Patient is sedated.  With painful stimuli, patient started having rhythmic 

twitching of his shoulder with some involvement of the upper limbs bilaterally, 

synchronously.  No facial region was twitching, and no movement of the lower 

extremities or the trunk otherwise.  Unclear if it is ictal or not.





- Labs


CBC & Chem 7: 


                                 01/13/23 04:45





                                 01/13/23 04:45


Labs: 


                  Abnormal Lab Results - Last 24 Hours (Table)











  01/11/23 01/11/23 01/11/23 Range/Units





  07:10 19:15 20:01 


 


RBC     (4.30-5.90)  m/uL


 


Hgb     (13.0-17.5)  gm/dL


 


Hct     (39.0-53.0)  %


 


Plt Count     (150-450)  k/uL


 


ABG pCO2   54 H   (35-45)  mmHg


 


ABG pO2   64 L   ()  mmHg


 


ABG HCO3   32 H   (21-25)  mmol/L


 


ABG Total CO2   34 H   (19-24)  mmol/L


 


ABG O2 Saturation   93.0 L   (94-97)  %


 


Potassium    3.2 L  (3.5-5.1)  mmol/L


 


Chloride     ()  mmol/L


 


Carbon Dioxide     (22-30)  mmol/L


 


BUN     (9-20)  mg/dL


 


Creatinine     (0.66-1.25)  mg/dL


 


Glucose     (74-99)  mg/dL


 


POC Glucose (mg/dL)     ()  mg/dL


 


Calcium     (8.4-10.2)  mg/dL


 


Procalcitonin  0.61 H    (0.02-0.09)  ng/mL














  01/12/23 01/12/23 01/12/23 Range/Units





  05:25 05:38 07:46 


 


RBC     (4.30-5.90)  m/uL


 


Hgb     (13.0-17.5)  gm/dL


 


Hct     (39.0-53.0)  %


 


Plt Count     (150-450)  k/uL


 


ABG pCO2   49 H   (35-45)  mmHg


 


ABG pO2     ()  mmHg


 


ABG HCO3   33 H   (21-25)  mmol/L


 


ABG Total CO2   34 H   (19-24)  mmol/L


 


ABG O2 Saturation   97.9 H   (94-97)  %


 


Potassium  3.2 L    (3.5-5.1)  mmol/L


 


Chloride  110 H    ()  mmol/L


 


Carbon Dioxide  33 H    (22-30)  mmol/L


 


BUN  5 L    (9-20)  mg/dL


 


Creatinine  0.65 L    (0.66-1.25)  mg/dL


 


Glucose  112 H    (74-99)  mg/dL


 


POC Glucose (mg/dL)    115 H  ()  mg/dL


 


Calcium  7.3 L    (8.4-10.2)  mg/dL


 


Procalcitonin     (0.02-0.09)  ng/mL














  01/12/23 Range/Units





  09:32 


 


RBC  3.62 L  (4.30-5.90)  m/uL


 


Hgb  11.2 L  (13.0-17.5)  gm/dL


 


Hct  34.2 L  (39.0-53.0)  %


 


Plt Count  106 L  (150-450)  k/uL


 


ABG pCO2   (35-45)  mmHg


 


ABG pO2   ()  mmHg


 


ABG HCO3   (21-25)  mmol/L


 


ABG Total CO2   (19-24)  mmol/L


 


ABG O2 Saturation   (94-97)  %


 


Potassium   (3.5-5.1)  mmol/L


 


Chloride   ()  mmol/L


 


Carbon Dioxide   (22-30)  mmol/L


 


BUN   (9-20)  mg/dL


 


Creatinine   (0.66-1.25)  mg/dL


 


Glucose   (74-99)  mg/dL


 


POC Glucose (mg/dL)   ()  mg/dL


 


Calcium   (8.4-10.2)  mg/dL


 


Procalcitonin   (0.02-0.09)  ng/mL








                      Microbiology - Last 24 Hours (Table)











 01/08/23 08:29 Blood Culture - Preliminary





 Blood    No Growth after 96 hours


 


 01/06/23 20:37 Blood Culture Gram Stain - Final





 Blood Blood Culture - Final





    Staphylococcus aureus





    Staphylococcus epidermidis





    Coagulase Negative Staph














Assessment and Plan


Assessment: 





* New onset rhythmic twitching of the shoulders bilaterally and some upper 

  limbs, possible seizure.


* Altered mental status, likely due to toxic metabolic encephalopathy


* Patient presented with unresponsiveness, likely due to above.


* Possible Alcohol withdrawal


* History of alcoholism.


* Acute kidney injury, now resolved


* Status post shock, likely hypovolemic


* Atrial fibrillation with RVR, status post cardioversion


* Acute respiratory failure, on mechanical ventilation


* Elevated troponin


* Gram-positive bacteremia, possibly contaminant.





Plan: 





* Patient had a repeat EEG performed today 01/12/2023, which was abnormal due to

  presence of amplitude asymmetry with relatively higher amplitude in the left 

  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  

  Generalized suppressed background consistent with encephalopathy or medication

  effect.  #3 some sporadic sharp-appearing waves seen in the left parietal 

  region, which could be related to underlying breach rhythm, although may sugg

  est underlying cortical irritability.  No electrographic seizure was recorded 

  in the entire study.  Clinical correlation also recommended.


* Patient was given a loading dose of Keppra 1500 mg by intensivist.  We will 

  maintain on Keppra 1000 mg IV twice a day.  Ativan 1 mg every hour as needed 

  for seizure like activity.  


* If no improvement, patient will need prolonged EEG.


* Repeat CT head today revealed previous surgery.  Mild atrophy, no acute 

  intracranial process.  I personally reviewed CT head, agree with the findings.





* CTA reported as negative of the brain.  Mild plaque at the carotid artery 

  bifurcation.  No evidence of hemodynamic arterial stenosis in the neck.


* EEG 01/09/2023 was abnormal due to amplitude asymmetry with relatively higher 

  amplitude activity in the left frontoparietal region due to breach rhythm from

  previous craniotomy defect.  Background slowing of moderate to severe degree, 

  consistent with generalized cerebral dysfunction, as can be seen with toxic 

  metabolic encephalopathy or due to diffuse structural brain abnormality.  

  Clinical correlation is recommended.  No epileptiform activity was seen.


* B12 2102, folate 12.90, TSH slightly decreased 0.220, with normal free T4 

  1.37.  Possible due to sick euthyroid.


* Continue present management.  Neurology will follow.











Addendum: 11:30 PM


Nurse perfect served at 11:30 PM that patient had a seizure.  Fixed upward gaze 

to the right.  Significant hypertension, systolic blood pressure in the 180s.  

Patient was given 2 mg Ativan and that did not break the seizure.  He was 

seizing at that time for 3.5 minutes.  Recommended 5 mg of Versed bolus and 

started 2 mg/hr drip and titrate up to stop the seizure.


Also recommended Dilantin 20 mg/kg loading dose 1.


Check Dilantin level in the morning.


Patient undergo prolonged EEG in the morning.

## 2023-01-13 NOTE — P.PN
Subjective


Progress Note Date: 01/13/23





Patient had stopped versed overnight, then had another episode of seizrue like 

activity.  Restarted on versed gtt, loaded with dilantin, ongoing keppra.





General: intubated, sedated


HEENT: normocephalic, atraumatic, no tracheal deviation


Respiratory: symmetric chest rise, no cyanosis, ventilator dependent


CVS: perfusing all extremities, no distal gangrene, no pitting edema


GI: soft, ND


: no SPT, no CVAT, alston is present


Neuro: sedated, rigors, moving all extremities, not following commands





Assessment/plan:





Acute hypoxic and hypercapnic respiratory failure status post intubation


-Repeating prolonged EEG today, discussed with neurology, pending read


-CXR reviewed today, appears to have left pleural effusion








Acute metabolic encephalopathy


Hyperammonemia


History of alcohol abuse


-Continue to wean sedation as able, again on versed and propofol, weaning versed

as able.  Discussed with nursing, pt following commands with  minimal sedation





Shock, likely hypovolemic - resolved


High anion gap metabolic acidosis secondary to Severe lactic acidosis - resolved


Acute kidney injury - resolved


Severe hyperphosphatemia - resolved


Severe hypokalemia - resolving


Hypomagnesemia


-Continue IV fluids


-Monitor urine output and renal function, renal ultrasound showed no 

hydronephrosis


-Replete electrolytes per protocol, continue to monitor


-Renal following





A. fib with RVR status post cardioversion


Elevated troponin


-Cardiology following





Possible upper GI bleed


Thrombocytopenia


-IV PPI twice a day


-Low platelets likely in the setting of chronic alcohol abuse


-Gen. surgery following





Staph bacteremia


-First set of blood cultures positive for staph aureus and epidermidis, likely 

contaminant. however, sputum culture also showing staph


-Patient currently on vancomycin, repeat blood cultures no growth to date





DVT ppx: Lovenox





Code status: Full code





Anticipated discharge place: Pending clinical course


Anticipated discharge time: Pending clinical course








Objective





- Vital Signs


Vital signs: 


                                   Vital Signs











Temp  98.4 F   01/13/23 08:00


 


Pulse  65   01/13/23 10:00


 


Resp  18   01/13/23 10:00


 


BP  94/57   01/13/23 10:00


 


Pulse Ox  99   01/13/23 10:00


 


FiO2  35   01/13/23 10:00








                                 Intake & Output











 01/12/23 01/13/23 01/13/23





 18:59 06:59 18:59


 


Intake Total 2345.119 2653.421 597.633


 


Output Total 1870 1690 320


 


Balance 475.119 963.421 277.633


 


Weight  94.4 kg 94.4 kg


 


Intake:   


 


  IV 1700 1900 400


 


    Dextrose 5%-0.45% NaCl 1, 1200 1200 400





    000 ml @ 100 mls/hr IV .   





    Q10H [facility] Rx#:[id number]   


 


    Phenytoin Sodium Inj 1,  100 





    850 mg In Sodium Chloride   





    0.9% 100 ml @ 200 mls/hr   





    IVPB ONCE STA Rx#:   





    [id number]   


 


    Vancomycin 1,500 mg In 500 500 





    Sodium Chloride 0.9% 500   





    ml 500 ml @ 167 mls/hr   





    IVPB Q12H [facility] Rx#:   





    [id number]   


 


    levETIRAcetam IV 1,000 mg  100 





    In Saline 1 100ml.bag @   





    400 mls/hr IVPB Q12HR [facility]   





    Rx#:[id number]   


 


  Intake, IV Titration 310.119 343.421 47.633





  Amount   


 


    Midazolam HCl 200 mg In 94.292 9.801 0





    Sodium Chloride 0.9% 60   





    ml @ 14 MG/HR 7 mls/hr IV   





    .[id number] [facility] Rx#:   





    [id number]   


 


    Norepinephrine 4 mg In 40.339 215.315 31.523





    Sodium Chloride 0.9% 250   





    ml @ 0.03 MCG/KG/MIN 9.   





    332 mls/hr IV .Q24H [facility]   





    Rx#:[id number]   


 


    fentaNYL (PF) 2,500 mcg 30.003  





    In Sodium Chloride 0.9%   





    200 ml @ 2 MCG/KG/HR 16.   





    329 mls/hr IV .O12P57M   





    [facility] Rx#:[id number]   


 


    propofoL 1,000 mg In 145.485 118.305 16.11





    Empty Bag 1 bag @ 25 MCG/   





    KG/MIN 13.5 mls/hr IV .   





    Q7H25M [facility] Rx#:[id number]   


 


  Tube Feeding 120 260 120


 


  Other 215 150 30


 


Output:   


 


  Urine 1870 1690 320


 


Other:   


 


  Voiding Method Indwelling Catheter Indwelling Catheter Indwelling Catheter








                       ABP, PAP, CO, CI - Last Documented











Arterial Blood Pressure        94/46

















- Labs


CBC & Chem 7: 


                                 01/13/23 04:45





                                 01/13/23 04:45


Labs: 


                  Abnormal Lab Results - Last 24 Hours (Table)











  01/12/23 01/13/23 01/13/23 Range/Units





  23:44 04:45 04:45 


 


WBC    11.5 H  (3.8-10.6)  k/uL


 


RBC    3.96 L  (4.30-5.90)  m/uL


 


Hgb    12.1 L  (13.0-17.5)  gm/dL


 


Hct    37.4 L  (39.0-53.0)  %


 


Neutrophils #    9.1 H  (1.3-7.7)  k/uL


 


ABG pH     (7.35-7.45)  


 


ABG HCO3     (21-25)  mmol/L


 


ABG Total CO2     (19-24)  mmol/L


 


ABG O2 Saturation     (94-97)  %


 


Chloride  113 H  113 H   ()  mmol/L


 


BUN  4 L  4 L   (9-20)  mg/dL


 


Glucose  113 H  167 H   (74-99)  mg/dL


 


POC Glucose (mg/dL)     ()  mg/dL


 


Calcium  7.6 L  7.6 L   (8.4-10.2)  mg/dL














  01/13/23 01/13/23 Range/Units





  05:48 05:51 


 


WBC    (3.8-10.6)  k/uL


 


RBC    (4.30-5.90)  m/uL


 


Hgb    (13.0-17.5)  gm/dL


 


Hct    (39.0-53.0)  %


 


Neutrophils #    (1.3-7.7)  k/uL


 


ABG pH  7.46 H   (7.35-7.45)  


 


ABG HCO3  31 H   (21-25)  mmol/L


 


ABG Total CO2  32 H   (19-24)  mmol/L


 


ABG O2 Saturation  98.4 H   (94-97)  %


 


Chloride    ()  mmol/L


 


BUN    (9-20)  mg/dL


 


Glucose    (74-99)  mg/dL


 


POC Glucose (mg/dL)   141 H  ()  mg/dL


 


Calcium    (8.4-10.2)  mg/dL








                      Microbiology - Last 24 Hours (Table)











 01/08/23 08:29 Blood Culture - Preliminary





 Blood    No Growth after 96 hours

## 2023-01-13 NOTE — P.PN
Subjective


Progress Note Date: 01/13/23





Patient was seen for a follow-up.  Patient currently on propofol 10 mcg/kg/m.  

Also on Versed 3 mg per hour.  Patient seizure-like activity has stopped since 

on Versed.   





Patient's daughters has mentioned yesterday that patient has been a very heavy 

drinker all their lives that very know their father.  They believe patient's 

last drink was on 01/01/2023, whereas he was hospitalized on 01/06/2023.  Haleigh wilson's daughters states that he has history of traumatic brain injury in 2016 or

2017, when was drunk and he fell down stairs suffering from a brain bleed.  He 

was admitted to [facility] in the ICU for a month.  He had undergone 

craniotomy 2, on the left side.  





Per nurse report, when the sedation is decreased, he shakes his head yes/no.  He

moves all 4 extremities.  He follows commands.  Because of agitation, he was 

again sedated.  He is not able to be extubated.








Objective





- Vital Signs


Vital signs: 


                                   Vital Signs











Temp  98.4 F   01/13/23 12:00


 


Pulse  75   01/13/23 16:00


 


Resp  19   01/13/23 16:00


 


BP  117/70   01/13/23 16:00


 


Pulse Ox  98   01/13/23 16:00


 


FiO2  35   01/13/23 16:00








                                 Intake & Output











 01/12/23 01/13/23 01/13/23





 18:59 06:59 18:59


 


Intake Total 2345.119 2653.421 1257.084


 


Output Total 1870 1690 1245


 


Balance 475.119 963.421 12.084


 


Weight  94.4 kg 94.4 kg


 


Intake:   


 


  IV 1700 1900 700


 


    Dextrose 5%-0.45% NaCl 1, 1200 1200 700





    000 ml @ 100 mls/hr IV .   





    Q10H [facility] Rx#:[id number]   


 


    Phenytoin Sodium Inj 1,  100 





    850 mg In Sodium Chloride   





    0.9% 100 ml @ 200 mls/hr   





    IVPB ONCE STA Rx#:   





    [id number]   


 


    Vancomycin 1,500 mg In 500 500 





    Sodium Chloride 0.9% 500   





    ml 500 ml @ 167 mls/hr   





    IVPB Q12H [facility] Rx#:   





    [id number]   


 


    levETIRAcetam IV 1,000 mg  100 





    In Saline 1 100ml.bag @   





    400 mls/hr IVPB Q12HR [facility]   





    Rx#:[id number]   


 


  Intake, IV Titration 310.119 343.421 157.084





  Amount   


 


    Midazolam HCl 200 mg In 94.292 9.801 0





    Sodium Chloride 0.9% 60   





    ml @ 14 MG/HR 7 mls/hr IV   





    .Q11Q79F [facility] Rx#:   





    [id number]   


 


    Norepinephrine 4 mg In 40.339 215.315 107.899





    Sodium Chloride 0.9% 250   





    ml @ 0.03 MCG/KG/MIN 9.   





    332 mls/hr IV .Q24H [facility]   





    Rx#:[id number]   


 


    fentaNYL (PF) 2,500 mcg 30.003  





    In Sodium Chloride 0.9%   





    200 ml @ 2 MCG/KG/HR 16.   





    329 mls/hr IV .[id number]   





    [facility] Rx#:[id number]   


 


    propofoL 1,000 mg In 145.485 118.305 49.185





    Empty Bag 1 bag @ 25 MCG/   





    KG/MIN 13.5 mls/hr IV .   





    Q7H25M [facility] Rx#:[id number]   


 


  Tube Feeding 120 260 340


 


  Other 215 150 60


 


Output:   


 


  Urine 1870 1690 1245


 


Other:   


 


  Voiding Method Indwelling Catheter Indwelling Catheter Indwelling Catheter








                       ABP, PAP, CO, CI - Last Documented











Arterial Blood Pressure        168/88

















- Exam





Patient is sedated.  Patient not responding to painful stimuli.  His pupils are 

equal, round and reactive to light.  Oculocephalics are slightly present.  

Patient does not withdraw to painful stimuli.  No seizure-like activity noted.  

Reflexes are 2 at the biceps, 2 brachioradialis, 2 at the knees, and ankles and 

plantars are flat.  Patient has peripheral edema.





- Labs


CBC & Chem 7: 


                                 01/13/23 04:45





                                 01/13/23 14:38


Labs: 


                  Abnormal Lab Results - Last 24 Hours (Table)











  01/12/23 01/13/23 01/13/23 Range/Units





  23:44 04:45 04:45 


 


WBC    11.5 H  (3.8-10.6)  k/uL


 


RBC    3.96 L  (4.30-5.90)  m/uL


 


Hgb    12.1 L  (13.0-17.5)  gm/dL


 


Hct    37.4 L  (39.0-53.0)  %


 


Neutrophils #    9.1 H  (1.3-7.7)  k/uL


 


ABG pH     (7.35-7.45)  


 


ABG HCO3     (21-25)  mmol/L


 


ABG Total CO2     (19-24)  mmol/L


 


ABG O2 Saturation     (94-97)  %


 


Chloride  113 H  113 H   ()  mmol/L


 


BUN  4 L  4 L   (9-20)  mg/dL


 


Glucose  113 H  167 H   (74-99)  mg/dL


 


POC Glucose (mg/dL)     ()  mg/dL


 


Calcium  7.6 L  7.6 L   (8.4-10.2)  mg/dL














  01/13/23 01/13/23 01/13/23 Range/Units





  05:48 05:51 14:35 


 


WBC     (3.8-10.6)  k/uL


 


RBC     (4.30-5.90)  m/uL


 


Hgb     (13.0-17.5)  gm/dL


 


Hct     (39.0-53.0)  %


 


Neutrophils #     (1.3-7.7)  k/uL


 


ABG pH  7.46 H    (7.35-7.45)  


 


ABG HCO3  31 H    (21-25)  mmol/L


 


ABG Total CO2  32 H    (19-24)  mmol/L


 


ABG O2 Saturation  98.4 H    (94-97)  %


 


Chloride     ()  mmol/L


 


BUN     (9-20)  mg/dL


 


Glucose     (74-99)  mg/dL


 


POC Glucose (mg/dL)   141 H  127 H  ()  mg/dL


 


Calcium     (8.4-10.2)  mg/dL








                      Microbiology - Last 24 Hours (Table)











 01/08/23 08:29 Blood Culture - Preliminary





 Blood    No Growth after 120 hours














Assessment and Plan


Assessment: 





* New onset rhythmic twitching of the shoulders bilaterally and some upper 

  limbs, rule out seizures.  Rule out metabolic myoclonic jerks.  EEG did not 

  reveal any epileptiform activity.  No status epilepticus.


* Altered mental status, likely due to toxic metabolic encephalopathy


* Patient presented with unresponsiveness, likely due to above.


* Possible Alcohol withdrawal


* History of alcoholism.


* Elevated ammonia, abnormal liver functions, rule out cirrhosis.


* Acute kidney injury, now resolved


* Status post shock, likely hypovolemic


* Atrial fibrillation with RVR, status post cardioversion


* Acute respiratory failure, on mechanical ventilation


* Elevated troponin


* Gram-positive bacteremia, possibly contaminant.





Plan: 





* Patient had a prolonged, 2.5 EEG performed today.  According to preliminary 

  report from Dr. Ortiz, did not reveal any epileptiform activity.  No status

  epilepticus.  Official report pending.  We will continue Keppra 1000 mg twice 

  a day.  Try to wean off propofol, then Versed drip.  Patient received Dilantin

  loading dose last night.  Patient's level is 10.9.  We will not continue 

  Dilantin, as there is no epileptiform activity.





* Repeat EEG 01/12/2023, which was abnormal due to presence of amplitude 

  asymmetry with relatively higher amplitude in the left frontoparietal region 

  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  Generalized suppressed 

  background consistent with encephalopathy or medication effect.  Also some 

  sporadic sharp-appearing waves seen in the left parietal region, which could 

  be related to underlying breach rhythm, although may suggest underlying 

  cortical irritability.  No electrographic seizure was recorded in the entire 

  study.  Clinical correlation also recommended.


* Patient was given a loading dose of Keppra 1500 mg by intensivist.  We will 

  maintain on Keppra 1000 mg IV twice a day.  Ativan 1 mg every hour as needed 

  for seizure like activity.  





* Repeat CT head 01/12/2023 revealed previous surgery.  Mild atrophy, no acute 

  intracranial process.  I personally reviewed CT head, agree with the findings.





* CTA reported as negative of the brain.  Mild plaque at the carotid artery 

  bifurcation.  No evidence of hemodynamic arterial stenosis in the neck.


* EEG 01/09/2023 was abnormal due to amplitude asymmetry with relatively higher 

  amplitude activity in the left frontoparietal region due to breach rhythm from

  previous craniotomy defect.  Background slowing of moderate to severe degree, 

  consistent with generalized cerebral dysfunction, as can be seen with toxic 

  metabolic encephalopathy or due to diffuse structural brain abnormality.  

  Clinical correlation is recommended.  No epileptiform activity was seen.


* B12 2102, folate 12.90, TSH slightly decreased 0.220, with normal free T4 

  1.37.  Possible due to sick euthyroid.


* Continue present management.  Neurology will follow.





* If no improvement, consider lumbar puncture in the morning.  Patient is 

  completely afebrile, white cells are normal.  No signs of infection otherwise.


* Discussed with patient's nurse in detail.

## 2023-01-14 LAB
ANION GAP SERPL CALC-SCNC: 0 MMOL/L
BASOPHILS # BLD AUTO: 0 K/UL (ref 0–0.2)
BASOPHILS NFR BLD AUTO: 1 %
BUN SERPL-SCNC: 8 MG/DL (ref 9–20)
CALCIUM SPEC-MCNC: 7.5 MG/DL (ref 8.4–10.2)
CELL CNT PNL CSF: 100
CHLORIDE SERPL-SCNC: 114 MMOL/L (ref 98–107)
CO2 BLDA-SCNC: 31 MMOL/L (ref 19–24)
CO2 SERPL-SCNC: 29 MMOL/L (ref 22–30)
EOSINOPHIL # BLD AUTO: 0.3 K/UL (ref 0–0.7)
EOSINOPHIL NFR BLD AUTO: 4 %
ERYTHROCYTE [DISTWIDTH] IN BLOOD BY AUTOMATED COUNT: 3.59 M/UL (ref 4.3–5.9)
ERYTHROCYTE [DISTWIDTH] IN BLOOD: 13.5 % (ref 11.5–15.5)
GLUCOSE BLD-MCNC: 101 MG/DL (ref 70–110)
GLUCOSE BLD-MCNC: 109 MG/DL (ref 70–110)
GLUCOSE CSF-MCNC: 69 MG/DL (ref 40–70)
GLUCOSE SERPL-MCNC: 124 MG/DL (ref 74–99)
GRANULOCYTES NFR CSF MANUAL: 77 %
HCO3 BLDA-SCNC: 30 MMOL/L (ref 21–25)
HCT VFR BLD AUTO: 34.2 % (ref 39–53)
HGB BLD-MCNC: 10.9 GM/DL (ref 13–17.5)
INR PPP: 1 (ref ?–1.2)
LYMPHOCYTES # SPEC AUTO: 1.2 K/UL (ref 1–4.8)
LYMPHOCYTES NFR SPEC AUTO: 18 %
MCH RBC QN AUTO: 30.4 PG (ref 25–35)
MCHC RBC AUTO-ENTMCNC: 31.9 G/DL (ref 31–37)
MCV RBC AUTO: 95.3 FL (ref 80–100)
MONOCYTES # BLD AUTO: 0.4 K/UL (ref 0–1)
MONOCYTES NFR BLD AUTO: 6 %
MONONUC CELLS NFR CSF: 18 %
NEUTROPHILS # BLD AUTO: 4.5 K/UL (ref 1.3–7.7)
NEUTROPHILS NFR BLD AUTO: 68 %
NUC CELL # CSF: 160 U/L (ref 0–5)
PCO2 BLDA: 44 MMHG (ref 35–45)
PH BLDA: 7.45 [PH] (ref 7.35–7.45)
PLATELET # BLD AUTO: 159 K/UL (ref 150–450)
PO2 BLDA: 110 MMHG (ref 83–108)
POTASSIUM SERPL-SCNC: 3.5 MMOL/L (ref 3.5–5.1)
PT BLD: 10.3 SEC (ref 9–12)
RBC # CSF: (no result) U/L (ref 0–10)
SODIUM SERPL-SCNC: 143 MMOL/L (ref 137–145)
SPECIMEN VOL CSF: 2.5 ML
WBC # BLD AUTO: 6.6 K/UL (ref 3.8–10.6)

## 2023-01-14 PROCEDURE — 009U3ZX DRAINAGE OF SPINAL CANAL, PERCUTANEOUS APPROACH, DIAGNOSTIC: ICD-10-PCS

## 2023-01-14 RX ADMIN — SODIUM CHLORIDE SCH MLS/HR: 9 INJECTION, SOLUTION INTRAVENOUS at 08:25

## 2023-01-14 RX ADMIN — POTASSIUM CHLORIDE SCH: 14.9 INJECTION, SOLUTION INTRAVENOUS at 16:00

## 2023-01-14 RX ADMIN — POTASSIUM BICARBONATE SCH MEQ: 782 TABLET, EFFERVESCENT ORAL at 08:25

## 2023-01-14 RX ADMIN — PANTOPRAZOLE SODIUM SCH MG: 40 INJECTION, POWDER, FOR SOLUTION INTRAVENOUS at 08:25

## 2023-01-14 RX ADMIN — MIDAZOLAM SCH MLS/HR: 5 INJECTION INTRAMUSCULAR; INTRAVENOUS at 11:06

## 2023-01-14 RX ADMIN — ENOXAPARIN SODIUM SCH: 40 INJECTION SUBCUTANEOUS at 10:38

## 2023-01-14 RX ADMIN — POTASSIUM CHLORIDE SCH: 14.9 INJECTION, SOLUTION INTRAVENOUS at 07:09

## 2023-01-14 RX ADMIN — LEVETIRACETAM SCH MLS/HR: 10 INJECTION INTRAVENOUS at 09:20

## 2023-01-14 RX ADMIN — SODIUM CHLORIDE SCH MLS/HR: 900 INJECTION, SOLUTION INTRAVENOUS at 08:25

## 2023-01-14 RX ADMIN — PANTOPRAZOLE SODIUM SCH MG: 40 INJECTION, POWDER, FOR SOLUTION INTRAVENOUS at 20:15

## 2023-01-14 RX ADMIN — LEVETIRACETAM SCH MLS/HR: 10 INJECTION INTRAVENOUS at 20:13

## 2023-01-14 RX ADMIN — METOPROLOL TARTRATE SCH MG: 25 TABLET, FILM COATED ORAL at 08:28

## 2023-01-14 RX ADMIN — POTASSIUM BICARBONATE SCH MEQ: 782 TABLET, EFFERVESCENT ORAL at 07:45

## 2023-01-14 RX ADMIN — FENTANYL CITRATE SCH: 50 INJECTION INTRAVENOUS at 16:20

## 2023-01-14 RX ADMIN — METOPROLOL TARTRATE SCH MG: 25 TABLET, FILM COATED ORAL at 20:13

## 2023-01-14 RX ADMIN — CHLORHEXIDINE GLUCONATE SCH ML: 1.2 RINSE ORAL at 20:15

## 2023-01-14 RX ADMIN — SODIUM CHLORIDE SCH MLS/HR: 9 INJECTION, SOLUTION INTRAVENOUS at 19:51

## 2023-01-14 RX ADMIN — NOREPINEPHRINE BITARTRATE SCH MLS/HR: 1 INJECTION, SOLUTION, CONCENTRATE INTRAVENOUS at 00:25

## 2023-01-14 RX ADMIN — FENTANYL CITRATE SCH: 50 INJECTION INTRAVENOUS at 08:23

## 2023-01-14 RX ADMIN — CHLORHEXIDINE GLUCONATE SCH ML: 1.2 RINSE ORAL at 08:25

## 2023-01-14 NOTE — P.PN
Subjective


Progress Note Date: 01/14/23





Patient was seen for a follow-up.  Patient initially seen at 3:45 PM.  Patient 

currently on propofol 25 mcg/kg/m.  Also on Versed 6 mg per hour.  Patient 

seizure-like activity has stopped since on Versed.  Patient's sedation were 

stopped at 3:55 PM.  Patient re-examined at 4:45 PM.  Patient slightly opening 

the eyes, but not making any eye contact, turning his head, following commands. 

No seizure-like activity noted.  Patient's daughter apparently discussed with 

the family, and consented for lumbar puncture.  Lovenox was held since 

yesterday. 





Patient's daughters has mentioned yesterday that patient has been a very heavy 

drinker all their lives that very know their father.  They believe patient's 

last drink was on 01/01/2023, whereas he was hospitalized on 01/06/2023.  

Patient's daughters states that he has history of traumatic brain injury in 2016

or 2017, when was drunk and he fell down stairs suffering from a brain bleed.  

He was admitted to [facility] in the ICU for a month.  He had undergone 

craniotomy 2, on the left side.  











Objective





- Vital Signs


Vital signs: 


                                   Vital Signs











Temp  98.8 F   01/14/23 12:00


 


Pulse  71   01/14/23 15:00


 


Resp  19   01/14/23 15:00


 


BP  128/73   01/14/23 13:00


 


Pulse Ox  97   01/14/23 15:00


 


FiO2  35   01/14/23 15:41








                                 Intake & Output











 01/13/23 01/14/23 01/14/23





 18:59 06:59 18:59


 


Intake Total 2463.697 6508.114 1847.008


 


Output Total 1495 880 660


 


Balance -9.456 262.797 4341.008


 


Weight 94.4 kg 96 kg 


 


Intake:   


 


   537 6011


 


    Dextrose 5%-0.45% NaCl 1, 800 600 450





    000 ml @ 50 mls/hr IV .   





    Q20H [facility] Rx#:[id number]   


 


    Vancomycin 1,500 mg In   500





    Sodium Chloride 0.9% 500   





    ml 500 ml @ 167 mls/hr   





    IVPB Q12H [facility] Rx#:   





    [id number]   


 


    levETIRAcetam IV 1,000 mg   100





    In Saline 1 100ml.bag @   





    400 mls/hr IVPB Q12HR [facility]   





    Rx#:[id number]   


 


  Intake, IV Titration 175.544 664.114 353.008





  Amount   


 


    Midazolam HCl 200 mg In 0 18.725 49.75





    Sodium Chloride 0.9% 60   





    ml @ 14 MG/HR 7 mls/hr IV   





    .[id number] [facility] Rx#:   





    [id number]   


 


    Norepinephrine 4 mg In 107.899 47.389 83.783





    Sodium Chloride 0.9% 250   





    ml @ 0.03 MCG/KG/MIN 9.   





    332 mls/hr IV .Q24H [facility]   





    Rx#:[id number]   


 


    Thiamine 100 mg In Sodium   100





    Chloride 0.9% 50 ml @   





    100 mls/hr IVPB Q24HR [facility]   





    Rx#:[id number]   


 


    Vancomycin 1,500 mg In  498 





    Sodium Chloride 0.9% 500   





    ml 500 ml @ 167 mls/hr   





    IVPB Q12H [facility] Rx#:   





    [id number]   


 


    propofoL 1,000 mg In 67.645 100.00 119.475





    Empty Bag 1 bag @ 25 MCG/   





    KG/MIN 13.5 mls/hr IV .   





    Q7H25M [facility] Rx#:[id number]   


 


  Tube Feeding 420 400 324


 


  Other 90  120


 


Output:   


 


  Urine 1495 880 660


 


Other:   


 


  Voiding Method Indwelling Catheter Indwelling Catheter Indwelling Catheter


 


  # Bowel Movements   1








                       ABP, PAP, CO, CI - Last Documented











Arterial Blood Pressure        135/66

















- Exam





Patient is sedated.  Even after sedation was held, patient was minimally 

grimacing with painful stimuli to the thumb bilaterally.  Pupils were equal, 

round and reacting.  Oculocephalics present.  Patient would not follow commands.

 No obvious seizure activity noticed.  Tone is equal bilaterally.  Reflexes are 

2+ and plantars are flat.   Patient has peripheral edema.





- Labs


CBC & Chem 7: 


                                 01/14/23 05:20





                                 01/14/23 05:20


Labs: 


                  Abnormal Lab Results - Last 24 Hours (Table)











  01/13/23 01/13/23 01/14/23 Range/Units





  16:21 18:02 05:20 


 


RBC     (4.30-5.90)  m/uL


 


Hgb     (13.0-17.5)  gm/dL


 


Hct     (39.0-53.0)  %


 


ABG pO2     ()  mmHg


 


ABG HCO3     (21-25)  mmol/L


 


ABG Total CO2     (19-24)  mmol/L


 


ABG O2 Saturation     (94-97)  %


 


Chloride    114 H  ()  mmol/L


 


BUN    8 L  (9-20)  mg/dL


 


Creatinine    0.64 L  (0.66-1.25)  mg/dL


 


Glucose    124 H  (74-99)  mg/dL


 


POC Glucose (mg/dL)   113 H   ()  mg/dL


 


Calcium    7.5 L  (8.4-10.2)  mg/dL


 


Delta Bilirubin  0.3 H    (0.0-0.2)  mg/dL


 


Total Protein  4.4 L    (6.3-8.2)  g/dL


 


Albumin  2.1 L    (3.5-5.0)  g/dL














  01/14/23 01/14/23 Range/Units





  05:20 05:21 


 


RBC  3.59 L   (4.30-5.90)  m/uL


 


Hgb  10.9 L   (13.0-17.5)  gm/dL


 


Hct  34.2 L   (39.0-53.0)  %


 


ABG pO2   110 H  ()  mmHg


 


ABG HCO3   30 H  (21-25)  mmol/L


 


ABG Total CO2   31 H  (19-24)  mmol/L


 


ABG O2 Saturation   98.7 H  (94-97)  %


 


Chloride    ()  mmol/L


 


BUN    (9-20)  mg/dL


 


Creatinine    (0.66-1.25)  mg/dL


 


Glucose    (74-99)  mg/dL


 


POC Glucose (mg/dL)    ()  mg/dL


 


Calcium    (8.4-10.2)  mg/dL


 


Delta Bilirubin    (0.0-0.2)  mg/dL


 


Total Protein    (6.3-8.2)  g/dL


 


Albumin    (3.5-5.0)  g/dL








                      Microbiology - Last 24 Hours (Table)











 01/08/23 08:29 Blood Culture - Final





 Blood    No Growth after 144 hours














Assessment and Plan


Assessment: 





* New onset rhythmic twitching of the shoulders bilaterally and some upper 

  limbs, rule out seizures.  Rule out metabolic myoclonic jerks.  EEG did not 

  reveal any epileptiform activity.  No status epilepticus.


* Altered mental status, likely due to toxic metabolic encephalopathy


* Patient presented with unresponsiveness, likely due to above.


* Possible Alcohol withdrawal


* History of alcoholism.


* Elevated ammonia, abnormal liver functions, rule out cirrhosis.


* Acute kidney injury, now resolved


* Status post shock, likely hypovolemic


* Atrial fibrillation with RVR, status post cardioversion


* Acute respiratory failure, on mechanical ventilation


* Elevated troponin


* Gram-positive bacteremia, possibly contaminant.





Plan: 





* Patient continues to be severely encephalopathic.  Patient also sedated.  Even

  after sedation was held, patient continued to be encephalopathic.  Patient's 

  daughters consented for lumbar puncture.  It will be performed today.





* Patient had a prolonged, 2.5 hour EEG performed 01/13/2023.  According to 

  preliminary report from Dr. Ortiz, did not reveal any epileptiform 

  activity.  Some triphasic waves are seen.  No status epilepticus.  Official 

  report pending.  We will continue Keppra 1000 mg twice a day.  Try to wean off

  propofol, then Versed drip.  Patient received Dilantin loading dose last 

  night.  Patient's level is 10.9.  We will not continue Dilantin, as there is 

  no epileptiform activity.





* Repeat EEG 01/12/2023, which was abnormal due to presence of amplitude 

  asymmetry with relatively higher amplitude in the left frontoparietal region 

  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  Generalized suppressed 

  background consistent with encephalopathy or medication effect.  Also some 

  sporadic sharp-appearing waves seen in the left parietal region, which could 

  be related to underlying breach rhythm, although may suggest underlying 

  cortical irritability.  No electrographic seizure was recorded in the entire 

  study.  Clinical correlation also recommended.


* Patient was given a loading dose of Keppra 1500 mg by intensivist.  We will 

  maintain on Keppra 1000 mg IV twice a day.  Ativan 1 mg every hour as needed 

  for seizure like activity.  





* Repeat CT head 01/12/2023 revealed previous surgery.  Mild atrophy, no acute 

  intracranial process.  I personally reviewed CT head, agree with the findings.





* CTA reported as negative of the brain.  Mild plaque at the carotid artery 

  bifurcation.  No evidence of hemodynamic arterial stenosis in the neck.


* Initial EEG 01/09/2023 was abnormal due to amplitude asymmetry with relatively

  higher amplitude activity in the left frontoparietal region due to breach rh

  ythm from previous craniotomy defect.  Background slowing of moderate to 

  severe degree, consistent with generalized cerebral dysfunction, as can be 

  seen with toxic metabolic encephalopathy or due to diffuse structural brain 

  abnormality.  Clinical correlation is recommended.  No epileptiform activity 

  was seen.


* B12 2102, folate 12.90, TSH slightly decreased 0.220, with normal free T4 

  1.37.  Possible due to sick euthyroid.


* Continue present management.  Neurology will follow.





* Discussed with patient's nurse and patient's daughter in detail.

## 2023-01-14 NOTE — P.PN
Subjective


Progress Note Date: 01/14/23





Patient had versed increased overnight, still having episodes of seizure like 

activity when sedation is weaned.  On keppra, propofol, versed, and ativan PRN 

for seizure like activity.





General: intubated, sedated


HEENT: normocephalic, atraumatic, no tracheal deviation


Respiratory: symmetric chest rise, no cyanosis, ventilator dependent


CVS: perfusing all extremities, no distal gangrene, no pitting edema


GI: soft, ND


: no SPT, no CVAT, alston is present


Neuro: sedated, rigors, moving all extremities, not following commands





Assessment/plan:





Acute hypoxic and hypercapnic respiratory failure status post intubation


-Repeating prolonged EEG is pending read


-Discussed with nursing overnight events requiring increase of versed


-CXR reviewed today, continues to have left pleural effusion








Acute metabolic encephalopathy


Hyperammonemia


History of alcohol abuse


-Continue to wean sedation as able, again on versed and propofol, keppra, ativan

PRN for seizures weaning versed as able. 





Shock, likely hypovolemic - resolved


High anion gap metabolic acidosis secondary to Severe lactic acidosis - resolved


Acute kidney injury - resolved


Severe hyperphosphatemia - resolved


Severe hypokalemia - resolving


Hypomagnesemia


-Continue IV fluids


-Monitor urine output and renal function, renal ultrasound showed no 

hydronephrosis


-Replete electrolytes per protocol, continue to monitor


-Renal following





A. fib with RVR status post cardioversion


Elevated troponin


-Cardiology following





Thrombocytopenia, resolved


-IV PPI twice a day


-Low platelets likely in the setting of chronic alcohol abuse


-Gen. surgery following





Staph bacteremia


-First set of blood cultures positive for staph aureus and epidermidis, likely 

contaminant. however, sputum culture also showing staph


-Patient currently on vancomycin, repeat blood cultures no growth to date





DVT ppx: Lovenox





Code status: Full code





Anticipated discharge place: Pending clinical course


Anticipated discharge time: Pending clinical course








Objective





- Vital Signs


Vital signs: 


                                   Vital Signs











Temp  98.9 F   01/14/23 01:00


 


Pulse  63   01/14/23 07:00


 


Resp  18   01/14/23 07:00


 


BP  118/75   01/14/23 06:00


 


Pulse Ox  98   01/14/23 07:00


 


FiO2  35   01/14/23 07:46








                                 Intake & Output











 01/13/23 01/14/23 01/14/23





 18:59 06:59 18:59


 


Intake Total 8000.430 4234.114 190.308


 


Output Total 1495 880 80


 


Balance -9.456 784.114 110.308


 


Weight 94.4 kg 96 kg 


 


Intake:   


 


   600 50


 


    Dextrose 5%-0.45% NaCl 1, 800 600 50





    000 ml @ 100 mls/hr IV .   





    Q10H [facility] Rx#:[id number]   


 


  Intake, IV Titration 175.544 664.114 136.308





  Amount   


 


    Midazolam HCl 200 mg In 0 18.725 23.5





    Sodium Chloride 0.9% 60   





    ml @ 14 MG/HR 7 mls/hr IV   





    .[id number] [facility] Rx#:   





    [id number]   


 


    Norepinephrine 4 mg In 107.899 47.389 83.783





    Sodium Chloride 0.9% 250   





    ml @ 0.03 MCG/KG/MIN 9.   





    332 mls/hr IV .Q24H [facility]   





    Rx#:[id number]   


 


    Vancomycin 1,500 mg In  498 





    Sodium Chloride 0.9% 500   





    ml 500 ml @ 167 mls/hr   





    IVPB Q12H [facility] Rx#:   





    [id number]   


 


    propofoL 1,000 mg In 67.645 100.00 29.025





    Empty Bag 1 bag @ 25 MCG/   





    KG/MIN 13.5 mls/hr IV .   





    Q7H25M [facility] Rx#:[id number]   


 


  Tube Feeding 420 400 4


 


  Other 90  


 


Output:   


 


  Urine 1495 880 80


 


Other:   


 


  Voiding Method Indwelling Catheter Indwelling Catheter 








                       ABP, PAP, CO, CI - Last Documented











Arterial Blood Pressure        152/68

















- Labs


CBC & Chem 7: 


                                 01/14/23 05:20





                                 01/14/23 05:20


Labs: 


                  Abnormal Lab Results - Last 24 Hours (Table)











  01/13/23 01/13/23 01/13/23 Range/Units





  14:35 16:21 18:02 


 


RBC     (4.30-5.90)  m/uL


 


Hgb     (13.0-17.5)  gm/dL


 


Hct     (39.0-53.0)  %


 


ABG pO2     ()  mmHg


 


ABG HCO3     (21-25)  mmol/L


 


ABG Total CO2     (19-24)  mmol/L


 


ABG O2 Saturation     (94-97)  %


 


Chloride     ()  mmol/L


 


BUN     (9-20)  mg/dL


 


Creatinine     (0.66-1.25)  mg/dL


 


Glucose     (74-99)  mg/dL


 


POC Glucose (mg/dL)  127 H   113 H  ()  mg/dL


 


Calcium     (8.4-10.2)  mg/dL


 


Delta Bilirubin   0.3 H   (0.0-0.2)  mg/dL


 


Total Protein   4.4 L   (6.3-8.2)  g/dL


 


Albumin   2.1 L   (3.5-5.0)  g/dL














  01/14/23 01/14/23 01/14/23 Range/Units





  05:20 05:20 05:21 


 


RBC   3.59 L   (4.30-5.90)  m/uL


 


Hgb   10.9 L   (13.0-17.5)  gm/dL


 


Hct   34.2 L   (39.0-53.0)  %


 


ABG pO2    110 H  ()  mmHg


 


ABG HCO3    30 H  (21-25)  mmol/L


 


ABG Total CO2    31 H  (19-24)  mmol/L


 


ABG O2 Saturation    98.7 H  (94-97)  %


 


Chloride  114 H    ()  mmol/L


 


BUN  8 L    (9-20)  mg/dL


 


Creatinine  0.64 L    (0.66-1.25)  mg/dL


 


Glucose  124 H    (74-99)  mg/dL


 


POC Glucose (mg/dL)     ()  mg/dL


 


Calcium  7.5 L    (8.4-10.2)  mg/dL


 


Delta Bilirubin     (0.0-0.2)  mg/dL


 


Total Protein     (6.3-8.2)  g/dL


 


Albumin     (3.5-5.0)  g/dL








                      Microbiology - Last 24 Hours (Table)











 01/08/23 08:29 Blood Culture - Preliminary





 Blood    No Growth after 120 hours

## 2023-01-14 NOTE — P.PN
Subjective


Progress Note Date: 01/14/23


Principal diagnosis: 





Acute hypoxic and hypercapnic respiratory failure





57-year-old male patient with known history of alcoholism and known history of 

chronic into fibrillation.  The patient arrived to the emergency department 

yesterday for altered mentation suspecting a possible stroke.  EMS stated that 

the patient was at home, and he was in his normal self at around 7:30 PM.  

Subsequently at around 8 PM, he was found face down in bed unresponsive.  In the

emergency, the patient was found to be hypotensive.  EMS was supporting his 

breathing by bagging and the patient was having agonal breathing.  It was 

reported to us that the patient is an alcoholic and he was binge drinking.  

There is also history of emesis and apparently the patient was having 

significant amount of emesis and ultimately collapsed and he was unable to stand

up anymore.  In the emergency, the patient was found to have a heart rate of 

220.  Initial blood pressure was 50/30.  He was having agonal breathing.  He was

not following any commands.  Code stroke was activated.  Patient was unable to 

protect his airway.  At that point, he was intubated and he was given a 7 ET 

tube.  He was given a total of 3 L of fluid normal saline bolus.  CAT scan of 

the brain was done in addition to a CT angiogram of the head and the neck.  The 

CAT scans were negative.  Case was discussed with intervention neurology and no 

further treatment was done.  Initial blood work showed a lactic acid level of 

12.4.  The patient also will was found to have a potassium level of 2.7.  He was

in acute kidney injury with a BUN of 52 and a creatinine of 3.6.  Sodium level 

was at 141.  Troponins were positive at levels of 0.3 and 0.2 respectively.  AST

was 191.  He was 84 bilirubin was 3.7 calcium was 7.8.  His white cell count was

17.2 with a hemoglobin 18.7.  INR was 1.4 with a PT of 17.8 and a PTT of 23.  

Urine drug screen was positive for benzodiazepines and marijuana.  Alcohol level

was negative.  UA was positive for protein, 10 WBCs, 8 RBCs.  The chest x-ray 

was essentially clear.  Posterior intubation chest x-ray showed adequate 

positioning of 82.  Vision also has a G-tube in place.  No evidence of any pneu

mothorax.  Subsequent lactic acid level dropped down to 5.3.  At this point in 

time, the patient is intubated on a mechanical ventilator.  He is on a Versed 

drip running at 11 mg/m and fentanyl drip running at 2 Karan respiratory 

kilogram per hour.  His assist-control mode of mechanical ventilation at the 

rate of 18 with a tidal volume of 400 FiO2 of 50% with a PEEP of 5.  Most recent

blood gas showed a pH of 7.31 with a pCO2 of 43 and pO2 of 203.  IV fluids are 

currently in the form of normal saline at rate of 1 50 mL an hour.  Urine output

is adequate and the patient is producing white celia urine.  Repeat electrodes 

from today are still pending.  He was placed on IV heparin.  He is on no 

pressors for now.  Note that the patient also had a CAT scan of the chest 

abdomen and pelvis in the emergency department that showed no significant 

abnormalities.





On today's evaluation of 01/08/2023, the patient is still intubated on a 

mechanical ventilator.  He is very much dependent on sedation as the patient is 

quite agitated once off sedation.  This morning, he is on a combination of 

Versed at 40 mg an hour and is also on fentanyl and at 4 mcg/kg/h.  I was told 

by the nursing staff that he gets quite agitated when the sedation runs of and 

this has been noted by the nursing staff.  Note that no purposeful activity or 

following commands and his mentation is not appropriate at this point in time.  

There may be a component of delirium tremens of this patient.  He is a long-time

alcoholic.  In [facility], another sedation holiday will be given today and his 

mental status will be reassessed during the day.  Meanwhile, this morning, he is

on assist-control mode at a rate of 18 with a tidal volume of 400 and FiO2 was 

at 45% with a PEEP of 5.  His chest x-ray showed no acute abnormalities and EKG 

was in a good location.  No significant orotracheal secretions.  The blood gas 

is showing a component of alkalosis as the patient was being given bicarb 

infusion.  His patient's pH is at 7.47 with a pCO2 of 54 and pO2 of 90.  On his 

blood work, the renal function has improved.  The patient's creatinine is down 

to 1.23 with a BUN of 44.  Sodium is at 143 and potassium is at 3.70 severity 

replaced.  Serum bicarb is up to 32.  WBC was at 14.3 and hematocrit is 49 with 

a platelet count that is pending for now.  Note that her amylase and lipase was 

slightly elevated and there may be a component of pancreatitis along with 

alcoholic hepatitis.  Blood culture was positive for staph aureus and staph 

epidermidis.  I'm not sure if this is a true infection versus a contaminant.  

This was only in 1 out of 2 bottles.  Another set of blood cultures will be 

obtained.  Noted the patient was given a dose of vancomycin pending further 

cultures.  He is on Lovenox portably prophylaxis.  He remains nothing by mouth. 

No pressors for now.  Renal ultrasound was completed yesterday and it showed no 

evidence of any hydronephrosis.





Reevaluated today on 1/9/2023, patient remains in the ICU, intubated and 

mechanically ventilated.  Patient is on assist control rate of 18 tidal volume 

400 FiO2 40% and PEEP of 5 ABG showed a pO2 of 84 pCO2 48 pH of 7.47.  Patient 

remains on fentanyl at 4 mcg/kg/h, norepinephrine at 0.01 mcg/kg/m, Versed at 15

mg per hour and he is on IV fluid at the cc per hour in the form of 0.9 normal 

saline.  Patient is receiving vital HPI at 10 mL per hour.  Continues to have 

extreme agitations in spite of sedation, patient is arousable, and gets agitated

very easily.  Chest x-ray not done today, but chest x-ray from yesterday showed 

no evidence of infiltrate.  Seen by neurology today, felt to have toxic 

metabolic encephalopathy and alcohol withdrawal.  Considering the patient's 

agitation, he is obviously not a candidate for weaning and extubation at this 

point.  Basic metabolic profile is normal, renal profile is normal.  Brain CT on

admission showed no intracranial abnormality





Reevaluated today on 1/10/23, patient remains in the ICU, intubated and 

mechanically ventilated.  Patient is on assist control rate of 18, tidal volume 

400 FiO2 40% and PEEP of 5.  ABG showed a pO2 of 100 pCO2 51 pH of 7.41.  EEG 

showed mostly metabolic encephalopathy, no seizure activity.  Patient remains on

significant amount of sedation including fentanyl and 4 mcg/kg/h, and I cut it 

down to 2 mcg/kg per hour.  Remains on Versed at 20 mg per hour.  Normal saline 

which I have changed to D5 45 at 100 mL per hour.  He is on tube feeding at 40 

mL per hour.  Patient remains on GI and DVT prophylaxis, he is not requiring any

pressors.  Today I plan to hold or at least cut down on the sedation, assess 

mental status, and possibly give the patient a trial of weaning if possible.  

However considering the patient gets extremely agitated, that may not happen 

today.  His potassium is low, and is being corrected as per protocol.  WBC count

is 5.8 hemoglobin is 11.8.  Renal profile is normal potassium is being corrected

it is 2.90.  Patient's Gram stain of the sputum and culture came back positive 

for staph aureus.  The staph aureus is MSSA.  Chest x-ray remains relatively cl

ear, minimal perihilar atelectasis, no clear-cut evidence of pneumonia.





Reevaluated today on 1/11/23, remains in the ICU, intubated, mechanically 

ventilated, patient remains on assist control rate of 18 tidal volume 400 FiO2 

40% and PEEP of 5.  Cut down his FiO2 down to 35%, his ABG showed a pO2 of 106 

pCO2 48 pH of 7.44.  Apparently the patient was placed back on relatively high-

dose of fentanyl yesterday, although my recommendation was to keep fentanyl at 

no more than 2 mcg/kg/h.  Found out this morning that the patient was placed on 

4 mcg/kg per hour overnight, he is relatively sedated, he is also on Versed at 

20 mg per hour.  Norepinephrine at 0.02 mcg/kg/m, D5 4 5 at 100 mL per hour.  

Patient is on vancomycin which I have discontinued and place the patient on 

cefazolin for his MSSA tracheobronchitis.  Enteral feeding is presently on hold,

patient developed significant amount of residual overnight.  Hence we'll start 

trickle feedings.  At any rate patient was laced on a lower dose of fentanyl, 

but shortly after the patient became extremely agitated, restless, and could not

be ventilated properly with his agitation in spite of fentanyl 12 mcg/kg/h and 

in spite of Versed at 20 mg per hour hence I recommended that the patient goes 

on Nimbex, and will be titrated accordingly.  In the meantime we'll cut down the

fentanyl to 1 mcg/kg/h and he will need lower dose of Versed.  To me it seems, 

patient is having some sort of withdrawal.  Most likely alcohol withdrawal 

considering his clinical history.  After paralyzing the patient with Nimbex, and

cutting down on the dose of fentanyl, cutting down on the dose of Versed, I 

recommended the patient goes on Seroquel, I also recommended that he remains 

intubated and mechanically ventilated.  IB he is not ready to be weaned and e

xtubated at this point.





Reevaluated today on 1/12/23, patient remains in the ICU, remains intubated and 

mechanically ventilated.  He is on assist control rate of 18 tidal volume 400 

FiO2 40% and PEEP of 5.  ABG showed a pO2 of 89 pCO2 49 pH of 7.44.  Yesterday 

the patient had to be placed on Nimbex for extreme agitation, and he was 

throughout the afternoon on fentanyl at 2 mcg/kg/m he was also on Versed at 20 

mg per hour, and Nimbex was added yesterday, however in the early evening the 

patient developed significant hypertension, tachycardia, and he was not doing 

very well.  Clinically I felt this is most likely that the patient was having a 

seizure that could not be clinically seen while on Nimbex, hence I decided to 

discontinue his Nimbex completely, and I recommended overnight that the patient 

goes on fentanyl, Versed, and propofol.  After this was done, the patient did 

great, his blood pressure settled down nicely, his oxygenation improved, and I 

recommended no more Nimbex for this patient.  I clinically believe that the 

patient is having seizures, neurology will see him today, may have to have 

repeat EEG.  Patient is now on Versed at 15 mg per hour, is also on propofol at 

20 mcg/kg/m.  Around 4 AM, his fentanyl was discontinued.  I plan to start 

tapering his Versed and propofol down, and if he develops any symptoms to 

suggest underlying seizure, I will go ahead and start the patient on Keppra 

until seen by neurology today.  Patient is on norepinephrine at 0.02 mcg/kg/m, 

however this is not necessarily needed if the patient requires less sedation and

I will try to taper down his Versed and propofol further.  Patient remains on IV

fluid D5 4 5 at 100 mL per hour.  Obviously considering the events yesterday and

last night, the patient is not quite ready for weaning, but I am willing to 

start tapering the propofol and Versed down, hopefully maintain him on either 

propofol or Versed alone.  Again no more Nimbex for this patient, I believe the 

patient must have had a seizure while he was on Nimbex and that could not be 

seen.CBC today is relatively normal basic metabolic profile is normal potassium 

is a bit low at 3.2, renal profile is normal.patient remains on trickle 

feeding,/enteral feeding/nutritional support.





Reevaluated today on 1/1:30/23, patient remains in the ICU, intubated 

mechanically ventilated.  He is on assist control rate of 18 tidal volume 450 

FiO2 35% PEEP of 5 ABG showed a pO2 of 100 pCO2 44 pH of 7.46.  His FiO2 was cut

down to 35%.  Patient continued to have intermittent episodes of seizure-like 

activities or possibly myoclonic jerks affecting his upper body and upper 

extremities, with deviation of his eyes with upward gaze.  Neurology on the case

loaded up with Dilantin yesterday, and he is on Keppra today.  Although his EEG 

according to the neurologist did not show any evidence of seizure activity.  

Patient to be seen again by neurology today, and decide whether to continue with

seizure medications he did receive Dilantin 1 dose yesterday, and he is now on 

Keppra 1 g twice a day.  Early this morning patient had an EEG which is yet to 

be interpreted by the neurologist.  In the meantime the patient is on propofol 

at 20 mcg/kg/m, Versed 3 mg/h, D5 4 5 at 50 mL per hour and is on vital AF at 30

mL per hour.  Unable to cut down on his sedation at this point mostly because of

these episodic seizure-like activities or possibly myoclonic jerks.  Chest x-ray

today showed small left pleural effusion not large enough to consider 

thoracentesis.  His WBC count is 11.5 hemoglobin is 12.1.  Basic metabolic 

profile and renal profile are normal





Patient was reevaluated today on 1/14/23, remains in the ICU intubated and 

mechanically ventilated, he is tidal volume 450 assist-control rate of 18FiO2 is

down to 35%, PEEP is 5.  ABG showed a pO2 of 110 pCO2 of 44 pH of 7.45, hence no

changes were made in the ventilator settings.  Neurologically the patient is 

about the same, continues to have these episodes of myoclonic-like jerks, 

remains on propofol at 25 mcg/kg/m, remains on Versed at 6 mg/h, patient is 

supposedly scheduled to have a lumbar puncture done today.  Chest x-ray 

continues to show a small left pleural effusion, otherwise is unremarkable.  

Labs were all reviewed and they seem to be relatively unremarkable.  Again the 

only reason the patient could not be extubated to mostly because of his 

neurological status at this point.CBC is relatively normal ABG as noted above.  

Basic metabolic profile is relatively.  Renal profile is normal.  Patient is 

receiving vital AF at 40 mL per hour.  Patient remains on GI and DVT prophylaxis











Objective





- Vital Signs


Vital signs: 


                                   Vital Signs











Temp  98.9 F   01/14/23 08:00


 


Pulse  65   01/14/23 11:00


 


Resp  18   01/14/23 11:00


 


BP  97/55   01/14/23 10:00


 


Pulse Ox  98   01/14/23 11:00


 


FiO2  35   01/14/23 08:00








                                 Intake & Output











 01/13/23 01/14/23 01/14/23





 18:59 06:59 18:59


 


Intake Total 7142.062 9974.114 1351.958


 


Output Total 1495 880 340


 


Balance -9.456 407.617 3600.958


 


Weight 94.4 kg 96 kg 


 


Intake:   


 


   600 850


 


    Dextrose 5%-0.45% NaCl 1, 800 600 250





    000 ml @ 100 mls/hr IV .   





    Q10H [facility] Rx#:[id number]   


 


    Vancomycin 1,500 mg In   500





    Sodium Chloride 0.9% 500   





    ml 500 ml @ 167 mls/hr   





    IVPB Q12H [facility] Rx#:   





    [id number]   


 


    levETIRAcetam IV 1,000 mg   100





    In Saline 1 100ml.bag @   





    400 mls/hr IVPB Q12HR [facility]   





    Rx#:[id number]   


 


  Intake, IV Titration 175.544 664.114 247.958





  Amount   


 


    Midazolam HCl 200 mg In 0 18.725 35.15





    Sodium Chloride 0.9% 60   





    ml @ 14 MG/HR 7 mls/hr IV   





    .[id number] [facility] Rx#:   





    [id number]   


 


    Norepinephrine 4 mg In 107.899 47.389 83.783





    Sodium Chloride 0.9% 250   





    ml @ 0.03 MCG/KG/MIN 9.   





    332 mls/hr IV .Q24H [facility]   





    Rx#:[id number]   


 


    Thiamine 100 mg In Sodium   100





    Chloride 0.9% 50 ml @   





    100 mls/hr IVPB Q24HR [facility]   





    Rx#:[id number]   


 


    Vancomycin 1,500 mg In  498 





    Sodium Chloride 0.9% 500   





    ml 500 ml @ 167 mls/hr   





    IVPB Q12H [facility] Rx#:   





    [id number]   


 


    propofoL 1,000 mg In 67.645 100.00 29.025





    Empty Bag 1 bag @ 25 MCG/   





    KG/MIN 13.5 mls/hr IV .   





    Q7H25M [facility] Rx#:[id number]   


 


  Tube Feeding 420 400 164


 


  Other 90  90


 


Output:   


 


  Urine 1495 880 340


 


Other:   


 


  Voiding Method Indwelling Catheter Indwelling Catheter Indwelling Catheter


 


  # Bowel Movements   1








                       ABP, PAP, CO, CI - Last Documented











Arterial Blood Pressure        124/60

















- Exam





Physical Exam: Revealed 57-year-old white male intubated, mechanically 

ventilated, maintained on propofol and on Versed.


Head: Atraumatic, normocephalic.  Endotracheal tube and orogastric tube are 

intact.


HEENT:[Neck is supple.] [No neck masses.] [No thyromegaly.] [No JVD.]


Chest: [Clear throughout, no crackles, no rhonchi, no wheezes.]


Cardiac Exam: [Normal S1 and S2, no S3 gallop, no murmur.]


Abdomen: [Soft, nontender,  no megaly, no rebound, no guarding, normal bowel 

sounds.]


Extremities: [No clubbing, no edema, no cyanosis.]


Neurological Exam: Unable to assess on Versed and propofol,.


Psychiatric : Could not assess.  .


Musculoskeletal: No deformities.


Skin: No rashes





- Labs


CBC & Chem 7: 


                                 01/14/23 05:20





                                 01/14/23 05:20


Labs: 


                  Abnormal Lab Results - Last 24 Hours (Table)











  01/13/23 01/13/23 01/13/23 Range/Units





  14:35 16:21 18:02 


 


RBC     (4.30-5.90)  m/uL


 


Hgb     (13.0-17.5)  gm/dL


 


Hct     (39.0-53.0)  %


 


ABG pO2     ()  mmHg


 


ABG HCO3     (21-25)  mmol/L


 


ABG Total CO2     (19-24)  mmol/L


 


ABG O2 Saturation     (94-97)  %


 


Chloride     ()  mmol/L


 


BUN     (9-20)  mg/dL


 


Creatinine     (0.66-1.25)  mg/dL


 


Glucose     (74-99)  mg/dL


 


POC Glucose (mg/dL)  127 H   113 H  ()  mg/dL


 


Calcium     (8.4-10.2)  mg/dL


 


Delta Bilirubin   0.3 H   (0.0-0.2)  mg/dL


 


Total Protein   4.4 L   (6.3-8.2)  g/dL


 


Albumin   2.1 L   (3.5-5.0)  g/dL














  01/14/23 01/14/23 01/14/23 Range/Units





  05:20 05:20 05:21 


 


RBC   3.59 L   (4.30-5.90)  m/uL


 


Hgb   10.9 L   (13.0-17.5)  gm/dL


 


Hct   34.2 L   (39.0-53.0)  %


 


ABG pO2    110 H  ()  mmHg


 


ABG HCO3    30 H  (21-25)  mmol/L


 


ABG Total CO2    31 H  (19-24)  mmol/L


 


ABG O2 Saturation    98.7 H  (94-97)  %


 


Chloride  114 H    ()  mmol/L


 


BUN  8 L    (9-20)  mg/dL


 


Creatinine  0.64 L    (0.66-1.25)  mg/dL


 


Glucose  124 H    (74-99)  mg/dL


 


POC Glucose (mg/dL)     ()  mg/dL


 


Calcium  7.5 L    (8.4-10.2)  mg/dL


 


Delta Bilirubin     (0.0-0.2)  mg/dL


 


Total Protein     (6.3-8.2)  g/dL


 


Albumin     (3.5-5.0)  g/dL








                      Microbiology - Last 24 Hours (Table)











 01/08/23 08:29 Blood Culture - Final





 Blood    No Growth after 144 hours














Assessment and Plan


Assessment: 





Impression:


Acute hypoxic and hypercapnic respiratory failure, mostly because the patient 

was unable to protect his airways upon his initial presentation, patient 

required intubation for airways protection.  And he was encephalopathic with 

mental status change upon his initial presentation.


possible recurrent seizures, however EEGs done did not reflect any seizure 

activity as per neurology on the case.


MSSA tracheobronchitis, we will discontinue vancomycin again and start the 

patient on Kefzol.


Altered mental status, possibly metabolic encephalopathy, suspect alcohol 

withdrawal.


Severe lactic acidosis on admission, with hypovolemia, resolved.impression 

suspect, his severe lactic acidosis may have been related to seizure activity.


Hypovolemic with hypotension


Anion gap metabolic acidosis, resolved.


Positive blood cultures for staph epidermidis, felt to be a contamination.


History of alcoholism


Acute kidney injury with hypovolemia, resolved.


History of paroxysmal atrial fibrillation


Benign essential hypertension


Acute toxic metabolic encephalopathy, possible seizures.


Dyslipidemia





Recommendation:


agree with lumbar puncture to further evaluate his encephalopathy.


Continue ventilatory support, not ready for any weaning trials at this point 

because of his neurological status.


Continue GI and DVT prophylaxis patient is on Lovenox and on Protonix.


Continue nutritional support.


Continue IV thiamine.


Continue to monitor daily labs and address accordingly


Remains critically ill.


continue propofol and Versed and titrate accordingly.


Critical care time is over 30





Time with Patient: Greater than 30

## 2023-01-14 NOTE — XR
EXAMINATION TYPE: XR chest 1V portable

 

DATE OF EXAM: 1/14/2023 6:22 AM

 

COMPARISON: Chest radiographs from 1/13/2023.

 

TECHNIQUE: XR chest 1V portable Portable AP radiograph of the chest.

 

CLINICAL INDICATION:Male, 57 years old with history of Vent; 

 

FINDINGS: 

Lungs/Pleura: Similar multifocal airspace opacities in the lung bases. Similar left pleural effusion 
with blunting of the costophrenic angle.. 

Pulmonary vascularity: Unremarkable.

Heart/mediastinum: Cardiomediastinal silhouette is unremarkable.

Musculoskeletal: No acute osseous pathology. There is fixation hardware in the lower cervical spine.

 

Lines/Tubes:

Endotracheal tube with distal tip 3.7 cm above the jossy.

Nasogastric tube with its distal tip and side-port projecting under the diaphragm.

Right internal jugular central venous catheter with distal tip at the right atrium.

 

IMPRESSION: 

1. Similar multifocal airspace opacities.

2. Stable support lines and tubes.

## 2023-01-15 LAB
ANION GAP SERPL CALC-SCNC: 1 MMOL/L
BASOPHILS # BLD AUTO: 0 K/UL (ref 0–0.2)
BASOPHILS NFR BLD AUTO: 1 %
BUN SERPL-SCNC: 9 MG/DL (ref 9–20)
CALCIUM SPEC-MCNC: 7.4 MG/DL (ref 8.4–10.2)
CHLORIDE SERPL-SCNC: 113 MMOL/L (ref 98–107)
CO2 BLDA-SCNC: 30 MMOL/L (ref 19–24)
CO2 SERPL-SCNC: 26 MMOL/L (ref 22–30)
EOSINOPHIL # BLD AUTO: 0.3 K/UL (ref 0–0.7)
EOSINOPHIL NFR BLD AUTO: 4 %
ERYTHROCYTE [DISTWIDTH] IN BLOOD BY AUTOMATED COUNT: 3.78 M/UL (ref 4.3–5.9)
ERYTHROCYTE [DISTWIDTH] IN BLOOD: 13.5 % (ref 11.5–15.5)
GLUCOSE BLD-MCNC: 108 MG/DL (ref 70–110)
GLUCOSE BLD-MCNC: 118 MG/DL (ref 70–110)
GLUCOSE BLD-MCNC: 97 MG/DL (ref 70–110)
GLUCOSE SERPL-MCNC: 120 MG/DL (ref 74–99)
HCO3 BLDA-SCNC: 28 MMOL/L (ref 21–25)
HCT VFR BLD AUTO: 35.7 % (ref 39–53)
HGB BLD-MCNC: 11.4 GM/DL (ref 13–17.5)
LYMPHOCYTES # SPEC AUTO: 1.4 K/UL (ref 1–4.8)
LYMPHOCYTES NFR SPEC AUTO: 20 %
MCH RBC QN AUTO: 30.3 PG (ref 25–35)
MCHC RBC AUTO-ENTMCNC: 32.1 G/DL (ref 31–37)
MCV RBC AUTO: 94.5 FL (ref 80–100)
MONOCYTES # BLD AUTO: 0.4 K/UL (ref 0–1)
MONOCYTES NFR BLD AUTO: 5 %
NEUTROPHILS # BLD AUTO: 4.9 K/UL (ref 1.3–7.7)
NEUTROPHILS NFR BLD AUTO: 68 %
PCO2 BLDA: 41 MMHG (ref 35–45)
PH BLDA: 7.45 [PH] (ref 7.35–7.45)
PLATELET # BLD AUTO: 193 K/UL (ref 150–450)
PO2 BLDA: 80 MMHG (ref 83–108)
POTASSIUM SERPL-SCNC: 3.6 MMOL/L (ref 3.5–5.1)
SODIUM SERPL-SCNC: 140 MMOL/L (ref 137–145)
WBC # BLD AUTO: 7.1 K/UL (ref 3.8–10.6)

## 2023-01-15 RX ADMIN — SODIUM CHLORIDE SCH MLS/HR: 9 INJECTION, SOLUTION INTRAVENOUS at 08:35

## 2023-01-15 RX ADMIN — PANTOPRAZOLE SODIUM SCH MG: 40 INJECTION, POWDER, FOR SOLUTION INTRAVENOUS at 08:35

## 2023-01-15 RX ADMIN — METOPROLOL TARTRATE SCH MG: 25 TABLET, FILM COATED ORAL at 08:35

## 2023-01-15 RX ADMIN — MIDAZOLAM SCH: 5 INJECTION INTRAMUSCULAR; INTRAVENOUS at 14:34

## 2023-01-15 RX ADMIN — CHLORHEXIDINE GLUCONATE SCH ML: 1.2 RINSE ORAL at 08:35

## 2023-01-15 RX ADMIN — PANTOPRAZOLE SODIUM SCH MG: 40 INJECTION, POWDER, FOR SOLUTION INTRAVENOUS at 19:58

## 2023-01-15 RX ADMIN — LEVETIRACETAM SCH MLS/HR: 10 INJECTION INTRAVENOUS at 08:35

## 2023-01-15 RX ADMIN — MIDAZOLAM SCH: 5 INJECTION INTRAMUSCULAR; INTRAVENOUS at 07:35

## 2023-01-15 RX ADMIN — CHLORHEXIDINE GLUCONATE SCH ML: 1.2 RINSE ORAL at 19:58

## 2023-01-15 RX ADMIN — SODIUM CHLORIDE SCH MLS/HR: 900 INJECTION, SOLUTION INTRAVENOUS at 08:35

## 2023-01-15 RX ADMIN — FENTANYL CITRATE SCH: 50 INJECTION INTRAVENOUS at 08:50

## 2023-01-15 RX ADMIN — DEXMEDETOMIDINE HYDROCHLORIDE SCH MLS/HR: 4 INJECTION, SOLUTION INTRAVENOUS at 19:33

## 2023-01-15 RX ADMIN — POTASSIUM CHLORIDE SCH MLS/HR: 14.9 INJECTION, SOLUTION INTRAVENOUS at 10:08

## 2023-01-15 RX ADMIN — DEXMEDETOMIDINE HYDROCHLORIDE SCH MLS/HR: 4 INJECTION, SOLUTION INTRAVENOUS at 14:20

## 2023-01-15 RX ADMIN — LEVETIRACETAM SCH MLS/HR: 10 INJECTION INTRAVENOUS at 19:58

## 2023-01-15 RX ADMIN — ENOXAPARIN SODIUM SCH MG: 40 INJECTION SUBCUTANEOUS at 10:08

## 2023-01-15 RX ADMIN — METOPROLOL TARTRATE SCH MG: 25 TABLET, FILM COATED ORAL at 19:59

## 2023-01-15 NOTE — P.PN
Subjective


Progress Note Date: 01/15/23





Patient was seen for a follow-up.  Patient has not had any more seizure-like 

spells.  Versed has been slowly tapered down, and discontinued at 1:10 PM.  

Propofol also discontinued at 12:20 PM.  Patient has been started on Precedex 

0.5, down.  He is more waking up, following directions as per exam.  Patient 

significant a generalized weak.  Patient admits to having some headache. 





Patient's daughters has mentioned yesterday that patient has been a very heavy 

drinker all their lives that very know their father.  They believe patient's 

last drink was on 01/01/2023, whereas he was hospitalized on 01/06/2023.  

Patient's daughters states that he has history of traumatic brain injury in 2016

or 2017, when was drunk and he fell down stairs suffering from a brain bleed.  

He was admitted to [facility] in the ICU for a month.  He had undergone 

craniotomy 2, on the left side.  











Objective





- Vital Signs


Vital signs: 


                                   Vital Signs











Temp  100.2 F H  01/15/23 16:00


 


Pulse  101 H  01/15/23 16:00


 


Resp  22   01/15/23 16:00


 


BP  104/53   01/15/23 08:00


 


Pulse Ox  94 L  01/15/23 16:00


 


FiO2  35   01/15/23 16:00








                                 Intake & Output











 01/14/23 01/15/23 01/15/23





 18:59 06:59 18:59


 


Intake Total 2171.108 6173.409 9894.640


 


Output Total 0935 961 2453


 


Balance 1141.108 973.252 -736.360


 


Weight  95.8 kg 


 


Intake:   


 


  IV 1200 1148 1050


 


    Dextrose 5%-0.45% NaCl 1, 600 650 450





    000 ml @ 50 mls/hr IV .   





    Q20H [facility] Rx#:[id number]   


 


    Vancomycin 1,500 mg In 500 498 500





    Sodium Chloride 0.9% 500   





    ml 500 ml @ 167 mls/hr   





    IVPB Q12H [facility] Rx#:   





    [id number]   


 


    levETIRAcetam IV 1,000 mg 100  100





    In Saline 1 100ml.bag @   





    400 mls/hr IVPB Q12HR [facility]   





    Rx#:[id number]   


 


  Intake, IV Titration 361.108 209.252 281.640





  Amount   


 


    Dexmedetomidine/0.9% NaCl   16.726





    (Pmx) 400 mcg In Empty   





    Bag 1 bag @ 0.2 MCG/KG/HR   





    4.79 mls/hr IV .[id number]   





    [facility] Rx#:[id number]   


 


    Midazolam HCl 200 mg In 49.75 30.817 12.584





    Sodium Chloride 0.9% 60   





    ml @ 14 MG/HR 7 mls/hr IV   





    .[id number] [facility] Rx#:   





    [id number]   


 


    Norepinephrine 4 mg In 83.783  





    Sodium Chloride 0.9% 250   





    ml @ 0.03 MCG/KG/MIN 9.   





    332 mls/hr IV .Q24H [facility]   





    Rx#:[id number]   


 


    Thiamine 100 mg In Sodium 100  100





    Chloride 0.9% 50 ml @   





    100 mls/hr IVPB Q24HR [facility]   





    Rx#:[id number]   


 


    ceFAZolin 2 gm In Sodium   50





    Chloride 0.9% 50 ml @ 100   





    mls/hr IVPB Q8H [facility] Rx#:   





    [id number]   


 


    propofoL 1,000 mg In 127.575 178.435 102.33





    Empty Bag 1 bag @ 25 MCG/   





    KG/MIN 13.5 mls/hr IV .   





    Q7H25M [facility] Rx#:[id number]   


 


  Tube Feeding 460 576 432


 


  Other 150  150


 


Output:   


 


  Urine 4565 936 4643


 


Other:   


 


  Voiding Method Indwelling Catheter Indwelling Catheter Indwelling Catheter


 


  # Bowel Movements 1  








                       ABP, PAP, CO, CI - Last Documented











Arterial Blood Pressure        145/71

















- Exam





Patient is intubated.  Off sedation, but still very groggy, encephalopathic.  

Patient does open his eyes to calling his name.  He did nod his head 

appropriately.  Pupils are equal, round and reactive to light.  Patient's  

was at least 4-bilaterally.  Patient flexing his right forearm better than the 

left.  He did not wiggle his toes or feet.  Patient did make eye contact, and 

slightly turned his head towards the examiner.  Patient has peripheral edema.





- Labs


CBC & Chem 7: 


                                 01/15/23 05:50





                                 01/15/23 05:50


Labs: 


                  Abnormal Lab Results - Last 24 Hours (Table)











  01/14/23 01/15/23 01/15/23 Range/Units





  17:00 05:36 05:50 


 


RBC     (4.30-5.90)  m/uL


 


Hgb     (13.0-17.5)  gm/dL


 


Hct     (39.0-53.0)  %


 


ABG pO2   80 L   ()  mmHg


 


ABG HCO3   28 H   (21-25)  mmol/L


 


ABG Total CO2   30 H   (19-24)  mmol/L


 


Chloride     ()  mmol/L


 


Creatinine    0.64 L  (0.66-1.25)  mg/dL


 


Glucose     (74-99)  mg/dL


 


POC Glucose (mg/dL)     ()  mg/dL


 


Calcium     (8.4-10.2)  mg/dL


 


CSF RBC  53476 H    (0-10)  u/L


 


CSF Tot Nucleated Cells  160 H*    (0-5)  u/L














  01/15/23 01/15/23 01/15/23 Range/Units





  05:50 05:50 11:32 


 


RBC  3.78 L    (4.30-5.90)  m/uL


 


Hgb  11.4 L    (13.0-17.5)  gm/dL


 


Hct  35.7 L    (39.0-53.0)  %


 


ABG pO2     ()  mmHg


 


ABG HCO3     (21-25)  mmol/L


 


ABG Total CO2     (19-24)  mmol/L


 


Chloride   113 H   ()  mmol/L


 


Creatinine   0.63 L   (0.66-1.25)  mg/dL


 


Glucose   120 H   (74-99)  mg/dL


 


POC Glucose (mg/dL)    118 H  ()  mg/dL


 


Calcium   7.4 L   (8.4-10.2)  mg/dL


 


CSF RBC     (0-10)  u/L


 


CSF Tot Nucleated Cells     (0-5)  u/L








                      Microbiology - Last 24 Hours (Table)











 01/14/23 17:00 CSF Gram Stain - Preliminary





 Cerebral Spinal Fluid 














Assessment and Plan


Assessment: 





* New onset rhythmic twitching of the shoulders bilaterally and some upper 

  limbs, probably myoclonic jerks, unlikely to be seizures.  EEG did not reveal 

  any epileptiform activity.  No status epilepticus.


* Altered mental status, likely due to toxic metabolic encephalopathy


* Patient presented with unresponsiveness, likely due to above.


* Possible Alcohol withdrawal


* History of alcoholism.


* History of traumatic brain injury with subdural hematoma, status post 

  craniotomy in 2016 oh 2017.


* Elevated ammonia, abnormal liver functions, rule out cirrhosis.


* Acute kidney injury, now resolved


* Status post shock, likely hypovolemic


* Atrial fibrillation with RVR, status post cardioversion


* Acute respiratory failure, on mechanical ventilation


* Elevated troponin


* Gram-positive bacteremia, possibly contaminant.





Plan: 





* Patient's sedation has been discontinued.  He is waking up, following 

  directions.  Still very encephalopathic, generalized weak.  Not able to move 

  his lower extremities but very weak upper extremities as well.  CT of the 

  lumbar spine showed no evidence of epidural hematoma.  Recommend stay off 

  sedation.  Patient currently on Precedex.





* CSF revealed normal glucose 69, CSF protein 57(12-60), WBC count 160, 

  polynuclear 77%, mononuclear cells 18%, eosinophils 5%.  This was a traumatic 

  tap, as the same ratio in the blood as well.  No evidence of infection.  

  Patient currently on vancomycin.  Await viral cultures.





* Patient had a prolonged, 2.5 hour EEG performed 01/13/2023.  According to 

  preliminary report from Dr. Ortiz, did not reveal any epileptiform 

  activity.  Some triphasic waves are seen.  No status epilepticus.  Official 

  report pending.  We will continue Keppra 1000 mg twice a day.  Try to wean off

  propofol, then Versed drip.  Patient received Dilantin loading dose last 

  night.  Patient's level is 10.9.  We will not continue Dilantin, as there is 

  no epileptiform activity.





* Patient was given a loading dose of Keppra 1500 mg by intensivist.  We will 

  maintain on Keppra 1000 mg IV twice a day.  Ativan 1 mg every hour as needed 

  for seizure like activity.  Patient probably may be rapidly weaned off Keppra,

  once extubated, and mental condition stabilized. 





* Repeat CT head 01/12/2023 revealed previous surgery.  Mild atrophy, no acute 

  intracranial process.  I personally reviewed CT head, agree with the findings.





* CTA reported as negative of the brain.  Mild plaque at the carotid artery 

  bifurcation.  No evidence of hemodynamic arterial stenosis in the neck.


* Initial EEG 01/09/2023 was abnormal due to amplitude asymmetry with relatively

  higher amplitude activity in the left frontoparietal region due to breach 

  rhythm from previous craniotomy defect.  Background slowing of moderate to 

  severe degree, consistent with generalized cerebral dysfunction, as can be 

  seen with toxic metabolic encephalopathy or due to diffuse structural brain 

  abnormality.  Clinical correlation is recommended.  No epileptiform activity 

  was seen.


* B12 2102, folate 12.90, TSH slightly decreased 0.220, with normal free T4 

  1.37.  Possible due to sick euthyroid.





* Other medical management as per critical care team, and primary care.





* Dr. Caesar Boston Will resume neurology service in the morning.

## 2023-01-15 NOTE — P.PN
Subjective


Progress Note Date: 01/15/23





Patient having versed decreased down to 1.  Propofol being weaned.  Attempt to 

assess mental status today.  Had Lumbar puncture yesterday at bedside, results 

reviewed, many RBCs, many WBCs  predominantly PMNs.  Cx pending.  





General: intubated, sedated


HEENT: normocephalic, atraumatic, no tracheal deviation


Respiratory: symmetric chest rise, no cyanosis, ventilator dependent


CVS: perfusing all extremities, no distal gangrene, no pitting edema


GI: soft, ND


: no SPT, no CVAT, alston is present


Neuro: sedated, rigors, moving all extremities, not following commands





Assessment/plan:





Acute hypoxic and hypercapnic respiratory failure status post intubation


-Repeating prolonged EEG is pending read


-Discussed with nursing overnight events requiring increase of versed


-CXR reviewed today, continues to have left pleural effusion








Acute metabolic encephalopathy


Hyperammonemia


History of alcohol abuse


-Continue to wean sedation as able, again on versed and propofol, keppra, ativan

PRN for seizures weaning versed as able. 


-f/u Lumbar Puncture Cx, HSV





Shock, likely hypovolemic - resolved


High anion gap metabolic acidosis secondary to Severe lactic acidosis - resolved


Acute kidney injury - resolved


Severe hyperphosphatemia - resolved


Severe hypokalemia - resolving


Hypomagnesemia


-Continue IV fluids


-Monitor urine output and renal function, renal ultrasound showed no 

hydronephrosis


-Replete electrolytes per protocol, continue to monitor


-Renal following





A. fib with RVR status post cardioversion


Elevated troponin


-Cardiology following





Thrombocytopenia, resolved


-IV PPI twice a day


-Low platelets likely in the setting of chronic alcohol abuse


-Gen. surgery following





Staph bacteremia


-First set of blood cultures positive for staph aureus and epidermidis, likely 

contaminant. however, sputum culture also showing staph


-Patient currently on vancomycin, repeat blood cultures no growth to date





DVT ppx: Lovenox





Code status: Full code





Anticipated discharge place: Pending clinical course


Anticipated discharge time: Pending clinical course








Objective





- Vital Signs


Vital signs: 


                                   Vital Signs











Temp  96.8 F L  01/15/23 08:00


 


Pulse  74   01/15/23 11:00


 


Resp  23   01/15/23 11:00


 


BP  104/53   01/15/23 08:00


 


Pulse Ox  98   01/15/23 11:00


 


FiO2  35   01/15/23 08:00








                                 Intake & Output











 01/14/23 01/15/23 01/15/23





 18:59 06:59 18:59


 


Intake Total 2171.108 1024.579 1988.427


 


Output Total 1030 960 925


 


Balance 1141.108 973.252 359.427


 


Weight  95.8 kg 


 


Intake:   


 


  IV 1200 1148 800


 


    Dextrose 5%-0.45% NaCl 1, 600 650 200





    000 ml @ 50 mls/hr IV .   





    Q20H [facility] Rx#:[id number]   


 


    Vancomycin 1,500 mg In 500 498 500





    Sodium Chloride 0.9% 500   





    ml 500 ml @ 167 mls/hr   





    IVPB Q12H [facility] Rx#:   





    [id number]   


 


    levETIRAcetam IV 1,000 mg 100  100





    In Saline 1 100ml.bag @   





    400 mls/hr IVPB Q12HR [facility]   





    Rx#:[id number]   


 


  Intake, IV Titration 361.108 209.252 202.427





  Amount   


 


    Midazolam HCl 200 mg In 49.75 30.817 10.267





    Sodium Chloride 0.9% 60   





    ml @ 14 MG/HR 7 mls/hr IV   





    .[id number] [facility] Rx#:   





    [id number]   


 


    Norepinephrine 4 mg In 83.783  





    Sodium Chloride 0.9% 250   





    ml @ 0.03 MCG/KG/MIN 9.   





    332 mls/hr IV .Q24H [facility]   





    Rx#:[id number]   


 


    Thiamine 100 mg In Sodium 100  100





    Chloride 0.9% 50 ml @   





    100 mls/hr IVPB Q24HR [facility]   





    Rx#:[id number]   


 


    propofoL 1,000 mg In 127.575 178.435 92.16





    Empty Bag 1 bag @ 25 MCG/   





    KG/MIN 13.5 mls/hr IV .   





    Q7H25M [facility] Rx#:[id number]   


 


  Tube Feeding 460 576 192


 


  Other 150  90


 


Output:   


 


  Urine 1030 960 925


 


Other:   


 


  Voiding Method Indwelling Catheter Indwelling Catheter Indwelling Catheter


 


  # Bowel Movements 1  








                       ABP, PAP, CO, CI - Last Documented











Arterial Blood Pressure        124/56

















- Labs


CBC & Chem 7: 


                                 01/15/23 05:50





                                 01/15/23 05:50


Labs: 


                  Abnormal Lab Results - Last 24 Hours (Table)











  01/14/23 01/15/23 01/15/23 Range/Units





  17:00 05:36 05:50 


 


RBC     (4.30-5.90)  m/uL


 


Hgb     (13.0-17.5)  gm/dL


 


Hct     (39.0-53.0)  %


 


ABG pO2   80 L   ()  mmHg


 


ABG HCO3   28 H   (21-25)  mmol/L


 


ABG Total CO2   30 H   (19-24)  mmol/L


 


Chloride     ()  mmol/L


 


Creatinine    0.64 L  (0.66-1.25)  mg/dL


 


Glucose     (74-99)  mg/dL


 


Calcium     (8.4-10.2)  mg/dL


 


CSF RBC  55883 H    (0-10)  u/L


 


CSF Tot Nucleated Cells  160 H*    (0-5)  u/L














  01/15/23 01/15/23 Range/Units





  05:50 05:50 


 


RBC  3.78 L   (4.30-5.90)  m/uL


 


Hgb  11.4 L   (13.0-17.5)  gm/dL


 


Hct  35.7 L   (39.0-53.0)  %


 


ABG pO2    ()  mmHg


 


ABG HCO3    (21-25)  mmol/L


 


ABG Total CO2    (19-24)  mmol/L


 


Chloride   113 H  ()  mmol/L


 


Creatinine   0.63 L  (0.66-1.25)  mg/dL


 


Glucose   120 H  (74-99)  mg/dL


 


Calcium   7.4 L  (8.4-10.2)  mg/dL


 


CSF RBC    (0-10)  u/L


 


CSF Tot Nucleated Cells    (0-5)  u/L








                      Microbiology - Last 24 Hours (Table)











 01/14/23 17:00 CSF Gram Stain - Preliminary





 Cerebral Spinal Fluid 


 


 01/08/23 08:29 Blood Culture - Final





 Blood    No Growth after 144 hours

## 2023-01-15 NOTE — P.PN
Subjective


Progress Note Date: 01/15/23


Principal diagnosis: 





Acute hypoxic and hypercapnic respiratory failure





57-year-old male patient with known history of alcoholism and known history of 

chronic into fibrillation.  The patient arrived to the emergency department 

yesterday for altered mentation suspecting a possible stroke.  EMS stated that 

the patient was at home, and he was in his normal self at around 7:30 PM.  

Subsequently at around 8 PM, he was found face down in bed unresponsive.  In the

emergency, the patient was found to be hypotensive.  EMS was supporting his 

breathing by bagging and the patient was having agonal breathing.  It was 

reported to us that the patient is an alcoholic and he was binge drinking.  

There is also history of emesis and apparently the patient was having 

significant amount of emesis and ultimately collapsed and he was unable to stand

up anymore.  In the emergency, the patient was found to have a heart rate of 

220.  Initial blood pressure was 50/30.  He was having agonal breathing.  He was

not following any commands.  Code stroke was activated.  Patient was unable to 

protect his airway.  At that point, he was intubated and he was given a 7 ET 

tube.  He was given a total of 3 L of fluid normal saline bolus.  CAT scan of 

the brain was done in addition to a CT angiogram of the head and the neck.  The 

CAT scans were negative.  Case was discussed with intervention neurology and no 

further treatment was done.  Initial blood work showed a lactic acid level of 

12.4.  The patient also will was found to have a potassium level of 2.7.  He was

in acute kidney injury with a BUN of 52 and a creatinine of 3.6.  Sodium level 

was at 141.  Troponins were positive at levels of 0.3 and 0.2 respectively.  AST

was 191.  He was 84 bilirubin was 3.7 calcium was 7.8.  His white cell count was

17.2 with a hemoglobin 18.7.  INR was 1.4 with a PT of 17.8 and a PTT of 23.  

Urine drug screen was positive for benzodiazepines and marijuana.  Alcohol level

was negative.  UA was positive for protein, 10 WBCs, 8 RBCs.  The chest x-ray 

was essentially clear.  Posterior intubation chest x-ray showed adequate 

positioning of 82.  Vision also has a G-tube in place.  No evidence of any pneu

mothorax.  Subsequent lactic acid level dropped down to 5.3.  At this point in 

time, the patient is intubated on a mechanical ventilator.  He is on a Versed 

drip running at 11 mg/m and fentanyl drip running at 2 Karan respiratory 

kilogram per hour.  His assist-control mode of mechanical ventilation at the 

rate of 18 with a tidal volume of 400 FiO2 of 50% with a PEEP of 5.  Most recent

blood gas showed a pH of 7.31 with a pCO2 of 43 and pO2 of 203.  IV fluids are 

currently in the form of normal saline at rate of 1 50 mL an hour.  Urine output

is adequate and the patient is producing white celia urine.  Repeat electrodes 

from today are still pending.  He was placed on IV heparin.  He is on no 

pressors for now.  Note that the patient also had a CAT scan of the chest 

abdomen and pelvis in the emergency department that showed no significant 

abnormalities.





On today's evaluation of 01/08/2023, the patient is still intubated on a 

mechanical ventilator.  He is very much dependent on sedation as the patient is 

quite agitated once off sedation.  This morning, he is on a combination of 

Versed at 40 mg an hour and is also on fentanyl and at 4 mcg/kg/h.  I was told 

by the nursing staff that he gets quite agitated when the sedation runs of and 

this has been noted by the nursing staff.  Note that no purposeful activity or 

following commands and his mentation is not appropriate at this point in time.  

There may be a component of delirium tremens of this patient.  He is a long-time

alcoholic.  In [facility], another sedation holiday will be given today and his 

mental status will be reassessed during the day.  Meanwhile, this morning, he is

on assist-control mode at a rate of 18 with a tidal volume of 400 and FiO2 was 

at 45% with a PEEP of 5.  His chest x-ray showed no acute abnormalities and EKG 

was in a good location.  No significant orotracheal secretions.  The blood gas 

is showing a component of alkalosis as the patient was being given bicarb 

infusion.  His patient's pH is at 7.47 with a pCO2 of 54 and pO2 of 90.  On his 

blood work, the renal function has improved.  The patient's creatinine is down 

to 1.23 with a BUN of 44.  Sodium is at 143 and potassium is at 3.70 severity 

replaced.  Serum bicarb is up to 32.  WBC was at 14.3 and hematocrit is 49 with 

a platelet count that is pending for now.  Note that her amylase and lipase was 

slightly elevated and there may be a component of pancreatitis along with 

alcoholic hepatitis.  Blood culture was positive for staph aureus and staph 

epidermidis.  I'm not sure if this is a true infection versus a contaminant.  

This was only in 1 out of 2 bottles.  Another set of blood cultures will be 

obtained.  Noted the patient was given a dose of vancomycin pending further 

cultures.  He is on Lovenox portably prophylaxis.  He remains nothing by mouth. 

No pressors for now.  Renal ultrasound was completed yesterday and it showed no 

evidence of any hydronephrosis.





Reevaluated today on 1/9/2023, patient remains in the ICU, intubated and 

mechanically ventilated.  Patient is on assist control rate of 18 tidal volume 

400 FiO2 40% and PEEP of 5 ABG showed a pO2 of 84 pCO2 48 pH of 7.47.  Patient 

remains on fentanyl at 4 mcg/kg/h, norepinephrine at 0.01 mcg/kg/m, Versed at 15

mg per hour and he is on IV fluid at the cc per hour in the form of 0.9 normal 

saline.  Patient is receiving vital HPI at 10 mL per hour.  Continues to have 

extreme agitations in spite of sedation, patient is arousable, and gets agitated

very easily.  Chest x-ray not done today, but chest x-ray from yesterday showed 

no evidence of infiltrate.  Seen by neurology today, felt to have toxic 

metabolic encephalopathy and alcohol withdrawal.  Considering the patient's 

agitation, he is obviously not a candidate for weaning and extubation at this 

point.  Basic metabolic profile is normal, renal profile is normal.  Brain CT on

admission showed no intracranial abnormality





Reevaluated today on 1/10/23, patient remains in the ICU, intubated and 

mechanically ventilated.  Patient is on assist control rate of 18, tidal volume 

400 FiO2 40% and PEEP of 5.  ABG showed a pO2 of 100 pCO2 51 pH of 7.41.  EEG 

showed mostly metabolic encephalopathy, no seizure activity.  Patient remains on

significant amount of sedation including fentanyl and 4 mcg/kg/h, and I cut it 

down to 2 mcg/kg per hour.  Remains on Versed at 20 mg per hour.  Normal saline 

which I have changed to D5 45 at 100 mL per hour.  He is on tube feeding at 40 

mL per hour.  Patient remains on GI and DVT prophylaxis, he is not requiring any

pressors.  Today I plan to hold or at least cut down on the sedation, assess 

mental status, and possibly give the patient a trial of weaning if possible.  

However considering the patient gets extremely agitated, that may not happen 

today.  His potassium is low, and is being corrected as per protocol.  WBC count

is 5.8 hemoglobin is 11.8.  Renal profile is normal potassium is being corrected

it is 2.90.  Patient's Gram stain of the sputum and culture came back positive 

for staph aureus.  The staph aureus is MSSA.  Chest x-ray remains relatively cl

ear, minimal perihilar atelectasis, no clear-cut evidence of pneumonia.





Reevaluated today on 1/11/23, remains in the ICU, intubated, mechanically 

ventilated, patient remains on assist control rate of 18 tidal volume 400 FiO2 

40% and PEEP of 5.  Cut down his FiO2 down to 35%, his ABG showed a pO2 of 106 

pCO2 48 pH of 7.44.  Apparently the patient was placed back on relatively high-

dose of fentanyl yesterday, although my recommendation was to keep fentanyl at 

no more than 2 mcg/kg/h.  Found out this morning that the patient was placed on 

4 mcg/kg per hour overnight, he is relatively sedated, he is also on Versed at 

20 mg per hour.  Norepinephrine at 0.02 mcg/kg/m, D5 4 5 at 100 mL per hour.  

Patient is on vancomycin which I have discontinued and place the patient on 

cefazolin for his MSSA tracheobronchitis.  Enteral feeding is presently on hold,

patient developed significant amount of residual overnight.  Hence we'll start 

trickle feedings.  At any rate patient was laced on a lower dose of fentanyl, 

but shortly after the patient became extremely agitated, restless, and could not

be ventilated properly with his agitation in spite of fentanyl 12 mcg/kg/h and 

in spite of Versed at 20 mg per hour hence I recommended that the patient goes 

on Nimbex, and will be titrated accordingly.  In the meantime we'll cut down the

fentanyl to 1 mcg/kg/h and he will need lower dose of Versed.  To me it seems, 

patient is having some sort of withdrawal.  Most likely alcohol withdrawal 

considering his clinical history.  After paralyzing the patient with Nimbex, and

cutting down on the dose of fentanyl, cutting down on the dose of Versed, I 

recommended the patient goes on Seroquel, I also recommended that he remains 

intubated and mechanically ventilated.  IB he is not ready to be weaned and e

xtubated at this point.





Reevaluated today on 1/12/23, patient remains in the ICU, remains intubated and 

mechanically ventilated.  He is on assist control rate of 18 tidal volume 400 

FiO2 40% and PEEP of 5.  ABG showed a pO2 of 89 pCO2 49 pH of 7.44.  Yesterday 

the patient had to be placed on Nimbex for extreme agitation, and he was 

throughout the afternoon on fentanyl at 2 mcg/kg/m he was also on Versed at 20 

mg per hour, and Nimbex was added yesterday, however in the early evening the 

patient developed significant hypertension, tachycardia, and he was not doing 

very well.  Clinically I felt this is most likely that the patient was having a 

seizure that could not be clinically seen while on Nimbex, hence I decided to 

discontinue his Nimbex completely, and I recommended overnight that the patient 

goes on fentanyl, Versed, and propofol.  After this was done, the patient did 

great, his blood pressure settled down nicely, his oxygenation improved, and I 

recommended no more Nimbex for this patient.  I clinically believe that the 

patient is having seizures, neurology will see him today, may have to have 

repeat EEG.  Patient is now on Versed at 15 mg per hour, is also on propofol at 

20 mcg/kg/m.  Around 4 AM, his fentanyl was discontinued.  I plan to start 

tapering his Versed and propofol down, and if he develops any symptoms to 

suggest underlying seizure, I will go ahead and start the patient on Keppra 

until seen by neurology today.  Patient is on norepinephrine at 0.02 mcg/kg/m, 

however this is not necessarily needed if the patient requires less sedation and

I will try to taper down his Versed and propofol further.  Patient remains on IV

fluid D5 4 5 at 100 mL per hour.  Obviously considering the events yesterday and

last night, the patient is not quite ready for weaning, but I am willing to 

start tapering the propofol and Versed down, hopefully maintain him on either 

propofol or Versed alone.  Again no more Nimbex for this patient, I believe the 

patient must have had a seizure while he was on Nimbex and that could not be 

seen.CBC today is relatively normal basic metabolic profile is normal potassium 

is a bit low at 3.2, renal profile is normal.patient remains on trickle 

feeding,/enteral feeding/nutritional support.





Reevaluated today on 1/1:30/23, patient remains in the ICU, intubated 

mechanically ventilated.  He is on assist control rate of 18 tidal volume 450 

FiO2 35% PEEP of 5 ABG showed a pO2 of 100 pCO2 44 pH of 7.46.  His FiO2 was cut

down to 35%.  Patient continued to have intermittent episodes of seizure-like 

activities or possibly myoclonic jerks affecting his upper body and upper 

extremities, with deviation of his eyes with upward gaze.  Neurology on the case

loaded up with Dilantin yesterday, and he is on Keppra today.  Although his EEG 

according to the neurologist did not show any evidence of seizure activity.  

Patient to be seen again by neurology today, and decide whether to continue with

seizure medications he did receive Dilantin 1 dose yesterday, and he is now on 

Keppra 1 g twice a day.  Early this morning patient had an EEG which is yet to 

be interpreted by the neurologist.  In the meantime the patient is on propofol 

at 20 mcg/kg/m, Versed 3 mg/h, D5 4 5 at 50 mL per hour and is on vital AF at 30

mL per hour.  Unable to cut down on his sedation at this point mostly because of

these episodic seizure-like activities or possibly myoclonic jerks.  Chest x-ray

today showed small left pleural effusion not large enough to consider 

thoracentesis.  His WBC count is 11.5 hemoglobin is 12.1.  Basic metabolic 

profile and renal profile are normal





Patient was reevaluated today on 1/14/23, remains in the ICU intubated and 

mechanically ventilated, he is tidal volume 450 assist-control rate of 18FiO2 is

down to 35%, PEEP is 5.  ABG showed a pO2 of 110 pCO2 of 44 pH of 7.45, hence no

changes were made in the ventilator settings.  Neurologically the patient is 

about the same, continues to have these episodes of myoclonic-like jerks, 

remains on propofol at 25 mcg/kg/m, remains on Versed at 6 mg/h, patient is 

supposedly scheduled to have a lumbar puncture done today.  Chest x-ray 

continues to show a small left pleural effusion, otherwise is unremarkable.  

Labs were all reviewed and they seem to be relatively unremarkable.  Again the 

only reason the patient could not be extubated to mostly because of his 

neurological status at this point.CBC is relatively normal ABG as noted above.  

Basic metabolic profile is relatively.  Renal profile is normal.  Patient is 

receiving vital AF at 40 mL per hour.  Patient remains on GI and DVT prophylaxis





Reevaluated today on 1/15/23, patient remains in the ICU, intubated and 

mechanically ventilated.  He is on assist control rate of 18 tidal volume 450 

FiO2 35% PEEP of 5.  Remains on propofol at 40 mcg/kg/m is also on Versed 1 

mg/h.  And he is receiving vital AF at 58 mL per hour.  No major issues over the

last 24 hours, patient seems to be quite sedated with the propofol and Versed.  

I plan to lower the dose of propofol, and hopefully get to assess mental status 

today.  I could not awaken the patient today to assess mental status, hence I 

recommended propofol to be cut down significantly and assess mental status RBC 

the patient not quite ready for any weaning trials.  Chest x-ray showed small 

left pleural effusion, otherwise basically unremarkable.  Patient remains on va

ncomycin for his MSSA, hence I will go back and switch him to 2 g IV piggyback 

every 8 hours.  Labs today showed a relatively normal CBC, normal basic 

metabolic profile, his spinal fluid results, initial report seems to be 

unremarkable, however the spinal fluid was obviously bloody and was traumatic.  

Whether the patient will need a repeat CT of the brain again because of his 

bloody spinal fluid, that will be decided upon by neurology on the case.  Lumbar

spine CT showed no evidence of epidural hematoma











Objective





- Vital Signs


Vital signs: 


                                   Vital Signs











Temp  96.8 F L  01/15/23 08:00


 


Pulse  70   01/15/23 10:00


 


Resp  21   01/15/23 10:00


 


BP  104/53   01/15/23 08:00


 


Pulse Ox  98   01/15/23 10:00


 


FiO2  35   01/15/23 08:00








                                 Intake & Output











 01/14/23 01/15/23 01/15/23





 18:59 06:59 18:59


 


Intake Total 2171.108 8472.455 1517.117


 


Output Total 1030 960 700


 


Balance 1141.108 973.252 472.117


 


Weight  95.8 kg 


 


Intake:   


 


  IV 1200 1148 750


 


    Dextrose 5%-0.45% NaCl 1, 600 650 150





    000 ml @ 50 mls/hr IV .   





    Q20H [facility] Rx#:[id number]   


 


    Vancomycin 1,500 mg In 500 498 500





    Sodium Chloride 0.9% 500   





    ml 500 ml @ 167 mls/hr   





    IVPB Q12H [facility] Rx#:   





    [id number]   


 


    levETIRAcetam IV 1,000 mg 100  100





    In Saline 1 100ml.bag @   





    400 mls/hr IVPB Q12HR [facility]   





    Rx#:[id number]   


 


  Intake, IV Titration 361.108 209.252 188.117





  Amount   


 


    Midazolam HCl 200 mg In 49.75 30.817 10.267





    Sodium Chloride 0.9% 60   





    ml @ 14 MG/HR 7 mls/hr IV   





    .[id number] [facility] Rx#:   





    [id number]   


 


    Norepinephrine 4 mg In 83.783  





    Sodium Chloride 0.9% 250   





    ml @ 0.03 MCG/KG/MIN 9.   





    332 mls/hr IV .Q24H [facility]   





    Rx#:[id number]   


 


    Thiamine 100 mg In Sodium 100  100





    Chloride 0.9% 50 ml @   





    100 mls/hr IVPB Q24HR [facility]   





    Rx#:[id number]   


 


    propofoL 1,000 mg In 127.575 178.435 77.85





    Empty Bag 1 bag @ 25 MCG/   





    KG/MIN 13.5 mls/hr IV .   





    Q7H25M [facility] Rx#:[id number]   


 


  Tube Feeding 460 576 144


 


  Other 150  90


 


Output:   


 


  Urine 1030 960 700


 


Other:   


 


  Voiding Method Indwelling Catheter Indwelling Catheter Indwelling Catheter


 


  # Bowel Movements 1  








                       ABP, PAP, CO, CI - Last Documented











Arterial Blood Pressure        124/63

















- Exam





Physical Exam: Revealed 57-year-old white male intubated, mechanically 

ventilated, remains on propofol at very low dose of Versed.


Head: Atraumatic, normocephalic.  Endotracheal tube and orogastric tube are 

intact.


HEENT:[Neck is supple.] [No neck masses.] [No thyromegaly.] [No JVD.]


Chest: [Clear throughout, no crackles, no rhonchi, no wheezes.]


Cardiac Exam: [Normal S1 and S2, no S3 gallop, no murmur.]


Abdomen: [Soft, nontender,  no megaly, no rebound, no guarding, normal bowel 

sounds.]


Extremities: [No clubbing, no edema, no cyanosis.]


Neurological Exam: Unable to assess hence I am lowering the dose of propofol and

hopefully couldn't assess mental status later today.


Psychiatric : Could not assess.  .


Musculoskeletal: No deformities.


Skin: No rashes





- Labs


CBC & Chem 7: 


                                 01/15/23 05:50





                                 01/15/23 05:50


Labs: 


                  Abnormal Lab Results - Last 24 Hours (Table)











  01/14/23 01/15/23 01/15/23 Range/Units





  17:00 05:36 05:50 


 


RBC     (4.30-5.90)  m/uL


 


Hgb     (13.0-17.5)  gm/dL


 


Hct     (39.0-53.0)  %


 


ABG pO2   80 L   ()  mmHg


 


ABG HCO3   28 H   (21-25)  mmol/L


 


ABG Total CO2   30 H   (19-24)  mmol/L


 


Chloride     ()  mmol/L


 


Creatinine    0.64 L  (0.66-1.25)  mg/dL


 


Glucose     (74-99)  mg/dL


 


Calcium     (8.4-10.2)  mg/dL


 


CSF RBC  05344 H    (0-10)  u/L


 


CSF Tot Nucleated Cells  160 H*    (0-5)  u/L














  01/15/23 01/15/23 Range/Units





  05:50 05:50 


 


RBC  3.78 L   (4.30-5.90)  m/uL


 


Hgb  11.4 L   (13.0-17.5)  gm/dL


 


Hct  35.7 L   (39.0-53.0)  %


 


ABG pO2    ()  mmHg


 


ABG HCO3    (21-25)  mmol/L


 


ABG Total CO2    (19-24)  mmol/L


 


Chloride   113 H  ()  mmol/L


 


Creatinine   0.63 L  (0.66-1.25)  mg/dL


 


Glucose   120 H  (74-99)  mg/dL


 


Calcium   7.4 L  (8.4-10.2)  mg/dL


 


CSF RBC    (0-10)  u/L


 


CSF Tot Nucleated Cells    (0-5)  u/L








                      Microbiology - Last 24 Hours (Table)











 01/14/23 17:00 CSF Gram Stain - Preliminary





 Cerebral Spinal Fluid 


 


 01/08/23 08:29 Blood Culture - Final





 Blood    No Growth after 144 hours














Assessment and Plan


Assessment: 





Impression:


Acute hypoxic and hypercapnic respiratory failure, mostly because the patient 

was unable to protect his airways upon his initial presentation, patient 

required intubation for airways protection.  And he was encephalopathic with 

mental status change upon his initial presentation.


possible recurrent seizures, however EEGs done did not reflect any seizure activ

ity as per neurology on the case.


MSSA tracheobronchitis, we will place back on Kefzol and discontinue vancomycin


Altered mental status, possibly metabolic encephalopathy, suspect alcohol 

withdrawal.


Severe lactic acidosis on admission, with hypovolemia, resolved.


Hypovolemic with hypotension


Anion gap metabolic acidosis, resolved.


Positive blood cultures for staph epidermidis, felt to be a contamination.


History of alcoholism


Acute kidney injury with hypovolemia, resolved.


History of paroxysmal atrial fibrillation


Benign essential hypertension


Acute toxic metabolic encephalopathy, possible seizures.


Dyslipidemia





Recommendation:


Spinal fluid results were noted.  Lumbosacral spine CT was noted, no evidence of

epidural hematoma other results of the spinal fluid are pending


Continue ventilatory support, however will assess mental status today on a lower

dose of sedation and based on his neurological status, doubt if the patient is 

ready for weaning


Continue enteral feeding/interstitial support


Continue IV thiamine.


Discontinue vancomycin and start Kefzol instead.  


Continue to monitor daily labs and address accordingly


Overall prognosis remains guarded mostly because of his underlying neurological 

status.  being addressed by neurology on the case.  


Remains critically ill.


Continue GI and DVT prophylaxis.  


Critical care time is over 30





Time with Patient: Greater than 30

## 2023-01-15 NOTE — P.PCN
Date of Procedure: 01/14/23


Preoperative Diagnosis: 


Encephalopathy, seizure-like activity, rule out encephalitis


Postoperative Diagnosis: 


Persistent encephalopathy, seizure-like activity, rule out encephalitis


Procedure(s) Performed: 


Lumbar puncture


Anesthesia: other


Surgeon: Rosi Brooks


Pathology: other (CSF glucose, proteins, cell count, viral cultures, Gram stain 

and cultures.)


Condition: stable


Disposition: ICU


Indications for Procedure: 


Persistent encephalopathy, seizure-like activity, rule out encephalitis


Operative Findings: 


Patient currently intubated, sedated.  Informed consent was obtained from 

patient's daughters.  A nurse and two nursing aides helped with the procedure.  

Procedure performed under strict aseptic condition.  Patient was placed in the 

left lateral position.  L4-L5 level was identified and marked.  Low back region 

sterilized with chlorprep, and then with Betadine that came in the kit.  A 20-

gauge 3.5 inch spinal needle was used.  Initial attempt made at L4-L5 lumbar 

space.  I was only feeling resistance, therefore after 2 attempts, I attempted 

at L3 4 lumbar space.  I was able to enter subarachnoid space in 1 pass.  Spinal

fluid was initially traumatic.  It cleared up in the second tube.  However 

patient moved slightly during the procedure, and then CSF became slightly blood-

tinged in the third tube, and became slightly more pinkish in the fourth tube.  

About 12 mL of spinal fluid was obtained in 4 tubes.  Stylette was reinserted, 

and spinal needle withdrawn.  Patient tolerated the procedure well.


Description of Procedure: 


Same as above

## 2023-01-15 NOTE — XR
EXAMINATION TYPE: XR chest 1V portable

 

DATE OF EXAM: 1/15/2023 6:34 AM

 

COMPARISON: Chest radiograph from one day prior.

 

TECHNIQUE: XR chest 1V portable Portable AP radiograph of the chest.

 

CLINICAL INDICATION:Male, 57 years old with history of Vent; 

 

FINDINGS: 

Lungs/Pleura: There is no evidence of focal consolidation, or pneumothorax.  Blunting of the left cos
tophrenic angle.

Pulmonary vascularity: Unremarkable.

Heart/mediastinum: Cardiomediastinal silhouette is unremarkable.

Musculoskeletal: No acute osseous pathology.

 

Lines/Tubes:

Endotracheal tube with distal tip 4.9 cm above the jossy.

Nasogastric tube with its distal tip and side-port projecting under the diaphragm.

Right internal jugular central venous catheter with distal tip at the cavoatrial junction.

 

IMPRESSION: 

Improved aeration of the lungs with persistent small left pleural effusion

## 2023-01-16 LAB
ANION GAP SERPL CALC-SCNC: 2 MMOL/L
BASOPHILS # BLD AUTO: 0 K/UL (ref 0–0.2)
BASOPHILS NFR BLD AUTO: 0 %
BUN SERPL-SCNC: 13 MG/DL (ref 9–20)
CALCIUM SPEC-MCNC: 7.7 MG/DL (ref 8.4–10.2)
CHLORIDE SERPL-SCNC: 114 MMOL/L (ref 98–107)
CO2 BLDA-SCNC: 30 MMOL/L (ref 19–24)
CO2 SERPL-SCNC: 28 MMOL/L (ref 22–30)
EOSINOPHIL # BLD AUTO: 0.3 K/UL (ref 0–0.7)
EOSINOPHIL NFR BLD AUTO: 3 %
ERYTHROCYTE [DISTWIDTH] IN BLOOD BY AUTOMATED COUNT: 3.77 M/UL (ref 4.3–5.9)
ERYTHROCYTE [DISTWIDTH] IN BLOOD: 13.5 % (ref 11.5–15.5)
GLUCOSE BLD-MCNC: 100 MG/DL (ref 70–110)
GLUCOSE BLD-MCNC: 100 MG/DL (ref 70–110)
GLUCOSE BLD-MCNC: 103 MG/DL (ref 70–110)
GLUCOSE SERPL-MCNC: 108 MG/DL (ref 74–99)
HCO3 BLDA-SCNC: 29 MMOL/L (ref 21–25)
HCT VFR BLD AUTO: 34.9 % (ref 39–53)
HGB BLD-MCNC: 11.5 GM/DL (ref 13–17.5)
LYMPHOCYTES # SPEC AUTO: 1.3 K/UL (ref 1–4.8)
LYMPHOCYTES NFR SPEC AUTO: 16 %
MCH RBC QN AUTO: 30.4 PG (ref 25–35)
MCHC RBC AUTO-ENTMCNC: 32.8 G/DL (ref 31–37)
MCV RBC AUTO: 92.7 FL (ref 80–100)
MONOCYTES # BLD AUTO: 0.3 K/UL (ref 0–1)
MONOCYTES NFR BLD AUTO: 4 %
NEUTROPHILS # BLD AUTO: 5.9 K/UL (ref 1.3–7.7)
NEUTROPHILS NFR BLD AUTO: 74 %
PCO2 BLDA: 39 MMHG (ref 35–45)
PH BLDA: 7.48 [PH] (ref 7.35–7.45)
PLATELET # BLD AUTO: 218 K/UL (ref 150–450)
PO2 BLDA: 99 MMHG (ref 83–108)
POTASSIUM SERPL-SCNC: 3.6 MMOL/L (ref 3.5–5.1)
SODIUM SERPL-SCNC: 144 MMOL/L (ref 137–145)
WBC # BLD AUTO: 8 K/UL (ref 3.8–10.6)

## 2023-01-16 RX ADMIN — SODIUM CHLORIDE SCH MLS/HR: 900 INJECTION, SOLUTION INTRAVENOUS at 08:41

## 2023-01-16 RX ADMIN — ACETAMINOPHEN PRN MLS/HR: 10 INJECTION, SOLUTION INTRAVENOUS at 23:00

## 2023-01-16 RX ADMIN — ACETAMINOPHEN PRN MLS/HR: 10 INJECTION, SOLUTION INTRAVENOUS at 16:31

## 2023-01-16 RX ADMIN — CLEVIPIDINE SCH MLS/HR: 0.5 EMULSION INTRAVENOUS at 15:05

## 2023-01-16 RX ADMIN — FENTANYL CITRATE SCH: 50 INJECTION INTRAVENOUS at 06:25

## 2023-01-16 RX ADMIN — CHLORHEXIDINE GLUCONATE SCH: 1.2 RINSE ORAL at 08:40

## 2023-01-16 RX ADMIN — PANTOPRAZOLE SODIUM SCH MG: 40 INJECTION, POWDER, FOR SOLUTION INTRAVENOUS at 08:25

## 2023-01-16 RX ADMIN — METOPROLOL TARTRATE SCH MG: 25 TABLET, FILM COATED ORAL at 08:25

## 2023-01-16 RX ADMIN — LEVETIRACETAM SCH MLS/HR: 10 INJECTION INTRAVENOUS at 21:33

## 2023-01-16 RX ADMIN — SODIUM CHLORIDE SCH MLS/HR: 900 INJECTION, SOLUTION INTRAVENOUS at 23:53

## 2023-01-16 RX ADMIN — POTASSIUM CHLORIDE SCH MLS/HR: 14.9 INJECTION, SOLUTION INTRAVENOUS at 16:21

## 2023-01-16 RX ADMIN — PANTOPRAZOLE SODIUM SCH MG: 40 INJECTION, POWDER, FOR SOLUTION INTRAVENOUS at 20:59

## 2023-01-16 RX ADMIN — POTASSIUM CHLORIDE SCH MLS/HR: 14.9 INJECTION, SOLUTION INTRAVENOUS at 17:55

## 2023-01-16 RX ADMIN — METOPROLOL TARTRATE SCH MG: 25 TABLET, FILM COATED ORAL at 20:59

## 2023-01-16 RX ADMIN — MIDAZOLAM SCH: 5 INJECTION INTRAMUSCULAR; INTRAVENOUS at 06:26

## 2023-01-16 RX ADMIN — POTASSIUM CHLORIDE SCH MLS/HR: 14.9 INJECTION, SOLUTION INTRAVENOUS at 06:27

## 2023-01-16 RX ADMIN — NOREPINEPHRINE BITARTRATE SCH: 1 INJECTION, SOLUTION, CONCENTRATE INTRAVENOUS at 06:25

## 2023-01-16 RX ADMIN — ENOXAPARIN SODIUM SCH MG: 40 INJECTION SUBCUTANEOUS at 08:25

## 2023-01-16 RX ADMIN — SODIUM CHLORIDE SCH MLS/HR: 900 INJECTION, SOLUTION INTRAVENOUS at 18:37

## 2023-01-16 RX ADMIN — LEVETIRACETAM SCH MLS/HR: 10 INJECTION INTRAVENOUS at 08:25

## 2023-01-16 NOTE — P.PN
Subjective


Progress Note Date: 01/16/23


Principal diagnosis: 





Respiratory failure.





Reevaluated today on 1/1:30/23, patient remains in the ICU, intubated 

mechanically ventilated.  He is on assist control rate of 18 tidal volume 450 

FiO2 35% PEEP of 5 ABG showed a pO2 of 100 pCO2 44 pH of 7.46.  His FiO2 was cut

down to 35%.  Patient continued to have intermittent episodes of seizure-like 

activities or possibly myoclonic jerks affecting his upper body and upper 

extremities, with deviation of his eyes with upward gaze.  Neurology on the case

loaded up with Dilantin yesterday, and he is on Keppra today.  Although his EEG 

according to the neurologist did not show any evidence of seizure activity.  

Patient to be seen again by neurology today, and decide whether to continue with

seizure medications he did receive Dilantin 1 dose yesterday, and he is now on 

Keppra 1 g twice a day.  Early this morning patient had an EEG which is yet to 

be interpreted by the neurologist.  In the meantime the patient is on propofol 

at 20 mcg/kg/m, Versed 3 mg/h, D5 4 5 at 50 mL per hour and is on vital AF at 30

mL per hour.  Unable to cut down on his sedation at this point mostly because of

these episodic seizure-like activities or possibly myoclonic jerks.  Chest x-ray

today showed small left pleural effusion not large enough to consider 

thoracentesis.  His WBC count is 11.5 hemoglobin is 12.1.  Basic metabolic 

profile and renal profile are normal





Patient was reevaluated today on 1/14/23, remains in the ICU intubated and 

mechanically ventilated, he is tidal volume 450 assist-control rate of 18FiO2 is

down to 35%, PEEP is 5.  ABG showed a pO2 of 110 pCO2 of 44 pH of 7.45, hence no

changes were made in the ventilator settings.  Neurologically the patient is 

about the same, continues to have these episodes of myoclonic-like jerks, 

remains on propofol at 25 mcg/kg/m, remains on Versed at 6 mg/h, patient is 

supposedly scheduled to have a lumbar puncture done today.  Chest x-ray 

continues to show a small left pleural effusion, otherwise is unremarkable.  

Labs were all reviewed and they seem to be relatively unremarkable.  Again the 

only reason the patient could not be extubated to mostly because of his 

neurological status at this point.CBC is relatively normal ABG as noted above.  

Basic metabolic profile is relatively.  Renal profile is normal.  Patient is 

receiving vital AF at 40 mL per hour.  Patient remains on GI and DVT prophylaxis





Reevaluated today on 1/15/23, patient remains in the ICU, intubated and 

mechanically ventilated.  He is on assist control rate of 18 tidal volume 450 

FiO2 35% PEEP of 5.  Remains on propofol at 40 mcg/kg/m is also on Versed 1 

mg/h.  And he is receiving vital AF at 58 mL per hour.  No major issues over the

last 24 hours, patient seems to be quite sedated with the propofol and Versed.  

I plan to lower the dose of propofol, and hopefully get to assess mental status 

today.  I could not awaken the patient today to assess mental status, hence I 

recommended propofol to be cut down significantly and assess mental status RBC 

the patient not quite ready for any weaning trials.  Chest x-ray showed small 

left pleural effusion, otherwise basically unremarkable.  Patient remains on 

vancomycin for his MSSA, hence I will go back and switch him to 2 g IV piggyback

every 8 hours.  Labs today showed a relatively normal CBC, normal basic 

metabolic profile, his spinal fluid results, initial report seems to be 

unremarkable, however the spinal fluid was obviously bloody and was traumatic.  

Whether the patient will need a repeat CT of the brain again because of his 

bloody spinal fluid, that will be decided upon by neurology on the case.  Lumbar

spine CT showed no evidence of epidural hematoma.





Progress note dated 01/16/2023.





The patient is seen in the intensive care unit, room 252.  He was admitted on 

January 6 with mental status changes, and acute kidney injury.  The patient 

remains on the mechanical ventilator.  Ventilator settings include the volume 

assist control, rate 18, tidal volume 450, FiO2 35%, and PEEP of 5.  Blood gases

show pO2 of 99, pCO2 39, pH is 7.48.  The patient's on propofol at 25 mcg/kg/m, 

D5 half-normal saline at 50 mL an hour, and vital AF at 48 mL an hour, which is 

goal.  The patient will get Lasix 40 mg IV push.  We will attempt a daily 

interruption of sedation and a spontaneous breathing trial.  The patient appears

much more awake and alert today.  White count 8, hemoglobin 11.5, hematocrit 

34.9, with a normal platelet count.  Sodium 144, potassium 3.6, chlorides 114, 

CO2 28, BUN 13, creatinine 0.65.  Blood cultures show staph aureus and staph 

epidermidis, and sputum samples are positive for Staphylococcus aureus.  The 

patient's chest x-ray shows a small left-sided pleural effusion.





Objective





- Vital Signs


Vital signs: 


                                   Vital Signs











Temp  99.8 F H  01/16/23 08:00


 


Pulse  80   01/16/23 09:00


 


Resp  17   01/16/23 09:00


 


BP  134/75   01/16/23 07:00


 


Pulse Ox  95   01/16/23 09:00


 


FiO2  35   01/16/23 08:00








                                 Intake & Output











 01/15/23 01/16/23 01/16/23





 18:59 06:59 18:59


 


Intake Total 2116.825 1140.811 346.5


 


Output Total 2925 1035 550


 


Balance -808.175 105.811 -203.5


 


Weight  99 kg 


 


Intake:   


 


  IV 1150 350 210


 


    .9 @ 10   10


 


    Dextrose 5%-0.45% NaCl 1, 550 350 50





    000 ml @ 50 mls/hr IV .   





    Q20H [facility] Rx#:[id number]   


 


    Thiamine 100 mg In Sodium   50





    Chloride 0.9% 50 ml @   





    100 mls/hr IVPB Q24HR [facility]   





    Rx#:[id number]   


 


    Vancomycin 1,500 mg In 500  





    Sodium Chloride 0.9% 500   





    ml 500 ml @ 167 mls/hr   





    IVPB Q12H [facility] Rx#:   





    [id number]   


 


    levETIRAcetam IV 1,000 mg 100  100





    In Saline 1 100ml.bag @   





    400 mls/hr IVPB Q12HR [facility]   





    Rx#:[id number]   


 


  Intake, IV Titration 288.825 166.811 58.5





  Amount   


 


    Dexmedetomidine/0.9% NaCl 23.911 76.361 





    (Pmx) 400 mcg In Empty   





    Bag 1 bag @ 0.2 MCG/KG/HR   





    4.79 mls/hr IV .[id number]   





    [facility] Rx#:[id number]   


 


    Midazolam HCl 200 mg In 12.584  





    Sodium Chloride 0.9% 60   





    ml @ 14 MG/HR 7 mls/hr IV   





    .[id number] [facility] Rx#:   





    [id number]   


 


    Thiamine 100 mg In Sodium 100  





    Chloride 0.9% 50 ml @   





    100 mls/hr IVPB Q24HR [facility]   





    Rx#:[id number]   


 


    ceFAZolin 2 gm In Sodium 50  





    Chloride 0.9% 50 ml @ 100   





    mls/hr IVPB Q8H [facility] Rx#:   





    [id number]   


 


    propofoL 1,000 mg In 102.33 90.450 58.5





    Empty Bag 1 bag @ 25 MCG/   





    KG/MIN 13.5 mls/hr IV .   





    Q7H25M [facility] Rx#:[id number]   


 


  Tube Feeding 528 624 48


 


  Other 150  30


 


Output:   


 


  Urine 2925 1035 550


 


Other:   


 


  Voiding Method Indwelling Catheter Indwelling Catheter Indwelling Catheter








                       ABP, PAP, CO, CI - Last Documented











Arterial Blood Pressure        135/63

















- Exam





No acute distress, mildly sedated, with an orally placed endotracheal tube, and 

NG tube noted transnasally.





HEENT examination is grossly unremarkable. 





Neck supple.  Full range of motion.  No adenopathy thyromegaly or neck vein 

distention.





Cardiovascular examination reveals regular rhythm rate.  S1-S2 normal.  No S3 or

S4.  No discernible murmur noted.  Heart rate 80 bpm.





Lungs reveal scattered mild rhonchi.  No wheezes or crackles.  Breath sounds 

equal bilaterally.  Saturations are adequate.





Abdomen soft bowel sounds are heard.  No masses or tenderness.





Extremities are intact.  No cyanosis clubbing or edema.





Skin is without rash or lesion.





Neurologic examination is brief but nonfocal.





- Labs


CBC & Chem 7: 


                                 01/16/23 05:00





                                 01/16/23 05:00


Labs: 


                  Abnormal Lab Results - Last 24 Hours (Table)











  01/15/23 01/16/23 01/16/23 Range/Units





  11:32 05:00 05:00 


 


RBC   3.77 L   (4.30-5.90)  m/uL


 


Hgb   11.5 L   (13.0-17.5)  gm/dL


 


Hct   34.9 L   (39.0-53.0)  %


 


ABG pH     (7.35-7.45)  


 


ABG HCO3     (21-25)  mmol/L


 


ABG Total CO2     (19-24)  mmol/L


 


ABG O2 Saturation     (94-97)  %


 


Chloride    114 H  ()  mmol/L


 


Creatinine    0.65 L  (0.66-1.25)  mg/dL


 


Glucose    108 H  (74-99)  mg/dL


 


POC Glucose (mg/dL)  118 H    ()  mg/dL


 


Calcium    7.7 L  (8.4-10.2)  mg/dL














  01/16/23 Range/Units





  05:39 


 


RBC   (4.30-5.90)  m/uL


 


Hgb   (13.0-17.5)  gm/dL


 


Hct   (39.0-53.0)  %


 


ABG pH  7.48 H  (7.35-7.45)  


 


ABG HCO3  29 H  (21-25)  mmol/L


 


ABG Total CO2  30 H  (19-24)  mmol/L


 


ABG O2 Saturation  98.3 H  (94-97)  %


 


Chloride   ()  mmol/L


 


Creatinine   (0.66-1.25)  mg/dL


 


Glucose   (74-99)  mg/dL


 


POC Glucose (mg/dL)   ()  mg/dL


 


Calcium   (8.4-10.2)  mg/dL








                      Microbiology - Last 24 Hours (Table)











 01/14/23 17:00 CSF Gram Stain - Preliminary





 Cerebral Spinal Fluid CSF Culture - Preliminary














Assessment and Plan


Assessment: 





Acute respiratory failure, with intubation on January 6, secondary to patient's 

inability to protect his airway.





Acute mental status changes.





Possible recurrent seizures.





Staphylococcus aureus bacteremia.





Severe lactic acidosis, resolved.





Hypovolemic hypotension, resolved.





Anion gap metabolic acidosis.





History of alcohol abuse.





Acute kidney injury.





History of paroxysmal atrial fibrillation.





Hypertension.





Hyperlipidemia.


Plan: 





Plan dated 01/16/2023.





The patient will get Lasix 40 mg IV push.  The patient was placed on pressure 

support of 5 and CPAP of 5.  The patient's respiratory rate is low, and tidal 

volumes are excellent.  Minute volume is low.  The patient will have a cuff leak

test, and likely be extubated.  Additional recommendations and suggestions are 

forthcoming.  Prognosis is guarded.  Labs, x-rays, medications are reviewed.  We

will continue to follow the patient and make recommendations along the way.


Time with Patient: Greater than 30

## 2023-01-16 NOTE — P.PN
Subjective


Progress Note Date: 01/16/23


I am seeing the patient for the first time during this admission.  It seems 

patient was having new onset rhythmic twitching of shoulder and patient was 

started on Keppra.  Patient had 2.5 hour EEG and no seizure or epileptiform 

discharges but pending final report.  He had CSF study and was traumatic tap and

was traumatic tap but upon reviewing the CSF study I still felt the patient had 

elevated nucleated cells in comparison to rbc.  I felt the calculated nucleated 

cells: 120 (1 nucleated cells to 500-1000rbc).


Patient had fevers during this stay and today it is as high as 103.4F.  Blood 

culture is staph aureus and sputum is staph aeurus.


Today the patient was extubated.  


I spoke with primary and he felt his mentation has been improving compared to 

initial presentation.


Please refer to Dr. Brooks's notes for further details.








Objective





- Vital Signs


Vital signs: 


                                   Vital Signs











Temp  103.4 F H  01/16/23 16:00


 


Pulse  110 H  01/16/23 17:00


 


Resp  20   01/16/23 17:00


 


BP  136/60   01/16/23 17:00


 


Pulse Ox  94 L  01/16/23 17:00


 


FiO2  35   01/16/23 08:00








                                 Intake & Output











 01/15/23 01/16/23 01/16/23





 18:59 06:59 18:59


 


Intake Total 2116.825 1140.811 806.5


 


Output Total 2925 1035 6025


 


Balance -808.175 105.811 -5218.5


 


Weight  99 kg 99 kg


 


Intake:   


 


  IV 1150 350 670


 


    .9 @ 10   70


 


    Dextrose 5%-0.45% NaCl 1, 550 350 350





    000 ml @ 50 mls/hr IV .   





    Q20H [facility] Rx#:[id number]   


 


    Thiamine 100 mg In Sodium   100





    Chloride 0.9% 50 ml @   





    100 mls/hr IVPB Q24HR [facility]   





    Rx#:[id number]   


 


    Vancomycin 1,500 mg In 500  





    Sodium Chloride 0.9% 500   





    ml 500 ml @ 167 mls/hr   





    IVPB Q12H [facility] Rx#:   





    [id number]   


 


    ceFAZolin 2 gm In Sodium   50





    Chloride 0.9% 50 ml @ 100   





    mls/hr IVPB Q8H [facility] Rx#:   





    [id number]   


 


    levETIRAcetam IV 1,000 mg 100  100





    In Saline 1 100ml.bag @   





    400 mls/hr IVPB Q12HR [facility]   





    Rx#:[id number]   


 


  Intake, IV Titration 288.825 166.811 58.5





  Amount   


 


    Dexmedetomidine/0.9% NaCl 23.911 76.361 





    (Pmx) 400 mcg In Empty   





    Bag 1 bag @ 0.2 MCG/KG/HR   





    4.79 mls/hr IV .[id number]   





    [facility] Rx#:[id number]   


 


    Midazolam HCl 200 mg In 12.584  





    Sodium Chloride 0.9% 60   





    ml @ 14 MG/HR 7 mls/hr IV   





    .[id number] [facility] Rx#:   





    [id number]   


 


    Thiamine 100 mg In Sodium 100  





    Chloride 0.9% 50 ml @   





    100 mls/hr IVPB Q24HR [facility]   





    Rx#:[id number]   


 


    ceFAZolin 2 gm In Sodium 50  





    Chloride 0.9% 50 ml @ 100   





    mls/hr IVPB Q8H [facility] Rx#:   





    [id number]   


 


    propofoL 1,000 mg In 102.33 90.450 58.5





    Empty Bag 1 bag @ 25 MCG/   





    KG/MIN 13.5 mls/hr IV .   





    Q7H25M [facility] Rx#:[id number]   


 


  Tube Feeding 528 624 48


 


  Other 150  30


 


Output:   


 


  Urine 2925 1035 6025


 


Other:   


 


  Voiding Method Indwelling Catheter Indwelling Catheter Indwelling Catheter








                       ABP, PAP, CO, CI - Last Documented











Arterial Blood Pressure        103/46

















- Exam


GENERAL: The patient is lying in bed and is not in acute distress.





NEUROLOGICAL:


Higher mental function: The patient is moderate to severely drowsy but is brie

fly awakeable to voice.  He is oriented to self.  He was able to name his 

daughter who is at bedside correctly.  He is following few simple commands. 


Cranial nerves: He is tracking throughout the room.  No facial weakness. No 

dysarthria.  


Motor: The strength is able to lift bilateral uppers above gravity equally and 

wiggling his toes symmetrically.  Normal tone and bulk.  


Cerebellum: Unable to assess.


Sensation: Unable to assess.





SOME OF THE WORK-UP DURING THIS HOSPITAL VISIT CONSISTED OF:


* Repeat CT head 01/12/2023 revealed previous surgery.  Mild atrophy, no acute 

  intracranial process.  I personally reviewed CT head, agree with the findings.


* CTA reported as negative of the brain.  Mild plaque at the carotid artery 

  bifurcation.  No evidence of hemodynamic arterial stenosis in the neck.


* Initial EEG 01/09/2023 was abnormal due to amplitude asymmetry with relatively

  higher amplitude activity in the left frontoparietal region due to breach 

  rhythm from previous craniotomy defect.  Background slowing of moderate to 

  severe degree, consistent with generalized cerebral dysfunction, as can be 

  seen with toxic metabolic encephalopathy or due to diffuse structural brain 

  abnormality.  Clinical correlation is recommended.  No epileptiform activity 

  was seen.


* B12 2102, folate 12.90, TSH slightly decreased 0.220, with normal free T4 

  1.37.  Possible due to sick euthyroid.








- Labs


CBC & Chem 7: 


                                 01/16/23 05:00





                                 01/16/23 15:10


Labs: 


                  Abnormal Lab Results - Last 24 Hours (Table)











  01/16/23 01/16/23 01/16/23 Range/Units





  05:00 05:00 05:39 


 


RBC  3.77 L    (4.30-5.90)  m/uL


 


Hgb  11.5 L    (13.0-17.5)  gm/dL


 


Hct  34.9 L    (39.0-53.0)  %


 


ABG pH    7.48 H  (7.35-7.45)  


 


ABG HCO3    29 H  (21-25)  mmol/L


 


ABG Total CO2    30 H  (19-24)  mmol/L


 


ABG O2 Saturation    98.3 H  (94-97)  %


 


Potassium     (3.5-5.1)  mmol/L


 


Chloride   114 H   ()  mmol/L


 


Creatinine   0.65 L   (0.66-1.25)  mg/dL


 


Glucose   108 H   (74-99)  mg/dL


 


Calcium   7.7 L   (8.4-10.2)  mg/dL














  01/16/23 Range/Units





  15:10 


 


RBC   (4.30-5.90)  m/uL


 


Hgb   (13.0-17.5)  gm/dL


 


Hct   (39.0-53.0)  %


 


ABG pH   (7.35-7.45)  


 


ABG HCO3   (21-25)  mmol/L


 


ABG Total CO2   (19-24)  mmol/L


 


ABG O2 Saturation   (94-97)  %


 


Potassium  3.2 L  (3.5-5.1)  mmol/L


 


Chloride   ()  mmol/L


 


Creatinine   (0.66-1.25)  mg/dL


 


Glucose   (74-99)  mg/dL


 


Calcium   (8.4-10.2)  mg/dL








                      Microbiology - Last 24 Hours (Table)











 01/14/23 17:00 CSF Gram Stain - Preliminary





 Cerebral Spinal Fluid CSF Culture - Preliminary














Assessment and Plan


Assessment: 











* Confusion with fevers and I felt the CSF study even though was traumatic but 

  the calculated nucleated cells were still elevated (blood culture and sputum 

  is staph but there is concern for contaminant): Appears meningoencephalitis.


* New onset rhythmic twitching of the shoulders bilaterally and some upper 

  limbs, probably myoclonic jerks, unlikely to be seizures.  EEG did not reveal 

  any epileptiform activity.  No status epilepticus.


* Patient presented with unresponsiveness, likely due to above.


* Possible Alcohol withdrawal


* History of alcoholism.


* History of traumatic brain injury with subdural hematoma, status post 

  craniotomy in 2016 oh 2017.


* Elevated ammonia, abnormal liver functions, rule out cirrhosis.


* Acute kidney injury, now resolved


* Status post shock, likely hypovolemic


* Atrial fibrillation with RVR, status post cardioversion


* Acute respiratory failure, on mechanical ventilation


* Elevated troponin


* Gram-positive bacteremia, possibly contaminant.





Plan: 





* CSF revealed normal glucose 69, CSF protein 57(12-60), WBC count 160, 

  polynuclear 77%, mononuclear cells 18%, eosinophils 5%.  The corrected 

  nucleated cells is 80 cells (for every 1 nucleated cells is 500-1000rbc).  I 

  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  I spoke with primary and 

  will start the patient on Ceftriaxone 2gm every 12 hours, Vacomycin and 

  Acyclovir 10mg/kg every 8 hours.  Will get I.D. consulted especially with 

  continuing fevers and abnormal CSF study.  Await viral culture ordered by Dr. Brooks.


* Patient had a prolonged, 2.5 hour EEG performed 01/13/2023.  According to 

  preliminary report from Dr. Ortiz, did not reveal any epileptiform 

  activity.  Some triphasic waves are seen.  No status epilepticus.  Official 

  report pending.  We will continue Keppra 1000 mg twice a day.  Try to wean off

  propofol, then Versed drip.  Patient received Dilantin loading dose last 

  night.  Patient's level is 10.9.  We will not continue Dilantin, as there is 

  no epileptiform activity.


* Patient was given a loading dose of Keppra 1500 mg by intensivist.  We will 

  maintain on Keppra 1000 mg IV twice a day.  Ativan 1 mg every hour as needed 

  for seizure like activity.  Will consider weaning Keppra down the line once 

  more awake and responsive.  





The plan is discussed in detailed with the primary team.


Will continue to follow.





Time with Patient: Less than 30

## 2023-01-16 NOTE — EEG
ELECTROENCEPHALOGRAM REPORT



TECHNIQUE:

This is a report from a prolonged 2.5-hour inpatient digital video EEG performed using

the 10/20 international electrode placement system.



HISTORY:

The patient currently intubated, recent seizures.



OTHER MEDICAL HISTORY:

Includes prior head injury.



CURRENT MEDICATIONS:

Propofol, which has recently been decreased prior to the EEG and Versed discontinued

prior to the EEG.



FINDINGS:

RECORDING START TIME:  01/13/2023 at 06:54 a.m.



RECORDING END TIME:  01/13/2023 at 09:27 a.m.



EVENTS:  During this 2.5-hour inpatient video EEG, no clinical or electrographic

seizures were recorded.



During the course of this recording, numerous events occurred, only a partial listing

is provided below.  These were not associated with EEG changes.



Examples include, but are not limited to 01/13/2023 at 07:49:40 in which there is a

sudden brief less than 1 second movement of the legs/thighs inward.  A similar brief

less than 1 second movement of the thighs inward occurred at _____.



Noting that all of the movements that were recorded/potential events included a sudden

jerk, where sudden body movement lasting less than 1 second.  A left leg jerk occurred

at 08:22:18.  A left leg jerk also occurred at 08:50:28.  A left leg jerk was noted at

09:22:13.



BACKGROUND:  Initially, a sustained posterior dominant rhythm was not seen.  This EEG

demonstrated a burst suppression pattern.  An excess of beta frequency activity was

noted.  This is not epileptiform in nature and may in part be due to medication effect.



Initially, this recording demonstrated a modified burst suppression pattern.  Later in

the recording, background frequencies were seen in the poorly modulated 5 to 6 hertz

range.



Over the more anterior regions at that point, occasional triphasic waves were seen.



ACTIVATION:

1. Hyperventilation:  Not performed.

2. Photic stimulation:  No driving seen.

3. Sleep:  Stages I and II sleep noted.  Please note that the majority of this

    recording was stage II sleep.

ABNORMALITIES:

1. Initially, this EEG demonstrated a modified burst suppression pattern.

2. Subsequently that is later into the recording, the periods of suppression declined,

    background frequencies were seen in the poorly modulated 3 to 4 hertz range.

3. At that time over the more anterior regions, occasional triphasic waves were seen.

    In addition, diffuse synchronous and asynchronous frontally predominant 3 to 5

    hertz slow wave activity was seen.



IMPRESSION:

Abnormal 2.5-hour video EEG.  No definitive seizures were recorded.  No definitive

epileptiform activity was present.  Numerous events were recorded as described in

summary as a sudden body jerk or movement lasting less than 1 second.  These were not

associated with epileptiform activity.



Initially, this EEG demonstrated a modified burst suppression pattern.  As sedation

declined, background frequencies were seen occasionally in the 5 to 6 hertz range with

intermixed slower 3 to 4 hertz poorly modulated delta range slowing.  During those

times over the more anterior regions, diffuse frontally predominant 3 to 5 hertz slow

wave activity was seen with occasional triphasic waves.  The triphasic waves are not

epileptiform in nature.  Triphasic waves can be seen in the setting of a metabolic

encephalopathy.



CONCLUSION:

These findings indicate moderate-to-severe diffuse cerebral dysfunction, which may in

part be due to medication effect.  These findings were called to the neurologist taking

care of the patient at 3:25 p.m. on 01/13/2023.





MMVIVIANE / SUYAPA: [id number] / Job#: [id number]

## 2023-01-16 NOTE — P.PN
Subjective


Progress Note Date: 01/16/23





Patient is off sedation and doing well.  Still encephalopathic, but no seizure 

like activity.  Now only on Keppra.  Pending viral panel from [facility].





General: intubated, sedated


HEENT: normocephalic, atraumatic, no tracheal deviation


Respiratory: symmetric chest rise, no cyanosis, ventilator dependent


CVS: perfusing all extremities, no distal gangrene, no pitting edema


GI: soft, ND


: no SPT, no CVAT, alston is present


Neuro: sedated, rigors, moving all extremities, not following commands





Assessment/plan:





Acute hypoxic and hypercapnic respiratory failure status post intubation


-Repeating prolonged EEG is pending read


-Discussed with nursing overnight events requiring increase of versed


-CXR reviewed today, continues to have left pleural effusion








Acute metabolic encephalopathy


Hyperammonemia


History of alcohol abuse


-Continue to wean sedation as able, again on versed and propofol, keppra, ativan

PRN for seizures weaning versed as able. 


-f/u Lumbar Puncture Cx, HSV





Shock, likely hypovolemic - resolved


High anion gap metabolic acidosis secondary to Severe lactic acidosis - resolved


Acute kidney injury - resolved


Severe hyperphosphatemia - resolved


Severe hypokalemia - resolving


Hypomagnesemia


-Continue IV fluids


-Monitor urine output and renal function, renal ultrasound showed no hydron

ephrosis


-Replete electrolytes per protocol, continue to monitor


-Renal following





A. fib with RVR status post cardioversion


Elevated troponin


-Cardiology following





Thrombocytopenia, resolved


-IV PPI twice a day


-Low platelets likely in the setting of chronic alcohol abuse


-Gen. surgery following





Staph bacteremia


-First set of blood cultures positive for staph aureus and epidermidis, likely 

contaminant. however, sputum culture also showing staph


-Patient currently on vancomycin, repeat blood cultures no growth to date





DVT ppx: Lovenox





Code status: Full code





Anticipated discharge place: Pending clinical course


Anticipated discharge time: Pending clinical course








Objective





- Vital Signs


Vital signs: 


                                   Vital Signs











Temp  98.4 F   01/16/23 12:00


 


Pulse  109 H  01/16/23 14:00


 


Resp  19   01/16/23 14:00


 


BP  158/87   01/16/23 14:00


 


Pulse Ox  93 L  01/16/23 14:00


 


FiO2  35   01/16/23 08:00








                                 Intake & Output











 01/15/23 01/16/23 01/16/23





 18:59 06:59 18:59


 


Intake Total 2116.825 1140.811 686.5


 


Output Total 2925 1035 5325


 


Balance -808.175 105.811 -4638.5


 


Weight  99 kg 99 kg


 


Intake:   


 


  IV 1150 350 550


 


    .9 @ 10   50


 


    Dextrose 5%-0.45% NaCl 1, 550 350 250





    000 ml @ 50 mls/hr IV .   





    Q20H [facility] Rx#:[id number]   


 


    Thiamine 100 mg In Sodium   100





    Chloride 0.9% 50 ml @   





    100 mls/hr IVPB Q24HR [facility]   





    Rx#:[id number]   


 


    Vancomycin 1,500 mg In 500  





    Sodium Chloride 0.9% 500   





    ml 500 ml @ 167 mls/hr   





    IVPB Q12H [facility] Rx#:   





    [id number]   


 


    ceFAZolin 2 gm In Sodium   50





    Chloride 0.9% 50 ml @ 100   





    mls/hr IVPB Q8H [facility] Rx#:   





    [id number]   


 


    levETIRAcetam IV 1,000 mg 100  100





    In Saline 1 100ml.bag @   





    400 mls/hr IVPB Q12HR [facility]   





    Rx#:[id number]   


 


  Intake, IV Titration 288.825 166.811 58.5





  Amount   


 


    Dexmedetomidine/0.9% NaCl 23.911 76.361 





    (Pmx) 400 mcg In Empty   





    Bag 1 bag @ 0.2 MCG/KG/HR   





    4.79 mls/hr IV .[id number]   





    [facility] Rx#:[id number]   


 


    Midazolam HCl 200 mg In 12.584  





    Sodium Chloride 0.9% 60   





    ml @ 14 MG/HR 7 mls/hr IV   





    .[id number] [facility] Rx#:   





    [id number]   


 


    Thiamine 100 mg In Sodium 100  





    Chloride 0.9% 50 ml @   





    100 mls/hr IVPB Q24HR [facility]   





    Rx#:[id number]   


 


    ceFAZolin 2 gm In Sodium 50  





    Chloride 0.9% 50 ml @ 100   





    mls/hr IVPB Q8H [facility] Rx#:   





    [id number]   


 


    propofoL 1,000 mg In 102.33 90.450 58.5





    Empty Bag 1 bag @ 25 MCG/   





    KG/MIN 13.5 mls/hr IV .   





    Q7H25M [facility] Rx#:[id number]   


 


  Tube Feeding 528 624 48


 


  Other 150  30


 


Output:   


 


  Urine 2925 1035 5325


 


Other:   


 


  Voiding Method Indwelling Catheter Indwelling Catheter Indwelling Catheter








                       ABP, PAP, CO, CI - Last Documented











Arterial Blood Pressure        140/67

















- Labs


CBC & Chem 7: 


                                 01/16/23 05:00





                                 01/16/23 05:00


Labs: 


                  Abnormal Lab Results - Last 24 Hours (Table)











  01/16/23 01/16/23 01/16/23 Range/Units





  05:00 05:00 05:39 


 


RBC  3.77 L    (4.30-5.90)  m/uL


 


Hgb  11.5 L    (13.0-17.5)  gm/dL


 


Hct  34.9 L    (39.0-53.0)  %


 


ABG pH    7.48 H  (7.35-7.45)  


 


ABG HCO3    29 H  (21-25)  mmol/L


 


ABG Total CO2    30 H  (19-24)  mmol/L


 


ABG O2 Saturation    98.3 H  (94-97)  %


 


Chloride   114 H   ()  mmol/L


 


Creatinine   0.65 L   (0.66-1.25)  mg/dL


 


Glucose   108 H   (74-99)  mg/dL


 


Calcium   7.7 L   (8.4-10.2)  mg/dL








                      Microbiology - Last 24 Hours (Table)











 01/14/23 17:00 CSF Gram Stain - Preliminary





 Cerebral Spinal Fluid CSF Culture - Preliminary

## 2023-01-16 NOTE — XR
EXAMINATION TYPE: XR chest 1V portable

 

DATE OF EXAM: 1/16/2023 4:41 AM

 

COMPARISON: Chest radiographs from 1/15/2023.

 

TECHNIQUE: XR chest 1V portable Portable AP radiograph of the chest.

 

CLINICAL INDICATION:Male, 57 years old with history of Follow up-pt on vent.; 

 

FINDINGS: 

Lungs/Pleura: No pleural effusion or focal consolidation. Blunting of the left costophrenic angle. 

Pulmonary vascularity: Unremarkable.

Heart/mediastinum: Cardiomediastinal silhouette is unremarkable.

Musculoskeletal: No acute osseous pathology. Partial visualization of cervical fusion hardware.

 

Other findings: None

 

Lines/Tubes:

Endotracheal tube with distal tip 5.5 cm above the jossy

Nasogastric tube with its distal tip and side-port projecting under the diaphragm in stable position.


Right internal jugular central venous catheter with distal tip in the right atrium in stable position
.

 

IMPRESSION: 

1.  Decreased small left pleural effusion.

2.  Stable support lines and tubes.

## 2023-01-17 LAB
ANION GAP SERPL CALC-SCNC: 2 MMOL/L
BASOPHILS # BLD AUTO: 0 K/UL (ref 0–0.2)
BASOPHILS NFR BLD AUTO: 0 %
BUN SERPL-SCNC: 12 MG/DL (ref 9–20)
CALCIUM SPEC-MCNC: 7.4 MG/DL (ref 8.4–10.2)
CHLORIDE SERPL-SCNC: 110 MMOL/L (ref 98–107)
CO2 SERPL-SCNC: 27 MMOL/L (ref 22–30)
EOSINOPHIL # BLD AUTO: 0.3 K/UL (ref 0–0.7)
EOSINOPHIL NFR BLD AUTO: 2 %
ERYTHROCYTE [DISTWIDTH] IN BLOOD BY AUTOMATED COUNT: 3.55 M/UL (ref 4.3–5.9)
ERYTHROCYTE [DISTWIDTH] IN BLOOD: 13.6 % (ref 11.5–15.5)
GLUCOSE BLD-MCNC: 93 MG/DL (ref 70–110)
GLUCOSE BLD-MCNC: 94 MG/DL (ref 70–110)
GLUCOSE BLD-MCNC: 95 MG/DL (ref 70–110)
GLUCOSE BLD-MCNC: 99 MG/DL (ref 70–110)
GLUCOSE SERPL-MCNC: 96 MG/DL (ref 74–99)
HCT VFR BLD AUTO: 32.4 % (ref 39–53)
HGB BLD-MCNC: 10.9 GM/DL (ref 13–17.5)
LYMPHOCYTES # SPEC AUTO: 1.1 K/UL (ref 1–4.8)
LYMPHOCYTES NFR SPEC AUTO: 8 %
MCH RBC QN AUTO: 30.7 PG (ref 25–35)
MCHC RBC AUTO-ENTMCNC: 33.6 G/DL (ref 31–37)
MCV RBC AUTO: 91.5 FL (ref 80–100)
MONOCYTES # BLD AUTO: 0.5 K/UL (ref 0–1)
MONOCYTES NFR BLD AUTO: 4 %
NEUTROPHILS # BLD AUTO: 10.9 K/UL (ref 1.3–7.7)
NEUTROPHILS NFR BLD AUTO: 84 %
PLATELET # BLD AUTO: 199 K/UL (ref 150–450)
POTASSIUM SERPL-SCNC: 3.4 MMOL/L (ref 3.5–5.1)
SODIUM SERPL-SCNC: 139 MMOL/L (ref 137–145)
WBC # BLD AUTO: 13 K/UL (ref 3.8–10.6)

## 2023-01-17 RX ADMIN — SODIUM CHLORIDE SCH MLS/HR: 9 INJECTION, SOLUTION INTRAVENOUS at 18:28

## 2023-01-17 RX ADMIN — SODIUM CHLORIDE SCH MLS/HR: 9 INJECTION, SOLUTION INTRAVENOUS at 00:59

## 2023-01-17 RX ADMIN — POTASSIUM CHLORIDE SCH MLS/HR: 14.9 INJECTION, SOLUTION INTRAVENOUS at 07:11

## 2023-01-17 RX ADMIN — SODIUM CHLORIDE SCH MLS/HR: 900 INJECTION, SOLUTION INTRAVENOUS at 16:31

## 2023-01-17 RX ADMIN — PANTOPRAZOLE SODIUM SCH MG: 40 INJECTION, POWDER, FOR SOLUTION INTRAVENOUS at 09:22

## 2023-01-17 RX ADMIN — LEVETIRACETAM SCH MLS/HR: 10 INJECTION INTRAVENOUS at 09:22

## 2023-01-17 RX ADMIN — POTASSIUM CHLORIDE SCH MLS/HR: 14.9 INJECTION, SOLUTION INTRAVENOUS at 05:27

## 2023-01-17 RX ADMIN — POTASSIUM CHLORIDE SCH MLS/HR: 14.9 INJECTION, SOLUTION INTRAVENOUS at 02:32

## 2023-01-17 RX ADMIN — CLEVIPIDINE SCH MLS/HR: 0.5 EMULSION INTRAVENOUS at 21:30

## 2023-01-17 RX ADMIN — PANTOPRAZOLE SODIUM SCH MG: 40 INJECTION, POWDER, FOR SOLUTION INTRAVENOUS at 20:42

## 2023-01-17 RX ADMIN — CLEVIPIDINE SCH: 0.5 EMULSION INTRAVENOUS at 14:24

## 2023-01-17 RX ADMIN — LEVETIRACETAM SCH MLS/HR: 10 INJECTION INTRAVENOUS at 20:42

## 2023-01-17 RX ADMIN — METOPROLOL TARTRATE SCH MG: 25 TABLET, FILM COATED ORAL at 09:22

## 2023-01-17 RX ADMIN — SODIUM CHLORIDE SCH MLS/HR: 9 INJECTION, SOLUTION INTRAVENOUS at 10:35

## 2023-01-17 RX ADMIN — SODIUM CHLORIDE SCH MLS/HR: 900 INJECTION, SOLUTION INTRAVENOUS at 09:22

## 2023-01-17 RX ADMIN — ACETAMINOPHEN PRN MG: 325 TABLET, FILM COATED ORAL at 15:11

## 2023-01-17 RX ADMIN — ENOXAPARIN SODIUM SCH MG: 40 INJECTION SUBCUTANEOUS at 09:21

## 2023-01-17 RX ADMIN — CLEVIPIDINE SCH MLS/HR: 0.5 EMULSION INTRAVENOUS at 15:50

## 2023-01-17 RX ADMIN — ACETAMINOPHEN PRN MG: 325 TABLET, FILM COATED ORAL at 21:07

## 2023-01-17 RX ADMIN — METOPROLOL TARTRATE SCH MG: 25 TABLET, FILM COATED ORAL at 20:42

## 2023-01-17 RX ADMIN — SODIUM CHLORIDE SCH MLS/HR: 900 INJECTION, SOLUTION INTRAVENOUS at 09:21

## 2023-01-17 NOTE — P.PN
Subjective


Progress Note Date: 01/17/23


The patient is seen at bedside and according to nurse he is doing better.  

Patient states he feels better.  Denies of any headache, nausea or vomiting.  








Objective





- Vital Signs


Vital signs: 


                                   Vital Signs











Temp  99.5 F   01/17/23 12:00


 


Pulse  82   01/17/23 12:00


 


Resp  23   01/17/23 12:00


 


BP  119/72   01/17/23 12:00


 


Pulse Ox  95   01/17/23 12:00


 


FiO2  35   01/16/23 08:00








                                 Intake & Output











 01/16/23 01/17/23 01/17/23





 18:59 06:59 18:59


 


Intake Total 1061.436 1133 1175


 


Output Total 6525 1750 1000


 


Balance -4552.667 -144 175


 


Weight 99 kg 91.5 kg 


 


Intake:   


 


  IV 1830 1606 1175


 


    .9 @ 10 80 55 25


 


    ACETAMINOPHEN IV (For  100 





    ) 1,000 mg In Empty Bag 1   





    bag @ 400 mls/hr IVPB   





    Q6HR PRN Rx#:[id number]   


 


    Acyclovir Sodium 850 mg 250 250 250





    In Sodium Chloride 0.9%   





    250 ml @ 270 mls/hr IVPB   





    Q8HR [facility] Rx#:[id number]   


 


    Dextrose 5%-0.45% NaCl 1, 400 450 150





    000 ml @ 50 mls/hr IV .   





    Q20H [facility] Rx#:[id number]   


 


    Potassium Chloride 20 meq 200 100 100





    In Water For Injection 1   





    100ml.bag @ 50 mls/hr   





    IVPB Q2H [facility] Rx#:   





    [id number]   


 


    Thiamine 100 mg In Sodium 100  50





    Chloride 0.9% 50 ml @   





    100 mls/hr IVPB Q24HR [facility]   





    Rx#:[id number]   


 


    Vancomycin 1,750 mg In 500 501 500





    Sodium Chloride 0.9% 500   





    ml 500 ml @ 167 mls/hr   





    IVPB ONCE ONE Rx#:   





    [id number]   


 


    ceFAZolin 2 gm In Sodium 50  





    Chloride 0.9% 50 ml @ 100   





    mls/hr IVPB Q8H [facility] Rx#:   





    [id number]   


 


    cefTRIAXone 2 gm In 50 50 





    Sodium Chloride 0.9% 50   





    ml @ 100 mls/hr IVPB Q12H   





    [facility] Rx#:[id number]   


 


    levETIRAcetam IV 1,000 mg 100 100 100





    In Saline 1 100ml.bag @   





    400 mls/hr IVPB Q12HR [facility]   





    Rx#:[id number]   


 


  Intake, IV Titration 64.333  





  Amount   


 


    Clevidipine Butyrate 25 5.833  





    mg In Empty Bag 1 bag @ 1   





    MG/HR 2 mls/hr IV .Q24H   





    [facility] Rx#:[id number]   


 


    propofoL 1,000 mg In 58.5  





    Empty Bag 1 bag @ 25 MCG/   





    KG/MIN 13.5 mls/hr IV .   





    Q7H25M [facility] Rx#:[id number]   


 


  Tube Feeding 48  


 


  Other 30  


 


Output:   


 


  Urine 6525 1750 1000


 


Other:   


 


  Voiding Method Indwelling Catheter Indwelling Catheter Indwelling Catheter








                       ABP, PAP, CO, CI - Last Documented











Arterial Blood Pressure        164/66

















- Exam


GENERAL: The patient is lying in bed and is not in acute distress.








NEUROLOGICAL:


Higher mental function: The patient is awake (more awake today), alert, oriented

to self and time.  He stated he was in the hospital but did not know name.    

Patient is following commands.  No aphasia and no neglect.


Cranial nerves: The pupils are round, equal and reactive to light.  Visual 

fields are full to confrontation throughout.  Extraocular movement is intact no 

nystagmus is noted.   The facial strength is normal throughout.    Tongue is 

midline and moved side-to-side without any difficulty.  No dysarthria is noted. 

Shoulder shrug is normal bilaterally.


Motor: The strength is 5 over 5 throughout.  Normal tone and bulk.  


Cerebellum: Normal finger to nose bilaterally.


Sensation: Sensation is normal to touch throughout.








SOME OF THE WORK-UP DURING THIS HOSPITAL VISIT CONSISTED OF:


* Repeat CT head 01/12/2023 revealed previous surgery.  Mild atrophy, no acute 

  intracranial process.  I personally reviewed CT head, agree with the findings.


* CTA reported as negative of the brain.  Mild plaque at the carotid artery 

  bifurcation.  No evidence of hemodynamic arterial stenosis in the neck.


* Initial EEG 01/09/2023 was abnormal due to amplitude asymmetry with relatively

  higher amplitude activity in the left frontoparietal region due to breach 

  rhythm from previous craniotomy defect.  Background slowing of moderate to 

  severe degree, consistent with generalized cerebral dysfunction, as can be 

  seen with toxic metabolic encephalopathy or due to diffuse structural brain 

  abnormality.  Clinical correlation is recommended.  No epileptiform activity 

  was seen.


* B12 2102, folate 12.90, TSH slightly decreased 0.220, with normal free T4 

  1.37.  Possible due to sick euthyroid.








- Labs


CBC & Chem 7: 


                                 01/17/23 04:35





                                 01/17/23 04:35


Labs: 


                  Abnormal Lab Results - Last 24 Hours (Table)











  01/16/23 01/17/23 01/17/23 Range/Units





  15:10 04:35 04:35 


 


WBC   13.0 H   (3.8-10.6)  k/uL


 


RBC   3.55 L   (4.30-5.90)  m/uL


 


Hgb   10.9 L   (13.0-17.5)  gm/dL


 


Hct   32.4 L   (39.0-53.0)  %


 


Neutrophils #   10.9 H   (1.3-7.7)  k/uL


 


Potassium  3.2 L   3.4 L  (3.5-5.1)  mmol/L


 


Chloride    110 H  ()  mmol/L


 


Calcium    7.4 L  (8.4-10.2)  mg/dL








                      Microbiology - Last 24 Hours (Table)











 01/14/23 17:00 CSF Gram Stain - Preliminary





 Cerebral Spinal Fluid CSF Culture - Preliminary














Assessment and Plan


Assessment: 











* Confusion with continued fevers and I felt the CSF study even though was 

  traumatic but the calculated nucleated cells were still elevated (blood 

  culture and sputum is staph but there is concern for contaminant): Appears 

  meningoencephalitis.


* New onset rhythmic twitching of the shoulders bilaterally and some upper 

  limbs, probably myoclonic jerks, unlikely to be seizures.  EEG did not reveal 

  any epileptiform activity.  No status epilepticus---confusion has drastically 

  improved


* Patient presented with unresponsiveness, likely due to above.


* Possible Alcohol withdrawal


* History of alcoholism.


* History of traumatic brain injury with subdural hematoma, status post 

  craniotomy in 2016 oh 2017.


* Elevated ammonia, abnormal liver functions, rule out cirrhosis.


* Acute kidney injury, now resolved


* Status post shock, likely hypovolemic


* Atrial fibrillation with RVR, status post cardioversion


* Acute respiratory failure, on mechanical ventilation


* Elevated troponin


* Gram-positive bacteremia, possibly contaminant.





Plan: 





* CSF revealed normal glucose 69, CSF protein 57(12-60), WBC count 160, 

  polynuclear 77%, mononuclear cells 18%, eosinophils 5%.  The corrected 

  nucleated cells is 80 cells (for every 1 nucleated cells is 500-1000rbc).  I 

  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  Continue Ceftriaxone 2gm 

  every 12 hours, Vacomycin and Acyclovir 10mg/kg every 8 hours.  Will get I.D. 

  consulted especially with continuing fevers and abnormal CSF study.  Await 

  viral culture ordered by Dr. Brooks.


* I ordered MRI Brain w/ and w/o to rule out any CNS abscess or mass.


* Patient had a prolonged, 2.5 hour EEG performed 01/13/2023.  According to 

  preliminary report from Dr. Ortiz, did not reveal any epileptiform 

  activity.  Some triphasic waves are seen.  No status epilepticus.  Official 

  report pending.  We will continue Keppra 1000 mg twice a day.  Try to wean off

  propofol, then Versed drip.  Patient received Dilantin loading dose last 

  night.  Patient's level is 10.9.  We will not continue Dilantin, as there is 

  no epileptiform activity.


* Patient was given a loading dose of Keppra 1500 mg by intensivist.  We will 

  maintain on Keppra 1000 mg IV twice a day.  Ativan 1 mg every hour as needed 

  for seizure like activity.  Will consider weaning Keppra down the line once 

  more awake and responsive.  





The plan is discussed with patient's nurse.


Will continue to follow.





Time with Patient: Less than 30

## 2023-01-17 NOTE — P.PN
Subjective


Progress Note Date: 01/17/23


Principal diagnosis: 





Respiratory failure.





Reevaluated today on 1/1:30/23, patient remains in the ICU, intubated 

mechanically ventilated.  He is on assist control rate of 18 tidal volume 450 

FiO2 35% PEEP of 5 ABG showed a pO2 of 100 pCO2 44 pH of 7.46.  His FiO2 was cut

down to 35%.  Patient continued to have intermittent episodes of seizure-like 

activities or possibly myoclonic jerks affecting his upper body and upper 

extremities, with deviation of his eyes with upward gaze.  Neurology on the case

loaded up with Dilantin yesterday, and he is on Keppra today.  Although his EEG 

according to the neurologist did not show any evidence of seizure activity.  

Patient to be seen again by neurology today, and decide whether to continue with

seizure medications he did receive Dilantin 1 dose yesterday, and he is now on 

Keppra 1 g twice a day.  Early this morning patient had an EEG which is yet to 

be interpreted by the neurologist.  In the meantime the patient is on propofol 

at 20 mcg/kg/m, Versed 3 mg/h, D5 4 5 at 50 mL per hour and is on vital AF at 30

mL per hour.  Unable to cut down on his sedation at this point mostly because of

these episodic seizure-like activities or possibly myoclonic jerks.  Chest x-ray

today showed small left pleural effusion not large enough to consider 

thoracentesis.  His WBC count is 11.5 hemoglobin is 12.1.  Basic metabolic 

profile and renal profile are normal





Patient was reevaluated today on 1/14/23, remains in the ICU intubated and 

mechanically ventilated, he is tidal volume 450 assist-control rate of 18FiO2 is

down to 35%, PEEP is 5.  ABG showed a pO2 of 110 pCO2 of 44 pH of 7.45, hence no

changes were made in the ventilator settings.  Neurologically the patient is 

about the same, continues to have these episodes of myoclonic-like jerks, 

remains on propofol at 25 mcg/kg/m, remains on Versed at 6 mg/h, patient is 

supposedly scheduled to have a lumbar puncture done today.  Chest x-ray 

continues to show a small left pleural effusion, otherwise is unremarkable.  

Labs were all reviewed and they seem to be relatively unremarkable.  Again the 

only reason the patient could not be extubated to mostly because of his 

neurological status at this point.CBC is relatively normal ABG as noted above.  

Basic metabolic profile is relatively.  Renal profile is normal.  Patient is 

receiving vital AF at 40 mL per hour.  Patient remains on GI and DVT prophylaxis





Reevaluated today on 1/15/23, patient remains in the ICU, intubated and 

mechanically ventilated.  He is on assist control rate of 18 tidal volume 450 

FiO2 35% PEEP of 5.  Remains on propofol at 40 mcg/kg/m is also on Versed 1 

mg/h.  And he is receiving vital AF at 58 mL per hour.  No major issues over the

last 24 hours, patient seems to be quite sedated with the propofol and Versed.  

I plan to lower the dose of propofol, and hopefully get to assess mental status 

today.  I could not awaken the patient today to assess mental status, hence I 

recommended propofol to be cut down significantly and assess mental status RBC 

the patient not quite ready for any weaning trials.  Chest x-ray showed small 

left pleural effusion, otherwise basically unremarkable.  Patient remains on 

vancomycin for his MSSA, hence I will go back and switch him to 2 g IV piggyback

every 8 hours.  Labs today showed a relatively normal CBC, normal basic 

metabolic profile, his spinal fluid results, initial report seems to be 

unremarkable, however the spinal fluid was obviously bloody and was traumatic.  

Whether the patient will need a repeat CT of the brain again because of his 

bloody spinal fluid, that will be decided upon by neurology on the case.  Lumbar

spine CT showed no evidence of epidural hematoma.





Progress note dated 01/16/2023.





The patient is seen in the intensive care unit, room 252.  He was admitted on 

January 6 with mental status changes, and acute kidney injury.  The patient 

remains on the mechanical ventilator.  Ventilator settings include the volume 

assist control, rate 18, tidal volume 450, FiO2 35%, and PEEP of 5.  Blood gases

show pO2 of 99, pCO2 39, pH is 7.48.  The patient's on propofol at 25 mcg/kg/m, 

D5 half-normal saline at 50 mL an hour, and vital AF at 48 mL an hour, which is 

goal.  The patient will get Lasix 40 mg IV push.  We will attempt a daily 

interruption of sedation and a spontaneous breathing trial.  The patient appears

much more awake and alert today.  White count 8, hemoglobin 11.5, hematocrit 

34.9, with a normal platelet count.  Sodium 144, potassium 3.6, chlorides 114, 

CO2 28, BUN 13, creatinine 0.65.  Blood cultures show staph aureus and staph 

epidermidis, and sputum samples are positive for Staphylococcus aureus.  The 

patient's chest x-ray shows a small left-sided pleural effusion.





Progress note dated 01/17/2023.





The patient is again seen in the intensive care unit, room 252.  The patient was

extubated yesterday.  He's currently on 3 L of oxygen.  He is getting dextrose 

with half-normal saline at 50 mL an hour.  He is much more awake and alert.  He 

was started on acyclovir, Rocephin, and vancomycin.  The Protonix can be 

converted to by mouth.  The hospital service did consult infectious diseases.  

White count 13, hemoglobin 10.9, hematocrit 32.4, with a normal platelet count. 

Sodium 139, potassium 3.4, chlorides 110, CO2 27, BUN 12, creatinine 0.67.  

Blood and sputum samples were positive for Staphylococcus aureus.  Chest x-ray 

shows some mild fluid overload, likely as a result of extubation.





Objective





- Vital Signs


Vital signs: 


                                   Vital Signs











Temp  100.1 F H  01/17/23 08:00


 


Pulse  84   01/17/23 10:00


 


Resp  18   01/17/23 10:00


 


BP  133/73   01/17/23 10:00


 


Pulse Ox  96   01/17/23 09:00


 


FiO2  35   01/16/23 08:00








                                 Intake & Output











 01/16/23 01/17/23 01/17/23





 18:59 06:59 18:59


 


Intake Total 9684.977 6803 1055


 


Output Total 6525 1750 625


 


Balance -4552.667 -144 430


 


Weight 99 kg 91.5 kg 


 


Intake:   


 


  IV 1830 1606 1055


 


    .9 @ 10 80 55 5


 


    ACETAMINOPHEN IV (For  100 





    ) 1,000 mg In Empty Bag 1   





    bag @ 400 mls/hr IVPB   





    Q6HR [facility]N Rx#:[id number]   


 


    Acyclovir Sodium 850 mg 250 250 250





    In Sodium Chloride 0.9%   





    250 ml @ 270 mls/hr IVPB   





    Q8HR [facility] Rx#:[id number]   


 


    Dextrose 5%-0.45% NaCl 1, 400 450 50





    000 ml @ 50 mls/hr IV .   





    Q20H [facility] Rx#:[id number]   


 


    Potassium Chloride 20 meq 200 100 100





    In Water For Injection 1   





    100ml.bag @ 50 mls/hr   





    IVPB Q2H [facility] Rx#:   





    [id number]   


 


    Thiamine 100 mg In Sodium 100  50





    Chloride 0.9% 50 ml @   





    100 mls/hr IVPB Q24HR [facility]   





    Rx#:[id number]   


 


    Vancomycin 1,750 mg In 500 501 500





    Sodium Chloride 0.9% 500   





    ml 500 ml @ 167 mls/hr   





    IVPB ONCE ONE Rx#:   





    [id number]   


 


    ceFAZolin 2 gm In Sodium 50  





    Chloride 0.9% 50 ml @ 100   





    mls/hr IVPB Q8H [facility] Rx#:   





    [id number]   


 


    cefTRIAXone 2 gm In 50 50 





    Sodium Chloride 0.9% 50   





    ml @ 100 mls/hr IVPB Q12H   





    [facility] Rx#:[id number]   


 


    levETIRAcetam IV 1,000 mg 100 100 100





    In Saline 1 100ml.bag @   





    400 mls/hr IVPB Q12HR [facility]   





    Rx#:[id number]   


 


  Intake, IV Titration 64.333  





  Amount   


 


    Clevidipine Butyrate 25 5.833  





    mg In Empty Bag 1 bag @ 1   





    MG/HR 2 mls/hr IV .Q24H   





    [facility] Rx#:[id number]   


 


    propofoL 1,000 mg In 58.5  





    Empty Bag 1 bag @ 25 MCG/   





    KG/MIN 13.5 mls/hr IV .   





    [facility] Rx#:[id number]   


 


  Tube Feeding 48  


 


  Other 30  


 


Output:   


 


  Urine 6525 1750 625


 


Other:   


 


  Voiding Method Indwelling Catheter Indwelling Catheter Indwelling Catheter








                       ABP, PAP, CO, CI - Last Documented











Arterial Blood Pressure        143/65

















- Exam





No acute distress, awake and alert, currently on 3 L of oxygen.  His mental 

status is greatly improved.





HEENT examination is grossly unremarkable. 





Neck supple.  Full range of motion.  No adenopathy thyromegaly or neck vein 

distention.





Cardiovascular examination reveals regular rhythm rate.  S1-S2 normal.  No S3 or

S4.  No discernible murmur noted.  Heart rate 84 bpm.





Lungs reveal mostly clear breath sounds.  Minimal rhonchi.  No wheezes or 

crackles.  3 L saturation is 96%.





Abdomen soft bowel sounds are heard.  No masses or tenderness.





Extremities are intact.  No cyanosis clubbing or edema.





Skin is without rash or lesion.





Neurologic examination is brief but nonfocal.





- Labs


CBC & Chem 7: 


                                 01/17/23 04:35





                                 01/17/23 04:35


Labs: 


                  Abnormal Lab Results - Last 24 Hours (Table)











  01/16/23 01/17/23 01/17/23 Range/Units





  15:10 04:35 04:35 


 


WBC   13.0 H   (3.8-10.6)  k/uL


 


RBC   3.55 L   (4.30-5.90)  m/uL


 


Hgb   10.9 L   (13.0-17.5)  gm/dL


 


Hct   32.4 L   (39.0-53.0)  %


 


Neutrophils #   10.9 H   (1.3-7.7)  k/uL


 


Potassium  3.2 L   3.4 L  (3.5-5.1)  mmol/L


 


Chloride    110 H  ()  mmol/L


 


Calcium    7.4 L  (8.4-10.2)  mg/dL








                      Microbiology - Last 24 Hours (Table)











 01/14/23 17:00 CSF Gram Stain - Preliminary





 Cerebral Spinal Fluid CSF Culture - Preliminary














Assessment and Plan


Assessment: 





Acute respiratory failure, with intubation on January 6, secondary to patient's 

inability to protect his airway.  The patient was successfully extubated on 

01/16/2023.





Acute mental status changes.





Possible recurrent seizures.





Staphylococcus aureus bacteremia.





Severe lactic acidosis, resolved.





Hypovolemic hypotension, resolved.





Anion gap metabolic acidosis.





History of alcohol abuse.





Acute kidney injury.





History of paroxysmal atrial fibrillation.





Hypertension.





Hyperlipidemia.


Plan: 





Plan dated 01/16/2023.





The patient will get Lasix 40 mg IV push.  The patient was placed on pressure 

support of 5 and CPAP of 5.  The patient's respiratory rate is low, and tidal 

volumes are excellent.  Minute volume is low.  The patient will have a cuff leak

test, and likely be extubated.  Additional recommendations and suggestions are 

forthcoming.  Prognosis is guarded.  Labs, x-rays, medications are reviewed.  We

will continue to follow the patient and make recommendations along the way.





Plan dated 01/17/2023.





The patient was successfully extubated January 16.  The patient is currently on 

acyclovir, we will continue to follow the patient and make recommendations along

the way.  Prognosis is guarded.  Labs, x-rays, and medications are all reviewed.

 Infectious disease consultant was asked to see the patient.  The patient's 

Protonix chemically converted to by mouth.  Additional recommendations and 

suggestions are forthcoming.


Time with Patient: Greater than 30

## 2023-01-17 NOTE — P.CONS
History of Present Illness





- Reason for Consult


Consult date: 01/17/23


Infectious encephalopathy


Requesting physician: Gracy Mosqueda





- Chief Complaint


Fever and abnormal CSF x 1 day





- History of Present Illness


Patient is a 57 year old  male with a past medical history significant 

for alcohol dependence/abuse history of atrial fibrillation patient was brought 

to the hospital 11 days ago on 01/06/2023 for evaluation of mental status 

changes patient presented to the hospital and was hypotensive A. fib with RVR 

patient got intubated and cardioverted, patient on presentation hospital was 

afebrile and subsequently he did spike a fever of 100.7F on 01/08/2023 and 

another fever of 103F on 01/10/2023 patient was subsequently afebrile however 

now is running a fever on a daily basis since 01/16/2023, patient did have a 

positive blood culture on 01/06/2023 which did grew MSSA staph epi and the 

patient also grew MSSA from his sputum blood cultures on 01/08/2023 has been 

negative , patient did have a CT of the chest and abdominal pelvis on admission 

reported negative for acute abnormality, last chest x-ray on 01/17/2023 this 

morning which shows pulmonary vascular administration is more pronounced left 

effusion, patient did have a white count of 17,000 on admission which 

subsequently normalized however is up to 13,000 today, patient did have normal 

kidney function CRP was mildly elevated pro calcitonin was 0.61 on 01/11/2023, 

patient did have a CSF examination obtained on 01/14/2023 which it shows a 

40,000 RBC nucleated cells was 160 patient did have a normal CSF glucose and 

protein patient has been treated with vancomycin since admission to the hospital

and he was started on cefazolin on 1:15 which was subsequently canceled 

yesterday and the patient was started on Rocephin 2 g every 12 along with 

acyclovir with concern for possible meningitis/encephalitis and infectious 

disease was consulted last evening after the patient has been in the hospital 

for 11 days and presented with sepsis.


On today's evaluation that is 01/17/2023 patient did have a fever of 101F this 

morning, patient is currently breathing comfortably on 4 L nasal cannula when 

asked specifically the patient denies having any headache patient was aware that

he was at [facility] patient denies having any URI symptoms no 

chest pain or shortness of breath he did have some cough unable to bring up any 

sputum no nausea no vomiting and abdominal pain , patient did have diarrhea for 

which the patient did have a fecal management system however still for C. diff 

has not been sent





Review of Systems


Positive point has been  mentioned in the HPI rest of the systems are negative








Past Medical History


Past Medical History: Hypertension


History of Any Multi-Drug Resistant Organisms: None Reported


Past Surgical History: Appendectomy, Orthopedic Surgery, Tonsillectomy


Additional Past Surgical History / Comment(s): surgery for brain bleed 2019 or 

2020 (family unsure)


Past Psychological History: No Psychological Hx Reported


Past Alcohol Use History: None Reported


Past Drug Use History: None Reported





- Past Family History


  ** family


Family Medical History: Cancer


Additional Family Medical History / Comment(s): no CAD





Medications and Allergies


                                Home Medications











 Medication  Instructions  Recorded  Confirmed  Type


 


Hydrocodone/Acetaminophen [Norco 2 each PO Q6HR PRN #20 tab 06/17/17 01/07/23 Rx





5-325]    


 


Metoclopramide [Reglan] 5 mg PO QID PRN 01/07/23 01/07/23 History


 


Metoprolol Succinate (ER) [Toprol 50 mg PO DAILY 01/07/23 01/07/23 History





XL]    


 


Omeprazole [PriLOSEC] 20 mg PO DAILY 01/07/23 01/07/23 History


 


Potassium Chloride ER [K-Dur 20] 20 meq PO BID 01/07/23 01/07/23 History


 


Pravastatin Sodium [Pravachol] 40 mg PO DAILY 01/07/23 01/07/23 History


 


methocarbamoL [Methocarbamol] 750 mg PO TID PRN 01/07/23 01/07/23 History








                                    Allergies











Allergy/AdvReac Type Severity Reaction Status Date / Time


 


Penicillins Allergy  Unknown Verified 01/07/23 12:59





   Childhood  














Physical Exam


Vitals: 


                                   Vital Signs











  Temp Pulse Resp BP Pulse Ox


 


 01/17/23 10:00   84  18  133/73 


 


 01/17/23 09:00   78  19  125/82  96


 


 01/17/23 08:00  100.1 F H  82  20  129/79  96


 


 01/17/23 07:00  101 F H  83  27 H  121/72  95


 


 01/17/23 06:00   77  17  117/72  95


 


 01/17/23 05:00   75  16  118/66  96


 


 01/17/23 04:00  100.6 F H  78  22  111/62  98


 


 01/17/23 03:00   77  15  109/57  97


 


 01/17/23 02:00   75  28 H  106/60  98


 


 01/17/23 01:00  100.6 F H  75  11 L  101/55  96


 


 01/17/23 00:11   76  18  101/55  95


 


 01/17/23 00:00   77  22  113/66  96


 


 01/16/23 23:00  100.9 F H  77  17  121/66  96


 


 01/16/23 22:00   81  18  109/67  95


 


 01/16/23 21:00   85  19  131/67  97


 


 01/16/23 20:00  100.4 F H  89  19  119/64  96


 


 01/16/23 19:00   90  19  116/55  97


 


 01/16/23 18:00  101.1 F H  96  21  109/56  95


 


 01/16/23 17:00  102.2 F H  110 H  20  136/60  94 L


 


 01/16/23 16:00  103.4 F H  105 H  16  175/87  93 L


 


 01/16/23 15:29      92 L


 


 01/16/23 15:00   103 H  19  147/80  93 L


 


 01/16/23 14:00   109 H  19  158/87  93 L


 


 01/16/23 13:00   118 H  19  158/87  92 L


 


 01/16/23 12:00  98.4 F  93  22  152/80  96


 


 01/16/23 11:00   78  21   93 L








                                Intake and Output











 01/16/23 01/17/23 01/17/23





 22:59 06:59 14:59


 


Intake Total 5338.975 2390 1055


 


Output Total 1850 1100 625


 


Balance -299.167 241 430


 


Intake:   


 


  IV 1545 1341 1055


 


    .9 @ 10 45 40 5


 


    ACETAMINOPHEN IV (For  100 





    ) 1,000 mg In Empty Bag 1   





    bag @ 400 mls/hr IVPB   





    Q6HR PRN Rx#:[id number]   


 


    Acyclovir Sodium 850 mg 250 250 250





    In Sodium Chloride 0.9%   





    250 ml @ 270 mls/hr IVPB   





    Q8HR [facility] Rx#:[id number]   


 


    Dextrose 5%-0.45% NaCl 1, 300 300 50





    000 ml @ 50 mls/hr IV .   





    Q20H [facility] Rx#:[id number]   


 


    Potassium Chloride 20 meq 200 100 100





    In Water For Injection 1   





    100ml.bag @ 50 mls/hr   





    IVPB Q2H [facility] Rx#:   





    [id number]   


 


    Thiamine 100 mg In Sodium   50





    Chloride 0.9% 50 ml @   





    100 mls/hr IVPB Q24HR [facility]   





    Rx#:[id number]   


 


    Vancomycin 1,750 mg In 500 501 500





    Sodium Chloride 0.9% 500   





    ml 500 ml @ 167 mls/hr   





    IVPB ONCE ONE Rx#:   





    [id number]   


 


    cefTRIAXone 2 gm In 50 50 





    Sodium Chloride 0.9% 50   





    ml @ 100 mls/hr IVPB Q12H   





    [facility] Rx#:[id number]   


 


    levETIRAcetam IV 1,000 mg 100  100





    In Saline 1 100ml.bag @   





    400 mls/hr IVPB Q12HR [facility]   





    Rx#:[id number]   


 


  Intake, IV Titration 5.833  





  Amount   


 


    Clevidipine Butyrate 25 5.833  





    mg In Empty Bag 1 bag @ 1   





    MG/HR 2 mls/hr IV .Q24H   





    [facility] Rx#:[id number]   


 


Output:   


 


  Urine 1850 1100 625


 


Other:   


 


  Voiding Method Indwelling Catheter Indwelling Catheter Indwelling Catheter


 


  Weight  91.5 kg 








                         ABP, PAP, CO, CI - Last 8 Hours











Arterial Blood Pressure        143/65


 


Arterial Blood Pressure        144/60


 


Arterial Blood Pressure        131/66


 


Arterial Blood Pressure        132/51


 


Arterial Blood Pressure        142/55


 


Arterial Blood Pressure        137/56


 


Arterial Blood Pressure        108/47














GENERAL DESCRIPTION: Middle-aged male lying in bed, no distress. No tachypnea or

 accessory muscle of respiration use.


HEENT: Shows Pallor , no scleral icterus. Oral mucous membrane is dry. No 

pharyngeal erythema or thrush


NECK: Trachea central, no thyromegaly.


LUNGS: Unlabored breathing.  Decreased breath sound the bases. No wheeze or 

crackle.


HEART: S1, S2, regular rate and rhythm. No loud murmur


ABDOMEN: Soft, no tenderness , guarding or rigidity, no organomegaly


EXTREMITIES: No edema of feet.


SKIN: No rash, no masses palpable.


NEUROLOGICAL: The patient is awake, alert, oriented x3, mood and affect normal.

















Results


CBC & Chem 7: 


                                 01/20/23 05:40





                                 01/20/23 05:40


Labs: 


                  Abnormal Lab Results - Last 24 Hours (Table)











  01/16/23 01/17/23 01/17/23 Range/Units





  15:10 04:35 04:35 


 


WBC   13.0 H   (3.8-10.6)  k/uL


 


RBC   3.55 L   (4.30-5.90)  m/uL


 


Hgb   10.9 L   (13.0-17.5)  gm/dL


 


Hct   32.4 L   (39.0-53.0)  %


 


Neutrophils #   10.9 H   (1.3-7.7)  k/uL


 


Potassium  3.2 L   3.4 L  (3.5-5.1)  mmol/L


 


Chloride    110 H  ()  mmol/L


 


Calcium    7.4 L  (8.4-10.2)  mg/dL








                      Microbiology - Last 24 Hours (Table)











 01/14/23 17:00 CSF Gram Stain - Preliminary





 Cerebral Spinal Fluid CSF Culture - Preliminary














Assessment and Plan


(1) Bacteremia


Current Visit: Yes   Status: Acute   Code(s): R78.81 - BACTEREMIA   SNOMED 

Code(s): 4264307


   





(2) Fever


Current Visit: Yes   Status: Acute   Code(s): R50.9 - FEVER, UNSPECIFIED   

SNOMED Code(s): 465489515


   


Plan: 


1patient with abnormal CSF which is mostly a traumatic tap with more than 

40,000 RBC white count is still symptomatic elevated however the patient did 

have a normal glucose and protein the patient currently denies having any 

headache and he is aware that he is at [facility] all these 

factors will go against meningitis or encephalitis


2patient now to have a fever in this patient who did have a positive blood as 

well as sputum culture for MSSA and did have evidence of a left-sided effusion 

question of possible pulmonary source/empyema, in addition patient also having 

diarrhea with fecal management system and will need to rule out C. diff colitis 

to be the reason for his fever and elevated white count


3we will go ahead and discontinue vancomycin and Rocephin, start the patient on

 cefazolin for his MSSA bacteremia 


4-we will check a stool for C. diff and treat if positive 


5we will obtain blood culture CRP and pro calcitonin


We will follow on clinical condition and cultures to further adjust medication 

if needed


Thank you for this consultation will follow this patient with you


Time with Patient: Greater than 30

## 2023-01-17 NOTE — XR
EXAMINATION TYPE: XR chest 1V portable

 

DATE OF EXAM: 1/17/2023

 

Comparison: 1/16/2023

 

Clinical History: 57-year-old male ICU F/U, PT EXTUBATED

 

Findings:

ACDF hardware. Interval extubation and removal of NG tube. Right IJ CVC tip remains in the right atri
um. Heart remains borderline enlarged. Interstitial/bony vasculature appears more pronounced. Ongoing
 trace left pleural effusion.

 

 

Impression:

The interstitium and pulmonary vasculature appears more pronounced. Ongoing trace left pleural effusi
on. Correlate to exclude developing pulmonary vascular congestion.

## 2023-01-17 NOTE — P.PN
Subjective


Progress Note Date: 01/17/23





Patient is off sedation and doing well.  No longer confused.  LP concerning for 

infx process, started on vanco, ceftriaxone, acyclovir overnight after 

discussion with neurology.  Consulted ID.





General: intubated, sedated


HEENT: normocephalic, atraumatic, no tracheal deviation


Respiratory: symmetric chest rise, no cyanosis, ventilator dependent


CVS: perfusing all extremities, no distal gangrene, no pitting edema


GI: soft, ND


: no SPT, no CVAT, alston is present


Neuro: sedated, rigors, moving all extremities, not following commands





Assessment/plan:





Acute hypoxic and hypercapnic respiratory failure status post intubation


-Repeating prolonged EEG is pending read


-Discussed with nursing overnight events requiring increase of versed


-CXR reviewed today, continues to have left pleural effusion








Acute metabolic encephalopathy


Hyperammonemia


History of alcohol abuse


-Continue to wean sedation as able, again on versed and propofol, keppra, ativan

PRN for seizures weaning versed as able. 


-f/u Lumbar Puncture Cx, HSV





Shock, likely hypovolemic - resolved


High anion gap metabolic acidosis secondary to Severe lactic acidosis - resolved


Acute kidney injury - resolved


Severe hyperphosphatemia - resolved


Severe hypokalemia - resolving


Hypomagnesemia


-Continue IV fluids


-Monitor urine output and renal function, renal ultrasound showed no 

hydronephrosis


-Replete electrolytes per protocol, continue to monitor


-Renal following





A. fib with RVR status post cardioversion


Elevated troponin


-Cardiology following





Thrombocytopenia, resolved


-IV PPI twice a day


-Low platelets likely in the setting of chronic alcohol abuse


-Gen. surgery following





Staph bacteremia


-First set of blood cultures positive for staph aureus and epidermidis, likely 

contaminant. however, sputum culture also showing staph


-Patient currently on vancomycin, repeat blood cultures no growth to date





DVT ppx: Lovenox





Code status: Full code





Anticipated discharge place: Pending clinical course


Anticipated discharge time: Pending clinical course








Objective





- Vital Signs


Vital signs: 


                                   Vital Signs











Temp  100.1 F H  01/17/23 08:00


 


Pulse  82   01/17/23 08:00


 


Resp  20   01/17/23 08:00


 


BP  129/79   01/17/23 08:00


 


Pulse Ox  96   01/17/23 08:00


 


FiO2  35   01/16/23 08:00








                                 Intake & Output











 01/16/23 01/17/23 01/17/23





 18:59 06:59 18:59


 


Intake Total 6889.380 2931 155


 


Output Total 6525 1750 175


 


Balance -4552.667 -144 -20


 


Weight 99 kg 91.5 kg 


 


Intake:   


 


  IV 1830 1606 155


 


    .9 @ 10 80 55 5


 


    ACETAMINOPHEN IV (For  100 





    ) 1,000 mg In Empty Bag 1   





    bag @ 400 mls/hr IVPB   





    Q6HR [facility] Rx#:[id number]   


 


    Acyclovir Sodium 850 mg 250 250 





    In Sodium Chloride 0.9%   





    250 ml @ 270 mls/hr IVPB   





    Q8HR [facility] Rx#:[id number]   


 


    Dextrose 5%-0.45% NaCl 1, 400 450 50





    000 ml @ 50 mls/hr IV .   





    Q20H [facility] Rx#:[id number]   


 


    Potassium Chloride 20 meq 200 100 100





    In Water For Injection 1   





    100ml.bag @ 50 mls/hr   





    IVPB Q2H [facility] Rx#:   





    [id number]   


 


    Thiamine 100 mg In Sodium 100  





    Chloride 0.9% 50 ml @   





    100 mls/hr IVPB Q24HR [facility]   





    Rx#:[id number]   


 


    Vancomycin 1,750 mg In 500 501 





    Sodium Chloride 0.9% 500   





    ml 500 ml @ 167 mls/hr   





    IVPB ONCE ONE Rx#:   





    [id number]   


 


    ceFAZolin 2 gm In Sodium 50  





    Chloride 0.9% 50 ml @ 100   





    mls/hr IVPB Q8H [facility] Rx#:   





    [id number]   


 


    cefTRIAXone 2 gm In 50 50 





    Sodium Chloride 0.9% 50   





    ml @ 100 mls/hr IVPB Q12H   





    [facility] Rx#:[id number]   


 


    levETIRAcetam IV 1,000 mg 100 100 





    In Saline 1 100ml.bag @   





    400 mls/hr IVPB Q12HR [facility]   





    Rx#:[id number]   


 


  Intake, IV Titration 64.333  





  Amount   


 


    Clevidipine Butyrate 25 5.833  





    mg In Empty Bag 1 bag @ 1   





    MG/HR 2 mls/hr IV .Q24H   





    [facility] Rx#:[id number]   


 


    propofoL 1,000 mg In 58.5  





    Empty Bag 1 bag @ 25 MCG/   





    KG/MIN 13.5 mls/hr IV .   





    Q7H25M [facility] Rx#:[id number]   


 


  Tube Feeding 48  


 


  Other 30  


 


Output:   


 


  Urine 6525 1750 175


 


Other:   


 


  Voiding Method Indwelling Catheter Indwelling Catheter Indwelling Catheter








                       ABP, PAP, CO, CI - Last Documented











Arterial Blood Pressure        144/60

















- Labs


CBC & Chem 7: 


                                 01/17/23 04:35





                                 01/17/23 04:35


Labs: 


                  Abnormal Lab Results - Last 24 Hours (Table)











  01/16/23 01/17/23 01/17/23 Range/Units





  15:10 04:35 04:35 


 


WBC   13.0 H   (3.8-10.6)  k/uL


 


RBC   3.55 L   (4.30-5.90)  m/uL


 


Hgb   10.9 L   (13.0-17.5)  gm/dL


 


Hct   32.4 L   (39.0-53.0)  %


 


Neutrophils #   10.9 H   (1.3-7.7)  k/uL


 


Potassium  3.2 L   3.4 L  (3.5-5.1)  mmol/L


 


Chloride    110 H  ()  mmol/L


 


Calcium    7.4 L  (8.4-10.2)  mg/dL








                      Microbiology - Last 24 Hours (Table)











 01/14/23 17:00 CSF Gram Stain - Preliminary





 Cerebral Spinal Fluid CSF Culture - Preliminary

## 2023-01-18 LAB
ALBUMIN SERPL-MCNC: 2.5 G/DL (ref 3.5–5)
ALP SERPL-CCNC: 101 U/L (ref 38–126)
ALT SERPL-CCNC: 30 U/L (ref 4–49)
ANION GAP SERPL CALC-SCNC: 4 MMOL/L
AST SERPL-CCNC: 35 U/L (ref 17–59)
BASOPHILS # BLD AUTO: 0 K/UL (ref 0–0.2)
BASOPHILS NFR BLD AUTO: 1 %
BUN SERPL-SCNC: 9 MG/DL (ref 9–20)
CALCIUM SPEC-MCNC: 7.6 MG/DL (ref 8.4–10.2)
CHLORIDE SERPL-SCNC: 109 MMOL/L (ref 98–107)
CO2 SERPL-SCNC: 26 MMOL/L (ref 22–30)
EOSINOPHIL # BLD AUTO: 0.2 K/UL (ref 0–0.7)
EOSINOPHIL NFR BLD AUTO: 2 %
ERYTHROCYTE [DISTWIDTH] IN BLOOD BY AUTOMATED COUNT: 3.65 M/UL (ref 4.3–5.9)
ERYTHROCYTE [DISTWIDTH] IN BLOOD: 13.3 % (ref 11.5–15.5)
GLUCOSE BLD-MCNC: 102 MG/DL (ref 70–110)
GLUCOSE BLD-MCNC: 103 MG/DL (ref 70–110)
GLUCOSE BLD-MCNC: 87 MG/DL (ref 70–110)
GLUCOSE BLD-MCNC: 88 MG/DL (ref 70–110)
GLUCOSE SERPL-MCNC: 98 MG/DL (ref 74–99)
HCT VFR BLD AUTO: 33.4 % (ref 39–53)
HGB BLD-MCNC: 11.1 GM/DL (ref 13–17.5)
LYMPHOCYTES # SPEC AUTO: 1.3 K/UL (ref 1–4.8)
LYMPHOCYTES NFR SPEC AUTO: 14 %
MCH RBC QN AUTO: 30.5 PG (ref 25–35)
MCHC RBC AUTO-ENTMCNC: 33.3 G/DL (ref 31–37)
MCV RBC AUTO: 91.5 FL (ref 80–100)
MONOCYTES # BLD AUTO: 0.4 K/UL (ref 0–1)
MONOCYTES NFR BLD AUTO: 4 %
NEUTROPHILS # BLD AUTO: 7.1 K/UL (ref 1.3–7.7)
NEUTROPHILS NFR BLD AUTO: 77 %
PLATELET # BLD AUTO: 204 K/UL (ref 150–450)
POTASSIUM SERPL-SCNC: 3.1 MMOL/L (ref 3.5–5.1)
PROT SERPL-MCNC: 5.1 G/DL (ref 6.3–8.2)
SODIUM SERPL-SCNC: 139 MMOL/L (ref 137–145)
WBC # BLD AUTO: 9.3 K/UL (ref 3.8–10.6)

## 2023-01-18 RX ADMIN — SODIUM CHLORIDE SCH MLS/HR: 900 INJECTION, SOLUTION INTRAVENOUS at 08:36

## 2023-01-18 RX ADMIN — ACETAMINOPHEN PRN MG: 325 TABLET, FILM COATED ORAL at 21:34

## 2023-01-18 RX ADMIN — PANTOPRAZOLE SODIUM SCH MG: 40 INJECTION, POWDER, FOR SOLUTION INTRAVENOUS at 08:37

## 2023-01-18 RX ADMIN — ENOXAPARIN SODIUM SCH MG: 40 INJECTION SUBCUTANEOUS at 08:36

## 2023-01-18 RX ADMIN — ACETAMINOPHEN PRN MG: 325 TABLET, FILM COATED ORAL at 04:50

## 2023-01-18 RX ADMIN — LEVETIRACETAM SCH MLS/HR: 10 INJECTION INTRAVENOUS at 21:32

## 2023-01-18 RX ADMIN — METOPROLOL TARTRATE SCH MG: 25 TABLET, FILM COATED ORAL at 08:37

## 2023-01-18 RX ADMIN — METOPROLOL TARTRATE SCH MG: 25 TABLET, FILM COATED ORAL at 21:33

## 2023-01-18 RX ADMIN — POTASSIUM CHLORIDE SCH MLS/HR: 14.9 INJECTION, SOLUTION INTRAVENOUS at 00:44

## 2023-01-18 RX ADMIN — PANTOPRAZOLE SODIUM SCH MG: 40 INJECTION, POWDER, FOR SOLUTION INTRAVENOUS at 21:33

## 2023-01-18 RX ADMIN — POTASSIUM CHLORIDE SCH MLS/HR: 14.9 INJECTION, SOLUTION INTRAVENOUS at 08:35

## 2023-01-18 RX ADMIN — LEVETIRACETAM SCH MLS/HR: 10 INJECTION INTRAVENOUS at 08:37

## 2023-01-18 RX ADMIN — SODIUM CHLORIDE SCH MLS/HR: 900 INJECTION, SOLUTION INTRAVENOUS at 00:45

## 2023-01-18 RX ADMIN — POTASSIUM CHLORIDE SCH MLS/HR: 14.9 INJECTION, SOLUTION INTRAVENOUS at 05:50

## 2023-01-18 NOTE — P.PN
Subjective


Progress Note Date: 01/18/23


Principal diagnosis: 





Respiratory failure.





Reevaluated today on 1/1:30/23, patient remains in the ICU, intubated 

mechanically ventilated.  He is on assist control rate of 18 tidal volume 450 

FiO2 35% PEEP of 5 ABG showed a pO2 of 100 pCO2 44 pH of 7.46.  His FiO2 was cut

down to 35%.  Patient continued to have intermittent episodes of seizure-like 

activities or possibly myoclonic jerks affecting his upper body and upper 

extremities, with deviation of his eyes with upward gaze.  Neurology on the case

loaded up with Dilantin yesterday, and he is on Keppra today.  Although his EEG 

according to the neurologist did not show any evidence of seizure activity.  

Patient to be seen again by neurology today, and decide whether to continue with

seizure medications he did receive Dilantin 1 dose yesterday, and he is now on 

Keppra 1 g twice a day.  Early this morning patient had an EEG which is yet to 

be interpreted by the neurologist.  In the meantime the patient is on propofol 

at 20 mcg/kg/m, Versed 3 mg/h, D5 4 5 at 50 mL per hour and is on vital AF at 30

mL per hour.  Unable to cut down on his sedation at this point mostly because of

these episodic seizure-like activities or possibly myoclonic jerks.  Chest x-ray

today showed small left pleural effusion not large enough to consider 

thoracentesis.  His WBC count is 11.5 hemoglobin is 12.1.  Basic metabolic 

profile and renal profile are normal





Patient was reevaluated today on 1/14/23, remains in the ICU intubated and 

mechanically ventilated, he is tidal volume 450 assist-control rate of 18FiO2 is

down to 35%, PEEP is 5.  ABG showed a pO2 of 110 pCO2 of 44 pH of 7.45, hence no

changes were made in the ventilator settings.  Neurologically the patient is 

about the same, continues to have these episodes of myoclonic-like jerks, 

remains on propofol at 25 mcg/kg/m, remains on Versed at 6 mg/h, patient is 

supposedly scheduled to have a lumbar puncture done today.  Chest x-ray 

continues to show a small left pleural effusion, otherwise is unremarkable.  

Labs were all reviewed and they seem to be relatively unremarkable.  Again the 

only reason the patient could not be extubated to mostly because of his 

neurological status at this point.CBC is relatively normal ABG as noted above.  

Basic metabolic profile is relatively.  Renal profile is normal.  Patient is 

receiving vital AF at 40 mL per hour.  Patient remains on GI and DVT prophylaxis





Reevaluated today on 1/15/23, patient remains in the ICU, intubated and 

mechanically ventilated.  He is on assist control rate of 18 tidal volume 450 

FiO2 35% PEEP of 5.  Remains on propofol at 40 mcg/kg/m is also on Versed 1 

mg/h.  And he is receiving vital AF at 58 mL per hour.  No major issues over the

last 24 hours, patient seems to be quite sedated with the propofol and Versed.  

I plan to lower the dose of propofol, and hopefully get to assess mental status 

today.  I could not awaken the patient today to assess mental status, hence I 

recommended propofol to be cut down significantly and assess mental status RBC 

the patient not quite ready for any weaning trials.  Chest x-ray showed small 

left pleural effusion, otherwise basically unremarkable.  Patient remains on 

vancomycin for his MSSA, hence I will go back and switch him to 2 g IV piggyback

every 8 hours.  Labs today showed a relatively normal CBC, normal basic 

metabolic profile, his spinal fluid results, initial report seems to be 

unremarkable, however the spinal fluid was obviously bloody and was traumatic.  

Whether the patient will need a repeat CT of the brain again because of his 

bloody spinal fluid, that will be decided upon by neurology on the case.  Lumbar

spine CT showed no evidence of epidural hematoma.





Progress note dated 01/16/2023.





The patient is seen in the intensive care unit, room 252.  He was admitted on 

January 6 with mental status changes, and acute kidney injury.  The patient 

remains on the mechanical ventilator.  Ventilator settings include the volume 

assist control, rate 18, tidal volume 450, FiO2 35%, and PEEP of 5.  Blood gases

show pO2 of 99, pCO2 39, pH is 7.48.  The patient's on propofol at 25 mcg/kg/m, 

D5 half-normal saline at 50 mL an hour, and vital AF at 48 mL an hour, which is 

goal.  The patient will get Lasix 40 mg IV push.  We will attempt a daily 

interruption of sedation and a spontaneous breathing trial.  The patient appears

much more awake and alert today.  White count 8, hemoglobin 11.5, hematocrit 

34.9, with a normal platelet count.  Sodium 144, potassium 3.6, chlorides 114, 

CO2 28, BUN 13, creatinine 0.65.  Blood cultures show staph aureus and staph 

epidermidis, and sputum samples are positive for Staphylococcus aureus.  The 

patient's chest x-ray shows a small left-sided pleural effusion.





Progress note dated 01/17/2023.





The patient is again seen in the intensive care unit, room 252.  The patient was

extubated yesterday.  He's currently on 3 L of oxygen.  He is getting dextrose 

with half-normal saline at 50 mL an hour.  He is much more awake and alert.  He 

was started on acyclovir, Rocephin, and vancomycin.  The Protonix can be 

converted to by mouth.  The hospital service did consult infectious diseases.  

White count 13, hemoglobin 10.9, hematocrit 32.4, with a normal platelet count. 

Sodium 139, potassium 3.4, chlorides 110, CO2 27, BUN 12, creatinine 0.67.  

Blood and sputum samples were positive for Staphylococcus aureus.  Chest x-ray 

shows some mild fluid overload, likely as a result of extubation.





Progress note dated 01/18/2023.





57-year-old male again seen in room 252 in the intensive care unit.  The patient

is doing much better.  He is on 3 L.  He is fully awake and alert.  Half-normal 

saline at 50 mL an hour.  Per infectious diseases, he continues on Ancef and 

acyclovir.  The patient can be transferred to the general medical floor without 

telemetry.  We'll DC his IV, and his arterial line.  White count 9.3, hemoglobin

11.1, hematocrit 33.4, and platelet count 204,000.  Sodium 139, potassium 3.1, 

chlorides 109, CO2 26, BUN 9, and creatinine 0.62.  Albumin is 2.5.





Objective





- Vital Signs


Vital signs: 


                                   Vital Signs











Temp  98.5 F   01/18/23 04:00


 


Pulse  76   01/18/23 10:00


 


Resp  19   01/18/23 10:00


 


BP  125/83   01/18/23 10:00


 


Pulse Ox  94 L  01/18/23 10:00


 


FiO2  35   01/16/23 08:00








                                 Intake & Output











 01/17/23 01/18/23 01/18/23





 18:59 06:59 18:59


 


Intake Total 2305.000 1112.734 770


 


Output Total 2400 2950 700


 


Balance -95.000 -1837.266 70


 


Weight  89.2 kg 89.2 kg


 


Intake:   


 


  IV 2090 960 650


 


    .9 @ 10 90 110 50


 


    Acyclovir Sodium 850 mg 350 250 250





    In Sodium Chloride 0.9%   





    250 ml @ 270 mls/hr IVPB   





    Q8HR [facility] Rx#:[id number]   


 


    Dextrose 5%-0.45% NaCl 1, 400 400 100





    000 ml @ 50 mls/hr IV .   





    Q20H [facility] Rx#:[id number]   


 


    Potassium Chloride 20 meq 100  100





    In Water For Injection 1   





    100ml.bag @ 50 mls/hr   





    IVPB Q2H [facility] Rx#:   





    [id number]   


 


    Thiamine 100 mg In Sodium 50  





    Chloride 0.9% 50 ml @   





    100 mls/hr IVPB Q24HR [facility]   





    Rx#:[id number]   


 


    Vancomycin 1,750 mg In 1000  





    Sodium Chloride 0.9% 500   





    ml 500 ml @ 167 mls/hr   





    IVPB ONCE ONE Rx#:   





    [id number]   


 


    ceFAZolin 2 gm In Sodium   50





    Chloride 0.9% 50 ml @ 100   





    mls/hr IVPB Q8H [facility] Rx#:   





    [id number]   


 


    cefTRIAXone 2 gm In  100 





    Sodium Chloride 0.9% 50   





    ml @ 100 mls/hr IVPB Q12H   





    [facility] Rx#:[id number]   


 


    levETIRAcetam IV 1,000 mg 100 100 100





    In Saline 1 100ml.bag @   





    400 mls/hr IVPB Q12HR [facility]   





    Rx#:[id number]   


 


  Intake, IV Titration 15.000 152.734 





  Amount   


 


    Clevidipine Butyrate 25 15.000 52.734 





    mg In Empty Bag 1 bag @ 1   





    MG/HR 2 mls/hr IV .Q24H   





    [facility] Rx#:[id number]   


 


    Potassium Chloride 20 meq  100 





    In Water For Injection 1   





    100ml.bag @ 50 mls/hr   





    IVPB Q2H [facility] Rx#:   





    [id number]   


 


  Oral 200  120


 


Output:   


 


  Urine 2400 2950 700


 


Other:   


 


  Voiding Method Indwelling Catheter Indwelling Catheter 








                       ABP, PAP, CO, CI - Last Documented











Arterial Blood Pressure        83/66

















- Exam





No acute distress, awake and alert, currently on 3 L of oxygen.  His mental 

status is greatly improved.





HEENT examination is grossly unremarkable. 





Neck supple.  Full range of motion.  No adenopathy thyromegaly or neck vein 

distention.





Cardiovascular examination reveals regular rhythm rate.  S1-S2 normal.  No S3 or

S4.  No discernible murmur noted.  Heart rate 79 bpm.





Lungs reveal mostly clear breath sounds.  Minimal rhonchi.  No wheezes or 

crackles.  3 L saturation is 97 %.





Abdomen soft bowel sounds are heard.  No masses or tenderness.





Extremities are intact.  No cyanosis clubbing or edema.





Skin is without rash or lesion.





Neurologic examination is brief but nonfocal.





- Labs


CBC & Chem 7: 


                                 01/18/23 04:30





                                 01/18/23 04:30


Labs: 


                  Abnormal Lab Results - Last 24 Hours (Table)











  01/17/23 01/18/23 01/18/23 Range/Units





  04:35 04:30 04:30 


 


RBC   3.65 L   (4.30-5.90)  m/uL


 


Hgb   11.1 L   (13.0-17.5)  gm/dL


 


Hct   33.4 L   (39.0-53.0)  %


 


Potassium    3.1 L  (3.5-5.1)  mmol/L


 


Chloride    109 H  ()  mmol/L


 


Creatinine    0.62 L  (0.66-1.25)  mg/dL


 


Calcium    7.6 L  (8.4-10.2)  mg/dL


 


C-Reactive Protein  6.9 H   12.4 H  (<1.0)  mg/dL


 


Total Protein    5.1 L  (6.3-8.2)  g/dL


 


Albumin    2.5 L  (3.5-5.0)  g/dL








                      Microbiology - Last 24 Hours (Table)











 01/14/23 17:00 CSF Gram Stain - Preliminary





 Cerebral Spinal Fluid CSF Culture - Preliminary














Assessment and Plan


Assessment: 





Acute respiratory failure, with intubation on January 6, secondary to patient's 

inability to protect his airway.  The patient was successfully extubated on 

01/16/2023.





Acute mental status changes, much improved.





Possible recurrent seizures.





Staphylococcus aureus bacteremia.





Severe lactic acidosis, resolved.





Hypovolemic hypotension, resolved.





Anion gap metabolic acidosis.





History of alcohol abuse.





Acute kidney injury.





History of paroxysmal atrial fibrillation.





Hypertension.





Hyperlipidemia.


Plan: 





Plan dated 01/16/2023.





The patient will get Lasix 40 mg IV push.  The patient was placed on pressure 

support of 5 and CPAP of 5.  The patient's respiratory rate is low, and tidal 

volumes are excellent.  Minute volume is low.  The patient will have a cuff leak

test, and likely be extubated.  Additional recommendations and suggestions are 

forthcoming.  Prognosis is guarded.  Labs, x-rays, medications are reviewed.  We

will continue to follow the patient and make recommendations along the way.





Plan dated 01/17/2023.





The patient was successfully extubated January 16.  The patient is currently on 

acyclovir, we will continue to follow the patient and make recommendations along

the way.  Prognosis is guarded.  Labs, x-rays, and medications are all reviewed.

 Infectious disease consultant was asked to see the patient.  The patient's 

Protonix chemically converted to by mouth.  Additional recommendations and 

suggestions are forthcoming.





Plan dated 01/18/2023.





The patient was successfully extubated on January 16.  Over the last 2 days, his

neurologic status is greatly improved.  The patient could be transferred to the 

general medical floor without telemetry.  We'll DC his IV fluids, and DC his 

arterial line.  He remains on Ancef, and acyclovir.  Labs, x-rays, and 

medications are reviewed.  Prognosis is guarded.


Time with Patient: Less than 30

## 2023-01-18 NOTE — MR
EXAMINATION TYPE: MR brain wo/w con

 

DATE OF EXAM: 1/18/2023

 

COMPARISON: None

 

HISTORY: Brain surgery. Confusion.

 

CONTRAST: 

Standard multiplanar, multisequence MRI departmental protocol images were obtained without contrast a
nd with 9 mL intravenous gadolinium contrast.  

 

 

On the T2 and FLAIR images there is patchy increased signal in the  white matter. These are somewhat 
peripheral at the gray-white matter junction. Total number of lesions is more than 25 and these measu
re up to 1 cm. The brainstem is intact. Cerebellum is intact the diffusion images show no evidence of
 an acute infarct. There is some thinning of the corpus callosum. Contrast images show normal enhance
ment of the venous sinuses. No pathologic intracranial enhancement. No evidence of orbital mass. Ther
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

 

IMPRESSION:

Numerous white matter foci that are nonspecific and could relate to demyelinating disease or microvas
cular ischemia. No evidence of an acute cortical infarct. No evidence of intracranial mass.

## 2023-01-18 NOTE — P.PN
Subjective


Progress Note Date: 01/18/23


Principal diagnosis: 


Fever and bacteremia





Patient is a 57 year old  male with a past medical history significant 

for alcohol dependence/abuse history of atrial fibrillation patient was brought 

to the hospital  on 01/06/2023 for evaluation of mental status changes patient 

presented to the hospital and was hypotensive A. fib with RVR patient got 

intubated and cardioverted, patient did have evidence of MSSA bacteremia sputum 

was also positive for the same pathogen subsequently has been running a fever 

did have a repeat which was traumatic Elevated white count but glucose protein 

normal. 


 on today's evaluation that is 01/18/2023, the patient denies having any fever 

or any chills he is breathing comfortably on room air patient denies any 

headache no chest pain shortness of breath occasional cough no abdominal pain, 

no worsening diarrhea reported by the nursing staff








Objective





- Vital Signs


Vital signs: 


                                   Vital Signs











Temp  98.5 F   01/18/23 04:00


 


Pulse  76   01/18/23 10:00


 


Resp  19   01/18/23 10:00


 


BP  125/83   01/18/23 10:00


 


Pulse Ox  94 L  01/18/23 10:00


 


FiO2  35   01/16/23 08:00








                                 Intake & Output











 01/17/23 01/18/23 01/18/23





 18:59 06:59 18:59


 


Intake Total 2305.000 1112.734 770


 


Output Total 2400 2950 700


 


Balance -95.000 -1837.266 70


 


Weight  89.2 kg 89.2 kg


 


Intake:   


 


  IV 2090 960 650


 


    .9 @ 10 90 110 50


 


    Acyclovir Sodium 850 mg 350 250 250





    In Sodium Chloride 0.9%   





    250 ml @ 270 mls/hr IVPB   





    Q8HR [facility] Rx#:[id number]   


 


    Dextrose 5%-0.45% NaCl 1, 400 400 100





    000 ml @ 50 mls/hr IV .   





    Q20H [facility] Rx#:[id number]   


 


    Potassium Chloride 20 meq 100  100





    In Water For Injection 1   





    100ml.bag @ 50 mls/hr   





    IVPB Q2H [facility] Rx#:   





    [id number]   


 


    Thiamine 100 mg In Sodium 50  





    Chloride 0.9% 50 ml @   





    100 mls/hr IVPB Q24HR [facility]   





    Rx#:[id number]   


 


    Vancomycin 1,750 mg In 1000  





    Sodium Chloride 0.9% 500   





    ml 500 ml @ 167 mls/hr   





    IVPB ONCE ONE Rx#:   





    [id number]   


 


    ceFAZolin 2 gm In Sodium   50





    Chloride 0.9% 50 ml @ 100   





    mls/hr IVPB Q8H [facility] Rx#:   





    [id number]   


 


    cefTRIAXone 2 gm In  100 





    Sodium Chloride 0.9% 50   





    ml @ 100 mls/hr IVPB Q12H   





    [facility] Rx#:[id number]   


 


    levETIRAcetam IV 1,000 mg 100 100 100





    In Saline 1 100ml.bag @   





    400 mls/hr IVPB Q12HR [facility]   





    Rx#:[id number]   


 


  Intake, IV Titration 15.000 152.734 





  Amount   


 


    Clevidipine Butyrate 25 15.000 52.734 





    mg In Empty Bag 1 bag @ 1   





    MG/HR 2 mls/hr IV .Q24H   





    [facility] Rx#:[id number]   


 


    Potassium Chloride 20 meq  100 





    In Water For Injection 1   





    100ml.bag @ 50 mls/hr   





    IVPB Q2H [facility] Rx#:   





    [id number]   


 


  Oral 200  120


 


Output:   


 


  Urine 2400 2950 700


 


Other:   


 


  Voiding Method Indwelling Catheter Indwelling Catheter 








                       ABP, PAP, CO, CI - Last Documented











Arterial Blood Pressure        83/66

















- Exam


GENERAL DESCRIPTION: A middle-aged male lying in bed in no distress





RESPIRATORY SYSTEM: Unlabored breathing , decreased breath sounds at bases





HEART: S1 S2 regular rate and rhythm ,





ABDOMEN: Soft , no tenderness





EXTREMITIES: No edema feet








- Labs


CBC & Chem 7: 


                                 01/18/23 04:30





                                 01/18/23 04:30


Labs: 


                  Abnormal Lab Results - Last 24 Hours (Table)











  01/17/23 01/18/23 01/18/23 Range/Units





  04:35 04:30 04:30 


 


RBC   3.65 L   (4.30-5.90)  m/uL


 


Hgb   11.1 L   (13.0-17.5)  gm/dL


 


Hct   33.4 L   (39.0-53.0)  %


 


Potassium    3.1 L  (3.5-5.1)  mmol/L


 


Chloride    109 H  ()  mmol/L


 


Creatinine    0.62 L  (0.66-1.25)  mg/dL


 


Calcium    7.6 L  (8.4-10.2)  mg/dL


 


C-Reactive Protein  6.9 H   12.4 H  (<1.0)  mg/dL


 


Total Protein    5.1 L  (6.3-8.2)  g/dL


 


Albumin    2.5 L  (3.5-5.0)  g/dL








                      Microbiology - Last 24 Hours (Table)











 01/14/23 17:00 CSF Gram Stain - Preliminary





 Cerebral Spinal Fluid CSF Culture - Preliminary














Assessment and Plan


(1) Bacteremia


Current Visit: Yes   Status: Acute   Code(s): R78.81 - BACTEREMIA   SNOMED 

Code(s): 7048908


   





(2) Fever


Current Visit: Yes   Status: Acute   Code(s): R50.9 - FEVER, UNSPECIFIED   

SNOMED Code(s): 109936965


   


Plan: 


1patient with abnormal CSF which is mostly a traumatic tap with more than 

40,000 RBC white count is still symptomatic elevated however the patient did 

have a normal glucose and protein the patient currently denies having any 

headache and he is aware that he is at [facility] all these 

factors will go against meningitis or encephalitis


2patient now to have a fever in this patient who did have a positive blood as 

well as sputum culture for MSSA and did have evidence of a left-sided effusion 

question of possible pulmonary source/empyema, in addition patient also having 

diarrhea with fecal management system , stool for C. diff negative


3patient to continue with cefazolin for his MSSA bacteremia and monitor 

clinical course closely





Time with Patient: Less than 30

## 2023-01-18 NOTE — P.PN
Subjective


Progress Note Date: 01/18/23


The patient is seen at bedside and continues to feel he is doing well.  Denies 

of any headache, focal weakness.  


Patient continues to have fever with Tmax overnight of 102F.


I spoke with the ID physician yesterday at night via phone and he agreed that 

CSF nucleated cells corrected is still elevated.








Objective





- Vital Signs


Vital signs: 


                                   Vital Signs











Temp  98.5 F   01/18/23 04:00


 


Pulse  82   01/18/23 12:00


 


Resp  16   01/18/23 12:00


 


BP  134/82   01/18/23 12:00


 


Pulse Ox  94 L  01/18/23 12:00


 


FiO2  35   01/16/23 08:00








                                 Intake & Output











 01/17/23 01/18/23 01/18/23





 18:59 06:59 18:59


 


Intake Total 2305.000 1112.734 790


 


Output Total 2400 2950 850


 


Balance -95.000 -1837.266 -60


 


Weight  89.2 kg 89.2 kg


 


Intake:   


 


  IV 2090 960 670


 


    .9 @ 10 90 110 70


 


    Acyclovir Sodium 850 mg 350 250 250





    In Sodium Chloride 0.9%   





    250 ml @ 270 mls/hr IVPB   





    Q8HR [facility] Rx#:[id number]   


 


    Dextrose 5%-0.45% NaCl 1, 400 400 100





    000 ml @ 50 mls/hr IV .   





    Q20H [facility] Rx#:[id number]   


 


    Potassium Chloride 20 meq 100  100





    In Water For Injection 1   





    100ml.bag @ 50 mls/hr   





    IVPB Q2H [facility] Rx#:   





    [id number]   


 


    Thiamine 100 mg In Sodium 50  





    Chloride 0.9% 50 ml @   





    100 mls/hr IVPB Q24HR [facility]   





    Rx#:[id number]   


 


    Vancomycin 1,750 mg In 1000  





    Sodium Chloride 0.9% 500   





    ml 500 ml @ 167 mls/hr   





    IVPB ONCE ONE Rx#:   





    [id number]   


 


    ceFAZolin 2 gm In Sodium   50





    Chloride 0.9% 50 ml @ 100   





    mls/hr IVPB Q8H [facility] Rx#:   





    [id number]   


 


    cefTRIAXone 2 gm In  100 





    Sodium Chloride 0.9% 50   





    ml @ 100 mls/hr IVPB Q12H   





    [facility] Rx#:[id number]   


 


    levETIRAcetam IV 1,000 mg 100 100 100





    In Saline 1 100ml.bag @   





    400 mls/hr IVPB Q12HR [facility]   





    Rx#:[id number]   


 


  Intake, IV Titration 15.000 152.734 





  Amount   


 


    Clevidipine Butyrate 25 15.000 52.734 





    mg In Empty Bag 1 bag @ 1   





    MG/HR 2 mls/hr IV .Q24H   





    [facility] Rx#:[id number]   


 


    Potassium Chloride 20 meq  100 





    In Water For Injection 1   





    100ml.bag @ 50 mls/hr   





    IVPB Q2H [facility] Rx#:   





    [id number]   


 


  Oral 200  120


 


Output:   


 


  Urine 2400 2950 850


 


Other:   


 


  Voiding Method Indwelling Catheter Indwelling Catheter Indwelling Catheter








                       ABP, PAP, CO, CI - Last Documented











Arterial Blood Pressure        83/66

















- Exam


GENERAL: The patient is lying in bed and is not in acute distress.








NEUROLOGICAL:


Higher mental function: The patient is awake,alert, oriented to self and time.  

He stated he was in the hospital but did not know name.    Patient is following 

commands.  No aphasia and no neglect.


Cranial nerves: The pupils are round, equal and reactive to light.  Visual 

fields are full to confrontation throughout.  Extraocular movement is intact no 

nystagmus is noted.   The facial strength is normal throughout.    Tongue is 

midline and moved side-to-side without any difficulty.  No dysarthria is noted. 

Shoulder shrug is normal bilaterally.


Motor: The strength is 5 over 5 throughout.  Normal tone and bulk.  


Cerebellum: Normal finger to nose bilaterally.


Sensation: Sensation is normal to touch throughout.








SOME OF THE WORK-UP DURING THIS HOSPITAL VISIT CONSISTED OF:


* Repeat CT head 01/12/2023 revealed previous surgery.  Mild atrophy, no acute 

  intracranial process.  I personally reviewed CT head, agree with the findings.


* CTA reported as negative of the brain.  Mild plaque at the carotid artery b

  ifurcation.  No evidence of hemodynamic arterial stenosis in the neck.


* Initial EEG 01/09/2023 was abnormal due to amplitude asymmetry with relatively

  higher amplitude activity in the left frontoparietal region due to breach 

  rhythm from previous craniotomy defect.  Background slowing of moderate to 

  severe degree, consistent with generalized cerebral dysfunction, as can be 

  seen with toxic metabolic encephalopathy or due to diffuse structural brain 

  abnormality.  Clinical correlation is recommended.  No epileptiform activity 

  was seen.


* B12 2102, folate 12.90, TSH slightly decreased 0.220, with normal free T4 

  1.37.  Possible due to sick euthyroid.








- Labs


CBC & Chem 7: 


                                 01/18/23 04:30





                                 01/18/23 04:30


Labs: 


                  Abnormal Lab Results - Last 24 Hours (Table)











  01/17/23 01/18/23 01/18/23 Range/Units





  04:35 04:30 04:30 


 


RBC   3.65 L   (4.30-5.90)  m/uL


 


Hgb   11.1 L   (13.0-17.5)  gm/dL


 


Hct   33.4 L   (39.0-53.0)  %


 


Potassium    3.1 L  (3.5-5.1)  mmol/L


 


Chloride    109 H  ()  mmol/L


 


Creatinine    0.62 L  (0.66-1.25)  mg/dL


 


Calcium    7.6 L  (8.4-10.2)  mg/dL


 


C-Reactive Protein  6.9 H   12.4 H  (<1.0)  mg/dL


 


Total Protein    5.1 L  (6.3-8.2)  g/dL


 


Albumin    2.5 L  (3.5-5.0)  g/dL








                      Microbiology - Last 24 Hours (Table)











 01/14/23 17:00 CSF Gram Stain - Preliminary





 Cerebral Spinal Fluid CSF Culture - Preliminary














Assessment and Plan


Assessment: 











* Confusion with continued fevers and I felt the CSF study even though was t

  raumatic but the calculated nucleated cells were still elevated concerning for

  meningoencephalitis (blood culture and sputum is staph but there is concern 

  for c?ontaminant)


* New onset rhythmic twitching of the shoulders bilaterally and some upper 

  limbs, probably myoclonic jerks, unlikely to be seizures.  EEG did not reveal 

  any epileptiform activity.  No status epilepticus---confusion has drastically 

  improved


* Patient presented with unresponsiveness, likely due to above.


* Possible Alcohol withdrawal


* History of alcoholism.


* History of traumatic brain injury with subdural hematoma, status post c

  raniotomy in 2016 oh 2017.


* Elevated ammonia, abnormal liver functions, rule out cirrhosis.


* Acute kidney injury, now resolved


* Status post shock, likely hypovolemic


* Atrial fibrillation with RVR, status post cardioversion


* Acute respiratory failure, on mechanical ventilation


* Elevated troponin


* Gram-positive bacteremia, possibly contaminant.





Plan: 





* CSF revealed normal glucose 69, CSF protein 57(12-60), WBC count 160, 

  polynuclear 77%, mononuclear cells 18%, eosinophils 5%.  The corrected 

  nucleated cells is 80 cells (for every 1 nucleated cells is 500-1000rbc).  I 

  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  Continue Ceftriaxone 2gm ev

  justin 12 hours, Vacomycin and Acyclovir 10mg/kg every 8 hours but will defer 

  modification of medication to I.D. team.  I spoken with I.D. and they agree 

  that corrected nucleated is elevated.  They d/c ceftriaxone and place on 

  cefazolin 2gm Q8 hours and D/C Vancomycin.    No viruses detected in CSF.


*  Pending MRI Brain w/ and w/o to rule out any CNS abscess or mass.


* Patient had a prolonged, 2.5 hour EEG performed 01/13/2023.  According to 

  preliminary report from Dr. Ortiz, did not reveal any epileptiform 

  activity.  Some triphasic waves are seen.  No status epilepticus.  Official 

  report pending.  We will continue Keppra 1000 mg twice a day.  Try to wean off

  propofol, then Versed drip.  Patient received Dilantin loading dose last 

  night.  Patient's level is 10.9.  We will not continue Dilantin, as there is 

  no epileptiform activity.


* Patient was given a loading dose of Keppra 1500 mg by intensivist.  We will 

  maintain on Keppra 1000 mg IV twice a day.  Ativan 1 mg every hour as needed 

  for seizure like activity.  Will consider weaning Keppra down the line after 

  obtaining back result of MRI Brain.





The plan is discussed with primary team and his nurse.


Will continue to follow.





Time with Patient: Less than 30 Eyeglasses

## 2023-01-18 NOTE — P.PN
Subjective


Progress Note Date: 01/18/23


Patient is a 57-year-old male with history of alcohol dependence/abuse, atrial 

fibrillation who presented with altered mentation and possible code stroke.  On 

arrival, patient was hemodynamically unstable, in A. fib with RVR, he was 

intubated and cardioverted to normal sinus rhythm.  CT had showed no acute 

process, interventional neurology on call did not recommend to give alteplase.  

Reportedly, patient has been binge drinking, his last drink was on January 1.  

Per family patient was vomiting prior to becoming unresponsive.  His initial 

labs were consistent with severe dehydration, pH of 7.13, potassium 2.7, 

creatinine of 3.66, lactic acid of 12.4, mild transaminitis, mild elevated tropo

yao, urine tox positive for benzos and marijuana. He was admitted   There was 

also concern for possible GI bleed, patient on IV Protonix, surgery following a 

hemoglobin stable.  Nephrology and ICU also following.





LP with traumatic tap, patient was started on vanco and rocephin and ID was 

consulted. Patient was extubated on 1/16/23. Sensorium has cleared. 





Imaging: 


Head CT 1/6/23: mild cerebral atrophy 


CT angio head and neck 1/6/23: negatvive 


CT chest/abd/pelvis 1/6/23: no significant abnormality 


US abd/bladder 1/7/23: no hydro 


EEG 1/9/23: abnormal due to asymetry and background slowing 


Head CT 1/11/23: prior surgery, mild atrophy 


EEG 1/12/23: abnormal due to asymmery and sporadic sharp waves 


Lumbar spine CT 1/15/23: Hypertrophic degenerative changes in the lumbar spine 


EEG 2.5 hr 1/15/23: Mod-severe diffuse cerebral dysfunction








Procedures: 


Art and central line 1/11/23


LP 1/15/23





Patient seen and examined at bedside.  He has no complaints currently.  He 

denies any pain.  No chest pain or shortness of breath.





General: nontoxic, no distress, appears at stated age


Derm: warm, dry


Head: atraumatic, normocephalic, symmetric


Eyes: EOMI, no lid lag, anicteric sclera


Mouth: no lip lesion, mucus membranes moist


Cardiovascular: S1S2 reg, no murmur, positive posterior tibial pulse bilateral, 


Lungs: CTA bilateral, no rhonchi, no rales , no accessory muscle use


Abdominal: soft,  nontender to palpation, no guarding, no appreciable 

organomegaly


Ext: no gross muscle atrophy, no edema, no contractures


Neuro:  CN II-XI grossly intact, no focal neuro deficits


Psych: Alert, oriented to person, time, and thinks he is at [facility] 

but then reorients to [place]., appropriate affect 














Assessment/plan:





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 


Acute metabolic encephalopathy


Hyperammonemia, resolved 


History of alcohol abuse


- ID and neuro recs apprecaited- conitnued keppra at [facility] time 


- Acyclovir D#3, cefazolin


- await MRI Brain


- await CSF culture


- monitor neuro status 





Staph bacteremia, likely due to staph aureus trancheobronchitis 


-First set of blood cultures positive for staph aureus and epidermidis, likely 

contaminant. however, sputum culture also showing staph


-Patient currently on cefazolin, repeat blood cultures no growth to date


     - cefazloin 1/11, 1/15-16, 1/18


     - Vanco 1/7, 1/8. 1/11-1/17 


    - rocephin 1/16, 1/17 





Hypokalemia


- replace and recheck in AM





 A. fib with RVR status post cardioversion preformed in the ED


Elevated troponin due to deman ischemic 


-Cardiology signed off , no recurrence of A fib this hospital stay. 


- D/C tele 


- consider outpatient follow-up


- CHADSVASC 1, ASA recommended 





Severe Protein calorie malnutrition


- dietitian recs


- encourage oral intake 





Resolved:


Acute hypoxic and hypercapnic respiratory failure status post intubation


Shock, hypovolemic


High anion gap metabolic acidosis secondary to Severe lactic acidosis


Acute kidney injury


Severe hyperphosphatemia


Severe hypokalemia


GI bleed 


Hypomagnesemia


Thrombocytopenia


Transaminitis, resolved 





Hx:


TBI, craniotomy


ETOH dependency 


HTN


HLD








DVT prophylaxis: Lovenox


Discussed with: 


Anticipated discharge: pending clinical course


Anticipated discharge place:pending clinical course


A total of  minutes was spent on the care of this complex patient more than 50% 

of the time was spent in counseling and care coordination. 





                               Active Medications











Generic Name Dose Route Start Last Admin





  Trade Name Freq  PRN Reason Stop Dose Admin


 


Acetaminophen  650 mg  01/17/23 15:06  01/18/23 04:50





  Acetaminophen Tab 325 Mg Tab  PO   650 mg





  Q6HR PRN   Administration





  Fever and/ or Pain  


 


Enoxaparin Sodium  40 mg  01/07/23 09:00  01/18/23 08:36





  Enoxaparin 40 Mg/0.4 Ml Syringe  SQ   40 mg





  DAILY [facility]   Administration


 


Thiamine HCl 100 mg/ Sodium  51 mls @ 100 mls/hr  01/07/23 09:00  01/18/23 08:36





  Chloride  IVPB   100 mls/hr





  Q24HR [facility]   Administration


 


Levetiracetam 1,000 mg/ IV  100 mls @ 400 mls/hr  01/12/23 21:00  01/18/23 08:37





  Solution  IVPB   400 mls/hr





  Q12HR [facility]   Administration


 


Acyclovir Sodium 850 mg/  267 mls @ 270 mls/hr  01/16/23 18:30  01/18/23 08:36





  Sodium Chloride  IVPB   270 mls/hr





  Q8HR [facility]   Administration





  Protocol  


 


Cefazolin Sodium 2 gm/ Sodium  50 mls @ 100 mls/hr  01/18/23 00:00  01/18/23 

08:36





  Chloride  IVPB   100 mls/hr





  Q8HR [facility]   Administration





  Protocol  


 


Metoprolol Tartrate  25 mg  01/08/23 10:45  01/18/23 08:37





  Metoprolol Tartrate 25 Mg Tab  PO   25 mg





  BID [facility]   Administration


 


Miscellaneous Information  1 each  01/10/23 08:12 





  Magnesium Replacement Protocol 1 Each Misc  MISCELLANE  





  DAILY PRN  





  Per Protocol  





  Protocol  


 


Miscellaneous Information  1 each  01/14/23 07:22 





  Potassium Replacement Protocol 1 Each Misc  MISCELLANE  





  DAILY PRN  





  Per Protocol  





  Protocol  


 


Naloxone HCl  0.2 mg  01/06/23 23:41 





  Naloxone 0.4 Mg/Ml 1 Ml Vial  IV  





  Q2M PRN  





  Opioid Reversal  


 


Pantoprazole Sodium  40 mg  01/07/23 09:30  01/18/23 08:37





  Pantoprazole 40 Mg/10 Ml Vial  IVP   40 mg





  BID [facility]   Administration




















Objective





- Vital Signs


Vital signs: 


                                   Vital Signs











Temp  98.5 F   01/18/23 04:00


 


Pulse  78   01/18/23 07:00


 


Resp  19   01/18/23 07:00


 


BP  114/68   01/18/23 07:00


 


Pulse Ox  95   01/18/23 07:31


 


FiO2  35   01/16/23 08:00








                                 Intake & Output











 01/17/23 01/18/23 01/18/23





 18:59 06:59 18:59


 


Intake Total 2305.000 1112.734 10


 


Output Total 2400 2950 125


 


Balance -95.000 -1837.266 -115


 


Weight  89.2 kg 


 


Intake:   


 


  IV 2090 960 10


 


    .9 @ 10 90 110 10


 


    Acyclovir Sodium 850 mg 350 250 





    In Sodium Chloride 0.9%   





    250 ml @ 270 mls/hr IVPB   





    Q8HR [facility] Rx#:[id number]   


 


    Dextrose 5%-0.45% NaCl 1, 400 400 





    000 ml @ 50 mls/hr IV .   





    Q20H [facility] Rx#:[id number]   


 


    Potassium Chloride 20 meq 100  





    In Water For Injection 1   





    100ml.bag @ 50 mls/hr   





    IVPB Q2H [facility] Rx#:   





    [id number]   


 


    Thiamine 100 mg In Sodium 50  





    Chloride 0.9% 50 ml @   





    100 mls/hr IVPB Q24HR [facility]   





    Rx#:[id number]   


 


    Vancomycin 1,750 mg In 1000  





    Sodium Chloride 0.9% 500   





    ml 500 ml @ 167 mls/hr   





    IVPB ONCE ONE Rx#:   





    [id number]   


 


    cefTRIAXone 2 gm In  100 





    Sodium Chloride 0.9% 50   





    ml @ 100 mls/hr IVPB Q12H   





    [facility] Rx#:[id number]   


 


    levETIRAcetam IV 1,000 mg 100 100 





    In Saline 1 100ml.bag @   





    400 mls/hr IVPB Q12HR [facility]   





    Rx#:[id number]   


 


  Intake, IV Titration 15.000 152.734 





  Amount   


 


    Clevidipine Butyrate 25 15.000 52.734 





    mg In Empty Bag 1 bag @ 1   





    MG/HR 2 mls/hr IV .Q24H   





    [facility] Rx#:[id number]   


 


    Potassium Chloride 20 meq  100 





    In Water For Injection 1   





    100ml.bag @ 50 mls/hr   





    IVPB Q2H [facility] Rx#:   





    [id number]   


 


  Oral 200  


 


Output:   


 


  Urine 2400 2950 125


 


Other:   


 


  Voiding Method Indwelling Catheter Indwelling Catheter 








                       ABP, PAP, CO, CI - Last Documented











Arterial Blood Pressure        147/69

















- Labs


CBC & Chem 7: 


                                 01/18/23 04:30





                                 01/18/23 04:30


Labs: 


                  Abnormal Lab Results - Last 24 Hours (Table)











  01/17/23 01/18/23 01/18/23 Range/Units





  04:35 04:30 04:30 


 


RBC   3.65 L   (4.30-5.90)  m/uL


 


Hgb   11.1 L   (13.0-17.5)  gm/dL


 


Hct   33.4 L   (39.0-53.0)  %


 


Potassium    3.1 L  (3.5-5.1)  mmol/L


 


Chloride    109 H  ()  mmol/L


 


Creatinine    0.62 L  (0.66-1.25)  mg/dL


 


Calcium    7.6 L  (8.4-10.2)  mg/dL


 


C-Reactive Protein  6.9 H   12.4 H  (<1.0)  mg/dL


 


Total Protein    5.1 L  (6.3-8.2)  g/dL


 


Albumin    2.5 L  (3.5-5.0)  g/dL








                      Microbiology - Last 24 Hours (Table)











 01/14/23 17:00 CSF Gram Stain - Preliminary





 Cerebral Spinal Fluid CSF Culture - Preliminary

## 2023-01-19 LAB
ANION GAP SERPL CALC-SCNC: 5 MMOL/L
BUN SERPL-SCNC: 9 MG/DL (ref 9–20)
CALCIUM SPEC-MCNC: 7.5 MG/DL (ref 8.4–10.2)
CHLORIDE SERPL-SCNC: 108 MMOL/L (ref 98–107)
CO2 SERPL-SCNC: 27 MMOL/L (ref 22–30)
ERYTHROCYTE [DISTWIDTH] IN BLOOD BY AUTOMATED COUNT: 3.52 M/UL (ref 4.3–5.9)
ERYTHROCYTE [DISTWIDTH] IN BLOOD: 13.3 % (ref 11.5–15.5)
GLUCOSE BLD-MCNC: 83 MG/DL (ref 70–110)
GLUCOSE BLD-MCNC: 86 MG/DL (ref 70–110)
GLUCOSE BLD-MCNC: 93 MG/DL (ref 70–110)
GLUCOSE BLD-MCNC: 99 MG/DL (ref 70–110)
GLUCOSE SERPL-MCNC: 82 MG/DL (ref 74–99)
HCT VFR BLD AUTO: 32.6 % (ref 39–53)
HGB BLD-MCNC: 11.1 GM/DL (ref 13–17.5)
MAGNESIUM SPEC-SCNC: 1.8 MG/DL (ref 1.6–2.3)
MCH RBC QN AUTO: 31.5 PG (ref 25–35)
MCHC RBC AUTO-ENTMCNC: 34 G/DL (ref 31–37)
MCV RBC AUTO: 92.7 FL (ref 80–100)
PLATELET # BLD AUTO: 227 K/UL (ref 150–450)
POTASSIUM SERPL-SCNC: 3.1 MMOL/L (ref 3.5–5.1)
SODIUM SERPL-SCNC: 140 MMOL/L (ref 137–145)
WBC # BLD AUTO: 7.6 K/UL (ref 3.8–10.6)

## 2023-01-19 RX ADMIN — METOPROLOL TARTRATE SCH MG: 25 TABLET, FILM COATED ORAL at 20:53

## 2023-01-19 RX ADMIN — SODIUM CHLORIDE SCH: 900 INJECTION, SOLUTION INTRAVENOUS at 07:48

## 2023-01-19 RX ADMIN — MAGNESIUM SULFATE IN DEXTROSE SCH MLS/HR: 10 INJECTION, SOLUTION INTRAVENOUS at 16:27

## 2023-01-19 RX ADMIN — SODIUM CHLORIDE SCH: 900 INJECTION, SOLUTION INTRAVENOUS at 07:47

## 2023-01-19 RX ADMIN — LEVETIRACETAM SCH MLS/HR: 10 INJECTION INTRAVENOUS at 20:52

## 2023-01-19 RX ADMIN — MAGNESIUM SULFATE IN DEXTROSE SCH MLS/HR: 10 INJECTION, SOLUTION INTRAVENOUS at 09:23

## 2023-01-19 RX ADMIN — METOPROLOL TARTRATE SCH MG: 25 TABLET, FILM COATED ORAL at 09:12

## 2023-01-19 RX ADMIN — ENOXAPARIN SODIUM SCH MG: 40 INJECTION SUBCUTANEOUS at 09:12

## 2023-01-19 RX ADMIN — SODIUM CHLORIDE SCH MLS/HR: 900 INJECTION, SOLUTION INTRAVENOUS at 17:27

## 2023-01-19 RX ADMIN — SODIUM CHLORIDE SCH MLS/HR: 900 INJECTION, SOLUTION INTRAVENOUS at 17:53

## 2023-01-19 RX ADMIN — ASPIRIN 81 MG CHEWABLE TABLET SCH MG: 81 TABLET CHEWABLE at 09:12

## 2023-01-19 RX ADMIN — PANTOPRAZOLE SODIUM SCH MG: 40 INJECTION, POWDER, FOR SOLUTION INTRAVENOUS at 20:53

## 2023-01-19 RX ADMIN — SODIUM CHLORIDE SCH MLS/HR: 900 INJECTION, SOLUTION INTRAVENOUS at 11:27

## 2023-01-19 RX ADMIN — PANTOPRAZOLE SODIUM SCH MG: 40 INJECTION, POWDER, FOR SOLUTION INTRAVENOUS at 09:24

## 2023-01-19 RX ADMIN — LEVETIRACETAM SCH MLS/HR: 10 INJECTION INTRAVENOUS at 14:17

## 2023-01-19 NOTE — US
EXAMINATION TYPE: US venous doppler duplex LE 

 

DATE OF EXAM: 1/19/2023 11:17 AM

 

COMPARISON: NONE

 

CLINICAL HISTORY: dvt. Ankle swelling per patient.  No redness.  

 

SIDE PERFORMED: Bilateral  

 

TECHNIQUE:  The lower extremity deep venous system is examined utilizing real time linear array sonog
dudley with graded compression, doppler sonography and color-flow sonography.

 

VESSELS IMAGED:

Common Femoral Vein

Deep Femoral Vein

Greater Saphenous Vein *

Femoral Vein

Popliteal Vein

Small Saphenous Vein *

Proximal Calf Veins

(* superficial vessels)

 

Right Leg:  Negative for DVT, Grayscale, color doppler, spectral doppler imaging performed of the nuvia
p veins of the lower extremities.  There is normal flow, compressibility, vascular waveforms.

 

Left Leg:  Negative for DVT, Grayscale, color doppler, spectral doppler imaging performed of the deep
 veins of the lower extremities.  There is normal flow, compressibility, vascular waveforms.

 

 

IMPRESSION:  

No evidence of deep vein to most of the bilateral lower extremities.

## 2023-01-19 NOTE — P.PN
Subjective


Progress Note Date: 01/19/23


Principal diagnosis: 





Respiratory failure.





Reevaluated today on 1/1:30/23, patient remains in the ICU, intubated 

mechanically ventilated.  He is on assist control rate of 18 tidal volume 450 

FiO2 35% PEEP of 5 ABG showed a pO2 of 100 pCO2 44 pH of 7.46.  His FiO2 was cut

down to 35%.  Patient continued to have intermittent episodes of seizure-like 

activities or possibly myoclonic jerks affecting his upper body and upper 

extremities, with deviation of his eyes with upward gaze.  Neurology on the case

loaded up with Dilantin yesterday, and he is on Keppra today.  Although his EEG 

according to the neurologist did not show any evidence of seizure activity.  

Patient to be seen again by neurology today, and decide whether to continue with

seizure medications he did receive Dilantin 1 dose yesterday, and he is now on 

Keppra 1 g twice a day.  Early this morning patient had an EEG which is yet to 

be interpreted by the neurologist.  In the meantime the patient is on propofol 

at 20 mcg/kg/m, Versed 3 mg/h, D5 4 5 at 50 mL per hour and is on vital AF at 30

mL per hour.  Unable to cut down on his sedation at this point mostly because of

these episodic seizure-like activities or possibly myoclonic jerks.  Chest x-ray

today showed small left pleural effusion not large enough to consider 

thoracentesis.  His WBC count is 11.5 hemoglobin is 12.1.  Basic metabolic 

profile and renal profile are normal





Patient was reevaluated today on 1/14/23, remains in the ICU intubated and 

mechanically ventilated, he is tidal volume 450 assist-control rate of 18FiO2 is

down to 35%, PEEP is 5.  ABG showed a pO2 of 110 pCO2 of 44 pH of 7.45, hence no

changes were made in the ventilator settings.  Neurologically the patient is 

about the same, continues to have these episodes of myoclonic-like jerks, 

remains on propofol at 25 mcg/kg/m, remains on Versed at 6 mg/h, patient is 

supposedly scheduled to have a lumbar puncture done today.  Chest x-ray 

continues to show a small left pleural effusion, otherwise is unremarkable.  

Labs were all reviewed and they seem to be relatively unremarkable.  Again the 

only reason the patient could not be extubated to mostly because of his 

neurological status at this point.CBC is relatively normal ABG as noted above.  

Basic metabolic profile is relatively.  Renal profile is normal.  Patient is 

receiving vital AF at 40 mL per hour.  Patient remains on GI and DVT prophylaxis





Reevaluated today on 1/15/23, patient remains in the ICU, intubated and 

mechanically ventilated.  He is on assist control rate of 18 tidal volume 450 

FiO2 35% PEEP of 5.  Remains on propofol at 40 mcg/kg/m is also on Versed 1 

mg/h.  And he is receiving vital AF at 58 mL per hour.  No major issues over the

last 24 hours, patient seems to be quite sedated with the propofol and Versed.  

I plan to lower the dose of propofol, and hopefully get to assess mental status 

today.  I could not awaken the patient today to assess mental status, hence I 

recommended propofol to be cut down significantly and assess mental status RBC 

the patient not quite ready for any weaning trials.  Chest x-ray showed small 

left pleural effusion, otherwise basically unremarkable.  Patient remains on 

vancomycin for his MSSA, hence I will go back and switch him to 2 g IV piggyback

every 8 hours.  Labs today showed a relatively normal CBC, normal basic 

metabolic profile, his spinal fluid results, initial report seems to be 

unremarkable, however the spinal fluid was obviously bloody and was traumatic.  

Whether the patient will need a repeat CT of the brain again because of his 

bloody spinal fluid, that will be decided upon by neurology on the case.  Lumbar

spine CT showed no evidence of epidural hematoma.





Progress note dated 01/16/2023.





The patient is seen in the intensive care unit, room 252.  He was admitted on 

January 6 with mental status changes, and acute kidney injury.  The patient 

remains on the mechanical ventilator.  Ventilator settings include the volume 

assist control, rate 18, tidal volume 450, FiO2 35%, and PEEP of 5.  Blood gases

show pO2 of 99, pCO2 39, pH is 7.48.  The patient's on propofol at 25 mcg/kg/m, 

D5 half-normal saline at 50 mL an hour, and vital AF at 48 mL an hour, which is 

goal.  The patient will get Lasix 40 mg IV push.  We will attempt a daily 

interruption of sedation and a spontaneous breathing trial.  The patient appears

much more awake and alert today.  White count 8, hemoglobin 11.5, hematocrit 

34.9, with a normal platelet count.  Sodium 144, potassium 3.6, chlorides 114, 

CO2 28, BUN 13, creatinine 0.65.  Blood cultures show staph aureus and staph 

epidermidis, and sputum samples are positive for Staphylococcus aureus.  The 

patient's chest x-ray shows a small left-sided pleural effusion.





Progress note dated 01/17/2023.





The patient is again seen in the intensive care unit, room 252.  The patient was

extubated yesterday.  He's currently on 3 L of oxygen.  He is getting dextrose 

with half-normal saline at 50 mL an hour.  He is much more awake and alert.  He 

was started on acyclovir, Rocephin, and vancomycin.  The Protonix can be 

converted to by mouth.  The hospital service did consult infectious diseases.  

White count 13, hemoglobin 10.9, hematocrit 32.4, with a normal platelet count. 

Sodium 139, potassium 3.4, chlorides 110, CO2 27, BUN 12, creatinine 0.67.  

Blood and sputum samples were positive for Staphylococcus aureus.  Chest x-ray 

shows some mild fluid overload, likely as a result of extubation.





Progress note dated 01/18/2023.





57-year-old male again seen in room 252 in the intensive care unit.  The patient

is doing much better.  He is on 3 L.  He is fully awake and alert.  Half-normal 

saline at 50 mL an hour.  Per infectious diseases, he continues on Ancef and 

acyclovir.  The patient can be transferred to the general medical floor without 

telemetry.  We'll DC his IV, and his arterial line.  White count 9.3, hemoglobin

11.1, hematocrit 33.4, and platelet count 204,000.  Sodium 139, potassium 3.1, 

chlorides 109, CO2 26, BUN 9, and creatinine 0.62.  Albumin is 2.5.





Progress note dated 01/19/2023.





57-year-old male who was transferred out of the intensive care unit, and is seen

today, in room 484.  Currently, the patient's on room air.  He's getting saline 

at 20 mL an hour, which can be discontinued.  Also, the central line can be 

removed.  Labs today include a white count 7.6, hemoglobin 11.1, hematocrit 

32.6, and a normal platelet count.  Sodium 140, potassium 3.1, chlorides 108, 

CO2 27, BUN 9, creatinine 0.68.





Objective





- Vital Signs


Vital signs: 


                                   Vital Signs











Temp  99.6 F   01/19/23 07:10


 


Pulse  84   01/19/23 07:10


 


Resp  18   01/19/23 09:12


 


BP  120/73   01/19/23 07:10


 


Pulse Ox  91 L  01/19/23 09:29


 


FiO2  35   01/16/23 08:00








                                 Intake & Output











 01/18/23 01/19/23 01/19/23





 18:59 06:59 18:59


 


Intake Total 1010  118


 


Output Total 1450 2600 610


 


Balance -440 -2600 -492


 


Weight 89.2 kg 70.5 kg 


 


Intake:   


 


    


 


    .9 @ 10 170  


 


    Acyclovir Sodium 850 mg 250  





    In Sodium Chloride 0.9%   





    250 ml @ 270 mls/hr IVPB   





    Q8HR [facility] Rx#:[id number]   


 


    Dextrose 5%-0.45% NaCl 1, 100  





    000 ml @ 50 mls/hr IV .   





    Q20H [facility] Rx#:[id number]   


 


    Potassium Chloride 20 meq 100  





    In Water For Injection 1   





    100ml.bag @ 50 mls/hr   





    IVPB Q2H [facility] Rx#:   





    [id number]   


 


    ceFAZolin 2 gm In Sodium 50  





    Chloride 0.9% 50 ml @ 100   





    mls/hr IVPB Q8H [facility] Rx#:   





    [id number]   


 


    levETIRAcetam IV 1,000 mg 100  





    In Saline 1 100ml.bag @   





    400 mls/hr IVPB Q12HR [facility]   





    Rx#:[id number]   


 


  Oral 240  118


 


Output:   


 


  Urine 1450 2600 610


 


    Uretheral ([place])   610


 


Other:   


 


  Voiding Method Indwelling Catheter  Indwelling Catheter


 


  # Bowel Movements 1  








                       ABP, PAP, CO, CI - Last Documented











Arterial Blood Pressure        83/66

















- Exam





No acute distress, awake and alert, currently on room air.  Mental status is 

back to normal.





HEENT examination is grossly unremarkable. 





Neck supple.  Full range of motion.  No adenopathy thyromegaly or neck vein 

distention.





Cardiovascular examination reveals regular rhythm rate.  S1-S2 normal.  No S3 or

S4.  No discernible murmur noted.  Heart rate 84 bpm.





Lungs reveal mostly clear breath sounds.  Minimal rhonchi.  No wheezes or 

crackles.  Room air saturation is 95%.





Abdomen soft bowel sounds are heard.  No masses or tenderness.





Extremities are intact.  No cyanosis clubbing or edema.





Skin is without rash or lesion.





Neurologic examination is brief but nonfocal.





- Labs


CBC & Chem 7: 


                                 01/19/23 03:50





                                 01/19/23 03:50


Labs: 


                  Abnormal Lab Results - Last 24 Hours (Table)











  01/17/23 01/19/23 01/19/23 Range/Units





  04:35 03:50 03:50 


 


RBC   3.52 L   (4.30-5.90)  m/uL


 


Hgb   11.1 L   (13.0-17.5)  gm/dL


 


Hct   32.6 L   (39.0-53.0)  %


 


Potassium    3.1 L  (3.5-5.1)  mmol/L


 


Chloride    108 H  ()  mmol/L


 


Calcium    7.5 L  (8.4-10.2)  mg/dL


 


Procalcitonin  0.27 H    (0.02-0.09)  ng/mL








                      Microbiology - Last 24 Hours (Table)











 01/14/23 17:00 CSF Gram Stain - Final





 Cerebral Spinal Fluid CSF Culture - Final














Assessment and Plan


Assessment: 





Acute respiratory failure, with intubation on January 6, secondary to patient's 

inability to protect his airway.  The patient was successfully extubated on 

01/16/2023.





Acute mental status changes, much improved.





Possible recurrent seizures.





Staphylococcus aureus bacteremia.





Severe lactic acidosis, resolved.





Hypovolemic hypotension, resolved.





Anion gap metabolic acidosis.





History of alcohol abuse.





Acute kidney injury.





History of paroxysmal atrial fibrillation.





Hypertension.





Hyperlipidemia.


Plan: 





Plan dated 01/16/2023.





The patient will get Lasix 40 mg IV push.  The patient was placed on pressure 

support of 5 and CPAP of 5.  The patient's respiratory rate is low, and tidal 

volumes are excellent.  Minute volume is low.  The patient will have a cuff leak

test, and likely be extubated.  Additional recommendations and suggestions are 

forthcoming.  Prognosis is guarded.  Labs, x-rays, medications are reviewed.  We

will continue to follow the patient and make recommendations along the way.





Plan dated 01/17/2023.





The patient was successfully extubated January 16.  The patient is currently on 

acyclovir, we will continue to follow the patient and make recommendations along

the way.  Prognosis is guarded.  Labs, x-rays, and medications are all reviewed.

 Infectious disease consultant was asked to see the patient.  The patient's 

Protonix chemically converted to by mouth.  Additional recommendations and 

suggestions are forthcoming.





Plan dated 01/18/2023.





The patient was successfully extubated on January 16.  Over the last 2 days, his

neurologic status is greatly improved.  The patient could be transferred to the 

general medical floor without telemetry.  We'll DC his IV fluids, and DC his 

arterial line.  He remains on Ancef, and acyclovir.  Labs, x-rays, and medicati

ons are reviewed.  Prognosis is guarded.





Plan dated 01/19/2023.





The patient's MRI of the brain is reviewed.  Nothing acute.  No evidence of 

intracranial mass.  No evidence of acute cortical infarct.  Labs, x-rays, and 

medications are reviewed.  IV can be discontinued.  He's currently on room air. 

His mental status is much improved.  He continues on acyclovir, and Ancef.  

Additional recommendations and suggestions are forthcoming.  Prognosis is 

guarded.  We will continue to follow make recommendations along the way.


Time with Patient: Less than 30

## 2023-01-19 NOTE — P.PN
Subjective


Progress Note Date: 01/19/23


Principal diagnosis: 


Fever and bacteremia





Patient is a 57 year old  male with a past medical history significant 

for alcohol dependence/abuse history of atrial fibrillation patient was brought 

to the hospital  on 01/06/2023 for evaluation of mental status changes patient 

presented to the hospital and was hypotensive A. fib with RVR patient got 

intubated and cardioverted, patient did have evidence of MSSA bacteremia sputum 

was also positive for the same pathogen subsequently has been running a fever 

did have a repeat which was traumatic Elevated white count but glucose protein 

normal. 


 on today's evaluation that is 01/19/2023, the patient did have a low-grade fev

er 100.1 last night, the patient is afebrile since then the patient is breathing

comfortably on room air patient denies any headache no chest pain shortness of 

breath occasional cough no abdominal pain, the patient diarrhea has resolved and

fecal management system has been removed





Objective





- Vital Signs


Vital signs: 


                                   Vital Signs











Temp  99.6 F   01/19/23 07:10


 


Pulse  84   01/19/23 07:10


 


Resp  18   01/19/23 09:12


 


BP  120/73   01/19/23 07:10


 


Pulse Ox  91 L  01/19/23 09:29


 


FiO2  35   01/16/23 08:00








                                 Intake & Output











 01/18/23 01/19/23 01/19/23





 18:59 06:59 18:59


 


Intake Total 1010  118


 


Output Total 1450 2600 610


 


Balance -766 -2600 -492


 


Weight 89.2 kg 70.5 kg 


 


Intake:   


 


    


 


    .9 @ 10 170  


 


    Acyclovir Sodium 850 mg 250  





    In Sodium Chloride 0.9%   





    250 ml @ 270 mls/hr IVPB   





    Q8HR [facility] Rx#:[id number]   


 


    Dextrose 5%-0.45% NaCl 1, 100  





    000 ml @ 50 mls/hr IV .   





    Q20H [facility] Rx#:[id number]   


 


    Potassium Chloride 20 meq 100  





    In Water For Injection 1   





    100ml.bag @ 50 mls/hr   





    IVPB Q2H [facility] Rx#:   





    [id number]   


 


    ceFAZolin 2 gm In Sodium 50  





    Chloride 0.9% 50 ml @ 100   





    mls/hr IVPB Q8H [facility] Rx#:   





    [id number]   


 


    levETIRAcetam IV 1,000 mg 100  





    In Saline 1 100ml.bag @   





    400 mls/hr IVPB Q12HR [facility]   





    Rx#:[id number]   


 


  Oral 240  118


 


Output:   


 


  Urine 1450 2600 610


 


    Uretheral ([place])   610


 


Other:   


 


  Voiding Method Indwelling Catheter  Indwelling Catheter


 


  # Bowel Movements 1  








                       ABP, PAP, CO, CI - Last Documented











Arterial Blood Pressure        83/66

















- Exam


GENERAL DESCRIPTION: A middle-aged male lying in bed in no distress





RESPIRATORY SYSTEM: Unlabored breathing , decreased breath sounds at bases





HEART: S1 S2 regular rate and rhythm ,





ABDOMEN: Soft , no tenderness





EXTREMITIES: No edema feet








- Labs


CBC & Chem 7: 


                                 01/19/23 03:50





                                 01/19/23 03:50


Labs: 


                  Abnormal Lab Results - Last 24 Hours (Table)











  01/17/23 01/19/23 01/19/23 Range/Units





  04:35 03:50 03:50 


 


RBC   3.52 L   (4.30-5.90)  m/uL


 


Hgb   11.1 L   (13.0-17.5)  gm/dL


 


Hct   32.6 L   (39.0-53.0)  %


 


Potassium    3.1 L  (3.5-5.1)  mmol/L


 


Chloride    108 H  ()  mmol/L


 


Calcium    7.5 L  (8.4-10.2)  mg/dL


 


Procalcitonin  0.27 H    (0.02-0.09)  ng/mL








                      Microbiology - Last 24 Hours (Table)











 01/14/23 17:00 CSF Gram Stain - Final





 Cerebral Spinal Fluid CSF Culture - Final














Assessment and Plan


(1) Bacteremia


Current Visit: Yes   Status: Acute   Code(s): R78.81 - BACTEREMIA   SNOMED 

Code(s): 2973673


   





(2) Fever


Current Visit: Yes   Status: Acute   Code(s): R50.9 - FEVER, UNSPECIFIED   

SNOMED Code(s): 003397158


   


Plan: 


1patient with abnormal CSF which is mostly a traumatic tap with more than 

40,000 RBC white count is still symptomatic elevated however the patient did 

have a normal glucose and protein the patient currently denies having any 

headache and he is aware that he is at [facility] all these 

factors will go against meningitis or encephalitis


2patient now to have a fever in this patient who did have a positive blood as 

well as sputum culture for MSSA and did have evidence of a left-sided effusion 

question of possible pulmonary source/empyema, in addition patient also having 

diarrhea with fecal management system , stool for C. diff negative


3there was a question of possible meningeal enhancement on the MRI which was 

not reported on the official MRI plus the patient did have a normal protein and 

glucose my clinic suspicious for meningitis remains to be low in this patient 

currently with no headache, I'm okay with switching antibiotics to Rocephin 2 g 

every 12 hours as per discussion with the admitting and neurologist, should also

obtained CT of the chest to make sure patient has not developed any empyema 

related to his pneumonia, echocardiogram has been ordered as well repeat 

cultures so far negative


Time with Patient: Less than 30

## 2023-01-19 NOTE — P.PN
Subjective


Progress Note Date: 01/19/23


The patient is seen at bedside and he is accompanied by his friend.  Patient 

states that he's doing progressively better last 2 days compared to his initial 

presentation.  He denies of any headache, nausea vomiting.  Denies of any visual

disturbance, focal weakness.  Patient continues to be selective fever in the 

last at T-max was yesterday at close to 7:30 PM of 100.1.


She stated that the he fell off of a roof in 2016 as a result he had a bleed and

had surgical resection.








Objective





- Vital Signs


Vital signs: 


                                   Vital Signs











Temp  98.7 F   01/19/23 14:40


 


Pulse  77   01/19/23 14:40


 


Resp  18   01/19/23 14:40


 


BP  130/74   01/19/23 14:40


 


Pulse Ox  92 L  01/19/23 14:40


 


FiO2  35   01/16/23 08:00








                                 Intake & Output











 01/18/23 01/19/23 01/19/23





 18:59 06:59 18:59


 


Intake Total 1010  354


 


Output Total 1450 2600 1035


 


Balance -440 -2600 -681


 


Weight 89.2 kg 70.5 kg 


 


Intake:   


 


    


 


    .9 @ 10 170  


 


    Acyclovir Sodium 850 mg 250  





    In Sodium Chloride 0.9%   





    250 ml @ 270 mls/hr IVPB   





    Q8HR [facility] Rx#:[id number]   


 


    Dextrose 5%-0.45% NaCl 1, 100  





    000 ml @ 50 mls/hr IV .   





    Q20H [facility] Rx#:[id number]   


 


    Potassium Chloride 20 meq 100  





    In Water For Injection 1   





    100ml.bag @ 50 mls/hr   





    IVPB Q2H [facility] Rx#:   





    [id number]   


 


    ceFAZolin 2 gm In Sodium 50  





    Chloride 0.9% 50 ml @ 100   





    mls/hr IVPB Q8H [facility] Rx#:   





    [id number]   


 


    levETIRAcetam IV 1,000 mg 100  





    In Saline 1 100ml.bag @   





    400 mls/hr IVPB Q12HR [facility]   





    Rx#:[id number]   


 


  Oral 240  354


 


Output:   


 


  Urine 1450 2600 1035


 


    Uretheral ([place])   610


 


Other:   


 


  Voiding Method Indwelling Catheter  Indwelling Catheter


 


  # Bowel Movements 1  








                       ABP, PAP, CO, CI - Last Documented











Arterial Blood Pressure        83/66

















- Exam


GENERAL: The patient is lying in bed and is not in acute distress.








NEUROLOGICAL:


Higher mental function: The patient is awake,alert, oriented to self, place and 

time.    Patient is following commands.  No aphasia and no neglect.


Cranial nerves: The pupils are round, equal and reactive to light.  Visual 

fields are full to confrontation throughout.  Extraocular movement is intact no 

nystagmus is noted.   The facial strength is normal throughout.    Tongue is 

midline and moved side-to-side without any difficulty.  No dysarthria is noted. 

Shoulder shrug is normal bilaterally.


Motor: The strength is 5 over 5 throughout.  Normal tone and bulk.  


Cerebellum: Normal finger to nose bilaterally.


Sensation: Sensation is normal to touch throughout.








SOME OF THE WORK-UP DURING THIS HOSPITAL VISIT CONSISTED OF:


* Repeat CT head 01/12/2023 revealed previous surgery.  Mild atrophy, no acute 

  intracranial process.  I personally reviewed CT head, agree with the findings.


* CTA reported as negative of the brain.  Mild plaque at the carotid artery 

  bifurcation.  No evidence of hemodynamic arterial stenosis in the neck.


* Initial EEG 01/09/2023 was abnormal due to amplitude asymmetry with relatively

  higher amplitude activity in the left frontoparietal region due to breach 

  rhythm from previous craniotomy defect.  Background slowing of moderate to 

  severe degree, consistent with generalized cerebral dysfunction, as can be 

  seen with toxic metabolic encephalopathy or due to diffuse structural brain 

  abnormality.  Clinical correlation is recommended.  No epileptiform activity 

  was seen.


* B12 2102, folate 12.90, TSH slightly decreased 0.220, with normal free T4 

  1.37.  Possible due to sick euthyroid.


* MRI of the brain is reported as numerous white matter foci the are nonspecific

  and could relate to demyelinating disease or microvascular ischemia.  No 

  evidence of acute cortical infarct.  No evidence of intracranial mass.  I 

  personally reviewed that imaging with Dr. Childers and the was felt the patient 

  has left hemispheric pachymeningeal enhancement and possible old subdural over

  the left








- Labs


CBC & Chem 7: 


                                 01/19/23 03:50





                                 01/19/23 03:50


Labs: 


                  Abnormal Lab Results - Last 24 Hours (Table)











  01/17/23 01/19/23 01/19/23 Range/Units





  04:35 03:50 03:50 


 


RBC   3.52 L   (4.30-5.90)  m/uL


 


Hgb   11.1 L   (13.0-17.5)  gm/dL


 


Hct   32.6 L   (39.0-53.0)  %


 


Potassium    3.1 L  (3.5-5.1)  mmol/L


 


Chloride    108 H  ()  mmol/L


 


Calcium    7.5 L  (8.4-10.2)  mg/dL


 


Procalcitonin  0.27 H    (0.02-0.09)  ng/mL








                      Microbiology - Last 24 Hours (Table)











 01/14/23 17:00 CSF Gram Stain - Final





 Cerebral Spinal Fluid CSF Culture - Final














Assessment and Plan


Assessment: 











* Confusion with continued fevers and I felt the CSF study even though was 

  traumatic but the calculated nucleated cells were still elevated concerning 

  for meningitis.  On MRI Brain it was felt he has left pachymeningeal 

  enhancement. Blood culture and sputum is staph but there is concern for 

  ?contaminant---confused has improved but continues to have fevers


* New onset rhythmic twitching of the shoulders bilaterally and some upper sandhu

  bs, probably myoclonic jerks, unlikely to be seizures.  EEG did not reveal any

  epileptiform activity.  No status epilepticus---confusion has drastically 

  improved


* Patient presented with unresponsiveness.


* History of traumatic brain injury with subdural hematoma, status post 

  craniotomy in 2016 


* History of alcoholism.


* Elevated ammonia, abnormal liver functions, rule out cirrhosis.


* Acute kidney injury, now resolved


* Status post shock, likely hypovolemic


* Atrial fibrillation with RVR, status post cardioversion


* Acute respiratory failure, on mechanical ventilation


* Elevated troponin








Plan: 





* CSF revealed normal glucose 69, CSF protein 57(12-60), WBC count 160, polynu

  clear 77%, mononuclear cells 18%, eosinophils 5%.  The corrected nucleated 

  cells is 80 cells (for every 1 nucleated cells is 500-1000rbc).  I feel 

  patient has underlying meningoencephalitis.  MRI of the brain is reported as 

  numerous white matter foci the are nonspecific and could relate to 

  demyelinating disease or microvascular ischemia.  No evidence of acute 

  cortical infarct.  No evidence of intracranial mass.  I personally reviewed 

  that imaging with Dr. Childers and the was felt the patient has left hemispheric

  pachymeningeal enhancement and possible old subdural over the left.  I spoken 

  with I.D. team and primary team  and they agree that corrected nucleated is 

  elevated.  Recommend third-generation cephalosporin


* Patient had a prolonged, 2.5 hour EEG performed 01/13/2023.  According to 

  preliminary report from Dr. Ortiz, did not reveal any epileptiform activit

  y.  Some triphasic waves are seen.  No status epilepticus.  Official report 

  pending.  We will continue Keppra 1000 mg twice a day for possible seizure-

  like activity.  I will not modify as of now because of meningeal enhancement 

  and his clinical presentation in the beginning.


* Primary is getting further investigation for his continued fevers.  I.D. is on

  board.





The plan is discussed in detailed with patient, primary team and I.D. team. 


Will continue to follow.





Time with Patient: Less than 30 COPD exacerbation

## 2023-01-19 NOTE — P.PN
Progress Note - Text





Therapy notes reviewed:


PT reports supervision for bed mobility, transfer, gait 70 feet with roller 

walker.


OT reports independent with feeding, supervision with grooming and upper 

dressing, maximal assistance for lower dressing, minimal assistance for bathing 

and toilet transfer and moderate assistance for toilet.


At this time patient does not meet insurance criteria is is no physical assist 

needs per PT report.  Requires multiple disparity for IP R.  We'll continue to 

follow closely with you for any possible changes in his.

## 2023-01-19 NOTE — P.PN
Subjective


Progress Note Date: 01/19/23


Patient is a 57-year-old male with history of alcohol dependence/abuse, atrial 

fibrillation who presented with altered mentation and possible code stroke.  On 

arrival, patient was hemodynamically unstable, in A. fib with RVR, he was 

intubated and cardioverted to normal sinus rhythm.  CT had showed no acute 

process, interventional neurology on call did not recommend to give alteplase.  

Reportedly, patient has been binge drinking, his last drink was on January 1.  

Per family patient was vomiting prior to becoming unresponsive.  His initial 

labs were consistent with severe dehydration, pH of 7.13, potassium 2.7, 

creatinine of 3.66, lactic acid of 12.4, mild transaminitis, mild elevated tropo

yao, urine tox positive for benzos and marijuana. He was admitted   There was 

also concern for possible GI bleed, patient on IV Protonix, surgery following a 

hemoglobin stable.  Nephrology and ICU also following.





LP with traumatic tap, patient was started on vanco and rocephin and ID was 

consulted. Patient was extubated on 1/16/23. Sensorium has cleared. He had 

continued fevers. 





Imaging: 


Head CT 1/6/23: mild cerebral atrophy 


CT angio head and neck 1/6/23: negatvive 


CT chest/abd/pelvis 1/6/23: no significant abnormality 


US abd/bladder 1/7/23: no hydro 


EEG 1/9/23: abnormal due to asymetry and background slowing 


Head CT 1/11/23: prior surgery, mild atrophy 


EEG 1/12/23: abnormal due to asymmery and sporadic sharp waves 


Lumbar spine CT 1/15/23: Hypertrophic degenerative changes in the lumbar spine 


EEG 2.5 hr 1/15/23: Mod-severe diffuse cerebral dysfunction


MRI Brain 1/18- numerous white matter changes. 








Procedures: 


Art and central line 1/11/23


LP 1/15/23





Patient seen and examined at bedside.  No chest pain, SOB, HA, nausea, or 

vomiting. Denies any IVDA, tattoos were done a Licensed places. 





General: nontoxic, no distress, appears at stated age


Derm: warm, dry


Head: atraumatic, normocephalic, symmetric


Eyes: EOMI, no lid lag, anicteric sclera


Mouth: no lip lesion, mucus membranes moist


Cardiovascular: S1S2 reg, no murmur, positive posterior tibial pulse bilateral, 


Lungs: CTA bilateral, no rhonchi, no rales , no accessory muscle use


Abdominal: soft,  nontender to palpation, no guarding, no appreciable 

organomegaly


Ext: no gross muscle atrophy, no edema, no contractures


Neuro:  CN II-XI grossly intact, no focal neuro deficits


Psych: Alert, oriented to person, time, and thinks he is at [facility] 

but then reorients to [place]., appropriate affect 





Assessment/plan:





Continued Pyrexia 


- Repeat blood cultures 


- check echo, rule out endocarditis 


- check b/l LE venous dopplers to rule out DVT


- d/w Dr. Boston there is some meningeal enhancement on MRI brain, recommend 

treatment for meningitis. 





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 


Acute metabolic encephalopathy


Hyperammonemia, resolved 


History of alcohol abuse


- ID and neuro recs appreciated- continue keppra at this time 


- Acyclovir D#4, cefazolin


- CSF culture- negative 


- monitor neuro status 





Staph bacteremia, likely due to staph aureus trancheobronchitis 


-First set of blood cultures positive for staph aureus and epidermidis, likely 

contaminant. however, sputum culture also showing staph


-Patient currently on cefazolin, repeat blood cultures no growth to date


     - cefazloin 1/11, 1/15-16, 1/18


     - Vanco 1/7, 1/8. 1/11-1/17 


    -  rocephin 1/16, 1/17 





Hypokalemia, Hypomagnesium 


- replace and recheck in AM





 A. fib with RVR status post cardioversion preformed in the ED


Elevated troponin due to deman ischemic 


-Cardiology signed off , no recurrence of A fib this hospital stay. 


- consider outpatient follow-up


- CHADSVASC 1, ASA recommended 





Severe Protein calorie malnutrition


- dietitian recs


- encourage oral intake 





Resolved:


Acute hypoxic and hypercapnic respiratory failure status post intubation


Shock, hypovolemic


High anion gap metabolic acidosis secondary to Severe lactic acidosis


Acute kidney injury


Severe hyperphosphatemia


Severe hypokalemia


GI bleed 


Hypomagnesemia


Thrombocytopenia


Transaminitis, resolved 





Hx:


TBI, craniotomy


ETOH dependency 


HTN


HLD





D/C alston and central line





DVT prophylaxis: Lovenox


Discussed with: [facility] sobia. Dr. Boston 


Anticipated discharge: pending clinical course


Anticipated discharge place:pending clinical course


A total of  minutes was spent on the care of this complex patient more than 50% 

of the time was spent in counseling and care coordination. 





                               Active Medications











Generic Name Dose Route Start Last Admin





  Trade Name Freq  PRN Reason Stop Dose Admin


 


Acetaminophen  650 mg  01/17/23 15:06  01/18/23 21:34





  Acetaminophen Tab 325 Mg Tab  PO   650 mg





  Q6HR PRN   Administration





  Fever and/ or Pain  


 


Aspirin  81 mg  01/19/23 09:00  01/19/23 09:12





  Aspirin 81 Mg  PO   81 mg





  DAILY [facility]   Administration


 


Enoxaparin Sodium  40 mg  01/07/23 09:00  01/19/23 09:12





  Enoxaparin 40 Mg/0.4 Ml Syringe  SQ   40 mg





  DAILY [facility]   Administration


 


Thiamine HCl 100 mg/ Sodium  51 mls @ 100 mls/hr  01/07/23 09:00  01/18/23 08:36





  Chloride  IVPB   100 mls/hr





  Q24HR [facility]   Administration


 


Levetiracetam 1,000 mg/ IV  100 mls @ 400 mls/hr  01/12/23 21:00  01/18/23 21:32





  Solution  IVPB   400 mls/hr





  Q12HR [facility]   Administration


 


Acyclovir Sodium 850 mg/  267 mls @ 270 mls/hr  01/16/23 18:30  01/19/23 07:48





  Sodium Chloride  IVPB   Not Given





  Q8HR [facility]  





  Protocol  


 


Cefazolin Sodium 2 gm/ Sodium  50 mls @ 100 mls/hr  01/18/23 00:00  01/19/23 

07:48





  Chloride  IVPB   Not Given





  Q8HR [facility]  





  Protocol  


 


Potassium Chloride 20 meq/ IV  100 mls @ 50 mls/hr  01/19/23 07:32 





  Solution  IVPB  01/19/23 09:31 





  ONCE STA  


 


Magnesium Sulfate/Dextrose 1  100 mls @ 100 mls/hr  01/19/23 08:00 





  gm/ IV Solution  IVPB  01/19/23 09:59 





  Q1H [facility]  


 


Metoprolol Tartrate  25 mg  01/08/23 10:45  01/19/23 09:12





  Metoprolol Tartrate 25 Mg Tab  PO   25 mg





  BID [facility]   Administration


 


Miscellaneous Information  1 each  01/10/23 08:12 





  Magnesium Replacement Protocol 1 Each Misc  MISCELLANE  





  DAILY PRN  





  Per Protocol  





  Protocol  


 


Miscellaneous Information  1 each  01/14/23 07:22 





  Potassium Replacement Protocol 1 Each Misc  MISCELLANE  





  DAILY PRN  





  Per Protocol  





  Protocol  


 


Naloxone HCl  0.2 mg  01/06/23 23:41 





  Naloxone 0.4 Mg/Ml 1 Ml Vial  IV  





  Q2M PRN  





  Opioid Reversal  


 


Pantoprazole Sodium  40 mg  01/07/23 09:30  01/18/23 21:33





  Pantoprazole 40 Mg/10 Ml Vial  IVP   40 mg





  BID [facility]   Administration























Objective





- Vital Signs


Vital signs: 


                                   Vital Signs











Temp  99.6 F   01/19/23 07:10


 


Pulse  84   01/19/23 07:10


 


Resp  18   01/19/23 07:10


 


BP  120/73   01/19/23 07:10


 


Pulse Ox  98   01/19/23 07:10


 


FiO2  35   01/16/23 08:00








                                 Intake & Output











 01/18/23 01/19/23 01/19/23





 18:59 06:59 18:59


 


Intake Total 1010  


 


Output Total 1450 2600 


 


Balance -440 -2600 


 


Weight 89.2 kg 70.5 kg 


 


Intake:   


 


    


 


    .9 @ 10 170  


 


    Acyclovir Sodium 850 mg 250  





    In Sodium Chloride 0.9%   





    250 ml @ 270 mls/hr IVPB   





    Q8HR [facility] Rx#:[id number]   


 


    Dextrose 5%-0.45% NaCl 1, 100  





    000 ml @ 50 mls/hr IV .   





    Q20H [facility] Rx#:[id number]   


 


    Potassium Chloride 20 meq 100  





    In Water For Injection 1   





    100ml.bag @ 50 mls/hr   





    IVPB Q2H [facility] Rx#:   





    [id number]   


 


    ceFAZolin 2 gm In Sodium 50  





    Chloride 0.9% 50 ml @ 100   





    mls/hr IVPB Q8H [facility] Rx#:   





    [id number]   


 


    levETIRAcetam IV 1,000 mg 100  





    In Saline 1 100ml.bag @   





    400 mls/hr IVPB Q12HR [facility]   





    Rx#:[id number]   


 


  Oral 240  


 


Output:   


 


  Urine 1450 2600 


 


Other:   


 


  Voiding Method Indwelling Catheter  


 


  # Bowel Movements 1  








                       ABP, PAP, CO, CI - Last Documented











Arterial Blood Pressure        83/66

















- Labs


CBC & Chem 7: 


                                 01/19/23 03:50





                                 01/19/23 03:50


Labs: 


                  Abnormal Lab Results - Last 24 Hours (Table)











  01/17/23 01/19/23 01/19/23 Range/Units





  04:35 03:50 03:50 


 


RBC   3.52 L   (4.30-5.90)  m/uL


 


Hgb   11.1 L   (13.0-17.5)  gm/dL


 


Hct   32.6 L   (39.0-53.0)  %


 


Potassium    3.1 L  (3.5-5.1)  mmol/L


 


Chloride    108 H  ()  mmol/L


 


Calcium    7.5 L  (8.4-10.2)  mg/dL


 


Procalcitonin  0.27 H    (0.02-0.09)  ng/mL








                      Microbiology - Last 24 Hours (Table)











 01/14/23 17:00 CSF Gram Stain - Final





 Cerebral Spinal Fluid CSF Culture - Final

## 2023-01-19 NOTE — P.CONS
History of Present Illness





- Chief Complaint


Toxic encephalopathy





- History of Present Illness





I had the opportunity to see patient for inpatient rehab consultation.  Patient 

admitted to Dr. Santa January 6 with mental status change, possible stroke 

with a transfer for with RVR, history of nausea and emesis and alcohol abuse.  

Seen by Dr. Chavez for ICU and ventilator care.  Seen by Dr. Gutierrez for acute 

kidney injury, notes ATN.  Seen by Dr. glasgow for upper GI bleed, follow 

medically.  Also seen by cardiology.  Diagnostic tests angiogram CT with plaques

only.  CT chest abdomen and pelvis demonstrates thoracolumbar mild spondylitic 

change only.  Abdominal ultrasound negative for hydronephrosis right and left.  

Head CT with old left temporoparietal craniotomy and mild cerebral atrophy.  

Lumbar CT with hypertrophic change.  Chest x-rays followed for a prominent 

vascular and left pleural effusion.  PT and OT prescribed.





Previous functional history as elicited from patient: 57-year-old right-handed 

white male who is single lives in a trailer home alone.  On disability related 

to neck and back.  Describes independent with own cooking, laundry, driving, 

standing shower.  Uses a roller walker for gait and 2016.  PCP Dr. Louie,.





Review of Systems


Review of systems:


ENT: Denies sneezes or discharge.


Eyes: Denies discharge or photophobia.


Cardiac: Denies chest pain or palpitation.


Pulmonary: Denies cough or shortness of breath.


Gastrointestinal: Denies nausea, emesis, constipation, diarrhea.


Genitourinary: Denies discharge or frequency.


Musculoskeletal: Denies muscle or bone aches.


Neurologic: Denies motor or sensory change.


Endocrine: Denies shakes or sweats.


Oncology: Denies cancers.


Dermatologic: Denies rash, itching, pruritus.


ALLERGY/immunology: Denies sneezes, rashes.








Past Medical History


Past Medical History: Hypertension


History of Any Multi-Drug Resistant Organisms: None Reported


Past Surgical History: Appendectomy, Orthopedic Surgery, Tonsillectomy


Additional Past Surgical History / Comment(s): surgery for brain bleed 2019 or 2 020 (family unsure)


Past Psychological History: No Psychological Hx Reported


Past Alcohol Use History: None Reported


Past Drug Use History: None Reported





- Past Family History


  ** family


Family Medical History: Cancer


Additional Family Medical History / Comment(s): no CAD





Medications and Allergies


                                Home Medications











 Medication  Instructions  Recorded  Confirmed  Type


 


Hydrocodone/Acetaminophen [Norco 2 each PO Q6HR PRN #20 tab 06/17/17 01/07/23 Rx





5-325]    


 


Metoclopramide [Reglan] 5 mg PO QID PRN 01/07/23 01/07/23 History


 


Metoprolol Succinate (ER) [Toprol 50 mg PO DAILY 01/07/23 01/07/23 History





XL]    


 


Omeprazole [PriLOSEC] 20 mg PO DAILY 01/07/23 01/07/23 History


 


Potassium Chloride ER [K-Dur 20] 20 meq PO BID 01/07/23 01/07/23 History


 


Pravastatin Sodium [Pravachol] 40 mg PO DAILY 01/07/23 01/07/23 History


 


methocarbamoL [Methocarbamol] 750 mg PO TID PRN 01/07/23 01/07/23 History








                                    Allergies











Allergy/AdvReac Type Severity Reaction Status Date / Time


 


Penicillins Allergy  Unknown Verified 01/07/23 12:59





   Childhood  














Physical Exam


Vitals: 


                                   Vital Signs











  Temp Pulse Pulse Resp BP BP Pulse Ox


 


 01/19/23 03:00        95


 


 01/19/23 02:00  98.0 F   81  17   100/55  90 L


 


 01/18/23 19:37  100.1 F H   89  17   134/85  94 L


 


 01/18/23 17:00      132/76  


 


 01/18/23 16:00      137/76  


 


 01/18/23 15:00      128/86  


 


 01/18/23 14:00      139/88  


 


 01/18/23 13:00      134/82  


 


 01/18/23 12:00   82   16  134/82   94 L


 


 01/18/23 10:00   76   19  125/83   94 L


 


 01/18/23 09:00   70   15  121/74  


 


 01/18/23 08:00   74   11 L  129/75  


 


 01/18/23 07:31        95


 


 01/18/23 07:00   78   19  114/68  


 


 01/18/23 06:00   75   15  126/71  








                                Intake and Output











 01/18/23 01/18/23 01/19/23





 14:59 22:59 06:59


 


Intake Total 830 180 


 


Output Total 2146 231 0091


 


Balance -250 -190 -2600


 


Intake:   


 


   60 


 


    .9 @ 10 110 60 


 


    Acyclovir Sodium 850 mg 250  





    In Sodium Chloride 0.9%   





    250 ml @ 270 mls/hr IVPB   





    Q8HR [facility] Rx#:[id number]   


 


    Dextrose 5%-0.45% NaCl 1, 100  





    000 ml @ 50 mls/hr IV .   





    Q20H [facility] Rx#:[id number]   


 


    Potassium Chloride 20 meq 100  





    In Water For Injection 1   





    100ml.bag @ 50 mls/hr   





    IVPB Q2H [facility] Rx#:   





    [id number]   


 


    ceFAZolin 2 gm In Sodium 50  





    Chloride 0.9% 50 ml @ 100   





    mls/hr IVPB Q8H [facility] Rx#:   





    [id number]   


 


    levETIRAcetam IV 1,000 mg 100  





    In Saline 1 100ml.bag @   





    400 mls/hr IVPB Q12HR [facility]   





    Rx#:[id number]   


 


  Oral 120 120 


 


Output:   


 


  Urine 6309 925 2257


 


Other:   


 


  Voiding Method Indwelling Catheter Indwelling Catheter 


 


  # Bowel Movements  1 


 


  Weight 89.2 kg  70.5 kg














Skin: Good color, texture, turgor.


General: Medium build and comfortable appearance.


Head: Normocephalic, atraumatic.


Eyes: Symmetric.  Pupils equal round.


Ears: Symmetric.  Hearing within normal limits.


Mouth: Clear.


Neck: Supple.  Carotid without bruit.


Cardiac: Regular rate and rhythm.


Lungs: Clear anteriorly and posteriorly.


Abdomen: Soft active nontender.


Extremities: Normal tone.


Neurological: Mental status: Alert, cooperative, pleasant.


Cranial nerves: Symmetric facial tone and trapezius.


Motor: Normal strength and isolation all 4 limbs.


Sensation: Intact throughout.


DTRs: Symmetric and equal throughout.


Mobility: Did not attempt to sit or stand as early a.m., just transferred out of

 ICU.





Results


CBC & Chem 7: 


                                 01/18/23 04:30





                                 01/18/23 04:30


Labs: 


                      Microbiology - Last 24 Hours (Table)











 01/14/23 17:00 CSF Gram Stain - Final





 Cerebral Spinal Fluid CSF Culture - Final














Assessment and Plan


(1) Alcohol withdrawal


Current Visit: Yes   Status: Acute   Code(s): F10.939 - ALCOHOL USE, UNSPECIFIED

 WITH WITHDRAWAL, UNSPECIFIED   SNOMED Code(s): 684886211


   





(2) Atrial fibrillation with RVR


Current Visit: Yes   Status: Acute   Code(s): I48.91 - UNSPECIFIED ATRIAL 

FIBRILLATION   SNOMED Code(s): 253567179145821


   





(3) Bacteremia


Current Visit: Yes   Status: Acute   Code(s): R78.81 - BACTEREMIA   SNOMED 

Code(s): 2297924


   





(4) GI bleed


Current Visit: Yes   Status: Acute   Code(s): K92.2 - GASTROINTESTINAL 

HEMORRHAGE, UNSPECIFIED   SNOMED Code(s): 93214568


   





(5) Hypokalemia


Current Visit: Yes   Status: Acute   Code(s): E87.6 - HYPOKALEMIA   SNOMED 

Code(s): 11003629


   





(6) Hypotension


Current Visit: Yes   Status: Acute   Code(s): I95.9 - HYPOTENSION, UNSPECIFIED  

 SNOMED Code(s): 18086500


   


Plan: 





Comments and plan: At this time must review PT and OT notes, which are 

prescribed.  Discussed possible inpatient rehab with patient and seems agreeable

 if necessary.

## 2023-01-20 LAB
ALBUMIN SERPL-MCNC: 2.8 G/DL (ref 3.8–4.9)
ALBUMIN/GLOB SERPL: 1.17 G/DL (ref 1.6–3.17)
ALP SERPL-CCNC: 85 U/L (ref 41–126)
ALT SERPL-CCNC: 48 U/L (ref 10–49)
ANION GAP SERPL CALC-SCNC: 10.3 MMOL/L (ref 10–18)
AST SERPL-CCNC: 47 U/L (ref 14–35)
BASOPHILS # BLD AUTO: 0.03 X 10*3/UL (ref 0–0.1)
BASOPHILS NFR BLD AUTO: 0.5 %
BUN SERPL-SCNC: 5.2 MG/DL (ref 9–27)
BUN/CREAT SERPL: 7.43 RATIO (ref 12–20)
CALCIUM SPEC-MCNC: 7.9 MG/DL (ref 8.7–10.3)
CHLORIDE SERPL-SCNC: 107 MMOL/L (ref 96–109)
CO2 SERPL-SCNC: 24.7 MMOL/L (ref 20–27.5)
EOSINOPHIL # BLD AUTO: 0.3 X 10*3/UL (ref 0.04–0.35)
EOSINOPHIL NFR BLD AUTO: 4.7 %
ERYTHROCYTE [DISTWIDTH] IN BLOOD BY AUTOMATED COUNT: 3.39 X 10*6/UL (ref 4.4–5.6)
ERYTHROCYTE [DISTWIDTH] IN BLOOD: 13.6 % (ref 11.5–14.5)
GLOBULIN SER CALC-MCNC: 2.4 G/DL (ref 1.6–3.3)
GLUCOSE BLD-MCNC: 101 MG/DL (ref 70–110)
GLUCOSE BLD-MCNC: 95 MG/DL (ref 70–110)
GLUCOSE SERPL-MCNC: 86 MG/DL (ref 70–110)
HCT VFR BLD AUTO: 31.5 % (ref 39.6–50)
HGB BLD-MCNC: 10.2 G/DL (ref 13–17)
IMM GRANULOCYTES BLD QL AUTO: 0.3 %
LYMPHOCYTES # SPEC AUTO: 1.63 X 10*3/UL (ref 0.9–5)
LYMPHOCYTES NFR SPEC AUTO: 25.4 %
MAGNESIUM SPEC-SCNC: 1.9 MG/DL (ref 1.5–2.4)
MCH RBC QN AUTO: 30.1 PG (ref 27–32)
MCHC RBC AUTO-ENTMCNC: 32.4 G/DL (ref 32–37)
MCV RBC AUTO: 92.9 FL (ref 80–97)
MONOCYTES # BLD AUTO: 0.5 X 10*3/UL (ref 0.2–1)
MONOCYTES NFR BLD AUTO: 7.8 %
NEUTROPHILS # BLD AUTO: 3.93 X 10*3/UL (ref 1.8–7.7)
NEUTROPHILS NFR BLD AUTO: 61.3 %
NRBC BLD AUTO-RTO: 0 /100 WBCS (ref 0–0)
PLATELET # BLD AUTO: 242 X 10*3/UL (ref 140–440)
POTASSIUM SERPL-SCNC: 3.6 MMOL/L (ref 3.5–5.5)
PROT SERPL-MCNC: 5.2 G/DL (ref 6.2–8.2)
SODIUM SERPL-SCNC: 142 MMOL/L (ref 135–145)
WBC # BLD AUTO: 6.41 X 10*3/UL (ref 4.5–10)

## 2023-01-20 RX ADMIN — LEVETIRACETAM SCH MLS/HR: 10 INJECTION INTRAVENOUS at 07:45

## 2023-01-20 RX ADMIN — ASPIRIN 81 MG CHEWABLE TABLET SCH MG: 81 TABLET CHEWABLE at 07:42

## 2023-01-20 RX ADMIN — SODIUM CHLORIDE SCH MLS/HR: 900 INJECTION, SOLUTION INTRAVENOUS at 09:46

## 2023-01-20 RX ADMIN — PANTOPRAZOLE SODIUM SCH MG: 40 INJECTION, POWDER, FOR SOLUTION INTRAVENOUS at 20:17

## 2023-01-20 RX ADMIN — ENOXAPARIN SODIUM SCH MG: 40 INJECTION SUBCUTANEOUS at 07:42

## 2023-01-20 RX ADMIN — METOPROLOL TARTRATE SCH MG: 25 TABLET, FILM COATED ORAL at 20:17

## 2023-01-20 RX ADMIN — SODIUM CHLORIDE SCH MLS/HR: 900 INJECTION, SOLUTION INTRAVENOUS at 08:03

## 2023-01-20 RX ADMIN — METOPROLOL TARTRATE SCH MG: 25 TABLET, FILM COATED ORAL at 07:42

## 2023-01-20 RX ADMIN — LEVETIRACETAM SCH MLS/HR: 10 INJECTION INTRAVENOUS at 20:17

## 2023-01-20 RX ADMIN — PANTOPRAZOLE SODIUM SCH MG: 40 INJECTION, POWDER, FOR SOLUTION INTRAVENOUS at 07:45

## 2023-01-20 RX ADMIN — SODIUM CHLORIDE SCH MLS/HR: 900 INJECTION, SOLUTION INTRAVENOUS at 01:14

## 2023-01-20 NOTE — P.PN
Subjective


Progress Note Date: 01/20/23


Principal diagnosis: 


Fever and bacteremia





Patient is a 57 year old  male with a past medical history significant 

for alcohol dependence/abuse history of atrial fibrillation patient was brought 

to the hospital  on 01/06/2023 for evaluation of mental status changes patient 

presented to the hospital and was hypotensive A. fib with RVR patient got 

intubated and cardioverted, patient did have evidence of MSSA bacteremia sputum 

was also positive for the same pathogen subsequently has been running a fever 

did have a repeat which was traumatic Elevated white count but glucose protein 

normal. 


 on today's evaluation that is 01/20/2023, the patient is afebrile today, the p

atient is breathing comfortably on room air patient denies any headache no chest

pain shortness of breath occasional cough but no sputum production no abdominal 

pain, the patient diarrhea has resolved and no urinary symptoms





Objective





- Vital Signs


Vital signs: 


                                   Vital Signs











Temp  97.5 F L  01/20/23 07:26


 


Pulse  83   01/20/23 07:26


 


Resp  18   01/20/23 07:26


 


BP  134/77   01/20/23 07:26


 


Pulse Ox  94 L  01/20/23 08:49


 


FiO2  35   01/16/23 08:00








                                 Intake & Output











 01/19/23 01/20/23 01/20/23





 18:59 06:59 18:59


 


Intake Total 472  


 


Output Total 1035 800 150


 


Balance -563 -800 -150


 


Weight  51 kg 83.4 kg


 


Intake:   


 


  Oral 472  


 


Output:   


 


  Urine 1035 800 150


 


    Uretheral ([place]) 610  


 


Other:   


 


  Voiding Method Indwelling Catheter Urinal Urinal


 


  # Voids 1  








                       ABP, PAP, CO, CI - Last Documented











Arterial Blood Pressure        83/66

















- Exam


GENERAL DESCRIPTION: A middle-aged male lying in bed in no distress





RESPIRATORY SYSTEM: Unlabored breathing , decreased breath sounds at bases





HEART: S1 S2 regular rate and rhythm ,





ABDOMEN: Soft , no tenderness





EXTREMITIES: No edema feet








- Labs


CBC & Chem 7: 


                                 01/20/23 05:40





                                 01/20/23 05:40


Labs: 


                  Abnormal Lab Results - Last 24 Hours (Table)











  01/20/23 01/20/23 Range/Units





  05:40 05:40 


 


RBC  3.39 L   (4.40-5.60)  X 10*6/uL


 


Hgb  10.2 L   (13.0-17.0)  g/dL


 


Hct  31.5 L   (39.6-50.0)  %


 


BUN   5.2 L  (9.0-27.0)  mg/dL


 


BUN/Creatinine Ratio   7.43 L  (12.00-20.00)  Ratio


 


Calcium   7.9 L  (8.7-10.3)  mg/dL


 


AST   47 H  (14-35)  U/L


 


C-Reactive Protein   6.80 H  (0.00-0.80)  mg/dL


 


Total Protein   5.2 L  (6.2-8.2)  g/dL


 


Albumin   2.8 L  (3.8-4.9)  g/dL


 


Albumin/Globulin Ratio   1.17 L  (1.60-3.17)  g/dL








                      Microbiology - Last 24 Hours (Table)











 01/19/23 09:23 Blood Culture - Preliminary





 Blood    No Growth after 24 hours


 


 01/19/23 11:30 Catheter Tip Culture - Preliminary





 Catheter Tip 


 


 01/18/23 16:20 Blood Culture - Preliminary





 Blood    No Growth after 24 hours














Assessment and Plan


(1) Bacteremia


Current Visit: Yes   Status: Acute   Code(s): R78.81 - BACTEREMIA   SNOMED 

Code(s): 9594305


   





(2) Fever


Current Visit: Yes   Status: Acute   Code(s): R50.9 - FEVER, UNSPECIFIED   

SNOMED Code(s): 237913204


   


Plan: 


1patient with abnormal CSF which is mostly a traumatic tap with more than 

40,000 RBC white count is still symptomatic elevated however the patient did 

have a normal glucose and protein the patient currently denies having any 

headache and he is aware that he is at [facility] all these 

factors will go against meningitis or encephalitis


2patient now to have a fever in this patient who did have a positive blood as 

well as sputum culture for MSSA and did have evidence of a left-sided effusion 

question of possible pulmonary source/empyema, in addition patient also having 

diarrhea with fecal management system , stool for C. diff negative


3there was a question of possible meningeal enhancement on the MRI which was 

not reported on the official MRI plus the patient did have a normal protein and 

glucose my clinic suspicious for meningitis remains to be low in this patient 

currently with no headache, patient echocardiogram was negative for any 

vegetation, CT of the chest is currently pending patient to continue with the 

Rocephin and monitor clinical course closely


Family the bedside questions were answered


Time with Patient: Less than 30

## 2023-01-20 NOTE — CA
Transthoracic Echo Report 

 Name: Denton Delacruz 

 MRN:    [id number] 

 Age:    57     Gender:     M 

 

 :    1965 

 Exam Date:     2023 13:55 

 Exam Location: [facility] 

 Ht (in):     70     Wt (lb):     155 

 Ordering Physician:        Tarah Kirkpatrick DO 

 Attending/Referring Phys:         [id number], Kaya 

 Technician         Yajaira Pinto RDCS 

 Procedure CPT: 

 Indications:       Endocarditis 

 

 Cardiac Hx: 

 Technical Quality: 

 Contrast 1:                                Total Dose (mL): 

 Contrast 2:                                Total Dose (mL): 

 

 MEASUREMENTS  (Male / Female) Normal Values 

 2D ECHO 

 LV Diastolic Diameter PLAX        5.2 cm                4.2 - 5.9 / 3.9 - 5.3 cm 

 LV Systolic Diameter PLAX         3.3 cm                 

 IVS Diastolic Thickness           1.1 cm                0.6 - 1.0 / 0.6 - 0.9 cm 

 LVPW Diastolic Thickness          1.0 cm                0.6 - 1.0 / 0.6 - 0.9 cm 

 LV Relative Wall Thickness        0.4                    

 RV Internal Dim ED PLAX           3.4 cm                 

 LA Volume                         101.9 cm???             18 - 58 / 22 - 52 cm??? 

 

 M-MODE 

 Aortic Root Diameter MM           2.8 cm                 

 LA Systolic Diameter MM           4.6 cm                 

 LA Ao Ratio MM                    1.6                    

 AV Cusp Separation MM             2.0 cm                 

 

 DOPPLER 

 AV Peak Velocity                  148.2 cm/s             

 AV Peak Gradient                  8.8 mmHg               

 AV Mean Velocity                  113.9 cm/s             

 AV Mean Gradient                  5.6 mmHg               

 AV Velocity Time Integral         27.7 cm                

 LVOT Peak Velocity                138.0 cm/s             

 LVOT Peak Gradient                7.6 mmHg               

 LVOT Velocity Time Integral       29.0 cm                

 MV Area PHT                       3.8 cm???                

 Mitral E Point Velocity           92.2 cm/s              

 Mitral A Point Velocity           66.6 cm/s              

 Mitral E to A Ratio               1.4                    

 MV Deceleration Time              197.1 ms               

 TR Peak Velocity                  299.3 cm/s             

 TR Peak Gradient                  35.8 mmHg              

 Right Ventricular Systolic Press  40.5 mmHg              

 

 

 FINDINGS 

 Left Ventricle 

 Normal Left ventricular size, wall thickness, systolic function with no obvious  

 regional wall motion abnormalities. Normal Left ventricular diastolic filling  

 pattern. Left ventricular ejection fraction is estimated at 55-60 %. 

 

 Right Ventricle 

 Normal right ventricular size and function. Mild pulmonary hypertension. 

 

 Right Atrium 

 Normal right atrial size. 

 

 Left Atrium 

 Severely increased left atrial volume. Mildly increased left atrial area. 

 

 Mitral Valve 

 Structurally normal mitral valve. Mild mitral regurgitation. 

 

 Aortic Valve 

 Trileaflet aortic valve. No aortic stenosis. Trace aortic regurgitation. 

 

 Tricuspid Valve 

 Structurally normal tricuspid valve. Mild tricuspid regurgitation. 

 

 Pulmonic Valve 

 Trace pulmonic regurgitation. 

 

 Pericardium 

 No pericardial effusion. 

 

 Aorta 

 Normal size aortic root and proximal ascending aorta. 

 

 CONCLUSIONS 

 LVH with preserved LV systolic function 

 Previewed by:  

 Dr. Marito Stacy MD 

 (Electronically Signed) 

 Final Date:      2023 08:46

## 2023-01-20 NOTE — P.PN
Subjective


Progress Note Date: 01/20/23


Hospital Course: 


Patient is a 57-year-old male with history of alcohol dependence/abuse, atrial 

fibrillation who presented with altered mentation and possible code stroke.  On 

arrival, patient was hemodynamically unstable, in A. fib with RVR, he was 

intubated and cardioverted to normal sinus rhythm.  CT had showed no acute 

process, interventional neurology on call did not recommend to give alteplase.  

Reportedly, patient has been binge drinking, his last drink was on January 1.  

Per family patient was vomiting prior to becoming unresponsive.  His initial 

labs were consistent with severe dehydration, pH of 7.13, potassium 2.7, 

creatinine of 3.66, lactic acid of 12.4, mild transaminitis, mild elevated 

troponin, urine tox positive for benzos and marijuana. He was admitted   There w

as also concern for possible GI bleed, patient on IV Protonix, surgery following

a hemoglobin stable.  Nephrology and ICU also following.





LP with traumatic tap, patient was started on vanco and rocephin and ID was 

consulted. Patient was extubated on 1/16/23. Sensorium has cleared. He had 

continued fevers.  Now has been afebrile for a few days on acyclovir and 

ceftriaxone





Imaging: 


Head CT 1/6/23: mild cerebral atrophy 


CT angio head and neck 1/6/23: negatvive 


CT chest/abd/pelvis 1/6/23: no significant abnormality 


US abd/bladder 1/7/23: no hydro 


EEG 1/9/23: abnormal due to asymetry and background slowing 


Head CT 1/11/23: prior surgery, mild atrophy 


EEG 1/12/23: abnormal due to asymmery and sporadic sharp waves 


Lumbar spine CT 1/15/23: Hypertrophic degenerative changes in the lumbar spine 


EEG 2.5 hr 1/15/23: Mod-severe diffuse cerebral dysfunction


MRI Brain 1/18- numerous white matter changes. 








Procedures: 


Art and central line 1/11/23


LP 1/15/23











Subjective: 


Patient seen and examined at bedside.  No acute events overnight.  He denies any

chest pain, shortness of breath, headache, nausea, vomiting, diarrhea, 

constipation, abdominal pain, or urinary complaints.





Pertinent positives and negatives as discussed above, a complete review of 

systems was performed and all other systems are negative.








Vitals Signs Reviewed. 





General: nontoxic, no distress, appears at stated age


Derm: warm, dry


Head: atraumatic, normocephalic, symmetric


Eyes: EOMI, no lid lag, anicteric sclera


Mouth: no lip lesion, mucus membranes moist


Cardiovascular: S1S2 reg, no murmur, positive posterior tibial pulse bilateral, 


Lungs: CTA bilateral, no rhonchi, no rales , no accessory muscle use


Abdominal: soft,  nontender to palpation, no guarding, no appreciable 

organomegaly


Ext: no gross muscle atrophy, no edema, no contractures


Neuro:  CN II-XI grossly intact, no focal neuro deficits


Psych: Alert, oriented, appropriate affect 








Assessment and Plan:


Pyrexia, now improved 


Bacterial pneumonia, right upper and middle lobe


- Repeat blood cultures negative


- echo does not show any vegetation


-Lower extremity Dopplers negative for DVT


-Per nephrology, meningoencephalitis still possibility given clinical 

improvement on ceftriaxone and IV acyclovir.


-Repeat CT chest showed right upper and right middle lobe opacity concerning for

pneumonia





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 


Acute metabolic encephalopathy


Hyperammonemia, resolved 


History of alcohol abuse


- ID and neuro recs appreciated- continue keppra at this time 


-IV acyclovir and ceftriaxone


- CSF culture- negative 


- monitor neuro status 





Staph bacteremia, likely due to staph aureus trancheobronchitis 


-First set of blood cultures positive for staph aureus and epidermidis, likely 

contaminant. however, sputum culture also showing staph


-Patient currently on ceftriaxone, repeat blood cultures no growth to date


     - cefazloin 1/11, 1/15-16, 1/18


     - Vanco 1/7, 1/8. 1/11-1/17 


    -  rocephin 1/16, 1/17 





 A. fib with RVR status post cardioversion preformed in the ED


Elevated troponin due to deman ischemic 


-Cardiology signed off , no recurrence of A fib this hospital stay. 


- consider outpatient follow-up


- CHADSVASC 1, ASA recommended 





Severe Protein calorie malnutrition


- dietitian recs


- encourage oral intake 





Resolved:


Acute hypoxic and hypercapnic respiratory failure status post intubation


Shock, hypovolemic


High anion gap metabolic acidosis secondary to Severe lactic acidosis


Acute kidney injury


Severe hyperphosphatemia


Severe hypokalemia


GI bleed 


Hypomagnesemia


Thrombocytopenia


Transaminitis, resolved 





Hx:


TBI, craniotomy


ETOH dependency 


HTN


HLD





DVT ppx: Lovenox





Code status: Full code





Anticipated discharge place: Pending clinical course, likely discharge home with

home care, likely IV antibiotics given bacteremia


Anticipated discharge time: Pending clinical course





Objective





- Vital Signs


Vital signs: 


                                   Vital Signs











Temp  97.5 F L  01/20/23 07:26


 


Pulse  83   01/20/23 07:26


 


Resp  18   01/20/23 07:26


 


BP  134/77   01/20/23 07:26


 


Pulse Ox  94 L  01/20/23 08:49


 


FiO2  35   01/16/23 08:00








                                 Intake & Output











 01/19/23 01/20/23 01/20/23





 18:59 06:59 18:59


 


Intake Total 472  


 


Output Total 1035 800 150


 


Balance -563 -800 -150


 


Weight  51 kg 83.4 kg


 


Intake:   


 


  Oral 472  


 


Output:   


 


  Urine 1035 800 150


 


    Uretheral ([place]) 610  


 


Other:   


 


  Voiding Method Indwelling Catheter Urinal Urinal


 


  # Voids 1  








                       ABP, PAP, CO, CI - Last Documented











Arterial Blood Pressure        83/66

















- Labs


CBC & Chem 7: 


                                 01/20/23 05:40





                                 01/20/23 05:40


Labs: 


                  Abnormal Lab Results - Last 24 Hours (Table)











  01/20/23 01/20/23 Range/Units





  05:40 05:40 


 


RBC  3.39 L   (4.40-5.60)  X 10*6/uL


 


Hgb  10.2 L   (13.0-17.0)  g/dL


 


Hct  31.5 L   (39.6-50.0)  %


 


BUN   5.2 L  (9.0-27.0)  mg/dL


 


BUN/Creatinine Ratio   7.43 L  (12.00-20.00)  Ratio


 


Calcium   7.9 L  (8.7-10.3)  mg/dL


 


AST   47 H  (14-35)  U/L


 


C-Reactive Protein   6.80 H  (0.00-0.80)  mg/dL


 


Total Protein   5.2 L  (6.2-8.2)  g/dL


 


Albumin   2.8 L  (3.8-4.9)  g/dL


 


Albumin/Globulin Ratio   1.17 L  (1.60-3.17)  g/dL








                      Microbiology - Last 24 Hours (Table)











 01/19/23 11:30 Catheter Tip Culture - Preliminary





 Catheter Tip 


 


 01/19/23 09:23 Blood Culture - Preliminary





 Blood    No Growth after 24 hours


 


 01/18/23 16:20 Blood Culture - Preliminary





 Blood    No Growth after 24 hours

## 2023-01-20 NOTE — P.PN
Subjective


Progress Note Date: 01/20/23


I am following seeing the patient and he stated he is doing drastically better. 

Today he was walking down the [place] with therapy and using a walker and feels he 

is gaining strength on a daily basis.


Denies of any headache.  No further headache since 01/18.





I spoke with Dr. Rosas (reading radiologist) regarding his MRI Brain and he 

feels the pachymeningeal enhancement on the left with fluid collection on the 

left side behind skull and these finding can still occur since old surgical 

resection but clinically correlate.








Objective





- Vital Signs


Vital signs: 


                                   Vital Signs











Temp  97.5 F L  01/20/23 07:26


 


Pulse  83   01/20/23 07:26


 


Resp  18   01/20/23 07:26


 


BP  134/77   01/20/23 07:26


 


Pulse Ox  94 L  01/20/23 08:49


 


FiO2  35   01/16/23 08:00








                                 Intake & Output











 01/19/23 01/20/23 01/20/23





 18:59 06:59 18:59


 


Intake Total 472  


 


Output Total 1035 800 150


 


Balance -563 -800 -150


 


Weight  51 kg 83.4 kg


 


Intake:   


 


  Oral 472  


 


Output:   


 


  Urine 1035 800 150


 


    Uretheral ([place]) 610  


 


Other:   


 


  Voiding Method Indwelling Catheter Urinal Urinal


 


  # Voids 1  








                       ABP, PAP, CO, CI - Last Documented











Arterial Blood Pressure        83/66

















- Exam


GENERAL: The patient is lying in bed and is not in acute distress.








NEUROLOGICAL:


Higher mental function: The patient is awake,alert, oriented to self, place and 

time.    Patient is following commands.  No aphasia and no neglect.


Cranial nerves: The pupils are round, equal and reactive to light.  Visual 

fields are full to confrontation throughout.  Extraocular movement is intact no 

nystagmus is noted.   The facial strength is normal throughout.    Tongue is 

midline and moved side-to-side without any difficulty.  No dysarthria is noted. 

Shoulder shrug is normal bilaterally.


Motor: Using a walker and walking on his own.  The strength is 5 over 5 

throughout.  Normal tone and bulk.  


Cerebellum: Normal finger to nose bilaterally.


Sensation: Sensation is normal to touch throughout.








SOME OF THE WORK-UP DURING THIS HOSPITAL VISIT CONSISTED OF:


* Repeat CT head 01/12/2023 revealed previous surgery.  Mild atrophy, no acute 

  intracranial process.  I personally reviewed CT head, agree with the findings.


* CTA reported as negative of the brain.  Mild plaque at the carotid artery 

  bifurcation.  No evidence of hemodynamic arterial stenosis in the neck.


* Initial EEG 01/09/2023 was abnormal due to amplitude asymmetry with relatively

  higher amplitude activity in the left frontoparietal region due to breach 

  rhythm from previous craniotomy defect.  Background slowing of moderate to 

  severe degree, consistent with generalized cerebral dysfunction, as can be 

  seen with toxic metabolic encephalopathy or due to diffuse structural brain 

  abnormality.  Clinical correlation is recommended.  No epileptiform activity 

  was seen.


* B12 2102, folate 12.90, TSH slightly decreased 0.220, with normal free T4 1.

  37.  Possible due to sick euthyroid.


* MRI of the brain is reported as numerous white matter foci the are nonspecific

  and could relate to demyelinating disease or microvascular ischemia.  No 

  evidence of acute cortical infarct.  No evidence of intracranial mass.  I 

  personally reviewed that imaging with Dr. Childers and the was felt the patient 

  has left hemispheric pachymeningeal enhancement and possible old subdural over

  the left.  I spoke with Dr. Rosas (reading radiologist) on 01/20 regarding his

  MRI Brain and he feels the pachymeningeal enhancement on the left with fluid 

  collection on the left side behind skull and these finding can still occur 

  since old surgical resection but clinically correlate.








- Labs


CBC & Chem 7: 


                                 01/20/23 05:40





                                 01/20/23 05:40


Labs: 


                  Abnormal Lab Results - Last 24 Hours (Table)











  01/20/23 01/20/23 Range/Units





  05:40 05:40 


 


RBC  3.39 L   (4.40-5.60)  X 10*6/uL


 


Hgb  10.2 L   (13.0-17.0)  g/dL


 


Hct  31.5 L   (39.6-50.0)  %


 


BUN   5.2 L  (9.0-27.0)  mg/dL


 


BUN/Creatinine Ratio   7.43 L  (12.00-20.00)  Ratio


 


Calcium   7.9 L  (8.7-10.3)  mg/dL


 


AST   47 H  (14-35)  U/L


 


C-Reactive Protein   6.80 H  (0.00-0.80)  mg/dL


 


Total Protein   5.2 L  (6.2-8.2)  g/dL


 


Albumin   2.8 L  (3.8-4.9)  g/dL


 


Albumin/Globulin Ratio   1.17 L  (1.60-3.17)  g/dL








                      Microbiology - Last 24 Hours (Table)











 01/19/23 11:30 Catheter Tip Culture - Preliminary





 Catheter Tip 


 


 01/18/23 16:20 Blood Culture - Preliminary





 Blood    No Growth after 24 hours














Assessment and Plan


Assessment: 











* Confusion with continued fevers and I felt the CSF study even though was 

  traumatic but the calculated nucleated cells were still elevated and was on 

  antibiotic for few days prior to CSF study is concerning for 

  meningoencephalitis.  Blood culture and sputum is staph but there is concern 

  for ?contaminant---confused has improved


* New onset rhythmic twitching of the shoulders bilaterally and some upper 

  limbs, probably myoclonic jerks, unlikely to be seizures.  EEG did not reveal 

  any epileptiform activity.  No status epilepticus---confusion has drastically 

  improved


* Patient presented with unresponsiveness.


* History of traumatic brain injury with subdural hematoma, status post 

  craniotomy in 2016 


* History of alcoholism.


* Elevated ammonia, abnormal liver functions, rule out cirrhosis.


* Acute kidney injury, now resolved


* Status post shock, likely hypovolemic


* Atrial fibrillation with RVR, status post cardioversion








Plan: 





* CSF revealed bloody, red, rbc csf 37550, normal glucose 69, CSF protein 

  57(12-60), WBC count 160, polynuclear 77%, mononuclear cells 18%, eosinophils 

  5%, .  The corrected nucleated cells is 80 cells (for every 1 nucleated cells 

  is 500-1000rbc).  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 

  correted nucleated cells elevated and was on antibiotic prior to CSF study.  

  Possibly more encephalitis since glucose and protein normal but cannot rule 

  out meningitis since CSF was done later and was on antibiotic.  MRI of the 

  brain is reported as numerous white matter foci the are nonspecific and could 

  relate to demyelinating disease or microvascular ischemia.  No evidence of 

  acute cortical infarct.  No evidence of intracranial mass.  I spoke with Dr. Rosas (reading radiologist) on 01/20 regarding his MRI Brain and he feels the 

  pachymeningeal enhancement on the left with fluid collection on the left side 

  behind skull and these finding can still occur since old surgical resection 

  but clinically correlate.


* On Ceftriaxone 2gm every 12 hours and on IV Acyclovir 850mg every 8 hours.  

  Will defer modification of medication to I.D. team.  Clinically he is doing 

  better with these medications and no further fevers since 01/18.


* Patient had a prolonged, 2.5 hour EEG performed 01/13/2023.  According to 

  preliminary report from Dr. Ortiz, did not reveal any epileptiform 

  activity.  Some triphasic waves are seen.  No status epilepticus.  Official 

  report pending.  We will continue Keppra 1000 mg twice a day for possible 

  seizure-like activity.  I will not modify as of now because of meningeal 

  enhancement and his clinical presentation in the beginning.


* Likely had decondition from his prolonged ICU was immobile.


* Primary is getting further investigation for his continued fevers.  I.D. is on

  board.





The plan is discussed in detailed with patient and primary team. 


Will continue to follow.





Time with Patient: Less than 30

## 2023-01-20 NOTE — P.PN
Subjective


Progress Note Date: 01/20/23


Principal diagnosis: 





acute respiratory failure





Reevaluated today on 1/1:30/23, patient remains in the ICU, intubated 

mechanically ventilated.  He is on assist control rate of 18 tidal volume 450 

FiO2 35% PEEP of 5 ABG showed a pO2 of 100 pCO2 44 pH of 7.46.  His FiO2 was cut

down to 35%.  Patient continued to have intermittent episodes of seizure-like 

activities or possibly myoclonic jerks affecting his upper body and upper 

extremities, with deviation of his eyes with upward gaze.  Neurology on the case

loaded up with Dilantin yesterday, and he is on Keppra today.  Although his EEG 

according to the neurologist did not show any evidence of seizure activity.  

Patient to be seen again by neurology today, and decide whether to continue with

seizure medications he did receive Dilantin 1 dose yesterday, and he is now on 

Keppra 1 g twice a day.  Early this morning patient had an EEG which is yet to 

be interpreted by the neurologist.  In the meantime the patient is on propofol 

at 20 mcg/kg/m, Versed 3 mg/h, D5 4 5 at 50 mL per hour and is on vital AF at 30

mL per hour.  Unable to cut down on his sedation at this point mostly because of

these episodic seizure-like activities or possibly myoclonic jerks.  Chest x-ray

today showed small left pleural effusion not large enough to consider 

thoracentesis.  His WBC count is 11.5 hemoglobin is 12.1.  Basic metabolic 

profile and renal profile are normal





Patient was reevaluated today on 1/14/23, remains in the ICU intubated and 

mechanically ventilated, he is tidal volume 450 assist-control rate of 18FiO2 is

down to 35%, PEEP is 5.  ABG showed a pO2 of 110 pCO2 of 44 pH of 7.45, hence no

changes were made in the ventilator settings.  Neurologically the patient is 

about the same, continues to have these episodes of myoclonic-like jerks, 

remains on propofol at 25 mcg/kg/m, remains on Versed at 6 mg/h, patient is 

supposedly scheduled to have a lumbar puncture done today.  Chest x-ray 

continues to show a small left pleural effusion, otherwise is unremarkable.  

Labs were all reviewed and they seem to be relatively unremarkable.  Again the 

only reason the patient could not be extubated to mostly because of his neuro

logical status at this point.CBC is relatively normal ABG as noted above.  Basic

metabolic profile is relatively.  Renal profile is normal.  Patient is receiving

vital AF at 40 mL per hour.  Patient remains on GI and DVT prophylaxis





Reevaluated today on 1/15/23, patient remains in the ICU, intubated and 

mechanically ventilated.  He is on assist control rate of 18 tidal volume 450 

FiO2 35% PEEP of 5.  Remains on propofol at 40 mcg/kg/m is also on Versed 1 

mg/h.  And he is receiving vital AF at 58 mL per hour.  No major issues over the

last 24 hours, patient seems to be quite sedated with the propofol and Versed.  

I plan to lower the dose of propofol, and hopefully get to assess mental status 

today.  I could not awaken the patient today to assess mental status, hence I 

recommended propofol to be cut down significantly and assess mental status RBC 

the patient not quite ready for any weaning trials.  Chest x-ray showed small 

left pleural effusion, otherwise basically unremarkable.  Patient remains on 

vancomycin for his MSSA, hence I will go back and switch him to 2 g IV piggyback

every 8 hours.  Labs today showed a relatively normal CBC, normal basic 

metabolic profile, his spinal fluid results, initial report seems to be 

unremarkable, however the spinal fluid was obviously bloody and was traumatic.  

Whether the patient will need a repeat CT of the brain again because of his 

bloody spinal fluid, that will be decided upon by neurology on the case.  Lumbar

spine CT showed no evidence of epidural hematoma.





Progress note dated 01/16/2023.





The patient is seen in the intensive care unit, room 252.  He was admitted on 

January 6 with mental status changes, and acute kidney injury.  The patient 

remains on the mechanical ventilator.  Ventilator settings include the volume 

assist control, rate 18, tidal volume 450, FiO2 35%, and PEEP of 5.  Blood gases

show pO2 of 99, pCO2 39, pH is 7.48.  The patient's on propofol at 25 mcg/kg/m, 

D5 half-normal saline at 50 mL an hour, and vital AF at 48 mL an hour, which is 

goal.  The patient will get Lasix 40 mg IV push.  We will attempt a daily 

interruption of sedation and a spontaneous breathing trial.  The patient appears

much more awake and alert today.  White count 8, hemoglobin 11.5, hematocrit 

34.9, with a normal platelet count.  Sodium 144, potassium 3.6, chlorides 114, 

CO2 28, BUN 13, creatinine 0.65.  Blood cultures show staph aureus and staph 

epidermidis, and sputum samples are positive for Staphylococcus aureus.  The pat

ient's chest x-ray shows a small left-sided pleural effusion.





Progress note dated 01/17/2023.





The patient is again seen in the intensive care unit, room 252.  The patient was

extubated yesterday.  He's currently on 3 L of oxygen.  He is getting dextrose 

with half-normal saline at 50 mL an hour.  He is much more awake and alert.  He 

was started on acyclovir, Rocephin, and vancomycin.  The Protonix can be 

converted to by mouth.  The hospital service did consult infectious diseases.  

White count 13, hemoglobin 10.9, hematocrit 32.4, with a normal platelet count. 

Sodium 139, potassium 3.4, chlorides 110, CO2 27, BUN 12, creatinine 0.67.  

Blood and sputum samples were positive for Staphylococcus aureus.  Chest x-ray 

shows some mild fluid overload, likely as a result of extubation.





Progress note dated 01/18/2023.





57-year-old male again seen in room 252 in the intensive care unit.  The patient

is doing much better.  He is on 3 L.  He is fully awake and alert.  Half-normal 

saline at 50 mL an hour.  Per infectious diseases, he continues on Ancef and 

acyclovir.  The patient can be transferred to the general medical floor without 

telemetry.  We'll DC his IV, and his arterial line.  White count 9.3, hemoglobin

11.1, hematocrit 33.4, and platelet count 204,000.  Sodium 139, potassium 3.1, 

chlorides 109, CO2 26, BUN 9, and creatinine 0.62.  Albumin is 2.5.





Progress note dated 01/19/2023.





57-year-old male who was transferred out of the intensive care unit, and is seen

today, in room 484.  Currently, the patient's on room air.  He's getting saline 

at 20 mL an hour, which can be discontinued.  Also, the central line can be 

removed.  Labs today include a white count 7.6, hemoglobin 11.1, hematocrit 

32.6, and a normal platelet count.  Sodium 140, potassium 3.1, chlorides 108, 

CO2 27, BUN 9, creatinine 0.68.








I'm evaluating this patient today 01/20/2023 in follow-up on a general medical 

floor.  Patient is currently sitting up in the chair, in good spirits, on room 

air, in no acute distress.  No new chest x-ray to review today.  Patient did 

have a chest CT without contrast today, which showed some right lung patchy 

atelectasis and some bibasilar atelectasis.  Patient remains afebrile.  

Continues on Rocephin per infectious disease. Echocardiogram from yesterday 

showed a preserved ejection fraction of 55-60%.  Patient's CBC was quite stable 

with a WBC count of 6.4, hemoglobin 10.2, hematocrit 31.5, platelets 242,000.  

Patient's BMP shows a sodium 142, potassium 3.6, chloride 107, serum CO2 24.7, 

BUN 5.2, creatinine 0.7, glucose 86.  Receiving Lovenox for DVT prophylaxis and 

Protonix for GI prophylaxis.  Vital signs remain stable.  





Objective





- Vital Signs


Vital signs: 


                                   Vital Signs











Temp  97.5 F L  01/20/23 07:26


 


Pulse  83   01/20/23 07:26


 


Resp  18   01/20/23 07:26


 


BP  134/77   01/20/23 07:26


 


Pulse Ox  94 L  01/20/23 08:49


 


FiO2  35   01/16/23 08:00








                                 Intake & Output











 01/19/23 01/20/23 01/20/23





 18:59 06:59 18:59


 


Intake Total 472  


 


Output Total 1035 800 150


 


Balance -563 -800 -150


 


Weight  51 kg 83.4 kg


 


Intake:   


 


  Oral 472  


 


Output:   


 


  Urine 1035 800 150


 


    Uretheral ([place]) 610  


 


Other:   


 


  Voiding Method Indwelling Catheter Urinal Urinal


 


  # Voids 1  








                       ABP, PAP, CO, CI - Last Documented











Arterial Blood Pressure        83/66

















- Exam





Alert, 57-year-old white male, sitting up in the chair, in no acute distress. 





HEENT examination is grossly unremarkable. 





Neck supple.  Full range of motion.  No adenopathy thyromegaly or neck vein 

distention.





Cardiovascular examination reveals regular rhythm rate.  S1-S2 normal.  No S3 or

S4.  No discernible murmur noted.  Heart rate 84 bpm.





Lung sounds with equal air entry, clear to auscultation bilaterally. No wheezes,

rhonchi, crackles.  Room air saturation is 93%.





Abdomen soft bowel sounds are heard.  No masses or tenderness.





Extremities are intact.  No cyanosis clubbing or edema.





Skin is without rash or lesion.





Neurologic examination is brief but nonfocal.





- Labs


CBC & Chem 7: 


                                 01/20/23 05:40





                                 01/20/23 05:40


Labs: 


                  Abnormal Lab Results - Last 24 Hours (Table)











  01/20/23 01/20/23 Range/Units





  05:40 05:40 


 


RBC  3.39 L   (4.40-5.60)  X 10*6/uL


 


Hgb  10.2 L   (13.0-17.0)  g/dL


 


Hct  31.5 L   (39.6-50.0)  %


 


BUN   5.2 L  (9.0-27.0)  mg/dL


 


BUN/Creatinine Ratio   7.43 L  (12.00-20.00)  Ratio


 


Calcium   7.9 L  (8.7-10.3)  mg/dL


 


AST   47 H  (14-35)  U/L


 


C-Reactive Protein   6.80 H  (0.00-0.80)  mg/dL


 


Total Protein   5.2 L  (6.2-8.2)  g/dL


 


Albumin   2.8 L  (3.8-4.9)  g/dL


 


Albumin/Globulin Ratio   1.17 L  (1.60-3.17)  g/dL








                      Microbiology - Last 24 Hours (Table)











 01/19/23 11:30 Catheter Tip Culture - Preliminary





 Catheter Tip 


 


 01/19/23 09:23 Blood Culture - Preliminary





 Blood    No Growth after 24 hours


 


 01/18/23 16:20 Blood Culture - Preliminary





 Blood    No Growth after 24 hours














Assessment and Plan


Assessment: 





Acute respiratory failure, with intubation on January 6, secondary to patient's 

inability to protect his airway.  The patient was successfully extubated on 

01/16/2023.





Acute mental status changes, much improved.





Possible recurrent seizures.





Staphylococcus aureus bacteremia.





Severe lactic acidosis, resolved.





Hypovolemic hypotension, resolved.





Anion gap metabolic acidosis.





History of alcohol abuse.





Acute kidney injury.





History of paroxysmal atrial fibrillation.





Hypertension.





Hyperlipidemia.


Plan: 





Patient's medications, labs, nonenhanced chest CT were reviewed


Continue antibiotics per infectious disease


On room air


Mentation is back to baseline


From a pulmonary standpoint, patient is cleared for discharge.





I have personally seen and examined the patient, performed the documentation and

the assessment and plan as written.  Number of minutes spent on the visit: 10.  


Time with Patient: Less than 30

## 2023-01-20 NOTE — CT
Exam: CT Chest without contrast.

 

Date: 1/20/2023. 

 

Comparison: CT chest, abdomen and pelvis on 1/6/2023.

 

History: Pneumonia.

 

Technique: CT examination of the chest was performed without contrast. Coronal and sagittal reformats
 were performed. CT dose lowering techniques were used, to include: automated exposure control, adjus
tment for patient size, and/or use of iterative reconstruction.

 

FINDINGS:

 

Mediastinum and Jacquie: There is no axillary, mediastinal or hilar lymphadenopathy.

 

Pleural and Pericardial spaces: There is a small left pleural effusion. Trace right pleural fluid is 
also noted.

 

Upper Abdomen: The visualized upper abdomen is unremarkable.

 

Cardiovascular: There is mild vascular calcification throughout the aortic arch without evidence of a
neurysmal dilation. There are moderate patchy coronary artery calcifications and mild global cardiome
kentrell.

 

Lung Parenchyma and Airways: Some mild consolidative changes seen within the posterior aspect of the 
right upper lobe and posterior superior aspect of the right middle lobe which is suspicious for pneum
onia. There are a few linear bands of opacities otherwise seen within the lungs bilaterally which are
 likely atelectasis or scarring.

 

Bones: No fracture or aggressive osseous lesion.

 

IMPRESSION:

 

1. Right upper and right middle lobe opacity is suspicious for a pneumonia described above.

2. Small left pleural effusion.

3. Cardiomegaly and coronary artery calcifications.

## 2023-01-21 RX ADMIN — PANTOPRAZOLE SODIUM SCH MG: 40 INJECTION, POWDER, FOR SOLUTION INTRAVENOUS at 21:37

## 2023-01-21 RX ADMIN — SODIUM CHLORIDE SCH MLS/HR: 900 INJECTION, SOLUTION INTRAVENOUS at 10:59

## 2023-01-21 RX ADMIN — METOPROLOL TARTRATE SCH MG: 25 TABLET, FILM COATED ORAL at 09:57

## 2023-01-21 RX ADMIN — METOPROLOL TARTRATE SCH MG: 25 TABLET, FILM COATED ORAL at 21:37

## 2023-01-21 RX ADMIN — ENOXAPARIN SODIUM SCH MG: 40 INJECTION SUBCUTANEOUS at 09:57

## 2023-01-21 RX ADMIN — PANTOPRAZOLE SODIUM SCH MG: 40 INJECTION, POWDER, FOR SOLUTION INTRAVENOUS at 08:45

## 2023-01-21 RX ADMIN — LEVETIRACETAM SCH MLS/HR: 10 INJECTION INTRAVENOUS at 09:57

## 2023-01-21 RX ADMIN — ASPIRIN 81 MG CHEWABLE TABLET SCH MG: 81 TABLET CHEWABLE at 09:57

## 2023-01-21 NOTE — P.PN
Subjective


Progress Note Date: 01/21/23


Principal diagnosis: 





Respiratory failure.





Reevaluated today on 1/1:30/23, patient remains in the ICU, intubated 

mechanically ventilated.  He is on assist control rate of 18 tidal volume 450 

FiO2 35% PEEP of 5 ABG showed a pO2 of 100 pCO2 44 pH of 7.46.  His FiO2 was cut

down to 35%.  Patient continued to have intermittent episodes of seizure-like 

activities or possibly myoclonic jerks affecting his upper body and upper 

extremities, with deviation of his eyes with upward gaze.  Neurology on the case

loaded up with Dilantin yesterday, and he is on Keppra today.  Although his EEG 

according to the neurologist did not show any evidence of seizure activity.  

Patient to be seen again by neurology today, and decide whether to continue with

seizure medications he did receive Dilantin 1 dose yesterday, and he is now on 

Keppra 1 g twice a day.  Early this morning patient had an EEG which is yet to 

be interpreted by the neurologist.  In the meantime the patient is on propofol 

at 20 mcg/kg/m, Versed 3 mg/h, D5 4 5 at 50 mL per hour and is on vital AF at 30

mL per hour.  Unable to cut down on his sedation at this point mostly because of

these episodic seizure-like activities or possibly myoclonic jerks.  Chest x-ray

today showed small left pleural effusion not large enough to consider 

thoracentesis.  His WBC count is 11.5 hemoglobin is 12.1.  Basic metabolic 

profile and renal profile are normal





Patient was reevaluated today on 1/14/23, remains in the ICU intubated and 

mechanically ventilated, he is tidal volume 450 assist-control rate of 18FiO2 is

down to 35%, PEEP is 5.  ABG showed a pO2 of 110 pCO2 of 44 pH of 7.45, hence no

changes were made in the ventilator settings.  Neurologically the patient is 

about the same, continues to have these episodes of myoclonic-like jerks, 

remains on propofol at 25 mcg/kg/m, remains on Versed at 6 mg/h, patient is 

supposedly scheduled to have a lumbar puncture done today.  Chest x-ray 

continues to show a small left pleural effusion, otherwise is unremarkable.  

Labs were all reviewed and they seem to be relatively unremarkable.  Again the 

only reason the patient could not be extubated to mostly because of his 

neurological status at this point.CBC is relatively normal ABG as noted above.  

Basic metabolic profile is relatively.  Renal profile is normal.  Patient is 

receiving vital AF at 40 mL per hour.  Patient remains on GI and DVT prophylaxis





Reevaluated today on 1/15/23, patient remains in the ICU, intubated and 

mechanically ventilated.  He is on assist control rate of 18 tidal volume 450 

FiO2 35% PEEP of 5.  Remains on propofol at 40 mcg/kg/m is also on Versed 1 

mg/h.  And he is receiving vital AF at 58 mL per hour.  No major issues over the

last 24 hours, patient seems to be quite sedated with the propofol and Versed.  

I plan to lower the dose of propofol, and hopefully get to assess mental status 

today.  I could not awaken the patient today to assess mental status, hence I 

recommended propofol to be cut down significantly and assess mental status RBC 

the patient not quite ready for any weaning trials.  Chest x-ray showed small 

left pleural effusion, otherwise basically unremarkable.  Patient remains on 

vancomycin for his MSSA, hence I will go back and switch him to 2 g IV piggyback

every 8 hours.  Labs today showed a relatively normal CBC, normal basic 

metabolic profile, his spinal fluid results, initial report seems to be 

unremarkable, however the spinal fluid was obviously bloody and was traumatic.  

Whether the patient will need a repeat CT of the brain again because of his 

bloody spinal fluid, that will be decided upon by neurology on the case.  Lumbar

spine CT showed no evidence of epidural hematoma.





Progress note dated 01/16/2023.





The patient is seen in the intensive care unit, room 252.  He was admitted on 

January 6 with mental status changes, and acute kidney injury.  The patient 

remains on the mechanical ventilator.  Ventilator settings include the volume 

assist control, rate 18, tidal volume 450, FiO2 35%, and PEEP of 5.  Blood gases

show pO2 of 99, pCO2 39, pH is 7.48.  The patient's on propofol at 25 mcg/kg/m, 

D5 half-normal saline at 50 mL an hour, and vital AF at 48 mL an hour, which is 

goal.  The patient will get Lasix 40 mg IV push.  We will attempt a daily 

interruption of sedation and a spontaneous breathing trial.  The patient appears

much more awake and alert today.  White count 8, hemoglobin 11.5, hematocrit 

34.9, with a normal platelet count.  Sodium 144, potassium 3.6, chlorides 114, 

CO2 28, BUN 13, creatinine 0.65.  Blood cultures show staph aureus and staph 

epidermidis, and sputum samples are positive for Staphylococcus aureus.  The 

patient's chest x-ray shows a small left-sided pleural effusion.





Progress note dated 01/17/2023.





The patient is again seen in the intensive care unit, room 252.  The patient was

extubated yesterday.  He's currently on 3 L of oxygen.  He is getting dextrose 

with half-normal saline at 50 mL an hour.  He is much more awake and alert.  He 

was started on acyclovir, Rocephin, and vancomycin.  The Protonix can be 

converted to by mouth.  The hospital service did consult infectious diseases.  

White count 13, hemoglobin 10.9, hematocrit 32.4, with a normal platelet count. 

Sodium 139, potassium 3.4, chlorides 110, CO2 27, BUN 12, creatinine 0.67.  

Blood and sputum samples were positive for Staphylococcus aureus.  Chest x-ray 

shows some mild fluid overload, likely as a result of extubation.





Progress note dated 01/18/2023.





57-year-old male again seen in room 252 in the intensive care unit.  The patient

is doing much better.  He is on 3 L.  He is fully awake and alert.  Half-normal 

saline at 50 mL an hour.  Per infectious diseases, he continues on Ancef and 

acyclovir.  The patient can be transferred to the general medical floor without 

telemetry.  We'll DC his IV, and his arterial line.  White count 9.3, hemoglobin

11.1, hematocrit 33.4, and platelet count 204,000.  Sodium 139, potassium 3.1, 

chlorides 109, CO2 26, BUN 9, and creatinine 0.62.  Albumin is 2.5.





Progress note dated 01/19/2023.





57-year-old male who was transferred out of the intensive care unit, and is seen

today, in room 484.  Currently, the patient's on room air.  He's getting saline 

at 20 mL an hour, which can be discontinued.  Also, the central line can be 

removed.  Labs today include a white count 7.6, hemoglobin 11.1, hematocrit 

32.6, and a normal platelet count.  Sodium 140, potassium 3.1, chlorides 108, 

CO2 27, BUN 9, creatinine 0.68.





Progress note dated 01/21/2023.





57-year-old male seen in room 484.  The patient's doing well.  He is currently 

on room air.  From our perspective, the patient could be considered for possible

discharge.  According to the patient, he is to have a PICC line placed, and he 

will be discharged until Monday.  No new labs today as yet.  Labs from January 20 are reviewed.  Computed tomography scan of the chest reveals a patchy 

infiltrate, in the right upper lobe/right middle lobe.





Objective





- Vital Signs


Vital signs: 


                                   Vital Signs











Temp  98.4 F   01/21/23 07:18


 


Pulse  75   01/21/23 08:58


 


Resp  16   01/21/23 08:58


 


BP  151/78   01/21/23 07:18


 


Pulse Ox  91 L  01/21/23 07:44


 


FiO2  35   01/16/23 08:00








                                 Intake & Output











 01/20/23 01/21/23 01/21/23





 18:59 06:59 18:59


 


Output Total 650  


 


Balance -650  


 


Weight 83.4 kg 82.4 kg 


 


Output:   


 


  Urine 650  


 


Other:   


 


  Voiding Method Urinal  Urinal


 


  # Voids  3 








                       ABP, PAP, CO, CI - Last Documented











Arterial Blood Pressure        83/66

















- Exam





No acute distress, awake and alert, currently on room air.  Mental status is 

back to normal.





HEENT examination is grossly unremarkable. 





Neck supple.  Full range of motion.  No adenopathy thyromegaly or neck vein 

distention.





Cardiovascular examination reveals regular rhythm rate.  S1-S2 normal.  No S3 or

S4.  No discernible murmur noted.  Heart rate 75 bpm.





Lungs reveal mostly clear breath sounds.  Minimal rhonchi.  No wheezes or 

crackles.  Room air saturation is 94 %.





Abdomen soft bowel sounds are heard.  No masses or tenderness.





Extremities are intact.  No cyanosis clubbing or edema.





Skin is without rash or lesion.





Neurologic examination is brief but nonfocal.





- Labs


CBC & Chem 7: 


                                 01/20/23 05:40





                                 01/20/23 05:40


Labs: 


                      Microbiology - Last 24 Hours (Table)











 01/19/23 09:23 Blood Culture - Preliminary





 Blood    No Growth after 48 hours


 


 01/18/23 16:20 Blood Culture - Preliminary





 Blood    No Growth after 48 hours


 


 01/19/23 11:30 Catheter Tip Culture - Preliminary





 Catheter Tip 














Assessment and Plan


Assessment: 





Acute respiratory failure, with intubation on January 6, secondary to patient's 

inability to protect his airway.  The patient was successfully extubated on 

01/16/2023.





Acute mental status changes, much improved.





Possible recurrent seizures.





Methicillin sensitive Staphylococcus aureus bacteremia/pneumonia.





Severe lactic acidosis, resolved.





Hypovolemic hypotension, resolved.





Anion gap metabolic acidosis.





History of alcohol abuse.





Acute kidney injury.





History of paroxysmal atrial fibrillation.





Hypertension.





Hyperlipidemia.


Plan: 





Plan dated 01/16/2023.





The patient will get Lasix 40 mg IV push.  The patient was placed on pressure 

support of 5 and CPAP of 5.  The patient's respiratory rate is low, and tidal 

volumes are excellent.  Minute volume is low.  The patient will have a cuff leak

test, and likely be extubated.  Additional recommendations and suggestions are 

forthcoming.  Prognosis is guarded.  Labs, x-rays, medications are reviewed.  We

will continue to follow the patient and make recommendations along the way.





Plan dated 01/17/2023.





The patient was successfully extubated January 16.  The patient is currently on 

acyclovir, we will continue to follow the patient and make recommendations along

the way.  Prognosis is guarded.  Labs, x-rays, and medications are all reviewed.

 Infectious disease consultant was asked to see the patient.  The patient's 

Protonix chemically converted to by mouth.  Additional recommendations and 

suggestions are forthcoming.





Plan dated 01/18/2023.





The patient was successfully extubated on January 16.  Over the last 2 days, his

neurologic status is greatly improved.  The patient could be transferred to the 

general medical floor without telemetry.  We'll DC his IV fluids, and DC his 

arterial line.  He remains on Ancef, and acyclovir.  Labs, x-rays, and 

medications are reviewed.  Prognosis is guarded.





Plan dated 01/19/2023.





The patient's MRI of the brain is reviewed.  Nothing acute.  No evidence of 

intracranial mass.  No evidence of acute cortical infarct.  Labs, x-rays, and 

medications are reviewed.  IV can be discontinued.  He's currently on room air. 

His mental status is much improved.  He continues on acyclovir, and Ancef.  

Additional recommendations and suggestions are forthcoming.  Prognosis is 

guarded.  We will continue to follow make recommendations along the way.





Plan dated 01/21/2023.





The patient has been on antibiotics for quite some time.  Apparently the plan 

according to the patient and infectious diseases, is to have a PICC line placed 

on Monday, and the patient will be discharged sometime on Monday.  Standpoint, 

the patient's doing well.  Moving forward, since the patient's not having any 

respiratory issues, therefore, we will see the patient only as needed.


Time with Patient: Less than 30

## 2023-01-21 NOTE — P.PN
Subjective


Progress Note Date: 01/21/23


The patient seen at bedside in accompanied by his friend.  He sitting in a 

recliner chair and states he is doing drastically better compared to his initial

presentation and his friend agrees.


According to the patient is seems that he had multiple spinal surgery he 

initially had spinal surgery after a fall in 2016 and it was in the cervical 

region and then it seems that the he was having left upper extremity weakness 

that was continued and he had further surgery but the patient states that that 

he continues to have left the neck pain with continued left upper extremity 

weakness and that is chronic.








Objective





- Vital Signs


Vital signs: 


                                   Vital Signs











Temp  98.4 F   01/21/23 07:18


 


Pulse  75   01/21/23 08:58


 


Resp  16   01/21/23 08:58


 


BP  151/78   01/21/23 07:18


 


Pulse Ox  91 L  01/21/23 07:44


 


FiO2  35   01/16/23 08:00








                                 Intake & Output











 01/20/23 01/21/23 01/21/23





 18:59 06:59 18:59


 


Output Total 650  


 


Balance -650  


 


Weight 83.4 kg 82.4 kg 


 


Output:   


 


  Urine 650  


 


Other:   


 


  Voiding Method Urinal  Urinal


 


  # Voids  3 








                       ABP, PAP, CO, CI - Last Documented











Arterial Blood Pressure        83/66

















- Exam


GENERAL: The patient is lying in bed and is not in acute distress.








NEUROLOGICAL:


Higher mental function: The patient is awake,alert, oriented to self, place and 

time.    Patient is following commands.  No aphasia and no neglect.


Cranial nerves: The pupils are round, equal and reactive to light.  Visual 

fields are full to confrontation throughout.  Extraocular movement is intact no 

nystagmus is noted.   The facial strength is normal throughout.    Tongue is 

midline and moved side-to-side without any difficulty.  No dysarthria is noted. 

Shoulder shrug is normal bilaterally.


Motor:  The strength is left hand  is 4+ with atrophy in the first FDI on 

left and distal left upper is 4+.  Otherwise 5 over 5 throughout.  Normal tone 

and bulk.  


Cerebellum: Normal finger to nose bilaterally.


Sensation: Sensation is normal to touch throughout.








SOME OF THE WORK-UP DURING THIS HOSPITAL VISIT CONSISTED OF:


* Repeat CT head 01/12/2023 revealed previous surgery.  Mild atrophy, no acute 

  intracranial process.  I personally reviewed CT head, agree with the findings.


* CTA reported as negative of the brain.  Mild plaque at the carotid artery 

  bifurcation.  No evidence of hemodynamic arterial stenosis in the neck.


* Initial EEG 01/09/2023 was abnormal due to amplitude asymmetry with relatively

  higher amplitude activity in the left frontoparietal region due to breach 

  rhythm from previous craniotomy defect.  Background slowing of moderate to 

  severe degree, consistent with generalized cerebral dysfunction, as can be 

  seen with toxic metabolic encephalopathy or due to diffuse structural brain 

  abnormality.  Clinical correlation is recommended.  No epileptiform activity 

  was seen.


* B12 2102, folate 12.90, TSH slightly decreased 0.220, with normal free T4 

  1.37.  Possible due to sick euthyroid.


* MRI of the brain is reported as numerous white matter foci the are nonspecific

  and could relate to demyelinating disease or microvascular ischemia.  No 

  evidence of acute cortical infarct.  No evidence of intracranial mass.  I 

  personally reviewed that imaging with Dr. Childers and the was felt the patient 

  has left hemispheric pachymeningeal enhancement and possible old subdural over

  the left.  I spoke with Dr. Rosas (reading radiologist) on 01/20 regarding his

  MRI Brain and he feels the pachymeningeal enhancement on the left with fluid c

  ollection on the left side behind skull and these finding can still occur 

  since old surgical resection but clinically correlate.








- Labs


CBC & Chem 7: 


                                 01/20/23 05:40





                                 01/20/23 05:40


Labs: 


                      Microbiology - Last 24 Hours (Table)











 01/19/23 09:23 Blood Culture - Preliminary





 Blood    No Growth after 48 hours


 


 01/18/23 16:20 Blood Culture - Preliminary





 Blood    No Growth after 48 hours


 


 01/19/23 11:30 Catheter Tip Culture - Preliminary





 Catheter Tip 














Assessment and Plan


Assessment: 











* Confusion with continued fevers and I felt the CSF study even though was 

  traumatic but the calculated nucleated cells were still elevated and was on 

  antibiotic for few days prior to CSF study is concerning for 

  meningoencephalitis.  Blood culture and sputum is staph but there is concern 

  for ?contaminant---confused has improved


* New onset rhythmic twitching of the shoulders bilaterally and some upper 

  limbs, probably myoclonic jerks, unlikely to be seizures.  EEG did not reveal 

  any epileptiform activity.  No status epilepticus---confusion has drastically 

  improved


* Patient presented with unresponsiveness.


* Acute kidney injury, now resolved


* Status post shock, likely hypovolemic--resolved


* History of traumatic brain injury with subdural hematoma, status post 

  craniotomy in 2016 


* Left cervical radiculopathy.  Has prior neck surgery and multiple spinal 

  surgery but continues to have weakness in left upper extremity


* History of alcoholism.


* Atrial fibrillation with RVR, status post cardioversion








Plan: 





* CSF revealed bloody, red, rbc csf 01834, normal glucose 69, CSF protein 57(12-

  60), WBC count 160, polynuclear 77%, mononuclear cells 18%, eosinophils 5%, . 

  The corrected nucleated cells is 80 cells (for every 1 nucleated cells is 

  500-1000rbc).  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 

  correted nucleated cells elevated and was on antibiotic prior to CSF study.  

  Possibly more encephalitis since glucose and protein normal but cannot rule 

  out meningitis since CSF was done later and was on antibiotic.  MRI of the 

  brain is reported as numerous white matter foci the are nonspecific and could 

  relate to demyelinating disease or microvascular ischemia.  No evidence of 

  acute cortical infarct.  No evidence of intracranial mass.  I spoke with Dr. Rosas (reading radiologist) on 01/20 regarding his MRI Brain and he feels the 

  pachymeningeal enhancement on the left with fluid collection on the left side 

  behind skull and these finding can still occur since old surgical resection 

  but clinically correlate.


* On Ceftriaxone 2gm every 12 hours and on IV Acyclovir 850mg every 8 hours.  

  Will defer modification of medication to I.D. team and they discontinued 

  Acyclovir.  Clinically he is doing better with these medications and no 

  further fevers since 01/18.


* Patient had a prolonged, 2.5 hour EEG performed 01/13/2023.  According to 

  preliminary report from Dr. Ortiz, did not reveal any epileptiform 

  activity.  Some triphasic waves are seen.  No status epilepticus.  Official 

  report pending.  We will continue Keppra 1000 mg twice a day for possible 

  seizure-like activity.  I will go down to Keppra 500mg 1 tab bid


* Likely had decondition from his prolonged ICU was immobile but improving


* He is to follow up with a neurosurgeon as an outpatient for his continued left

  upper extremity weakness and it seems she had the continued left cervical 

  radiculopathy with multiple surgeries in the past.  Patient stated that he had

  2 EMGs at in the past.


* Primary is getting further investigation for his continued fevers.  I.D. is on

  board.





The plan is discussed in detailed with patient and primary team. 


No further neurological work-up but will continue to follow.





Time with Patient: Less than 30

## 2023-01-21 NOTE — P.PN
Subjective


Progress Note Date: 01/21/23


Principal diagnosis: 


Fever and bacteremia





Patient is a 57 year old  male with a past medical history significant 

for alcohol dependence/abuse history of atrial fibrillation patient was brought 

to the hospital  on 01/06/2023 for evaluation of mental status changes patient 

presented to the hospital and was hypotensive A. fib with RVR patient got 

intubated and cardioverted, patient did have evidence of MSSA bacteremia sputum 

was also positive for the same pathogen subsequently has been running a fever 

did have a repeat which was traumatic Elevated white count but glucose protein 

normal. 


 on today's evaluation that is 01/21/2023, the patient remains to be afebrile, 

the patient is breathing comfortably on room air patient denies any headache , 

the patient denies chest pain shortness of breath occasional dry cough , no 

abdominal pain, the patient diarrhea has resolved and no urinary symptoms





Objective





- Vital Signs


Vital signs: 


                                   Vital Signs











Temp  98.4 F   01/21/23 07:18


 


Pulse  75   01/21/23 08:58


 


Resp  16   01/21/23 08:58


 


BP  151/78   01/21/23 07:18


 


Pulse Ox  91 L  01/21/23 07:44


 


FiO2  35   01/16/23 08:00








                                 Intake & Output











 01/20/23 01/21/23 01/21/23





 18:59 06:59 18:59


 


Output Total 650  


 


Balance -650  


 


Weight 83.4 kg 82.4 kg 


 


Output:   


 


  Urine 650  


 


Other:   


 


  Voiding Method Urinal  Urinal


 


  # Voids  3 








                       ABP, PAP, CO, CI - Last Documented











Arterial Blood Pressure        83/66

















- Exam


GENERAL DESCRIPTION: A middle-aged male lying in bed in no distress





RESPIRATORY SYSTEM: Unlabored breathing , decreased breath sounds at bases





HEART: S1 S2 regular rate and rhythm ,





ABDOMEN: Soft , no tenderness





EXTREMITIES: No edema feet








- Labs


CBC & Chem 7: 


                                 01/20/23 05:40





                                 01/20/23 05:40


Labs: 


                      Microbiology - Last 24 Hours (Table)











 01/18/23 16:20 Blood Culture - Preliminary





 Blood    No Growth after 48 hours


 


 01/19/23 11:30 Catheter Tip Culture - Preliminary





 Catheter Tip 


 


 01/19/23 09:23 Blood Culture - Preliminary





 Blood    No Growth after 24 hours














Assessment and Plan


(1) Bacteremia


Current Visit: Yes   Status: Acute   Code(s): R78.81 - BACTEREMIA   SNOMED 

Code(s): 6661128


   





(2) Fever


Current Visit: Yes   Status: Acute   Code(s): R50.9 - FEVER, UNSPECIFIED   

SNOMED Code(s): 315469170


   


Plan: 


1patient with abnormal CSF which is mostly a traumatic tap with more than 

40,000 RBC white count is still symptomatic elevated however the patient did 

have a normal glucose and protein the patient currently denies having any 

headache and he is aware that he is at [facility] all these 

factors will go against meningitis or encephalitis


2patient now to have a fever in this patient who did have a positive blood as 

well as sputum culture for MSSA and did have evidence of a left-sided effusion 

question of possible pulmonary source/empyema, in addition patient also having 

diarrhea with fecal management system , stool for C. diff negative


3there was a question of possible meningeal enhancement on the MRI which was 

not reported on the official MRI plus the patient did have a normal protein and 

glucose my clinic suspicious for meningitis remains to be low in this patient 

currently with no headache, patient echocardiogram was negative for any 

vegetation, CT of the chest is concerning for pneumonia patient will continue 

with the Rocephin may benefit from a short course of IV antibiotic on discharge 

because of his bacteremia, discussed with the admitting team


Time with Patient: Less than 30

## 2023-01-21 NOTE — P.PN
Subjective


Progress Note Date: 01/21/23


Hospital Course: 


Patient is a 57-year-old male with history of alcohol dependence/abuse, atrial 

fibrillation who presented with altered mentation and possible code stroke.  On 

arrival, patient was hemodynamically unstable, in A. fib with RVR, he was 

intubated and cardioverted to normal sinus rhythm.  CT had showed no acute 

process, interventional neurology on call did not recommend to give alteplase.  

Reportedly, patient has been binge drinking, his last drink was on January 1.  

Per family patient was vomiting prior to becoming unresponsive.  His initial 

labs were consistent with severe dehydration, pH of 7.13, potassium 2.7, 

creatinine of 3.66, lactic acid of 12.4, mild transaminitis, mild elevated 

troponin, urine tox positive for benzos and marijuana. He was admitted   There w

as also concern for possible GI bleed, patient on IV Protonix, surgery following

a hemoglobin stable.  Nephrology and ICU also following.





LP with traumatic tap, patient was started on vanco and rocephin and ID was 

consulted. Patient was extubated on 1/16/23. Sensorium has cleared. He had 

continued fevers.  Now has been afebrile for a few days. Pending discharge on 

Monday with PICC and IV abx.





Imaging: 


Head CT 1/6/23: mild cerebral atrophy 


CT angio head and neck 1/6/23: negatvive 


CT chest/abd/pelvis 1/6/23: no significant abnormality 


US abd/bladder 1/7/23: no hydro 


EEG 1/9/23: abnormal due to asymetry and background slowing 


Head CT 1/11/23: prior surgery, mild atrophy 


EEG 1/12/23: abnormal due to asymmery and sporadic sharp waves 


Lumbar spine CT 1/15/23: Hypertrophic degenerative changes in the lumbar spine 


EEG 2.5 hr 1/15/23: Mod-severe diffuse cerebral dysfunction


MRI Brain 1/18- numerous white matter changes. 








Procedures: 


Art and central line 1/11/23


LP 1/15/23











Subjective: 


Patient seen and examined at bedside.  No acute events overnight.  He denies any

chest pain, shortness of breath, headache, nausea, vomiting, diarrhea, 

constipation, abdominal pain, or urinary complaints.





Pertinent positives and negatives as discussed above, a complete review of 

systems was performed and all other systems are negative.








Vitals Signs Reviewed. 





General: nontoxic, no distress, appears at stated age


Derm: warm, dry


Head: atraumatic, normocephalic, symmetric


Eyes: EOMI, no lid lag, anicteric sclera


Mouth: no lip lesion, mucus membranes moist


Cardiovascular: S1S2 reg, no murmur, positive posterior tibial pulse bilateral, 


Lungs: CTA bilateral, no rhonchi, no rales , no accessory muscle use


Abdominal: soft,  nontender to palpation, no guarding, no appreciable organome

kentrell


Ext: no gross muscle atrophy, no edema, no contractures


Neuro:  CN II-XI grossly intact, no focal neuro deficits


Psych: Alert, oriented, appropriate affect 








Assessment and Plan:


Pyrexia, now improved 


Bacterial pneumonia, right upper and middle lobe


- Repeat blood cultures negative


- echo does not show any vegetation


-Lower extremity Dopplers negative for DVT


-Per nephrology, meningoencephalitis still possibility given clinical 

improvement on ceftriaxone


-Repeat CT chest showed right upper and right middle lobe opacity concerning for

pneumonia





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 


Acute metabolic encephalopathy


Hyperammonemia, resolved 


History of alcohol abuse


- ID and neuro recs appreciated- continue keppra at this time 


-IV ceftriaxone


- CSF culture- negative 


- monitor neuro status 





Staph bacteremia, likely due to staph aureus trancheobronchitis 


-First set of blood cultures positive for staph aureus and epidermidis, likely 

contaminant. however, sputum culture also showing staph


-Patient currently on ceftriaxone, repeat blood cultures no growth to date


     - cefazloin 1/11, 1/15-16, 1/18


     - Vanco 1/7, 1/8. 1/11-1/17 


    -  rocephin 1/16, 1/17 





 A. fib with RVR status post cardioversion preformed in the ED


Elevated troponin due to deman ischemic 


-Cardiology signed off , no recurrence of A fib this hospital stay. 


- consider outpatient follow-up


- CHADSVASC 1, ASA recommended 





Severe Protein calorie malnutrition


- dietitian recs


- encourage oral intake 





Resolved:


Acute hypoxic and hypercapnic respiratory failure status post intubation


Shock, hypovolemic


High anion gap metabolic acidosis secondary to Severe lactic acidosis


Acute kidney injury


Severe hyperphosphatemia


Severe hypokalemia


GI bleed 


Hypomagnesemia


Thrombocytopenia


Transaminitis, resolved 





Hx:


TBI, craniotomy


ETOH dependency 


HTN


HLD





DVT ppx: Lovenox





Code status: Full code





Anticipated discharge place: Pending clinical course, likely discharge home with

home care, likely IV antibiotics given bacteremia


Anticipated discharge time: Pending clinical course





Objective





- Vital Signs


Vital signs: 


                                   Vital Signs











Temp  98.4 F   01/21/23 07:18


 


Pulse  75   01/21/23 08:58


 


Resp  16   01/21/23 08:58


 


BP  151/78   01/21/23 07:18


 


Pulse Ox  91 L  01/21/23 07:44


 


FiO2  35   01/16/23 08:00








                                 Intake & Output











 01/20/23 01/21/23 01/21/23





 18:59 06:59 18:59


 


Output Total 650  


 


Balance -650  


 


Weight 83.4 kg 82.4 kg 


 


Output:   


 


  Urine 650  


 


Other:   


 


  Voiding Method Urinal  Urinal


 


  # Voids  3 








                       ABP, PAP, CO, CI - Last Documented











Arterial Blood Pressure        83/66

















- Labs


CBC & Chem 7: 


                                 01/20/23 05:40





                                 01/20/23 05:40


Labs: 


                      Microbiology - Last 24 Hours (Table)











 01/19/23 09:23 Blood Culture - Preliminary





 Blood    No Growth after 48 hours


 


 01/18/23 16:20 Blood Culture - Preliminary





 Blood    No Growth after 48 hours


 


 01/19/23 11:30 Catheter Tip Culture - Preliminary





 Catheter Tip

## 2023-01-22 RX ADMIN — PANTOPRAZOLE SODIUM SCH MG: 40 INJECTION, POWDER, FOR SOLUTION INTRAVENOUS at 08:55

## 2023-01-22 RX ADMIN — METOPROLOL TARTRATE SCH MG: 25 TABLET, FILM COATED ORAL at 20:24

## 2023-01-22 RX ADMIN — ENOXAPARIN SODIUM SCH MG: 40 INJECTION SUBCUTANEOUS at 08:55

## 2023-01-22 RX ADMIN — Medication SCH MG: at 08:53

## 2023-01-22 RX ADMIN — METOPROLOL TARTRATE SCH MG: 25 TABLET, FILM COATED ORAL at 08:53

## 2023-01-22 RX ADMIN — ASPIRIN 81 MG CHEWABLE TABLET SCH MG: 81 TABLET CHEWABLE at 08:53

## 2023-01-22 RX ADMIN — PANTOPRAZOLE SODIUM SCH MG: 40 INJECTION, POWDER, FOR SOLUTION INTRAVENOUS at 20:23

## 2023-01-22 NOTE — P.PN
Subjective


Progress Note Date: 01/22/23


Hospital Course: 


Patient is a 57-year-old male with history of alcohol dependence/abuse, atrial 

fibrillation who presented with altered mentation and possible code stroke.  On 

arrival, patient was hemodynamically unstable, in A. fib with RVR, he was 

intubated and cardioverted to normal sinus rhythm.  CT had showed no acute 

process, interventional neurology on call did not recommend to give alteplase.  

Reportedly, patient has been binge drinking, his last drink was on January 1.  

Per family patient was vomiting prior to becoming unresponsive.  His initial 

labs were consistent with severe dehydration, pH of 7.13, potassium 2.7, 

creatinine of 3.66, lactic acid of 12.4, mild transaminitis, mild elevated 

troponin, urine tox positive for benzos and marijuana. He was admitted   There w

as also concern for possible GI bleed, patient on IV Protonix, surgery following

a hemoglobin stable.  Nephrology and ICU also following.





LP with traumatic tap, patient was started on vanco and rocephin and ID was 

consulted. Patient was extubated on 1/16/23. Sensorium has cleared. He had 

continued fevers.  Now has been afebrile for a few days. Pending discharge on 

Monday with PICC and IV abx.





Imaging: 


Head CT 1/6/23: mild cerebral atrophy 


CT angio head and neck 1/6/23: negatvive 


CT chest/abd/pelvis 1/6/23: no significant abnormality 


US abd/bladder 1/7/23: no hydro 


EEG 1/9/23: abnormal due to asymetry and background slowing 


Head CT 1/11/23: prior surgery, mild atrophy 


EEG 1/12/23: abnormal due to asymmery and sporadic sharp waves 


Lumbar spine CT 1/15/23: Hypertrophic degenerative changes in the lumbar spine 


EEG 2.5 hr 1/15/23: Mod-severe diffuse cerebral dysfunction


MRI Brain 1/18- numerous white matter changes. 








Procedures: 


Art and central line 1/11/23


LP 1/15/23











Subjective: 


Patient seen and examined at bedside.  No acute events overnight.  He denies any

chest pain, shortness of breath, headache, nausea, vomiting, diarrhea, 

constipation, abdominal pain, or urinary complaints.





Pertinent positives and negatives as discussed above, a complete review of 

systems was performed and all other systems are negative.








Vitals Signs Reviewed. 





General: nontoxic, no distress, appears at stated age


Derm: warm, dry


Head: atraumatic, normocephalic, symmetric


Eyes: EOMI, no lid lag, anicteric sclera


Mouth: no lip lesion, mucus membranes moist


Cardiovascular: S1S2 reg, no murmur, positive posterior tibial pulse bilateral, 


Lungs: CTA bilateral, no rhonchi, no rales , no accessory muscle use


Abdominal: soft,  nontender to palpation, no guarding, no appreciable organome

kentrell


Ext: no gross muscle atrophy, no edema, no contractures


Neuro:  CN II-XI grossly intact, no focal neuro deficits


Psych: Alert, oriented, appropriate affect 








Assessment and Plan:


Pyrexia, now improved 


Bacterial pneumonia, right upper and middle lobe


- Repeat blood cultures negative


- echo does not show any vegetation


-Lower extremity Dopplers negative for DVT


-Per nephrology, meningoencephalitis still possibility given clinical 

improvement on ceftriaxone, and acyclovir 


-Repeat CT chest showed right upper and right middle lobe opacity concerning for

pneumonia





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 


Acute metabolic encephalopathy


Hyperammonemia, resolved 


History of alcohol abuse


- ID and neuro recs appreciated- continue keppra at this time 


- on IV ceftriaxone


- CSF culture- negative 


- monitor neuro status 


- acyclovir discontinued per ID





Staph bacteremia, likely due to staph aureus trancheobronchitis 


-First set of blood cultures positive for staph aureus and epidermidis, likely 

contaminant. however, sputum culture also showing staph


-Patient currently on ceftriaxone, repeat blood cultures no growth to date


     - cefazloin 1/11, 1/15-16, 1/18


     - Vanco 1/7, 1/8. 1/11-1/17 


    -  rocephin 1/16, 1/17 





 A. fib with RVR status post cardioversion preformed in the ED


Elevated troponin due to deman ischemic 


-Cardiology signed off , no recurrence of A fib this hospital stay. 


- consider outpatient follow-up


- CHADSVASC 1, ASA recommended 





Severe Protein calorie malnutrition


- dietitian recs


- encourage oral intake 





Resolved:


Acute hypoxic and hypercapnic respiratory failure status post intubation


Shock, hypovolemic


High anion gap metabolic acidosis secondary to Severe lactic acidosis


Acute kidney injury


Severe hyperphosphatemia


Severe hypokalemia


GI bleed 


Hypomagnesemia


Thrombocytopenia


Transaminitis, resolved 





Hx:


TBI, craniotomy


ETOH dependency 


HTN


HLD





DVT ppx: Lovenox





Code status: Full code





Anticipated discharge place: Pending clinical course, likely discharge home with

home care, likely IV antibiotics given bacteremia


Anticipated discharge time: Pending clinical course





Objective





- Vital Signs


Vital signs: 


                                   Vital Signs











Temp  97.7 F   01/22/23 07:30


 


Pulse  87   01/22/23 08:00


 


Resp  16   01/22/23 08:00


 


BP  137/83   01/22/23 07:30


 


Pulse Ox  94 L  01/22/23 07:30


 


FiO2  35   01/16/23 08:00








                                 Intake & Output











 01/21/23 01/22/23 01/22/23





 18:59 06:59 18:59


 


Intake Total 320  


 


Output Total  350 


 


Balance 320 -350 


 


Weight  79.4 kg 


 


Intake:   


 


    


 


    .9 @ 10 120  


 


    Thiamine 100 mg In Sodium 50  





    Chloride 0.9% 50 ml @   





    100 mls/hr IVPB [facility]   





    Rx#:[id number]   


 


    levETIRAcetam IV 1,000 mg 100  





    In Saline 1 100ml.bag @   





    400 mls/hr IVPB Q12HR [facility]   





    Rx#:[id number]   


 


  Intake, IV Titration 50  





  Amount   


 


    cefTRIAXone 2 gm In 50  





    Sodium Chloride 0.9% 50   





    ml @ 100 mls/hr IVPB   





    Q12HR [facility] Rx#:[id number]   


 


Output:   


 


  Urine  350 


 


Other:   


 


  Voiding Method Urinal Toilet Toilet





  Urinal Urinal








                       ABP, PAP, CO, CI - Last Documented











Arterial Blood Pressure        83/66

















- Labs


CBC & Chem 7: 


                                 01/20/23 05:40





                                 01/20/23 05:40


Labs: 


                      Microbiology - Last 24 Hours (Table)











 01/19/23 09:23 Blood Culture - Preliminary





 Blood    No Growth after 72 hours


 


 01/18/23 16:20 Blood Culture - Preliminary





 Blood    No Growth after 72 hours


 


 01/19/23 11:30 Catheter Tip Culture - Final





 Catheter Tip

## 2023-01-22 NOTE — P.PN
Subjective


Progress Note Date: 01/22/23


The patient is seen at bedside and sitting at side of bed and is accompanied by 

his daughter.  He feels he continue to feel drastically better and now walking 

with a cane rather than a walker since improving.  Denies of headache, further 

focal weakness.








Objective





- Vital Signs


Vital signs: 


                                   Vital Signs











Temp  98.1 F   01/22/23 14:07


 


Pulse  79   01/22/23 14:07


 


Resp  18   01/22/23 14:07


 


BP  109/66   01/22/23 14:07


 


Pulse Ox  93 L  01/22/23 14:07


 


FiO2  35   01/16/23 08:00








                                 Intake & Output











 01/21/23 01/22/23 01/22/23





 18:59 06:59 18:59


 


Intake Total 320  


 


Output Total  350 


 


Balance 320 -350 


 


Weight  79.4 kg 


 


Intake:   


 


    


 


    .9 @ 10 120  


 


    Thiamine 100 mg In Sodium 50  





    Chloride 0.9% 50 ml @   





    100 mls/hr IVPB Q24HR [facility]   





    Rx#:[id number]   


 


    levETIRAcetam IV 1,000 mg 100  





    In Saline 1 100ml.bag @   





    400 mls/hr IVPB Q12HR [facility]   





    Rx#:[id number]   


 


  Intake, IV Titration 50  





  Amount   


 


    cefTRIAXone 2 gm In 50  





    Sodium Chloride 0.9% 50   





    ml @ 100 mls/hr IVPB   





    Q12HR [facility] Rx#:[id number]   


 


Output:   


 


  Urine  350 


 


Other:   


 


  Voiding Method Urinal Toilet Toilet





  Urinal Urinal








                       ABP, PAP, CO, CI - Last Documented











Arterial Blood Pressure        83/66

















- Exam


GENERAL: The patient is lying in bed and is not in acute distress.








NEUROLOGICAL:


Higher mental function: The patient is awake,alert, oriented to self, place and 

time.    Patient is following commands.  No aphasia and no neglect.


Cranial nerves: The pupils are round, equal and reactive to light.  Visual 

fields are full to confrontation throughout.  Extraocular movement is intact no 

nystagmus is noted.   The facial strength is normal throughout.    Tongue is 

midline and moved side-to-side without any difficulty.  No dysarthria is noted. 

Shoulder shrug is normal bilaterally.


Motor:  The strength is left hand  is 4+ with atrophy in the first FDI on 

left and distal left upper is 4+.  Otherwise 5 over 5 throughout.  Normal tone 

and bulk.  


Cerebellum: Normal finger to nose bilaterally.


Sensation: Sensation is normal to touch throughout.








SOME OF THE WORK-UP DURING THIS HOSPITAL VISIT CONSISTED OF:


* Repeat CT head 01/12/2023 revealed previous surgery.  Mild atrophy, no acute 

  intracranial process.  I personally reviewed CT head, agree with the findings.


* CTA reported as negative of the brain.  Mild plaque at the carotid artery 

  bifurcation.  No evidence of hemodynamic arterial stenosis in the neck.


* Initial EEG 01/09/2023 was abnormal due to amplitude asymmetry with relatively

  higher amplitude activity in the left frontoparietal region due to breach 

  rhythm from previous craniotomy defect.  Background slowing of moderate to 

  severe degree, consistent with generalized cerebral dysfunction, as can be 

  seen with toxic metabolic encephalopathy or due to diffuse structural brain 

  abnormality.  Clinical correlation is recommended.  No epileptiform activity 

  was seen.


* B12 2102, folate 12.90, TSH slightly decreased 0.220, with normal free T4 

  1.37.  Possible due to sick euthyroid.


* MRI of the brain is reported as numerous white matter foci the are nonspecific

  and could relate to demyelinating disease or microvascular ischemia.  No 

  evidence of acute cortical infarct.  No evidence of intracranial mass.  I 

  personally reviewed that imaging with Dr. Childers and the was felt the patient 

  has left hemispheric pachymeningeal enhancement and possible old subdural over

  the left.  I spoke with Dr. Rosas (reading radiologist) on 01/20 regarding his

  MRI Brain and he feels the pachymeningeal enhancement on the left with fluid 

  collection on the left side behind skull and these finding can still occur 

  since old surgical resection but clinically correlate.








- Labs


CBC & Chem 7: 


                                 01/20/23 05:40





                                 01/20/23 05:40


Labs: 


                      Microbiology - Last 24 Hours (Table)











 01/19/23 09:23 Blood Culture - Preliminary





 Blood    No Growth after 72 hours


 


 01/18/23 16:20 Blood Culture - Preliminary





 Blood    No Growth after 72 hours


 


 01/19/23 11:30 Catheter Tip Culture - Final





 Catheter Tip 














Assessment and Plan


Assessment: 











* Confusion with continued fevers and I felt the CSF study even though was 

  traumatic but the calculated nucleated cells were still elevated and was on 

  antibiotic for few days prior to CSF study is concerning for 

  meningoencephalitis.  Blood culture and sputum is staph but there is concern 

  for ?contaminant---confused has improved


* New onset rhythmic twitching of the shoulders bilaterally and some upper 

  limbs, probably myoclonic jerks, unlikely to be seizures.  EEG did not reveal 

  any epileptiform activity.  No status epilepticus---confusion has drastically 

  improved


* Patient presented with unresponsiveness.


* Acute kidney injury, now resolved


* Status post shock, likely hypovolemic--resolved


* History of traumatic brain injury with subdural hematoma, status post 

  craniotomy in 2016 


* Left cervical radiculopathy.  Has prior neck surgery and multiple spinal surge

  ry but continues to have weakness in left upper extremity


* History of alcoholism.


* Atrial fibrillation with RVR, status post cardioversion








Plan: 





* CSF revealed bloody, red, rbc csf 33774, normal glucose 69, CSF protein 57(12-

  60), WBC count 160, polynuclear 77%, mononuclear cells 18%, eosinophils 5%, . 

  The corrected nucleated cells is 80 cells (for every 1 nucleated cells is 500-

  1000rbc).  I feel patient has underlying meningoencephalitis since correted n

  ucleated cells elevated and was on antibiotic prior to CSF study.  Possibly 

  more encephalitis since glucose and protein normal but cannot rule out 

  meningitis since CSF was done later and was on antibiotic.  MRI of the brain 

  is reported as numerous white matter foci the are nonspecific and could relate

  to demyelinating disease or microvascular ischemia.  No evidence of acute 

  cortical infarct.  No evidence of intracranial mass.  I spoke with Dr. Rosas 

  (reading radiologist) on 01/20 regarding his MRI Brain and he feels the 

  pachymeningeal enhancement on the left with fluid collection on the left side 

  behind skull and these finding can still occur since old surgical resection 

  but clinically correlate.


* Was Ceftriaxone 2gm every 12 hours and on IV Acyclovir 850mg every 8 hours and

  I.D. has D/C Acyclovir.  In my opinion, consider use of Acyclovir for total of

  14 days for concern of possible encephalitis but will defer final decision to 

  I.D. team. Will defer modification of medication to I.D. team.  Clinically he 

  is doing better with these medications and no further fevers since 01/18.


* Patient had a prolonged, 2.5 hour EEG performed 01/13/2023.  No definitive 

  seizures detected or epileptiform activity. I will go down to from Keppra 

  500mg 1 tab bid to 250mg bid and recommend within a week to stop since no 

  further seizure.


* Likely had decondition from his prolonged ICU was immobile but improving


* He is to follow up with a neurosurgeon as an outpatient for his continued left

  upper extremity weakness and it seems she had the continued left cervical 

  radiculopathy with multiple surgeries in the past.  Patient stated that he had

  2 EMGs at in the past.


* Primary is getting further investigation for his continued fevers.  I.D. is on

  board.





The plan is discussed in detailed with patient and primary team. 


No further neurological work-up but will continue to follow.





Dr. Brooks will start neurology service tomorrow a.m.





Time with Patient: Less than 30

## 2023-01-22 NOTE — P.PN
Subjective


Progress Note Date: 01/22/23


Principal diagnosis: 


Fever and bacteremia





Patient is a 57 year old  male with a past medical history significant 

for alcohol dependence/abuse history of atrial fibrillation patient was brought 

to the hospital  on 01/06/2023 for evaluation of mental status changes patient 

presented to the hospital and was hypotensive A. fib with RVR patient got 

intubated and cardioverted, patient did have evidence of MSSA bacteremia sputum 

was also positive for the same pathogen subsequently has been running a fever 

did have a repeat which was traumatic Elevated white count but glucose protein 

normal. 


 on today's evaluation that is 01/22/2023, the patient continues to be afebrile,

the patient is breathing comfortably on room air patient denies any headache , 

the patient denies chest pain shortness of breath , the patient did have 

occasional dry cough , no abdominal pain, the patient diarrhea has resolved and 

no urinary symptoms, overall feeling better





Objective





- Vital Signs


Vital signs: 


                                   Vital Signs











Temp  98.1 F   01/22/23 14:07


 


Pulse  79   01/22/23 14:07


 


Resp  18   01/22/23 14:07


 


BP  109/66   01/22/23 14:07


 


Pulse Ox  93 L  01/22/23 14:07


 


FiO2  35   01/16/23 08:00








                                 Intake & Output











 01/21/23 01/22/23 01/22/23





 18:59 06:59 18:59


 


Intake Total 320  


 


Output Total  350 


 


Balance 320 -350 


 


Weight  79.4 kg 


 


Intake:   


 


    


 


    .9 @ 10 120  


 


    Thiamine 100 mg In Sodium 50  





    Chloride 0.9% 50 ml @   





    100 mls/hr IVPB Q24HR PORTIA   





    Rx#:[id number]   


 


    levETIRAcetam IV 1,000 mg 100  





    In Saline 1 100ml.bag @   





    400 mls/hr IVPB Q12HR PORTIA   





    Rx#:[id number]   


 


  Intake, IV Titration 50  





  Amount   


 


    cefTRIAXone 2 gm In 50  





    Sodium Chloride 0.9% 50   





    ml @ 100 mls/hr IVPB   





    Q12HR PORTIA Rx#:[id number]   


 


Output:   


 


  Urine  350 


 


Other:   


 


  Voiding Method Urinal Toilet Toilet





  Urinal Urinal








                       ABP, PAP, CO, CI - Last Documented











Arterial Blood Pressure        83/66

















- Exam


GENERAL DESCRIPTION: A middle-aged male lying in bed in no distress





RESPIRATORY SYSTEM: Unlabored breathing , decreased breath sounds at bases





HEART: S1 S2 regular rate and rhythm ,





ABDOMEN: Soft , no tenderness





EXTREMITIES: No edema feet








- Labs


CBC & Chem 7: 


                                 01/20/23 05:40





                                 01/20/23 05:40


Labs: 


                      Microbiology - Last 24 Hours (Table)











 01/19/23 09:23 Blood Culture - Preliminary





 Blood    No Growth after 72 hours


 


 01/18/23 16:20 Blood Culture - Preliminary





 Blood    No Growth after 72 hours


 


 01/19/23 11:30 Catheter Tip Culture - Final





 Catheter Tip 














Assessment and Plan


(1) Bacteremia


Current Visit: Yes   Status: Acute   Code(s): R78.81 - BACTEREMIA   SNOMED 

Code(s): 5522036


   





(2) Fever


Current Visit: Yes   Status: Acute   Code(s): R50.9 - FEVER, UNSPECIFIED   

SNOMED Code(s): 740346263


   


Plan: 


1patient with abnormal CSF which is mostly a traumatic tap with more than 

40,000 RBC white count is still symptomatic elevated however the patient did 

have a normal glucose and protein the patient currently denies having any 

headache and he is aware that he is at [facility] all these 

factors will go against meningitis or encephalitis


2patient now to have a fever in this patient who did have a positive blood as 

well as sputum culture for MSSA and did have evidence of a left-sided effusion 

question of possible pulmonary source/empyema, in addition patient also having 

diarrhea with fecal management system , stool for C. diff negative


3there was a question of possible meningeal enhancement on the MRI which was 

not reported on the official MRI plus the patient did have a normal protein and 

glucose my clinic suspicious for meningitis remains to be low in this patient 

currently with no headache, patient echocardiogram was negative for any veg

etation, CT of the chest is concerning for pneumonia patient will continue with 

the Rocephin , plan is to get the midline in the morning and continue with 

Rocephin  10 days and close outpatient follow-up


Time with Patient: Less than 30

## 2023-01-23 VITALS
RESPIRATION RATE: 16 BRPM | DIASTOLIC BLOOD PRESSURE: 75 MMHG | HEART RATE: 87 BPM | TEMPERATURE: 97.8 F | SYSTOLIC BLOOD PRESSURE: 120 MMHG

## 2023-01-23 LAB
INR PPP: 1.1 (ref ?–1.2)
PT BLD: 11.5 SEC (ref 9–12)

## 2023-01-23 PROCEDURE — 05HA33Z INSERTION OF INFUSION DEVICE INTO LEFT BRACHIAL VEIN, PERCUTANEOUS APPROACH: ICD-10-PCS

## 2023-01-23 RX ADMIN — ENOXAPARIN SODIUM SCH MG: 40 INJECTION SUBCUTANEOUS at 08:05

## 2023-01-23 RX ADMIN — ENOXAPARIN SODIUM SCH: 40 INJECTION SUBCUTANEOUS at 06:07

## 2023-01-23 RX ADMIN — Medication SCH MG: at 08:05

## 2023-01-23 RX ADMIN — PANTOPRAZOLE SODIUM SCH MG: 40 INJECTION, POWDER, FOR SOLUTION INTRAVENOUS at 08:05

## 2023-01-23 RX ADMIN — ASPIRIN 81 MG CHEWABLE TABLET SCH MG: 81 TABLET CHEWABLE at 08:04

## 2023-01-23 RX ADMIN — METOPROLOL TARTRATE SCH MG: 25 TABLET, FILM COATED ORAL at 08:05

## 2023-01-23 NOTE — P.PN
Subjective


Progress Note Date: 01/23/23


Principal diagnosis: 


Fever and bacteremia





Patient is a 57 year old  male with a past medical history significant 

for alcohol dependence/abuse history of atrial fibrillation patient was brought 

to the hospital  on 01/06/2023 for evaluation of mental status changes patient 

presented to the hospital and was hypotensive A. fib with RVR patient got 

intubated and cardioverted, patient did have evidence of MSSA bacteremia sputum 

was also positive for the same pathogen subsequently has been running a fever 

did have a repeat which was traumatic Elevated white count but glucose protein 

normal. 


 on today's evaluation that is 01/23/2023, the patient remains to be afebrile, 

the patient is breathing comfortably on room air patient denies any headache , 

the patient denies chest pain shortness of breath , the patient did have 

occasional cough but no sputum production no nausea no vomiting no abdominal 

pain no diarrhea





Objective





- Vital Signs


Vital signs: 


                                   Vital Signs











Temp  97.8 F   01/23/23 07:23


 


Pulse  87   01/23/23 08:16


 


Resp  16   01/23/23 08:16


 


BP  120/75   01/23/23 07:23


 


Pulse Ox  94 L  01/23/23 07:23


 


FiO2  35   01/16/23 08:00








                                 Intake & Output











 01/22/23 01/23/23 01/23/23





 18:59 06:59 18:59


 


Intake Total 1080  


 


Balance 1080  


 


Weight  76.6 kg 


 


Intake:   


 


  Oral 1080  


 


Other:   


 


  Voiding Method Toilet Toilet Toilet





 Urinal Urinal 


 


  # Voids 3 2 








                       ABP, PAP, CO, CI - Last Documented











Arterial Blood Pressure        83/66

















- Exam


GENERAL DESCRIPTION: A middle-aged male lying in bed in no distress





RESPIRATORY SYSTEM: Unlabored breathing , decreased breath sounds at bases





HEART: S1 S2 regular rate and rhythm ,





ABDOMEN: Soft , no tenderness





EXTREMITIES: No edema feet








- Labs


CBC & Chem 7: 


                                 01/20/23 05:40





                                 01/20/23 05:40


Labs: 


                      Microbiology - Last 24 Hours (Table)











 01/18/23 16:20 Blood Culture - Preliminary





 Blood    No Growth after 96 hours


 


 01/19/23 09:23 Blood Culture - Preliminary





 Blood    No Growth after 72 hours














Assessment and Plan


(1) Bacteremia


Current Visit: Yes   Status: Acute   Code(s): R78.81 - BACTEREMIA   SNOMED 

Code(s): 8714439


   





(2) Fever


Current Visit: Yes   Status: Acute   Code(s): R50.9 - FEVER, UNSPECIFIED   

SNOMED Code(s): 801711311


   


Plan: 


1patient with abnormal CSF which is mostly a traumatic tap with more than 

40,000 RBC white count is still symptomatic elevated however the patient did 

have a normal glucose and protein the patient currently denies having any headac

he and he is aware that he is at [facility] all these factors 

will go against meningitis or encephalitis


2patient now to have a fever in this patient who did have a positive blood as 

well as sputum culture for MSSA and did have evidence of a left-sided effusion 

question of possible pulmonary source/empyema, in addition patient also having 

diarrhea with fecal management system , stool for C. diff negative


3there was a question of possible meningeal enhancement on the MRI which was 

not reported on the official MRI plus the patient did have a normal protein and 

glucose my clinic suspicious for meningitis remains to be low in this patient 

currently with no headache, patient echocardiogram was negative for any 

vegetation, CT of the chest is concerning for pneumonia , keeping in mind the 

patient fever has resolved off antibiotic was switched over to Rocephin and a 

surgical consult for possible meningitis though this likely and pneumonia we 

will consider Rocephin 2 g every 12 hours 10 days on discharge and close 

outpatient follow-up


Time with Patient: Less than 30

## 2023-01-23 NOTE — P.DS
Providers


Date of admission: 


01/06/23 23:52





Expected date of discharge: 01/23/23


Attending physician: 


Maxwell Roque MD





Consults: 





                                        





01/06/23 23:41


Consult Physician Stat 


   Consulting Provider: Elmo Chavez


   Consult Reason/Comments: acute vent dependance, acute encephalopathy, alcohol

withdrawal,


                                                afib rvr


   Do you want consulting provider notified?: Already Contacted


Consult Physician Urgent 


   Consulting Provider: Haris Gutierrez


   Consult Reason/Comments: roque


   Do you want consulting provider notified?: Yes





01/09/23 08:21


Consult Physician Routine 


   Consulting Provider: Caesar Boston


   Consult Reason/Comments: mental status changes


   Do you want consulting provider notified?: Yes





01/16/23 17:16


Consult Physician Routine 


   Consulting Provider: Kalpesh Fierro


   Consult Reason/Comments: infectious encephalopathy?


   Do you want consulting provider notified?: Yes





01/18/23 09:47


Consult Physician Routine 


   Consulting Provider: Gian Maher


   Consult Reason/Comments: rehab


   Do you want consulting provider notified?: Yes











Primary care physician: 


Jacy David MD





Hospital Course: 





Discharge Diagnosis:


Meningoencephalitis


Staph bacteremia


Bacterial pneumonia


Acute metabolic encephalopathy


Hyperammonemia


Alcohol withdrawal


A. fib with RVR status post cardioversion


Elevated troponin


Acute hypoxic and hypercapnic respiratory failure status post intubation


Shock, hypovolemic


High anion gap metabolic acidosis secondary to Severe lactic acidosis


Acute kidney injury


Severe hyperphosphatemia


Severe hypokalemia


GI bleed 


Hypomagnesemia


Thrombocytopenia


Transaminitis





Hospital Course: 


Patient is a 57-year-old male with history of alcohol dependence/abuse, atrial 

fibrillation who presented with altered mentation and possible code stroke.  On 

arrival, patient was hemodynamically unstable, in A. fib with RVR, he was 

intubated and cardioverted to normal sinus rhythm.  CT head showed no acute 

process, interventional neurology on call did not recommend to give alteplase.  

Reportedly, patient has been binge drinking, his last drink was on January 1.  

Per family patient was vomiting prior to becoming unresponsive.  His initial 

labs were consistent with severe dehydration, pH of 7.13, potassium 2.7, 

creatinine of 3.66, lactic acid of 12.4, mild transaminitis, mild elevated 

troponin, urine tox positive for benzos and marijuana. He was admitted directly 

to the ICU.  There was also concern for possible GI bleed after NG tube 

placement, patient on IV Protonix, surgery following a hemoglobin stable.  Renal

function improved with IV fluids.  Patient extubated on 1/16/23.  Blood cultures

positive for MSSA, coag negative staph.  Sputum culture is also positive for 

MSSA.  Due to persistent fevers, and encephalopathy, neurology was consulted.  

Patient had an LP, indicating likely meningoencephalitis, possibly bacterial.  

Patient was initially on IV vancomycin, Rocephin, acyclovir.  He had clinical 

improvement, has been afebrile for a few days.  Patient being discharged short 

course of IV Rocephin per ID. per neurology, patient has follow-up with 

neurosurgery for left upper extremity weakness.  Patient also discharged on 1 

week of Keppra taper.








Imaging: 


Head CT 1/6/23: mild cerebral atrophy 


CT angio head and neck 1/6/23: negatvive 


CT chest/abd/pelvis 1/6/23: no significant abnormality 


US abd/bladder 1/7/23: no hydro 


EEG 1/9/23: abnormal due to asymetry and background slowing 


Head CT 1/11/23: prior surgery, mild atrophy 


EEG 1/12/23: abnormal due to asymmery and sporadic sharp waves 


Lumbar spine CT 1/15/23: Hypertrophic degenerative changes in the lumbar spine 


EEG 2.5 hr 1/15/23: Mod-severe diffuse cerebral dysfunction


MRI Brain 1/18- numerous white matter changes. 


CT chest 1/20: Right upper and middle lobe pneumonia





Procedures: 


Art and central line 1/11/23


LP 1/15/23








Patient seen and examined at bedside.





Vital signs reviewed and stable. 


General: nontoxic, no distress, appears at stated age


Derm: warm, dry


Head: atraumatic, normocephalic, symmetric


Eyes: EOMI, no lid lag, anicteric sclera


Mouth: no lip lesion, mucus membranes moist


Cardiovascular: S1S2 reg, no murmur


Lungs: CTA bilateral, no rhonchi, no rales , no accessory muscle use


Abdominal: soft,  nontender to palpation, no guarding, no appreciable 

organomegaly


Ext: no gross muscle atrophy, no edema, no contractures


Neuro:  CN II-XI grossly intact, no focal neuro deficits


Psych: Alert, oriented, appropriate affect 








A total of 55 minutes of time were spent preparing this complex discharge 

summary.


Patient was discharged on 1/23/23 at 13:04. 


Patient Condition at Discharge: Stable





Plan - Discharge Summary


Discharge Rx Participant: No


New Discharge Prescriptions: 


New


   Aspirin 81 mg PO DAILY #30 tab


   Metoprolol Tartrate [Lopressor] 25 mg PO BID #60 tab


   cefTRIAXone [Rocephin] 2 gm IVPB Q12HR  each


   Thiamine [Vitamin B-1] 100 mg PO DAILY #30 tab


   levETIRAcetam [Keppra] 250 mg PO Q12HR #14 tab





Continue


   Pravastatin Sodium [Pravachol] 40 mg PO DAILY


   Omeprazole [PriLOSEC] 20 mg PO DAILY #30 cap





Discontinued


   Hydrocodone/Acetaminophen [Norco 5-325] 2 each PO Q6HR PRN #20 tab


     PRN Reason: Pain


   Metoclopramide [Reglan] 5 mg PO QID PRN


     PRN Reason: GERD


   methocarbamoL [Methocarbamol] 750 mg PO TID PRN


     PRN Reason: Muscle Pain


   Potassium Chloride ER [K-Dur 20] 20 meq PO BID


   Metoprolol Succinate (ER) [Toprol XL] 50 mg PO DAILY


Discharge Medication List





Pravastatin Sodium [Pravachol] 40 mg PO DAILY 01/07/23 [History]


Aspirin 81 mg PO DAILY #30 tab 01/23/23 [Rx]


Metoprolol Tartrate [Lopressor] 25 mg PO BID #60 tab 01/23/23 [Rx]


Omeprazole [PriLOSEC] 20 mg PO DAILY #30 cap 01/23/23 [Rx]


Thiamine [Vitamin B-1] 100 mg PO DAILY #30 tab 01/23/23 [Rx]


cefTRIAXone [Rocephin] 2 gm IVPB Q12HR  each 01/23/23 [Rx]


levETIRAcetam [Keppra] 250 mg PO Q12HR #14 tab 01/23/23 [Rx]








Follow up Appointment(s)/Referral(s): 


[facility], [[facility]STAFF] - As Needed (Home care will set up appontemt. any 

questions please call agency. )


Homar Joya MD [STAFF PHYSICIAN] - 1-2 days


Patient Instructions/Handouts:  Abuse of Alcohol (DC), Bacterial Pneumonia (DC),

Bacteremia (DC), Encephalopathy (DC), Bacterial Meningitis (DC)


Activity/Diet/Wound Care/Special Instructions: 


Please follow up with your PCP as soon as possible. You also need to follow up 

with neurology and neurosurgery with regards to left arm weakness. 


Discharge Disposition: HOME WITH HOME HEALTH SERVICES

## 2023-01-23 NOTE — P.PN
Subjective


Progress Note Date: 01/23/23








 


On today's evaluation of 01/30/2022, the patient is awake and alert and 

following commands and answering questions appropriately.  He is weak and he is 

requiring a walker to move around.  He is currently on room air oxygen.  He is 

known to have alcoholism and chronic atrial fibrillation.  He has been in the 

hospital for at least for the past 17 days.  The patient was intubated and 

subsequent extubation was done successfully.  He presented with sepsis and he 

was found to have MSSA bacteremia and sepsis and his blood cultures were also 

positive.  The patient was treated accordingly.  The lumbar puncture was also 

abnormal yet not consistent with acute meningitis or encephalitis.  At this 

point in time, the patient is on room air oxygen.  He is afebrile and he is 

hemodynamically stable.  Most recent cultures from the blood came back negative.

 He is currently completing course of IV Rocephin.  PICC line in his right upper

extremity.  No other issues for now.





Objective





- Vital Signs


Vital signs: 


                                   Vital Signs











Temp  97.8 F   01/23/23 07:23


 


Pulse  87   01/23/23 07:23


 


Resp  16   01/23/23 07:23


 


BP  120/75   01/23/23 07:23


 


Pulse Ox  94 L  01/23/23 07:23


 


FiO2  35   01/16/23 08:00








                                 Intake & Output











 01/22/23 01/23/23 01/23/23





 18:59 06:59 18:59


 


Intake Total 1080  


 


Balance 1080  


 


Weight  76.6 kg 


 


Intake:   


 


  Oral 1080  


 


Other:   


 


  Voiding Method Toilet Toilet 





 Urinal Urinal 


 


  # Voids 3 2 








                       ABP, PAP, CO, CI - Last Documented











Arterial Blood Pressure        83/66

















- Exam














No acute distress, awake and alert, currently on room air.  Mental status is 

back to normal.





HEENT examination is grossly unremarkable. 





Neck supple.  Full range of motion.  No adenopathy thyromegaly or neck vein 

distention.





Cardiovascular examination reveals regular rhythm rate.  S1-S2 normal.  No S3 or

S4.  No discernible murmur noted.  Heart rate 75 bpm.





Lungs reveal mostly clear breath sounds.  Minimal rhonchi.  No wheezes or 

crackles.  Room air saturation is 94 %.





Abdomen soft bowel sounds are heard.  No masses or tenderness.





Extremities are intact.  No cyanosis clubbing or edema.





Skin is without rash or lesion.





Neurologic examination is brief but nonfocal.





- Labs


CBC & Chem 7: 


                                 01/20/23 05:40





                                 01/20/23 05:40


Labs: 


                      Microbiology - Last 24 Hours (Table)











 01/18/23 16:20 Blood Culture - Preliminary





 Blood    No Growth after 96 hours


 


 01/19/23 09:23 Blood Culture - Preliminary





 Blood    No Growth after 72 hours














Assessment and Plan


Plan: 








Right lung pneumonia, right upper/middle lobe





Altered mental status, recovered





Acute respiratory failure,  recovered





Sepsis, recovered





Severe lactic acidosis,  recovered





Hypotension, recovered and the patient is normotensive for now





Anion gap metabolic acidosis, recovered





Troponin leak, probably in association with major hemodynamic alteration in 

acidosis, type II ischemia.








Alcoholism





Acute kidney injury  , recovered





Acute hypokalemia, awaiting follow-up labs, being replaced





History of atrial fibrillation, current rhythm is sinus





Hypertension





Hyperlipidemia





Plan





 Complete the course of antibiotics per IDs recommendation


 physical therapy and the patient was already given a walker





Continue most recent CAT scan of the chest from 01/20/2023 showed a right upper 

and right middle lung opacity/pulmonary infiltrates/pneumonia with a small left-

sided pleural effusion.  Nevertheless, the patient is currently on room air 

oxygen.  No signs of any respiratory difficulties.

## 2023-01-25 NOTE — CDI
Documentation Clarification Form



Date: 1/25/2023 10:28:27 AM

From: Daisy Santamaria

MRN: [id number]

Admit Date: 1/6/2023 11:52:00 PM

Patient Name: Denton Delacruz

Visit Number: [id number]

Discharge Date:  1/23/2023 2:15:00 PM





ATTENTION: The Clinical Documentation Specialists (CDI) and [facility] Coding Staff 
appreciate your assistance in clarifying documentation. Please respond to the 
clarification below the line at the bottom and electronically sign. The CDI & 
[facility] Coding staff will review the response and follow-up if needed. Please note: 
Queries are made part of the Legal Health Record. If you have any questions, 
please contact the author of this message via ITS.



Dr. Roberto Pereyra



Conflicting documentation has been found in the medical record.  As attending 
physician, please provide clarification.



Per DCS Staph bacteremia



Per Progress Note 01/23 "He presented with sepsis and he was found to have MSSA 
bacteremia and sepsis and his blood cultures were positive."

Per ID consult 01/17 "ID was consulted last evening after the patient has been 
in the hospital for 11 days and presented with sepsis"

History/Risk Factors:



Clinical Indicators: MSSA positive blood cultures.  WBC 17.2,  lactic acid 12.4,
   BPM,   on 1/10 temp 103.5 axillary



Treatment:  Vancomycin, cefazolin, Rocephin along with acyclovir



Please clarify which diagnosis is most appropriate:



[ x ]  MSSA sepsis POA

[  ]  Sepsis ruled out

[  ]  Other (please specify) __________

[  ]  Unable to determine 





___________________________________________________________________________



MTDD

## 2023-02-02 NOTE — P.PN
Progress Note - Text


Progress Note Date: 01/23/23





Patient was discharged before I could see him for follow-up.


Course over the last 1 week reviewed.





Patient's lumbar puncture performed 01/14/2023 at 5 pm, was grossly bloody CSF. 

The CSF cell count was performed on tube #4, which was most bloody out of all 

tubes.


RBC 40,700,  differential includes polynuclear 77%, mononuclear 18%, 

eosinophils 5%





Patient's CBC from the same day 05:30 am on 01/14/2023 was


Total WBC 6.6 hemoglobin 10.9 and WBC differential neutrophils 68%, lymphocytes 

18%, monocytes 6%, eosinophils 4%





CSF Gram stain was negative.  No polymorphonuclear leukocytes seen.  No 

organisms seen.  Cultures negative.





CSF glucose 69 (40-70), CSF protein 57(12-60), both completely normal.








Impression:


Although the CSF nucleated cells were elevated 160 when corrected for the amount

of red blood cells 40,700.





However the differential of WBCs in the CSF and the differential of WBCs in the 

blood were almost identical.  The CSF cell count was performed from tube #4, 

which was most bloody.





Most importantly, the Gram stain showed no polymorphonuclear leukocytes seen 

(which likely was performed from tube #2, which was the most clear CSF out of 

all 4 tubes).  I confirmed with [place] microbiology lab, who concurred that there

were no polymorphonuclear cells seen.  


Also that CSF glucose and CSF protein were normal.





Based upon above information, I had felt that the elevated white cells in the 

CSF is most likely due to traumatic tap.  I spoke to infectious disease Dr. Fierro today, who did not feel there was meningitis encephalitis.  











Patient apparently developed high-grade fever 1-2 days after lumbar puncture.  

Ideally, a repeat CSF was indicated at that time, if there were signs of m

eningitis/encephalitis.  Patient however had a clear source of infection 

including pneumonia.  However patient continued to improve and is ultimately 

discharged today.

## 2023-07-20 ENCOUNTER — HOSPITAL ENCOUNTER (INPATIENT)
Dept: HOSPITAL 47 - EC | Age: 58
LOS: 1 days | Discharge: HOME | DRG: 897 | End: 2023-07-21
Attending: FAMILY MEDICINE | Admitting: FAMILY MEDICINE
Payer: MEDICARE

## 2023-07-20 DIAGNOSIS — I48.0: ICD-10-CM

## 2023-07-20 DIAGNOSIS — Z79.82: ICD-10-CM

## 2023-07-20 DIAGNOSIS — R79.89: ICD-10-CM

## 2023-07-20 DIAGNOSIS — I10: ICD-10-CM

## 2023-07-20 DIAGNOSIS — Z79.51: ICD-10-CM

## 2023-07-20 DIAGNOSIS — Z88.0: ICD-10-CM

## 2023-07-20 DIAGNOSIS — D69.6: ICD-10-CM

## 2023-07-20 DIAGNOSIS — Z71.41: ICD-10-CM

## 2023-07-20 DIAGNOSIS — Z79.899: ICD-10-CM

## 2023-07-20 DIAGNOSIS — F10.231: Primary | ICD-10-CM

## 2023-07-20 DIAGNOSIS — E86.0: ICD-10-CM

## 2023-07-20 DIAGNOSIS — I08.3: ICD-10-CM

## 2023-07-20 DIAGNOSIS — R00.0: ICD-10-CM

## 2023-07-20 DIAGNOSIS — Z79.01: ICD-10-CM

## 2023-07-20 DIAGNOSIS — E78.5: ICD-10-CM

## 2023-07-20 DIAGNOSIS — E78.00: ICD-10-CM

## 2023-07-20 DIAGNOSIS — Z86.73: ICD-10-CM

## 2023-07-20 DIAGNOSIS — I27.20: ICD-10-CM

## 2023-07-20 DIAGNOSIS — Z86.61: ICD-10-CM

## 2023-07-20 DIAGNOSIS — E87.6: ICD-10-CM

## 2023-07-20 DIAGNOSIS — E83.42: ICD-10-CM

## 2023-07-20 DIAGNOSIS — F41.9: ICD-10-CM

## 2023-07-20 DIAGNOSIS — R11.2: ICD-10-CM

## 2023-07-20 LAB
ALBUMIN SERPL-MCNC: 4.6 G/DL (ref 3.5–5)
ALP SERPL-CCNC: 68 U/L (ref 38–126)
ALT SERPL-CCNC: 22 U/L (ref 4–49)
ANION GAP SERPL CALC-SCNC: 14 MMOL/L
APTT BLD: 22.3 SEC (ref 22–30)
AST SERPL-CCNC: 31 U/L (ref 17–59)
BASOPHILS # BLD AUTO: 0 K/UL (ref 0–0.2)
BASOPHILS NFR BLD AUTO: 0 %
BUN SERPL-SCNC: 21 MG/DL (ref 9–20)
CALCIUM SPEC-MCNC: 9.1 MG/DL (ref 8.4–10.2)
CHLORIDE SERPL-SCNC: 89 MMOL/L (ref 98–107)
CO2 SERPL-SCNC: 31 MMOL/L (ref 22–30)
EOSINOPHIL # BLD AUTO: 0 K/UL (ref 0–0.7)
EOSINOPHIL NFR BLD AUTO: 0 %
ERYTHROCYTE [DISTWIDTH] IN BLOOD BY AUTOMATED COUNT: 5.67 M/UL (ref 4.3–5.9)
ERYTHROCYTE [DISTWIDTH] IN BLOOD: 13.4 % (ref 11.5–15.5)
GLUCOSE SERPL-MCNC: 121 MG/DL (ref 74–99)
HCT VFR BLD AUTO: 51.4 % (ref 39–53)
HGB BLD-MCNC: 17.5 GM/DL (ref 13–17.5)
INR PPP: 1 (ref ?–1.2)
LACTATE BLDV-SCNC: 2.4 MMOL/L (ref 0.7–2)
LIPASE SERPL-CCNC: 45 U/L (ref 23–300)
LYMPHOCYTES # SPEC AUTO: 1.9 K/UL (ref 1–4.8)
LYMPHOCYTES NFR SPEC AUTO: 13 %
MAGNESIUM SPEC-SCNC: 1.4 MG/DL (ref 1.6–2.3)
MCH RBC QN AUTO: 30.9 PG (ref 25–35)
MCHC RBC AUTO-ENTMCNC: 34 G/DL (ref 31–37)
MCV RBC AUTO: 90.8 FL (ref 80–100)
MONOCYTES # BLD AUTO: 1.1 K/UL (ref 0–1)
MONOCYTES NFR BLD AUTO: 7 %
NEUTROPHILS # BLD AUTO: 11.4 K/UL (ref 1.3–7.7)
NEUTROPHILS NFR BLD AUTO: 78 %
NT-PROBNP SERPL-MCNC: 3980 PG/ML
PLATELET # BLD AUTO: 148 K/UL (ref 150–450)
POTASSIUM SERPL-SCNC: 2.9 MMOL/L (ref 3.5–5.1)
PROT SERPL-MCNC: 8 G/DL (ref 6.3–8.2)
PT BLD: 11 SEC (ref 9–12)
SODIUM SERPL-SCNC: 134 MMOL/L (ref 137–145)
WBC # BLD AUTO: 14.6 K/UL (ref 3.8–10.6)

## 2023-07-20 PROCEDURE — 71260 CT THORAX DX C+: CPT

## 2023-07-20 PROCEDURE — 93005 ELECTROCARDIOGRAM TRACING: CPT

## 2023-07-20 PROCEDURE — 96375 TX/PRO/DX INJ NEW DRUG ADDON: CPT

## 2023-07-20 PROCEDURE — 84145 PROCALCITONIN (PCT): CPT

## 2023-07-20 PROCEDURE — 83880 ASSAY OF NATRIURETIC PEPTIDE: CPT

## 2023-07-20 PROCEDURE — HZ2ZZZZ DETOXIFICATION SERVICES FOR SUBSTANCE ABUSE TREATMENT: ICD-10-PCS

## 2023-07-20 PROCEDURE — 80053 COMPREHEN METABOLIC PANEL: CPT

## 2023-07-20 PROCEDURE — 96376 TX/PRO/DX INJ SAME DRUG ADON: CPT

## 2023-07-20 PROCEDURE — 80048 BASIC METABOLIC PNL TOTAL CA: CPT

## 2023-07-20 PROCEDURE — 82140 ASSAY OF AMMONIA: CPT

## 2023-07-20 PROCEDURE — 71045 X-RAY EXAM CHEST 1 VIEW: CPT

## 2023-07-20 PROCEDURE — 83690 ASSAY OF LIPASE: CPT

## 2023-07-20 PROCEDURE — 74177 CT ABD & PELVIS W/CONTRAST: CPT

## 2023-07-20 PROCEDURE — 83735 ASSAY OF MAGNESIUM: CPT

## 2023-07-20 PROCEDURE — 96365 THER/PROPH/DIAG IV INF INIT: CPT

## 2023-07-20 PROCEDURE — 85610 PROTHROMBIN TIME: CPT

## 2023-07-20 PROCEDURE — 84484 ASSAY OF TROPONIN QUANT: CPT

## 2023-07-20 PROCEDURE — 36415 COLL VENOUS BLD VENIPUNCTURE: CPT

## 2023-07-20 PROCEDURE — 99291 CRITICAL CARE FIRST HOUR: CPT

## 2023-07-20 PROCEDURE — 96372 THER/PROPH/DIAG INJ SC/IM: CPT

## 2023-07-20 PROCEDURE — 84100 ASSAY OF PHOSPHORUS: CPT

## 2023-07-20 PROCEDURE — 85730 THROMBOPLASTIN TIME PARTIAL: CPT

## 2023-07-20 PROCEDURE — 96366 THER/PROPH/DIAG IV INF ADDON: CPT

## 2023-07-20 PROCEDURE — 87040 BLOOD CULTURE FOR BACTERIA: CPT

## 2023-07-20 PROCEDURE — 96361 HYDRATE IV INFUSION ADD-ON: CPT

## 2023-07-20 PROCEDURE — 85025 COMPLETE CBC W/AUTO DIFF WBC: CPT

## 2023-07-20 PROCEDURE — 83605 ASSAY OF LACTIC ACID: CPT

## 2023-07-20 RX ADMIN — POTASSIUM CHLORIDE SCH MLS/HR: 7.46 INJECTION, SOLUTION INTRAVENOUS at 20:28

## 2023-07-20 RX ADMIN — POTASSIUM CHLORIDE SCH MLS/HR: 7.46 INJECTION, SOLUTION INTRAVENOUS at 18:57

## 2023-07-20 RX ADMIN — POTASSIUM CHLORIDE SCH MLS/HR: 7.46 INJECTION, SOLUTION INTRAVENOUS at 23:00

## 2023-07-20 RX ADMIN — POTASSIUM CHLORIDE SCH MLS/HR: 14.9 INJECTION, SOLUTION INTRAVENOUS at 18:58

## 2023-07-20 NOTE — ED
Nausea/Vomiting/Diarrhea HPI





- General


Chief complaint: Nausea/Vomiting/Diarrhea


Stated complaint: Stroke Symptoms


Time Seen by Provider: 07/20/23 15:16


Source: patient, RN notes reviewed, old records reviewed


Mode of arrival: wheelchair


Limitations: no limitations





- History of Present Illness


Initial comments: 





This is a 57-year-old male to the emergency department for evaluation patient 

presents today for evaluation regards to severe and intractable nausea and 

vomiting.  Increasing for 1-2 days.  Patient has never had similar symptoms at 

this point.  Patient symptoms are now profound with lightheadedness dizziness 

sweating but without abdominal pain without chest pain no shortness of breath.  

Patient does have a history of similar event in the past unsure of underlying 

cause.  Patient has history of high blood pressure high cholesterol, no prior 

surgical history and patient denies smoking or significant alcohol use, does 

smoke marijuana


MD complaint: nausea, vomiting


-: hour(s)


Description of Vomiting: food contents, watery, bilious


Description of Diarrhea: water


Location: diffuse


Radiation: none


Severity: severe


Severity scale (1-10): 10


Consistency: constant


Improves with: none


Worsens with: eating, vomiting, movement


Context: other (0)


Associated Symptoms: loss of appetite, malaise, nausea/vomiting, weakness





- Related Data


                                Home Medications











 Medication  Instructions  Recorded  Confirmed


 


Metoprolol Succinate (ER) [Toprol 50 mg PO HS 07/20/23 07/20/23





XL]   


 


Omeprazole [PriLOSEC] 20 mg PO HS 07/20/23 07/20/23


 


Pravastatin Sodium [Pravachol] 80 mg PO HS 07/20/23 07/20/23


 


methocarbamoL [Methocarbamol] 750 mg PO TID PRN 07/20/23 07/20/23








                                  Previous Rx's











 Medication  Instructions  Recorded


 


Aspirin 81 mg PO DAILY  tab 07/21/23


 


Budesonide-Formot 160-4.5 Mcg 2 puff INHALATION RT-BID 30 Days 07/21/23





[Symbicort 160-4.5 Mcg Inhaler] #1 each 


 


Thiamine [Vitamin B-1] 100 mg PO DAILY 30 Days #30 tab 07/21/23











                                    Allergies











Allergy/AdvReac Type Severity Reaction Status Date / Time


 


Penicillins Allergy  Unknown Verified 07/20/23 17:20





   Childhood  














Review of Systems


ROS Statement: 


Those systems with pertinent positive or pertinent negative responses have been 

documented in the HPI.





ROS Other: All systems not noted in ROS Statement are negative.





Past Medical History


Past Medical History: Hypertension


History of Any Multi-Drug Resistant Organisms: None Reported


Past Surgical History: Appendectomy, Orthopedic Surgery, Tonsillectomy


Additional Past Surgical History / Comment(s): surgery for brain bleed 2019 or 

2020 (family unsure)


Past Psychological History: No Psychological Hx Reported


Smoking Status: Never smoker


Past Alcohol Use History: Daily


Past Drug Use History: None Reported, Marijuana





- Past Family History


  ** family


Family Medical History: Cancer


Additional Family Medical History / Comment(s): no CAD





General Exam


Limitations: no limitations


General appearance: alert, in no apparent distress


Head exam: Present: atraumatic, normocephalic, normal inspection


Eye exam: Present: normal appearance, PERRL, EOMI.  Absent: scleral icterus, 

conjunctival injection, periorbital swelling


ENT exam: Present: normal exam, mucous membranes moist


Neck exam: Present: normal inspection.  Absent: tenderness, meningismus, 

lymphadenopathy


Respiratory exam: Present: normal lung sounds bilaterally.  Absent: respiratory 

distress, wheezes, rales, rhonchi, stridor


Cardiovascular Exam: Present: regular rate, normal rhythm, normal heart sounds. 

Absent: systolic murmur, diastolic murmur, rubs, gallop, clicks


GI/Abdominal exam: Present: soft, normal bowel sounds.  Absent: distended, 

tenderness, guarding, rebound, rigid


Extremities exam: Present: normal inspection, full ROM, normal capillary refill.

 Absent: tenderness, pedal edema, joint swelling, calf tenderness


Back exam: Present: normal inspection


Neurological exam: Present: alert, oriented X3, CN II-XII intact


Psychiatric exam: Present: normal affect, normal mood


Skin exam: Present: warm, dry, intact, normal color.  Absent: rash





Course


                                   Vital Signs











  07/20/23 07/20/23 07/20/23





  14:40 17:49 18:35


 


Temperature 98.7 F 99.4 F 


 


Pulse Rate 75 89 106 H


 


Respiratory 16 19 20





Rate   


 


Blood Pressure 153/100 145/85 


 


O2 Sat by Pulse 96 95 97





Oximetry   














  07/20/23 07/21/23 07/21/23





  22:00 01:00 03:45


 


Temperature   


 


Pulse Rate 65 64 66


 


Respiratory 16 15 16





Rate   


 


Blood Pressure 154/91 121/79 114/75


 


O2 Sat by Pulse 95 96 97





Oximetry   














  07/21/23 07/21/23 07/21/23





  07:00 09:16 11:32


 


Temperature   


 


Pulse Rate 54 L 57 L 63


 


Respiratory 16 18 18





Rate   


 


Blood Pressure 123/75 134/88 113/77


 


O2 Sat by Pulse 97 92 L 96





Oximetry   














  07/21/23





  15:07


 


Temperature 


 


Pulse Rate 64


 


Respiratory 18





Rate 


 


Blood Pressure 127/94


 


O2 Sat by Pulse 95





Oximetry 














- Reevaluation(s)


Reevaluation #1: 





07/20/23 16:46


Medical records reviewed


Reevaluation #2: 





07/20/23 16:46


Patient symptoms are improving


Reevaluation #3: 





07/20/23 16:46


Patient informed of results and questions are answered


Reevaluation #4: 





07/20/23 16:46


Was pt. sent in by a medical professional or institution?


@  -no


Did you speak to anyone other than the patient for history?  


@  -no


Did you review nursing and triage notes? 


@  -agree


Were old charts reviewed? 


@  -yes


Differential Diagnosis? 


@  -prior


EKG interpreted by me (3pts min.)?


@  -yes


X-rays interpreted by me (1pt min.)?


@  yes


CT interpreted by me (1pt min.)?


@  -no


U/S interpreted by me (1pt. min.)?


@  -no


What testing was considered but not performed? (CT, X-rays, U/S, labs)? Why?


@  -no


What meds were considered but not given? Why?


@  -no


Did you discuss the management of the patient with other professionals?


@  -Did speak with Dr. Hinton who helps informed of patient's history of prior 

hospitalization which included recurrent hypoglycemia


Did you reconcile home meds?


@  -no


Was smoking cessation discussed for >3mins.?


@  -no


Was critical care preformed (if so, how long)?


@  -no


Were there social determinants of health that impacted care today? How? 

(Homelessness, low income, unemployed, alcoholism, drug addiction, 

transportation, low edu. Level, literacy, decrease access to med. care, [facility], 

rehab)?


@  -no


Was there de-escalation of care discussed even if they declined? (Discuss DNR or

withdrawal of care, Hospice)?


@  -no


What co-morbidities impacted this encounter? (DM, HTN, Smoking, COPD, CAD, 

Cancer, CVA, Hep., AIDS, mental health diagnosis, sleep apnea, morbid obesity)?


@  -Alcohol abuse


Was patient admitted / discharged?


@  -57 male acutely going to alcohol withdrawal and delirium tremens.  Patient 

will be admitted for further evaluation management


Admitted


Undiagnosed new problem with uncertain prognosis?


@  -no


Drug Therapy requiring intensive monitoring for toxicity (Heparin, Nitro, 

Insulin, Cardizem)?


@  -no


Were any procedures done?


@  -no


Diagnosis/symptom?


@  -Recurrent hypoglycemia DTs, or joint arrangements,, or Chronic, or Acute on 

Chronic?


@  -acute


Uncomplicated (without systemic symptoms) or Complicated (systemic symptoms)?


@  -complicated


Side effects of treatment?


@  -no


Exacerbation, Progression, or Severe Exacerbation]


@  -no


Poses a threat to life or bodily function?


@  -yes with severe withdrawal





Reevaluation #5: 





07/20/23 16:46


Differential Abdominal Pain Men:


Appendicitis, cholecystitis, diverticulosis, ischemic bowel, pancreatitis, 

hepatitis, UTI, gastroenteritis, AAA, incarcerated hernia, bowel obstruction, 

constipation, inflammatory bowel, hepatitis, peptic ulcer disease, splenic 

infarction, perforated viscus, testicular torsion, this is not meant to be an 

all-inclusive list





- Consultations


Consultation #1: 





Spoke with Dr. Molina will admit this patient





Medical Decision Making





- Medical Decision Making





57 male acutely going to alcohol withdrawal and delirium tremens.  Patient will 

be admitted for further evaluation management





- Lab Data


Result diagrams: 


                                 07/21/23 03:55





                                 07/21/23 09:16


                                   Lab Results











  07/20/23 07/20/23 07/20/23 Range/Units





  16:33 16:33 16:33 


 


WBC  14.6 H    (3.8-10.6)  k/uL


 


RBC  5.67    (4.30-5.90)  m/uL


 


Hgb  17.5    (13.0-17.5)  gm/dL


 


Hct  51.4    (39.0-53.0)  %


 


MCV  90.8    (80.0-100.0)  fL


 


MCH  30.9    (25.0-35.0)  pg


 


MCHC  34.0    (31.0-37.0)  g/dL


 


RDW  13.4    (11.5-15.5)  %


 


Plt Count  148 L    (150-450)  k/uL


 


MPV  9.6    


 


Neutrophils %  78    %


 


Lymphocytes %  13    %


 


Monocytes %  7    %


 


Eosinophils %  0    %


 


Basophils %  0    %


 


Neutrophils #  11.4 H    (1.3-7.7)  k/uL


 


Lymphocytes #  1.9    (1.0-4.8)  k/uL


 


Monocytes #  1.1 H    (0-1.0)  k/uL


 


Eosinophils #  0.0    (0-0.7)  k/uL


 


Basophils #  0.0    (0-0.2)  k/uL


 


PT   11.0   (9.0-12.0)  sec


 


INR   1.0   (<1.2)  


 


APTT   22.3   (22.0-30.0)  sec


 


Sodium     (137-145)  mmol/L


 


Potassium     (3.5-5.1)  mmol/L


 


Chloride     ()  mmol/L


 


Carbon Dioxide     (22-30)  mmol/L


 


Anion Gap     mmol/L


 


BUN     (9-20)  mg/dL


 


Creatinine     (0.66-1.25)  mg/dL


 


Est GFR (CKD-EPI)AfAm     (>60 ml/min/1.73 sqM)  


 


Est GFR (CKD-EPI)NonAf     (>60 ml/min/1.73 sqM)  


 


Glucose     (74-99)  mg/dL


 


Lactic Ac Sepsis Rflx     


 


Plasma Lactic Acid Jose R    2.4 H*  (0.7-2.0)  mmol/L


 


Calcium     (8.4-10.2)  mg/dL


 


Phosphorus     (2.5-4.5)  mg/dL


 


Magnesium     (1.6-2.3)  mg/dL


 


Total Bilirubin     (0.2-1.3)  mg/dL


 


AST     (17-59)  U/L


 


ALT     (4-49)  U/L


 


Alkaline Phosphatase     ()  U/L


 


Ammonia    <9  (<30)  umol/L


 


Troponin I     (0.000-0.034)  ng/mL


 


NT-Pro-B Natriuret Pep     pg/mL


 


Total Protein     (6.3-8.2)  g/dL


 


Albumin     (3.5-5.0)  g/dL


 


Lipase     ()  U/L














  07/20/23 07/20/23 07/20/23 Range/Units





  16:33 16:49 17:04 


 


WBC     (3.8-10.6)  k/uL


 


RBC     (4.30-5.90)  m/uL


 


Hgb     (13.0-17.5)  gm/dL


 


Hct     (39.0-53.0)  %


 


MCV     (80.0-100.0)  fL


 


MCH     (25.0-35.0)  pg


 


MCHC     (31.0-37.0)  g/dL


 


RDW     (11.5-15.5)  %


 


Plt Count     (150-450)  k/uL


 


MPV     


 


Neutrophils %     %


 


Lymphocytes %     %


 


Monocytes %     %


 


Eosinophils %     %


 


Basophils %     %


 


Neutrophils #     (1.3-7.7)  k/uL


 


Lymphocytes #     (1.0-4.8)  k/uL


 


Monocytes #     (0-1.0)  k/uL


 


Eosinophils #     (0-0.7)  k/uL


 


Basophils #     (0-0.2)  k/uL


 


PT     (9.0-12.0)  sec


 


INR     (<1.2)  


 


APTT     (22.0-30.0)  sec


 


Sodium   134 L   (137-145)  mmol/L


 


Potassium   2.9 L   (3.5-5.1)  mmol/L


 


Chloride   89 L   ()  mmol/L


 


Carbon Dioxide   31 H   (22-30)  mmol/L


 


Anion Gap   14   mmol/L


 


BUN   21 H   (9-20)  mg/dL


 


Creatinine   0.99   (0.66-1.25)  mg/dL


 


Est GFR (CKD-EPI)AfAm   >90   (>60 ml/min/1.73 sqM)  


 


Est GFR (CKD-EPI)NonAf   83   (>60 ml/min/1.73 sqM)  


 


Glucose   121 H   (74-99)  mg/dL


 


Lactic Ac Sepsis Rflx    Y  


 


Plasma Lactic Acid Jose R     (0.7-2.0)  mmol/L


 


Calcium   9.1   (8.4-10.2)  mg/dL


 


Phosphorus   2.2 L   (2.5-4.5)  mg/dL


 


Magnesium   1.4 L   (1.6-2.3)  mg/dL


 


Total Bilirubin   2.8 H   (0.2-1.3)  mg/dL


 


AST   31   (17-59)  U/L


 


ALT   22   (4-49)  U/L


 


Alkaline Phosphatase   68   ()  U/L


 


Ammonia     (<30)  umol/L


 


Troponin I  0.015    (0.000-0.034)  ng/mL


 


NT-Pro-B Natriuret Pep   3980   pg/mL


 


Total Protein   8.0   (6.3-8.2)  g/dL


 


Albumin   4.6   (3.5-5.0)  g/dL


 


Lipase   45   ()  U/L














- EKG Data


-: EKG Interpreted by Me (EKG is sinus tachycardia 108  QRS 99 QTc 450)





Critical Care Time


Critical Care Time: Yes


Total Critical Care Time: 31





Disposition


Clinical Impression: 


 Dehydration, Hypomagnesemia, Alcohol withdrawal, Elevated troponin, 

Hypokalemia, DTs (delirium tremens)





Disposition: ADMITTED AS IP TO THIS HOSP


Condition: Stable


Is patient prescribed a controlled substance at d/c from ED?: No


Time of Disposition: 18:00

## 2023-07-20 NOTE — XR
EXAMINATION TYPE: XR chest 1V

 

DATE OF EXAM: 7/20/2023 8:30 PM

 

COMPARISON: Chest x-ray 1/17/2023

 

TECHNIQUE: XR chest 1V .

 

CLINICAL INDICATION:Male, 57 years old with history of htn; 

 

FINDINGS: 

Lungs/Pleura: There is no evidence of pleural effusion, focal consolidation, or pneumothorax.  Left b
asilar atelectasis.

Pulmonary vascularity: Unremarkable.

Heart/mediastinum: Cardiomediastinal silhouette is unremarkable.

Musculoskeletal: No acute osseous pathology.

 

IMPRESSION: 

No acute cardiopulmonary disease/process.

## 2023-07-21 VITALS — SYSTOLIC BLOOD PRESSURE: 128 MMHG | TEMPERATURE: 98.5 F | RESPIRATION RATE: 16 BRPM | DIASTOLIC BLOOD PRESSURE: 76 MMHG

## 2023-07-21 VITALS — HEART RATE: 64 BPM

## 2023-07-21 LAB
ALBUMIN SERPL-MCNC: 3.9 D/DL (ref 3.8–4.9)
ALBUMIN/GLOB SERPL: 1.86 RATIO (ref 1.6–3.17)
ALP SERPL-CCNC: 61 U/L (ref 41–126)
ALT SERPL-CCNC: 18 U/L (ref 10–49)
ANION GAP SERPL CALC-SCNC: 14.3 MMOL/L (ref 4–12)
ANION GAP SERPL CALC-SCNC: 9 MMOL/L
AST SERPL-CCNC: 22 U/L (ref 14–35)
BASOPHILS # BLD AUTO: 0.01 X 10*3/UL (ref 0–0.1)
BASOPHILS NFR BLD AUTO: 0.1 %
BUN SERPL-SCNC: 15.7 MG/DL (ref 9–27)
BUN SERPL-SCNC: 18 MG/DL (ref 9–20)
BUN/CREAT SERPL: 17.44 RATIO (ref 12–20)
CALCIUM SPEC-MCNC: 8.7 MG/DL (ref 8.4–10.2)
CALCIUM SPEC-MCNC: 8.9 MG/DL (ref 8.7–10.3)
CHLORIDE SERPL-SCNC: 95 MMOL/L (ref 96–109)
CHLORIDE SERPL-SCNC: 95 MMOL/L (ref 98–107)
CO2 SERPL-SCNC: 30 MMOL/L (ref 22–30)
CO2 SERPL-SCNC: 30.7 MMOL/L (ref 21.6–31.8)
EOSINOPHIL # BLD AUTO: 0.05 X 10*3/UL (ref 0.04–0.35)
EOSINOPHIL NFR BLD AUTO: 0.4 %
ERYTHROCYTE [DISTWIDTH] IN BLOOD BY AUTOMATED COUNT: 5.15 X 10*6/UL (ref 4.4–5.6)
ERYTHROCYTE [DISTWIDTH] IN BLOOD: 14 % (ref 11.5–14.5)
GLOBULIN SER CALC-MCNC: 2.1 D/DL (ref 1.6–3.3)
GLUCOSE SERPL-MCNC: 103 MG/DL (ref 74–99)
GLUCOSE SERPL-MCNC: 108 MG/DL (ref 70–110)
HCT VFR BLD AUTO: 46.6 % (ref 39.6–50)
HGB BLD-MCNC: 15.9 D/DL (ref 12–15)
IMM GRANULOCYTES BLD QL AUTO: 0.5 %
LYMPHOCYTES # SPEC AUTO: 2.39 X 10*3/UL (ref 0.9–5)
LYMPHOCYTES NFR SPEC AUTO: 19.1 %
MAGNESIUM SPEC-SCNC: 1.8 MG/DL (ref 1.5–2.4)
MCH RBC QN AUTO: 30.9 PG (ref 27–32)
MCHC RBC AUTO-ENTMCNC: 34.1 D/DL (ref 32–37)
MCV RBC AUTO: 90.5 FL (ref 80–97)
MONOCYTES # BLD AUTO: 1.17 X 10*3/UL (ref 0.2–1)
MONOCYTES NFR BLD AUTO: 9.4 %
NEUTROPHILS # BLD AUTO: 8.83 X 10*3/UL (ref 1.8–7.7)
NEUTROPHILS NFR BLD AUTO: 70.5 %
NRBC BLD AUTO-RTO: 0 X 10*3/UL (ref 0–0.01)
PLATELET # BLD AUTO: 119 X 10*3/UL (ref 140–440)
POTASSIUM SERPL-SCNC: 3.2 MMOL/L (ref 3.5–5.5)
POTASSIUM SERPL-SCNC: 3.5 MMOL/L (ref 3.5–5.1)
PROT SERPL-MCNC: 6 D/DL (ref 6.2–8.2)
SODIUM SERPL-SCNC: 134 MMOL/L (ref 137–145)
SODIUM SERPL-SCNC: 140 MMOL/L (ref 135–145)
WBC # BLD AUTO: 12.51 X 10*3/UL (ref 4.5–10)

## 2023-07-21 RX ADMIN — POTASSIUM CHLORIDE SCH: 14.9 INJECTION, SOLUTION INTRAVENOUS at 18:10

## 2023-07-21 RX ADMIN — IOPAMIDOL PRN ML: 612 INJECTION, SOLUTION INTRAVENOUS at 17:07

## 2023-07-21 RX ADMIN — POTASSIUM CHLORIDE SCH MLS/HR: 7.46 INJECTION, SOLUTION INTRAVENOUS at 01:00

## 2023-07-21 RX ADMIN — IOPAMIDOL PRN ML: 612 INJECTION, SOLUTION INTRAVENOUS at 16:22

## 2023-07-21 NOTE — CT
EXAMINATION TYPE: CT ChestAbdPelvis w con

CT DLP: 638.1 mGycm, Automated exposure control for dose reduction was used.

 

DATE OF EXAM: 7/21/2023 6:02 PM

 

COMPARISON: 1/20/2023, 1/6/2023 

 

CLINICAL INDICATION:Male, 57 years old with history of abd pain; PHH, Pain, N/V

 

Technique: Multiple axial images of the chest, abdomen, and pelvis were obtained. Two-dimensional cor
onal and sagittal reconstructions were obtained. 

Contrast used:100ML mL of Isovue 300 with IV Contrast, 

Oral contrast used: with Oral Contrast 

 

Findings: 

 

CHEST:

LUNGS/ PLEURA: No focal consolidation, pneumothorax or effusion. Few scattered calcified granulomas n
oted. No greater than 4 mm pulmonary nodules visualized.

AIRWAY: Patent and unremarkable.

HEART: Size within normal limits. Atherosclerosis of the coronary arteries.

MEDIASTINUM: No gross evidence of adenopathy.

VASCULATURE:  No aortic aneurysm. 

MUSCULOSKELETAL: No acute osseous abnormalities. Post fixation changes to the lower cervical and uppe
r thoracic spine. Hardware appears intact. Remote side left rib fractures noted.

SOFT TISSUES/LYMPH NODES: Unremarkable.

LOWER NECK: No significant findings.

 

ABDOMEN:

ABDOMEN

LIVER: Diffusely hypoattenuating parenchyma.

GALLBLADDER AND BILE DUCTS: The gallbladder is surgically absent.

PANCREAS: Unremarkable.

SPLEEN: Unremarkable.

ADRENAL GLANDS: Unremarkable.

KIDNEYS AND URETERS: No evidence of hydronephrosis or renal calculus. The ureters are unremarkable.  


 

PELVIS

BLADDER: Unremarkable

REPRODUCTIVE: Unremarkable.

 

ABDOMEN & PELVIS

STOMACH AND BOWEL: No evidence of bowel obstruction. Few scattered colonic diverticula are present.

PERITONEUM: No evidence of pneumoperitoneum or free fluid.

 

VASCULATURE: Mild atherosclerotic calcifications are present throughout the abdominal aorta and its b
ranches. 

MUSCULOSKELETAL: No acute osseous abnormalities. Mild disc degeneration changes are present throughou
t the thoracolumbar spine.

LYMPH NODES: No gross evidence for lymphadenopathy.

SOFT TISSUE/ABDOMINAL WALL: Fat-containing hernias bilaterally. Fat-containing umbilical hernia.

 

IMPRESSION: 

1.  No evidence for acute thoracic or abdominal/pelvic process.

2.  Hepatic steatosis.

3.  Fat-containing umbilical and bilateral inguinal hernias.

4.  Colonic diverticulosis.

## 2023-07-21 NOTE — HP
HISTORY AND PHYSICAL



Past medical history of hypertension, dyslipidemia, GERD, paroxysmal atrial fib,

chronic alcohol abuse, meningitis. He has a followup with _____ cardiologist.  He has

elevated white count with left shift. We are going to get Infectious Disease to see him

to rule out any kind of infection.  He has had some nausea, vomiting.  Denies any

diarrhea.  No atypical chest pain.  No shortness of breath.  He is an alcoholic.  He

might be going through alcohol withdrawal per his family.  EKG shows sinus tach.  Chest

x-ray is negative.  White count was 12, hemoglobin of 15.9, platelets 119.  Sodium 140,

potassium 3.2, creatinine 0.9.  Troponins are negative.  BNP is 3980.  Cardiology has

been consulted.  Home medicines include metoprolol and losartan.  Recent echo on

January 2023 showed ejection fraction of 55% to 60%, pulmonary hypertension, trace

aortic regurg, tricuspid regurg.



Otherwise, 14-point review of systems is negative.



PHYSICAL EXAMINATION:

VITAL SIGNS:  Reviewed.

HEENT:  Normocephalic, atraumatic.

LUNGS:  Decreased breath sounds.

HEART:  S1, S2.

ABDOMEN:  Soft.

EXTREMITIES:  No cyanosis, clubbing or edema.

NEUROLOGIC:  Cranial nerves intact.



Nausea, vomiting, hyperkalemia, thrombocytopenia, hypomagnesemia, elevated BNP,

pulmonary hypertension, hypertension, dyslipidemia, paroxysmal atrial fib, history of

cardioversion, history of meningitis, marijuana use, anxiety.  Echo, BNP, alcohol, CIWA

protocol.  Elevated white count.  Dr. Fierro consulted.  Prognosis is guarded.  Please

see further orders.





MMODL / IJN: [id number] / Job#: [id number]

## 2023-07-21 NOTE — P.CRDCN
History of Present Illness


History of present illness: 





HISTORY OF PRESENT ILLNESS:  





This is a 57-year-old male with a past medical history significant for 

hypertension, hyperlipidemia, GERD, paroxysmal atrial fibrillation, frequent 

alcohol use, and meningitis. Patient does not follow with a cardiologist. We 

have been asked to see the patient in consultation for elevated BNP. Patient 

examined at the bedside.  Patient states he was feeling in his usual state of 

health until Monday.  Patient states that on Monday he began to feel unwell 

after going for a long walk.  He states that he became nauseated and has been 

having multiple episodes of vomiting throughout the week.  He denies any 

diarrhea.  He denies any fever at home.  He denied having any chest pain or 

shortness of breath.  He states that he has been having significant anxiety this

week.  The patient states his last drink was on Sunday night and reports having 

2 drinks.  The patient states he only treats occasionally however there is 

documentation of previous alcohol abuse.  The patient also reports frequent 

marijuana use.





* EKG reveals sinus tachycardia with no signs of acute ischemia


* Chest xray no acute cardiopulmonary process


* Laboratory data: WBC 12.51.  Hemoglobin 15.9.  Platelet count 119.  Sodium 

  140.  Potassium 3.2.  BUN 15.  Creatinine 0.90.  Troponin negative 2.  ProBNP

  3980.


* Current home cardiac medications include pravastatin 80 mg at night and 

  metoprolol succinate 50 mg at night


* Most recent echocardiogram obtained in January 2023 revealed ejection fraction

  55-60%, mild pulmonary hypertension, mild mitral regurgitation, trace aortic 

  regurgitation, and mild tricuspid regurgitation





REVIEW OF SYSTEMS: 


At the time of my exam:


CONSTITUTIONAL: Denies fever or chills.


HEENT: Denies blurred vision, vision changes, or eye pain. Denies hemoptysis 


CARDIOVASCULAR: Denies chest pain. Denies orthopnea. Denies PND. Denies 

palpitations


RESPIRATORY: Denies shortness of breath. 


GASTROINTESTINAL: Denies abdominal pain. Denies nausea or vomiting. 


HEMATOLOGIC: Denies bleeding disorders.


GENITOURINARY:  Denies any blood in urine.


SKIN: Denies pruitis. Denies rash.





PHYSICAL EXAM: 


VITAL SIGNS: Reviewed.


GENERAL: Well-developed in no acute distress. 


HEENT: Head is normocephalic. Pupils are equal, round. Sclerae anicteric. Mucous

membranes of the mouth are moist. Neck supple. No JVD or thyromegaly


LUNGS: Respirations even and unlabored. Lungs essentially clear to auscultation 

bilaterally.


HEART: Regular rate and rhythm.  S1 and S2 heard.


ABDOMEN: Soft. Nondistended. Nontender.


EXTREMITIES: Normal range of motion.  No clubbing or cyanosis.  Peripheral 

pulses intact.  No lower extremity edema


NEUROLOGIC: Awake and alert. Oriented x 3. 





ASSESSMENT: 


Nausea and vomiting


Hypokalemia


Thrombocytopenia


Hypomagnesemia


Elevated BNP of unclear significance, patient appears euvolemic with no signs of

acute heart failure


Hypertension


Hyperlipidemia


Paroxysmal atrial fibrillation, not on anticoagulation due to [id number] of 1 

(hx of htn)


History of cardioversion, January 2023


History of meningitis requiring mechanical ventilation, January 2023


Frequent alcohol use with documented history of alcohol abuse


Marijuana use


Anxiety





PLAN: 


Obtain 2-D echo to assess cardiac structure and function


Despite elevated BNP patient is euvolemic upon examination with no signs of 

acute heart failure.  No indication for diuretics at this time


Abstinence from alcohol and marijuana recommended


Further recommendations pending patient's course





Nurse practitioner note has been reviewed by physician. Signing provider agrees 

with the documented findings, assessment, and plan of care. 





Dr. Meneses's Addendum





I agree with above plan.  I will add aspirin 81 mg due to his [id number] score

1.  (Hypertension).





I have personally seen and examined the patient. I have personally performed all

the components of medical care documented above including detailed hisotry, 

review of system, physican exam, MDM and formulating the assessment and plan. I 

have personally reviewed the relevant labs, imaging and other diagnostics. I 

have discussed this in detail with my NP who has helped me with this d

ocumentation. I have carefully reviewed this document before finalizing. 





Total time spent reviewing medical chart, examining patient, counselling patient

and documentation [45] mins





Thank you for letting cardiology team participating in this patient's care. 





Dr. Blake Meneses MD


Cardiovascular Disease 





Past Medical History


Past Medical History: Hypertension


History of Any Multi-Drug Resistant Organisms: None Reported


Past Surgical History: Appendectomy, Orthopedic Surgery, Tonsillectomy


Additional Past Surgical History / Comment(s): surgery for brain bleed 2019 or 

2020 (family unsure)


Past Psychological History: No Psychological Hx Reported


Smoking Status: Never smoker


Past Alcohol Use History: Daily


Past Drug Use History: None Reported, Marijuana





- Past Family History


  ** family


Family Medical History: Cancer


Additional Family Medical History / Comment(s): no CAD





Medications and Allergies


                                Home Medications











 Medication  Instructions  Recorded  Confirmed  Type


 


Metoprolol Succinate (ER) [Toprol 50 mg PO HS 07/20/23 07/20/23 History





Xl]    


 


Omeprazole [PriLOSEC] 20 mg PO HS 07/20/23 07/20/23 History


 


Pravastatin Sodium [Pravachol] 80 mg PO HS 07/20/23 07/20/23 History


 


methocarbamoL [Methocarbamol] 750 mg PO TID PRN 07/20/23 07/20/23 History








                                    Allergies











Allergy/AdvReac Type Severity Reaction Status Date / Time


 


Penicillins Allergy  Unknown Verified 07/20/23 17:20





   Childhood  














Physical Exam


Vitals: 


                                   Vital Signs











  Temp Pulse Resp BP Pulse Ox


 


 07/21/23 07:00   54 L  16  123/75  97


 


 07/21/23 03:45   66  16  114/75  97


 


 07/21/23 01:00   64  15  121/79  96


 


 07/20/23 22:00   65  16  154/91  95


 


 07/20/23 18:35   106 H  20   97


 


 07/20/23 17:49  99.4 F  89  19  145/85  95


 


 07/20/23 14:40  98.7 F  75  16  153/100  96














Results





                                 07/21/23 03:55





                                 07/21/23 09:16


                                 Cardiac Enzymes











  07/20/23 07/20/23 07/20/23 Range/Units





  16:33 16:49 19:41 


 


AST   31   (17-59)  U/L


 


Troponin I  0.015   0.016  (0.000-0.034)  ng/mL














  07/20/23 Range/Units





  23:01 


 


AST   (17-59)  U/L


 


Troponin I  0.013  (0.000-0.034)  ng/mL








                                   Coagulation











  07/20/23 Range/Units





  16:33 


 


PT  11.0  (9.0-12.0)  sec


 


APTT  22.3  (22.0-30.0)  sec








                                       CBC











  07/20/23 07/21/23 Range/Units





  16:33 03:55 


 


WBC  14.6 H  12.51 H  (3.8-10.6)  k/uL


 


RBC  5.67  5.15  (4.30-5.90)  m/uL


 


Hgb  17.5  15.9 H  (13.0-17.5)  gm/dL


 


Hct  51.4  46.6  (39.0-53.0)  %


 


Plt Count  148 L  119 L  (150-450)  k/uL








                          Comprehensive Metabolic Panel











  07/20/23 Range/Units





  16:49 


 


Sodium  134 L  (137-145)  mmol/L


 


Potassium  2.9 L  (3.5-5.1)  mmol/L


 


Chloride  89 L  ()  mmol/L


 


Carbon Dioxide  31 H  (22-30)  mmol/L


 


BUN  21 H  (9-20)  mg/dL


 


Creatinine  0.99  (0.66-1.25)  mg/dL


 


Glucose  121 H  (74-99)  mg/dL


 


Calcium  9.1  (8.4-10.2)  mg/dL


 


AST  31  (17-59)  U/L


 


ALT  22  (4-49)  U/L


 


Alkaline Phosphatase  68  ()  U/L


 


Total Protein  8.0  (6.3-8.2)  g/dL


 


Albumin  4.6  (3.5-5.0)  g/dL








                               Current Medications











Generic Name Dose Route Start Last Admin





  Trade Name Piyushq  PRN Reason Stop Dose Admin


 


Diazepam  5 mg  07/20/23 18:15  07/21/23 01:00





  Diazepam 5 Mg/Ml 2 Ml Inj  IVP   5 mg





  Q6H PORTIA   Administration


 


Dextrose/Sodium Chloride  1,000 mls @ 20 mls/hr  07/20/23 18:15  07/20/23 18:58





  Dextrose 5%-1/2ns Iv Soln  IV   20 mls/hr





  .Q24H PORTIA   Administration


 


Lorazepam  1 mg  07/20/23 18:13 





  Lorazepam 2 Mg/Ml Inj  IV  





  Q1HR PRN  





  CIWA 10 to 15  


 


Lorazepam  1 mg  07/20/23 18:13 





  Lorazepam 2 Mg/Ml Inj  IV  





  Q2HR PRN  





  CIWA 8 or 9  


 


Lorazepam  2 mg  07/20/23 18:13 





  Lorazepam 2 Mg/Ml Inj  IV  07/22/23 18:13 





  Q10M PRN  





  CIWA 16 or higher  


 


Metoprolol Succinate  50 mg  07/20/23 21:00  07/20/23 23:24





  Metoprolol Succinate (Er) 50 Mg Tab.Er.24h  PO   50 mg





  HS PORTIA   Administration


 


Morphine Sulfate  4 mg  07/20/23 18:13 





  Morphine Sulfate 4 Mg/Ml Syringe  IV  





  Q4HR PRN  





  Severe Pain (Scale 7 to 10)  


 


Naloxone HCl  0.2 mg  07/20/23 18:13 





  Naloxone 0.4 Mg/Ml 1 Ml Vial  IV  





  Q2M PRN  





  Opioid Reversal  


 


Ondansetron HCl  4 mg  07/20/23 18:13 





  Ondansetron 4 Mg/2 Ml Vial  IVP  





  Q8HR PRN  





  Nausea And Vomiting  


 


Pantoprazole Sodium  40 mg  07/21/23 09:00 





  Pantoprazole 40 Mg/10 Ml Vial  IV  





  DAILY PORTIA  


 


Pravastatin Sodium  80 mg  07/20/23 21:00  07/20/23 23:24





  Pravastatin Sodium 80 Mg Tab  PO   80 mg





  HS PORTIA   Administration


 


Thiamine HCl  100 mg  07/21/23 09:00 





  Thiamine 100 Mg Tab  PO  





  DAILY PORTIA  








                                        





                                 07/21/23 03:55 





                                 07/20/23 16:49

## 2025-06-22 ENCOUNTER — HOSPITAL ENCOUNTER (INPATIENT)
Dept: HOSPITAL 47 - EC | Age: 60
LOS: 2 days | Discharge: HOME | DRG: 392 | End: 2025-06-24
Attending: INTERNAL MEDICINE | Admitting: INTERNAL MEDICINE
Payer: COMMERCIAL

## 2025-06-22 VITALS — RESPIRATION RATE: 18 BRPM

## 2025-06-22 DIAGNOSIS — Z86.73: ICD-10-CM

## 2025-06-22 DIAGNOSIS — I27.20: ICD-10-CM

## 2025-06-22 DIAGNOSIS — E87.20: ICD-10-CM

## 2025-06-22 DIAGNOSIS — Z79.899: ICD-10-CM

## 2025-06-22 DIAGNOSIS — G47.9: ICD-10-CM

## 2025-06-22 DIAGNOSIS — E87.6: ICD-10-CM

## 2025-06-22 DIAGNOSIS — N17.9: ICD-10-CM

## 2025-06-22 DIAGNOSIS — A08.4: Primary | ICD-10-CM

## 2025-06-22 DIAGNOSIS — I48.0: ICD-10-CM

## 2025-06-22 DIAGNOSIS — M54.9: ICD-10-CM

## 2025-06-22 DIAGNOSIS — D69.59: ICD-10-CM

## 2025-06-22 DIAGNOSIS — I10: ICD-10-CM

## 2025-06-22 DIAGNOSIS — R00.1: ICD-10-CM

## 2025-06-22 DIAGNOSIS — E86.0: ICD-10-CM

## 2025-06-22 DIAGNOSIS — M54.2: ICD-10-CM

## 2025-06-22 DIAGNOSIS — E78.00: ICD-10-CM

## 2025-06-22 DIAGNOSIS — E83.42: ICD-10-CM

## 2025-06-22 DIAGNOSIS — F10.20: ICD-10-CM

## 2025-06-22 DIAGNOSIS — Z79.82: ICD-10-CM

## 2025-06-22 DIAGNOSIS — Z88.0: ICD-10-CM

## 2025-06-22 DIAGNOSIS — G89.29: ICD-10-CM

## 2025-06-22 DIAGNOSIS — K29.20: ICD-10-CM

## 2025-06-22 DIAGNOSIS — R17: ICD-10-CM

## 2025-06-22 DIAGNOSIS — I5A: ICD-10-CM

## 2025-06-22 LAB
ALBUMIN SERPL-MCNC: 5.4 G/DL (ref 3.5–5)
ALP SERPL-CCNC: 94 U/L (ref 38–126)
ALT SERPL-CCNC: 41 U/L (ref 4–49)
AMORPH SED URNS QL MICRO: (no result) /HPF
AMYLASE SERPL-CCNC: 81 U/L (ref 30–110)
ANION GAP SERPL CALC-SCNC: 22 MMOL/L
ANISOCYTOSIS BLD QL SMEAR: (no result)
APTT BLD: 22.2 SEC (ref 22–30)
AST SERPL-CCNC: 52 U/L (ref 17–59)
BACTERIA UR QL AUTO: (no result) /HPF
BASO STIPL BLD QL SMEAR: (no result)
BASOPHILS # BLD AUTO: 0.06 10*3/UL (ref 0–0.1)
BASOPHILS NFR BLD AUTO: 0.3 %
BILIRUB BLD-MCNC: 3.8 MG/DL (ref 0.2–1.3)
BROAD CASTS [PRESENCE] IN URINE BY COMPUTER ASSISTED METHOD: (no result) /LPF
BUN SERPL-SCNC: 25 MG/DL (ref 9–20)
BURR CELLS BLD QL SMEAR: (no result)
CA CARBONATE CRY #/AREA URNS HPF: (no result) /HPF
CA PHOS CRY UR QL COMP ASSIST: (no result) /HPF
CALCIUM SPEC-MCNC: 9.9 MG/DL (ref 8.4–10.2)
CAOX CRY UR QL COMP ASSIST: (no result) /HPF
CASTS URNS QL MICRO: (no result) /LPF
CHLORIDE SERPL-SCNC: 83 MMOL/L (ref 98–107)
CO2 SERPL-SCNC: 30 MMOL/L (ref 22–30)
CREATININE: 1.28 MG/DL (ref 0.66–1.25)
CRYSTALS UR QL: (no result) /HPF
CYSTINE CRY #/AREA URNS HPF: (no result) /HPF
DACRYOCYTES BLD QL SMEAR: (no result)
DOHLE BOD BLD QL SMEAR: (no result)
EOSINOPHIL # BLD AUTO: 0.01 10*3/UL (ref 0.04–0.35)
EOSINOPHIL NFR BLD AUTO: 0 %
EPITHELIAL CASTS [PRESENCE] IN URINE BY COMPUTER ASSISTED METHOD: (no result) /LPF
ERYTHROCYTE CLUMPS [PRESENCE] IN URINE BY COMPUTER ASSISTED METHOD: (no result) /HPF
ERYTHROCYTE [DISTWIDTH] IN BLOOD BY AUTOMATED COUNT: 6.35 10*6/UL (ref 4.4–5.6)
ERYTHROCYTE [DISTWIDTH] IN BLOOD: 12.9 % (ref 11.5–14.5)
FATTY CASTS #/AREA UR COMP ASSIST: (no result) /LPF
GLUCOSE SERPL-MCNC: 126 MG/DL (ref 74–99)
GRAN CASTS UR QL COMP ASSIST: (no result) /LPF
HCT VFR BLD AUTO: 54.5 % (ref 39.6–50)
HGB BLD-MCNC: 19 G/DL (ref 13–17)
HOWELL-JOLLY BOD BLD QL SMEAR: (no result)
HYALINE CASTS UR QL AUTO: (no result) /LPF (ref 0–2)
HYPOCHROMIA BLD QL SMEAR: (no result)
IMM GRANULOCYTES # BLD: 0.23 10*3/UL (ref 0–0.04)
INR PPP: 1.1 (ref ?–1.2)
LEUCINE CRYSTALS [PRESENCE] IN URINE BY COMPUTER ASSISTED METHOD: (no result) /HPF
LG PLATELETS BLD QL SMEAR: (no result)
LYMPHOCYTES # SPEC AUTO: 1.6 10*3/UL (ref 0.9–5)
LYMPHOCYTES NFR SPEC AUTO: 7.1 %
Lab: (no result)
MCH RBC QN AUTO: 29.9 PG (ref 27–32)
MCHC RBC AUTO-ENTMCNC: 34.9 G/DL (ref 32–37)
MCV RBC AUTO: 85.8 FL (ref 80–97)
MIXED CELL CASTS UR QL COMP ASSIST: (no result) /LPF
MONOCYTES # BLD AUTO: 1.74 10*3/UL (ref 0.2–1)
MONOCYTES NFR BLD AUTO: 7.7 %
MUCOUS THREADS UR QL AUTO: (no result) /HPF
NEUTROPHILS # BLD AUTO: 18.82 10*3/UL (ref 1.8–7.7)
NEUTROPHILS NFR BLD AUTO: 83.9 %
NEUTS HYPERSEG # BLD: (no result) 10*3/UL
NRBC BLD AUTO-RTO: (no result) %
OVAL FAT BODIES #/AREA UR COMP ASSIST: (no result) /HPF
OVALOCYTES BLD QL SMEAR: (no result)
PELGER HUET CELLS BLD QL SMEAR: (no result)
PLATELET # BLD AUTO: 174 10*3/UL (ref 140–440)
PLATELETS.RETICULATED NFR BLD AUTO: (no result) %
PMV BLD AUTO: 12.3 FL (ref 9.5–12.2)
POIKILOCYTOSIS BLD QL SMEAR: (no result)
POIKILOCYTOSIS BLD QL SMEAR: (no result)
POLYCHROMASIA BLD QL SMEAR: (no result)
POTASSIUM SERPL-SCNC: 2.7 MMOL/L (ref 3.5–5.1)
PROT SERPL-MCNC: 8.9 G/DL (ref 6.3–8.2)
PT BLD: 12.3 SEC (ref 10–12.5)
RBC CASTS UR QL COMP ASSIST: (no result) /LPF
RBC MORPH BLD: (no result)
RBC UR QL: (no result) /HPF (ref 0–5)
RENAL EPI CELLS UR QL COMP ASSIST: (no result) /HPF
ROULEAUX BLD QL SMEAR: (no result)
SCHISTOCYTES BLD QL SMEAR: (no result)
SICKLE CELLS BLD QL SMEAR: (no result)
SODIUM SERPL-SCNC: 135 MMOL/L (ref 137–145)
SPERM # UR AUTO: (no result) /HPF
SPHEROCYTES BLD QL SMEAR: (no result)
SQUAMOUS UR QL AUTO: (no result) /HPF (ref 0–4)
STOMATOCYTES BLD QL SMEAR: (no result)
TARGETS BLD QL SMEAR: (no result)
TOXIC GRANULES BLD QL SMEAR: (no result)
TRANS CELLS UR QL COMP ASSIST: (no result) /HPF (ref 0–1)
TRI-PHOS CRY UR QL COMP ASSIST: (no result) /HPF
TRICHOMONAS UR QL COMP ASSIST: (no result) /HPF
TYROSINE CRYSTALS [PRESENCE] IN URINE BY COMPUTER ASSISTED METHOD: (no result) /HPF
URATE CRY UR QL COMP ASSIST: (no result) /HPF
VARIANT LYMPHS BLD QL SMEAR: (no result)
WAXY CASTS #/AREA UR COMP ASSIST: (no result) /LPF
WBC # BLD AUTO: 22.46 10*3/UL (ref 4.5–10)
WBC # UR AUTO: (no result) /HPF (ref 0–5)
WBC CASTS #/AREA URNS LPF: (no result) /LPF
WBC CLUMPS UR QL AUTO: (no result) /HPF
WBC NRBC COR # BLD: (no result) K/UL
WBC TOXIC VACUOLES BLD QL SMEAR: (no result)
YEAST BUDDING UR QL COMP ASSIST: (no result) /HPF
YEAST HYPHAE UR QL COMP ASSIST: (no result) /HPF

## 2025-06-22 PROCEDURE — 80048 BASIC METABOLIC PNL TOTAL CA: CPT

## 2025-06-22 PROCEDURE — 99291 CRITICAL CARE FIRST HOUR: CPT

## 2025-06-22 PROCEDURE — 87040 BLOOD CULTURE FOR BACTERIA: CPT

## 2025-06-22 PROCEDURE — 71046 X-RAY EXAM CHEST 2 VIEWS: CPT

## 2025-06-22 PROCEDURE — 96367 TX/PROPH/DG ADDL SEQ IV INF: CPT

## 2025-06-22 PROCEDURE — 83605 ASSAY OF LACTIC ACID: CPT

## 2025-06-22 PROCEDURE — 85610 PROTHROMBIN TIME: CPT

## 2025-06-22 PROCEDURE — 85025 COMPLETE CBC W/AUTO DIFF WBC: CPT

## 2025-06-22 PROCEDURE — 96365 THER/PROPH/DIAG IV INF INIT: CPT

## 2025-06-22 PROCEDURE — 81003 URINALYSIS AUTO W/O SCOPE: CPT

## 2025-06-22 PROCEDURE — 96361 HYDRATE IV INFUSION ADD-ON: CPT

## 2025-06-22 PROCEDURE — 85730 THROMBOPLASTIN TIME PARTIAL: CPT

## 2025-06-22 PROCEDURE — 84484 ASSAY OF TROPONIN QUANT: CPT

## 2025-06-22 PROCEDURE — 93306 TTE W/DOPPLER COMPLETE: CPT

## 2025-06-22 PROCEDURE — 83735 ASSAY OF MAGNESIUM: CPT

## 2025-06-22 PROCEDURE — 96376 TX/PRO/DX INJ SAME DRUG ADON: CPT

## 2025-06-22 PROCEDURE — 85027 COMPLETE CBC AUTOMATED: CPT

## 2025-06-22 PROCEDURE — 96375 TX/PRO/DX INJ NEW DRUG ADDON: CPT

## 2025-06-22 PROCEDURE — 93005 ELECTROCARDIOGRAM TRACING: CPT

## 2025-06-22 PROCEDURE — 36415 COLL VENOUS BLD VENIPUNCTURE: CPT

## 2025-06-22 PROCEDURE — 82150 ASSAY OF AMYLASE: CPT

## 2025-06-22 PROCEDURE — 80053 COMPREHEN METABOLIC PANEL: CPT

## 2025-06-22 PROCEDURE — 96366 THER/PROPH/DIAG IV INF ADDON: CPT

## 2025-06-22 PROCEDURE — 96372 THER/PROPH/DIAG INJ SC/IM: CPT

## 2025-06-22 PROCEDURE — 96368 THER/DIAG CONCURRENT INF: CPT

## 2025-06-22 RX ADMIN — HYDROMORPHONE HYDROCHLORIDE STA MG: 1 INJECTION, SOLUTION INTRAMUSCULAR; INTRAVENOUS; SUBCUTANEOUS at 18:12

## 2025-06-22 RX ADMIN — POTASSIUM CHLORIDE ONE MLS/HR: 14.9 INJECTION, SOLUTION INTRAVENOUS at 20:03

## 2025-06-22 RX ADMIN — POTASSIUM CHLORIDE STA MLS/HR: 14.9 INJECTION, SOLUTION INTRAVENOUS at 23:58

## 2025-06-22 RX ADMIN — ONDANSETRON STA: 2 INJECTION INTRAMUSCULAR; INTRAVENOUS at 18:16

## 2025-06-22 RX ADMIN — MAGNESIUM SULFATE IN DEXTROSE SCH MLS/HR: 10 INJECTION, SOLUTION INTRAVENOUS at 21:37

## 2025-06-22 RX ADMIN — CEFAZOLIN SCH MLS/HR: 330 INJECTION, POWDER, FOR SOLUTION INTRAMUSCULAR; INTRAVENOUS at 21:45

## 2025-06-22 RX ADMIN — POTASSIUM CHLORIDE SCH MLS/HR: 14.9 INJECTION, SOLUTION INTRAVENOUS at 20:31

## 2025-06-22 RX ADMIN — CEFAZOLIN SCH MLS/HR: 330 INJECTION, POWDER, FOR SOLUTION INTRAMUSCULAR; INTRAVENOUS at 17:53

## 2025-06-22 RX ADMIN — ACETAMINOPHEN STA MLS/HR: 10 INJECTION, SOLUTION INTRAVENOUS at 17:54

## 2025-06-22 RX ADMIN — POTASSIUM CHLORIDE STA MEQ: 20 TABLET, EXTENDED RELEASE ORAL at 21:19

## 2025-06-22 RX ADMIN — SODIUM CHLORIDE STA MG: 900 INJECTION, SOLUTION INTRAVENOUS at 21:38

## 2025-06-22 RX ADMIN — ORPHENADRINE CITRATE STA MG: 30 INJECTION, SOLUTION INTRAMUSCULAR; INTRAVENOUS at 20:25

## 2025-06-22 RX ADMIN — HYDROMORPHONE HYDROCHLORIDE STA MG: 1 INJECTION, SOLUTION INTRAMUSCULAR; INTRAVENOUS; SUBCUTANEOUS at 20:29

## 2025-06-22 RX ADMIN — ONDANSETRON STA MG: 2 INJECTION INTRAMUSCULAR; INTRAVENOUS at 18:08

## 2025-06-22 RX ADMIN — FAMOTIDINE STA MG: 10 INJECTION, SOLUTION INTRAVENOUS at 18:04

## 2025-06-23 LAB
ANION GAP SERPL CALC-SCNC: 9 MMOL/L
APPEARANCE UR: CLEAR
BILIRUB UR QL CFM: (no result)
BILIRUB UR QL STRIP.AUTO: NEGATIVE
BUN SERPL-SCNC: 19 MG/DL (ref 9–20)
CALCIUM SPEC-MCNC: 8.5 MG/DL (ref 8.4–10.2)
CHLORIDE SERPL-SCNC: 96 MMOL/L (ref 98–107)
CO2 SERPL-SCNC: 31 MMOL/L (ref 22–30)
COLOR UR: YELLOW
CREATININE: 0.71 MG/DL (ref 0.66–1.25)
ERYTHROCYTE [DISTWIDTH] IN BLOOD BY AUTOMATED COUNT: 4.95 10*6/UL (ref 4.4–5.6)
ERYTHROCYTE [DISTWIDTH] IN BLOOD: 13.7 % (ref 11.5–14.5)
GLUCOSE SERPL-MCNC: 98 MG/DL (ref 74–99)
GLUCOSE UR QL: NEGATIVE
HCT VFR BLD AUTO: 43 % (ref 39.6–50)
HGB BLD-MCNC: 14.7 G/DL (ref 13–17)
KETONES UR QL STRIP.AUTO: (no result)
LEUKOCYTE ESTERASE UR QL STRIP.AUTO: NEGATIVE
MAGNESIUM SPEC-SCNC: 1.9 MG/DL (ref 1.6–2.3)
MCH RBC QN AUTO: 29.7 PG (ref 27–32)
MCHC RBC AUTO-ENTMCNC: 34.2 G/DL (ref 32–37)
MCV RBC AUTO: 86.9 FL (ref 80–97)
NITRITE UR QL STRIP.AUTO: NEGATIVE
PH UR: 6.5 [PH] (ref 5–8)
PLATELET # BLD AUTO: 111 10*3/UL (ref 140–440)
PLATELETS.RETICULATED NFR BLD AUTO: (no result) %
PLATELETS.RETICULATED NFR BLD AUTO: (no result) %
PMV BLD AUTO: 11.9 FL (ref 9.5–12.2)
POTASSIUM SERPL-SCNC: 3 MMOL/L (ref 3.5–5.1)
PROT UR QL SSA: (no result)
PROT UR QL: (no result)
RBC UR QL: NEGATIVE
SODIUM SERPL-SCNC: 136 MMOL/L (ref 137–145)
SP GR UR: 1.02 (ref 1–1.03)
UROBILINOGEN UR QL STRIP: 2 MG/DL (ref ?–2)
WBC # BLD AUTO: 11.54 10*3/UL (ref 4.5–10)

## 2025-06-23 RX ADMIN — LORAZEPAM PRN MG: 1 TABLET ORAL at 12:46

## 2025-06-23 RX ADMIN — HYDROMORPHONE HYDROCHLORIDE PRN MG: 1 INJECTION, SOLUTION INTRAMUSCULAR; INTRAVENOUS; SUBCUTANEOUS at 02:02

## 2025-06-23 RX ADMIN — ONDANSETRON PRN MG: 2 INJECTION INTRAMUSCULAR; INTRAVENOUS at 08:57

## 2025-06-23 RX ADMIN — THERA TABS SCH EACH: TAB at 12:32

## 2025-06-23 RX ADMIN — FOLIC ACID SCH MG: 1 TABLET ORAL at 12:32

## 2025-06-23 RX ADMIN — POTASSIUM CHLORIDE STA MEQ: 20 TABLET, EXTENDED RELEASE ORAL at 14:04

## 2025-06-23 RX ADMIN — ASPIRIN 81 MG CHEWABLE TABLET SCH MG: 81 TABLET CHEWABLE at 20:29

## 2025-06-23 RX ADMIN — Medication SCH MG: at 12:32

## 2025-06-23 RX ADMIN — PANTOPRAZOLE SODIUM SCH MG: 40 INJECTION, POWDER, FOR SOLUTION INTRAVENOUS at 12:33

## 2025-06-23 RX ADMIN — METRONIDAZOLE STA MLS/HR: 500 INJECTION, SOLUTION INTRAVENOUS at 00:06

## 2025-06-23 RX ADMIN — LORAZEPAM PRN MG: 0.5 TABLET ORAL at 20:30

## 2025-06-23 RX ADMIN — ATORVASTATIN CALCIUM SCH MG: 10 TABLET, FILM COATED ORAL at 20:29

## 2025-06-23 RX ADMIN — ENOXAPARIN SODIUM SCH MG: 40 INJECTION SUBCUTANEOUS at 12:32

## 2025-06-24 VITALS — SYSTOLIC BLOOD PRESSURE: 137 MMHG | DIASTOLIC BLOOD PRESSURE: 72 MMHG | HEART RATE: 60 BPM | TEMPERATURE: 98.4 F

## 2025-06-24 LAB
ANION GAP SERPL CALC-SCNC: 5 MMOL/L
BUN SERPL-SCNC: 19 MG/DL (ref 9–20)
CALCIUM SPEC-MCNC: 8.2 MG/DL (ref 8.4–10.2)
CHLORIDE SERPL-SCNC: 103 MMOL/L (ref 98–107)
CO2 SERPL-SCNC: 29 MMOL/L (ref 22–30)
CREATININE: 0.71 MG/DL (ref 0.66–1.25)
ERYTHROCYTE [DISTWIDTH] IN BLOOD BY AUTOMATED COUNT: 4.6 10*6/UL (ref 4.4–5.6)
ERYTHROCYTE [DISTWIDTH] IN BLOOD: 13.5 % (ref 11.5–14.5)
GLUCOSE SERPL-MCNC: 88 MG/DL (ref 74–99)
HCT VFR BLD AUTO: 41.1 % (ref 39.6–50)
HGB BLD-MCNC: 14 G/DL (ref 13–17)
MAGNESIUM SPEC-SCNC: 1.7 MG/DL (ref 1.6–2.3)
MCH RBC QN AUTO: 30.4 PG (ref 27–32)
MCHC RBC AUTO-ENTMCNC: 34.1 G/DL (ref 32–37)
MCV RBC AUTO: 89.3 FL (ref 80–97)
PLATELET # BLD AUTO: 101 10*3/UL (ref 140–440)
PMV BLD AUTO: 12.1 FL (ref 9.5–12.2)
POTASSIUM SERPL-SCNC: 2.8 MMOL/L (ref 3.5–5.1)
SODIUM SERPL-SCNC: 137 MMOL/L (ref 137–145)
WBC # BLD AUTO: 8.92 10*3/UL (ref 4.5–10)

## 2025-06-24 RX ADMIN — POTASSIUM CHLORIDE SCH MLS/HR: 7.46 INJECTION, SOLUTION INTRAVENOUS at 08:16

## 2025-06-24 RX ADMIN — MAGNESIUM SULFATE IN DEXTROSE SCH MLS/HR: 10 INJECTION, SOLUTION INTRAVENOUS at 12:06

## 2025-06-24 RX ADMIN — POTASSIUM CHLORIDE STA MEQ: 20 TABLET, EXTENDED RELEASE ORAL at 08:17
